# Patient Record
Sex: FEMALE | Race: WHITE | NOT HISPANIC OR LATINO | Employment: OTHER | URBAN - METROPOLITAN AREA
[De-identification: names, ages, dates, MRNs, and addresses within clinical notes are randomized per-mention and may not be internally consistent; named-entity substitution may affect disease eponyms.]

---

## 2017-01-31 ENCOUNTER — OFFICE VISIT (OUTPATIENT)
Dept: FAMILY MEDICINE | Facility: CLINIC | Age: 67
End: 2017-01-31
Payer: COMMERCIAL

## 2017-01-31 VITALS
TEMPERATURE: 97.4 F | HEART RATE: 65 BPM | SYSTOLIC BLOOD PRESSURE: 102 MMHG | BODY MASS INDEX: 24.18 KG/M2 | WEIGHT: 145.13 LBS | RESPIRATION RATE: 16 BRPM | OXYGEN SATURATION: 98 % | DIASTOLIC BLOOD PRESSURE: 70 MMHG | HEIGHT: 65 IN

## 2017-01-31 DIAGNOSIS — G25.81 RESTLESS LEGS SYNDROME (RLS): ICD-10-CM

## 2017-01-31 DIAGNOSIS — E78.2 MIXED HYPERLIPIDEMIA: ICD-10-CM

## 2017-01-31 DIAGNOSIS — M21.612 BUNION, LEFT: Primary | ICD-10-CM

## 2017-01-31 DIAGNOSIS — I10 ESSENTIAL HYPERTENSION: ICD-10-CM

## 2017-01-31 DIAGNOSIS — M19.041 OSTEOARTHRITIS OF BOTH HANDS, UNSPECIFIED OSTEOARTHRITIS TYPE: ICD-10-CM

## 2017-01-31 DIAGNOSIS — M19.042 OSTEOARTHRITIS OF BOTH HANDS, UNSPECIFIED OSTEOARTHRITIS TYPE: ICD-10-CM

## 2017-01-31 PROCEDURE — 99204 OFFICE O/P NEW MOD 45 MIN: CPT | Performed by: NURSE PRACTITIONER

## 2017-01-31 NOTE — PROGRESS NOTES
SUBJECTIVE:                                                    Nicole Stanley is a 66 year old female who presents to clinic today for the following health issues:    1.  Establishing Care, Patient has no other concerns.   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      History of bunions, does not ant to do anything about them yet as she is so active.    High cholesterol - on zocor daily for years - diet excellent, famillial  High blood pressure - on dyazide, well controlled, diet excellent also famillial  Hypothyroidism.  Low dose, many years, stable  RLS - taking klonopin 3 tablets daily was taking 1 for sleep, now has to take 2 each night.      Problem list and histories reviewed & adjusted, as indicated.  Additional history: as documented    Patient Active Problem List   Diagnosis     Essential hypertension     Mixed hyperlipidemia     Bunion, left     Osteoarthritis of both hands, unspecified osteoarthritis type     Past Surgical History   Procedure Laterality Date     Cholecystectomy       Gi surgery       rectal sphincter     Eye surgery       blepharoplasty     Vascular surgery  1988     vein stripping       Social History   Substance Use Topics     Smoking status: Never Smoker      Smokeless tobacco: Not on file     Alcohol Use: 0.0 oz/week     0 Standard drinks or equivalent per week      Comment: 1-2 glass of wine per day     Family History   Problem Relation Age of Onset     Hypertension Mother           Hyperlipidemia Mother      Family History Negative Father           Alzheimer Disease Father      Family History Negative Maternal Grandmother                No current outpatient prescriptions on file.     No Known Allergies    ROS:  Constitutional, HEENT, cardiovascular, pulmonary, GI, , musculoskeletal, neuro, skin, endocrine and psych systems are negative, except as otherwise noted.    OBJECTIVE:                                          "           /70 mmHg  Pulse 65  Temp(Src) 97.4  F (36.3  C) (Oral)  Resp 16  Ht 5' 4.5\" (1.638 m)  Wt 145 lb 2 oz (65.828 kg)  BMI 24.53 kg/m2  SpO2 98%  LMP  (LMP Unknown)  Breastfeeding? No  Body mass index is 24.53 kg/(m^2).  GENERAL: healthy, alert and no distress  EYES: Eyes grossly normal to inspection, PERRL and conjunctivae and sclerae normal  HENT: ear canals and TM's normal, nose and mouth without ulcers or lesions  RESP: lungs clear to auscultation - no rales, rhonchi or wheezes  CV: regular rate and rhythm, normal S1 S2, no S3 or S4, no murmur, click or rub, no peripheral edema and peripheral pulses strong  ABDOMEN: soft, nontender, no hepatosplenomegaly, no masses and bowel sounds normal  MS: no gross musculoskeletal defects noted, no edema  SKIN: no suspicious lesions or rashes    Diagnostic Test Results:  none      ASSESSMENT/PLAN:                                                        ICD-10-CM    1. Bunion, left M21.612    2. Essential hypertension I10    3. Mixed hyperlipidemia E78.2    4. Osteoarthritis of both hands, unspecified osteoarthritis type M19.041     M19.042    5. Restless legs syndrome (RLS) G25.81      Will monitor bunions, call when ready to take the next step with podiatry.     BP stable on meds, f/u PRN.  Labs UTD    Cholesterol stable on meds.  Due for f/u in 6 months    Monitor hands, continue exercises.      Will work with pt on weaning klonopin.  Has been increasing use  And needing more and more.    See Patient Instructions    YAAKOV Garcia [facility]  "

## 2017-02-06 ENCOUNTER — OFFICE VISIT (OUTPATIENT)
Dept: PODIATRY | Facility: CLINIC | Age: 67
End: 2017-02-06
Payer: COMMERCIAL

## 2017-02-06 ENCOUNTER — RADIANT APPOINTMENT (OUTPATIENT)
Dept: GENERAL RADIOLOGY | Facility: CLINIC | Age: 67
End: 2017-02-06
Attending: PODIATRIST
Payer: COMMERCIAL

## 2017-02-06 VITALS
SYSTOLIC BLOOD PRESSURE: 102 MMHG | HEIGHT: 65 IN | BODY MASS INDEX: 24.32 KG/M2 | WEIGHT: 146 LBS | DIASTOLIC BLOOD PRESSURE: 58 MMHG

## 2017-02-06 DIAGNOSIS — M20.5X9 HALLUX LIMITUS, UNSPECIFIED LATERALITY: ICD-10-CM

## 2017-02-06 DIAGNOSIS — M21.619 BUNION: Primary | ICD-10-CM

## 2017-02-06 PROCEDURE — 99204 OFFICE O/P NEW MOD 45 MIN: CPT | Performed by: PODIATRIST

## 2017-02-06 PROCEDURE — 73630 X-RAY EXAM OF FOOT: CPT | Mod: LT

## 2017-02-06 RX ORDER — CALCIUM CARBONATE 500(1250)
500 TABLET ORAL 2 TIMES DAILY
COMMUNITY
End: 2017-06-14

## 2017-02-06 RX ORDER — SACCHAROMYCES BOULARDII 250 MG
250 CAPSULE ORAL 2 TIMES DAILY
COMMUNITY
End: 2017-04-18

## 2017-02-06 RX ORDER — TRIAMTERENE/HYDROCHLOROTHIAZID 37.5-25 MG
1 TABLET ORAL DAILY
COMMUNITY
End: 2017-04-20

## 2017-02-06 NOTE — MR AVS SNAPSHOT
After Visit Summary   2/6/2017    Nicole Stanley    MRN: [id number]           Patient Information     Date Of Birth          1950        Visit Information        Provider Department      2/6/2017 11:20 AM Efraín Rousseau DPM [facility]        Today's Diagnoses     Bunion    -  1     Hallux limitus, unspecified laterality           Care Instructions    Over the Counter Inserts    Super Feet are the most common and easiest to find.    Locations include any [employer], [employer] in [place] on [address] and in [place] on [address], [employer] in [place] on [address], [facility] in [place] on [address], [facility] in [place] on [address], [employer] in [place] on [address] and [employer] in [place] on [address] and in [place] on [address].    Spenco can be found online and at [employer] in [place] on [address], [employer] in [facility] on [address], [employer] in [place] on [address], [employer] in [place] on [address] and [employer] in [place] on [address].    Power Step can be a little harder to find.  Locations include [employer] in [place] on [address], [employer] in [facility] [place], [employer] in [place] on [address] and online    [employer] - [employer]     [facility]    **  A good high quality over the counter insert can cost around $40-$50.     Bunion (hallux abducto valgus)   A bunion is caused by muscle imbalance. The great toe is pulled toward the smaller toes. The metatarsal head is pushed outward creating a lump on the side of your foot. Imbalance is the result of foot structure and instability.   Bunions do not improve with time. They usually enlarge, however this is a fairly slow process. Shoes do not necessarily cause bunions, however, they can hasten development and definitely cause bunions to  "hurt.   Bunions often run in families. We inherit a certain foot structure, which may be predisposed to bunion development.   Bunion pain is likely a combination of shoes rubbing on the bump, nerve irritation, compression between the toes, joint misalignment, arthritis and altered gait.   Signs & Symptoms of \"Bunions\"   Bunions are usually termed mild, moderate or severe. Just because you have a bunion does not mean you have to have pain. There are some people with very severe bunions and no pain and people with mild bunions and a lot of pain.    1.  Pain on the inside of your foot at the big toe joint (1st MTPJ)    2.  Swelling on the inside of your foot at the big toe joint    3.  Redness on the inside of your foot at the big toe joint    4.  Numbness or burning in the big toe (hallux)    5.  Decreased motion at the big toe joint    6.  Painful bursa (fluid-filled sac) on the inside of your foot at the big toe joint    7.  Pain while wearing shoes -especially shoes too narrow or with high heels     8.  Pain during activities    9.  Corn in between the big toe and second toe    10.  Callous formation on the side or bottom of the big toe or big toe joint    11.  Callous under the second toe joint (2nd MTPJ)    12.  Pain in the second toe joint   Treatment for \"Bunions\"   Conservative (non-surgical) treatment will not make the bunion go away, but it will hopefully decrease the signs and symptoms you have and help you get rid of the pain and get you back to your activities.    1.  Wider shoes    2.  Extra depth shoes    3.  Stretch shoes (where bunion is) Cut an \"x\" or cross in the shoe (where bunion is)    4.  Ice    5.  Padding, splints, toe spacers    6.  NSAIDs    7.  Arch supports    8.  Custom orthoses (orthotics)    9.  Change activities    10.  Physical therapy     11.  Surgical treatment for bunions is sometimes needed. If you are limited by pain, cannot fit in shoes comfortably and are not able to do your " daily activities then surgery may be a good option for you. There are many different surgical procedures to repair bunions. Your foot doctor (podiatrist) will review your foot exam findings, your x-rays, your age, your health, your lifestyle, your physical activity level and discuss with you which procedure he or she would recommend. Surgical procedures for bunions range from soft tissue repair to cutting and realigning the bones. It is not recommended that you have bunion surgery for cosmetic reasons (you do not like how your foot looks) or because you want to fit in a certain pair of shoes; There is the risk that even after surgery, the bunion will reoccur 9-10% of the time.   Most bunion pain can be improved simply by wearing compatible shoes. People with bunions cannot choose footwear simply because they like the style. Your bunion should determine which shoes are to be worn. Wide shoes with nonirritating seams,soft leather and a square toe box are most compatible with a bunion. Shoes should fit appropriately right out of the box but may need to be professionally stretched and modified to accommodate the bump. Heels, dress shoes and shoes with pointed toes will not be comfortable.   Shoe inserts or orthotics will often times help with bunion pain, however inserts make shoe fit more challenging. Pads placed over the bunion can also help relieve the pain. Injection may help with nerve irritation.   Bunion surgery involves cutting and repositioning the bones surrounding the bunion. Pins and screws are used to hold the bones in place during the healing process. The goal of bunion surgery is to reduce the size of the bunion bump. Realignment of the toe and joint is attempted.     Some first toes cannot be forced back into normal alignment even with surgery. Surgery is helpful in most cases but does not necessarily create a normal foot.   Healing after surgery requires about six weeks of protection. This allows the  bone to heal. Maximum recovery takes about one year. The scar tissue and joint structures require this amount of time to finish the healing process. Expect stiffness, swelling and numbness during that time frame. Bunion surgery does involve side effects. Some side effects are predictable and others are less common but do occur. A scar will be visible and could be irritated by shoes. The shoe may rub on the screw or internal pin requiring surgical removal of these fixation devices. The screw and pin would likely be left in place for a full year. The first toe may loose motion after bunion surgery. The amount of stiffness is variable. Some people never regain normal motion of the first toe. This is due to scar tissue inherent to any surgery. The first toe may drift toward the second toe or away from the second toe. Spreading of the first and second toes is a rare occurrence after bunion surgery. This can be quite bothersome and would need to be surgically repaired. Toe drift toward the second toe could result in a recurrent bunion and revision surgery. Joint fusion is one option to correct an unstable, drifting toe. This procedure straightens the toe, however, no motion remains. Fusion may be necessary to correct complications of bunion surgery or as the original procedure in severe cases.   All surgical procedures involve risk of infection, numbness, pain, delayed healing, osteotomy dislocation, blood clots, continued foot pain, etc. Bunion surgery is quite complex and should not be taken lightly.   Any skin incision can lead to infection. Deep infection might involve the bone and thus repeat surgery and six weeks of IV antibiotics. Scar tissue can cause nerve pain or numbness. This is generally temporary but can be permanent. We do not have treatments that cure nerve problems. Second toe pain could be related to altered mechanics and pressure transferred to the second toe. Most feet with bunions have pre-existing  "second toe problems. Delayed bone healing would lengthen the healing time. Some bones simply do not heal. This requires repeat surgery, electronic bone stimulation and/or extended protection. Smokers have an approximate 20% chance of poor bone healing. This is double that of a non-smoker. The bone cut may displace. This may need to be repaired with a second operation. Displacement can cause joint malalignment. Immobility after surgery can cause blood clots in the legs and lungs. This could result in death.   Foot pain is complex. Most feet hurt for more than one reason. Fixing the bunion would not necessarily create a pain free foot. Appropriate shoes, healthy body weight, avoidance of bare foot walking and moderation of activity will always be necessary to enjoy foot comfort. Your bunion may involve arthritis, which is incurable even with surgery. Long standing bunions often involve chronic irritation to the surrounding nerves. Nerve pain may not resolve even with reducing the bunion bump since permanent nerve damage may be present   Bunion surgery is nevertheless quite successful. Most surgical patients are pleased with their foot following bunion surgery. Many of the issues described above can be controlled by taking proper care of your foot during the healing process.   Cosmetic bunion surgery is discouraged for the reasons listed above. A bunion that is comfortable when wearing appropriate shoes should simply be treated with appropriate shoes.   Your surgeon would be happy to fully describe any of the above issues. You should pursue a full understanding of the operation,recovery process and any potential problems that could develop.   Prevention of \"Bunions\"    1.  Do not wear high heels if there is a family history of bunions.   2.  Wear shoes that have enough width and depth in the toe box.  Use a motion control arch support if you over-pronate (foot rolls in)         DEGENERATIVE ARTHRITIS OF THE BIG TOE " "JOINT (hallux limitus/hallux rigidus)   Arthritis of the joint at the base of the big toe (metatarsophalangeal joint) has several causes. Usually it results from repetitive trauma to the joint, secondary to abnormal foot mechanics. Often it is hereditary. However, a one-time traumatic event can lead to arthritis. The condition can worsen with time. The cartilage wears out, joint surfaces are no longer smooth, bone rubs on bone, inflammation occurs with pain, and eventually bone spurs and loose fragments might develop.   The joint is often painful with activity, worse with flimsy shoes or walking barefoot, and it slowly progresses over time. A person might notice the toe \"locking up\" with walking. There often is an obvious, and irritating, bony bump on top of the foot. Shoes might be uncomfortable. In some people the pain is so bothersome that recreational activities sometimes even normal daily activities are difficult to perform.   The pain from this arthritis is likely a combination of joint jamming, cartilage loss and inflammation, and irritation from shoes rubbing on the bump. Sometimes other parts of the foot, leg, or back hurt from altering one's walk to compensate for the painful joint.     Ways to help a person live with the discomfort include wearing a good, supportive shoe with a rigid, rocker-type bottom. An example is a hiking boot. A rigid sole minimizes bending of the joint, and therefore, joint motion and pain. Shoes with a high toe box allow for less rubbing on the bump. Avoiding barefoot walking, sandals, flip-flops and slippers usually helps.     Sometimes an insert or orthotic provides symptom relief. This might make shoe fit more difficult. Pads over the bump and occasionally injections into the joint provide relief.     Surgery for this condition is aimed towards alleviating pain. It does not cure the arthritis nor does it guarantee better joint motion. Depending on the condition of the " metatarsophalangeal joint, there are several surgical options:    1.  Cutting off the bony bump(s) and cleaning the joint    2.  Loosening the joint up by making cuts in the first metatarsal bone or the big toe bone and removing a small section of bone.    3.  Repositioning bone to minimize jamming of the joint.    4.  In severe cases, the joint is fused. By fusing the joint, it will never bend again. This resolves the pain, because it's the movement of a worn out joint that causes pain. Oftentimes the operation involves a combination of these procedures and requires the use of screws, pins, and/or a small surgical plate.     Healing after surgery requires about six weeks of protection. This allows the bone to heal. Maximum recovery takes about one year. The scar tissue and joint structures require this amount of time to finish the healing process. Expect stiffness, swelling and numbness during that time frame.   Surgery for arthritis of the metatarsophalangeal joint does involve side effects. Some side effects are predictable and others are less common but do occur. A scar will be visible and could be irritated by shoes. The shoe may rub on the screw or internal pin requiring surgical removal of these fixation devices. The screw and pin would likely be left in place for a full year. The first toe may remain stiff after surgery. The amount of stiffness is variable. Most people never regain normal motion of the first toe. This is due to scar tissue inherent to any surgery, in addition to the cumulative effects of arthritis. Sometimes the big toe drifts to one side or the other. Joint fusion is one option to correct an unstable, drifting toe.   All surgical procedures involve risk of infection, numbness, pain, delayed bone healing, osteotomy (bone cut) dislocation, blood clots, continued foot pain, etc. Arthritic joint surgery is quite complex and should not be taken lightly.    Any skin incision can lead to  infection. Deep infection might involve the bone and thus repeat surgery and six weeks of IV antibiotics. Scar tissue can cause nerve pain or numbness. This is generally temporary but can be permanent. We do not have treatments that cure nerve problems. Second toe pain could be related to altered mechanics and pressure transferred to the second toe. Delayed bone healing would lengthen the healing time. Some bones simply do not heal. This requires repeat surgery, electronic bone stimulation and/or extended protection. Smokers have an approximate 20% chance of poor bone healing. This is double that of a non-smoker. The bone cut may displace. This may need to be repaired with a second operation. Displacement can cause joint malalignment. Immobility after surgery can cause a blood clot in the legs and lungs. This could result in death.   Foot pain is complex. Most feet hurt for more than one reason. Operating on the arthritic   big toe joint will not necessarily create a pain free foot. Appropriate shoes, healthy body weight, avoidance of bare foot walking and moderation of activity will always be necessary to enjoy foot comfort. Arthritis is incurable even with surgery.     Surgery for this type of arthritis is nevertheless quite successful. Most surgical patients are pleased with their foot following surgery. Many of the issues described above can be controlled by taking proper care of your foot during the healing process.   Cosmetic bump surgery is discouraged for the reasons listed above. A bump and joint that is comfortable when wearing appropriate shoes should simply be treated with appropriate shoes.   Your surgeon would be happy to fully describe any of the above issues. You should pursue a full understanding of the operation, recovery process and any potential problems that could develop.         Surgical planning.  If you have decided to have surgery, follow these few steps to get the procedure scheduled and  "to have the proper paperwork filled out.  If you are unsure about surgery, or would like to sit down and further discuss your issue and treatment options, please make a clinic appointment with Dr Rousseau.    1.  Pick the date that you would like to have surgery.  Keep in mind that you will likely need at least 2 weeks off after the procedure for proper rest and healing.    2.  Call the surgery scheduling line at [phone] to get the procedure scheduled.    3.  Make an appointment to see Dr Rousseau within 1 week of the date of surgery for your pre-operative consult.  When making the appointment, say \"I need to make a 40 minute surgical consult with Dr Rousseau\".  It is recommended that you bring a spouse, family member or friend with you.  There will be lots of information presented.  It can be overwhelming, and it is better to have someone there to help sort out the details.    4.  Make an appointment to see your Primary Care Physician within 4 weeks of the date of surgery for your \"Pre-operative History and Physical\".  This is done to make sure you are medically healthy to undergo surgery.        If you have any post-operative questions regarding your procedure, call our triage nurses at [phone].          Follow-ups after your visit        Follow-up notes from your care team     Return if symptoms worsen or fail to improve.      Who to contact     If you have questions or need follow up information about today's clinic visit or your schedule please contact [facility] directly at [phone].  Normal or non-critical lab and imaging results will be communicated to you by MyChart, letter or phone within 4 business days after the clinic has received the results. If you do not hear from us within 7 days, please contact the clinic through [employer] or phone. If you have a critical or abnormal lab result, we will notify you by phone as soon as possible.  Submit refill requests through [employer] " "or call your pharmacy and they will forward the refill request to us. Please allow 3 business days for your refill to be completed.          Additional Information About Your Visit        MyChart Information     [employer] lets you send messages to your doctor, view your test results, renew your prescriptions, schedule appointments and more. To sign up, go to [email or web address] . Click on \"Log in\" on the left side of the screen, which will take you to the Welcome page. Then click on \"Sign up Now\" on the right side of the page.     You will be asked to enter the access code listed below, as well as some personal information. Please follow the directions to create your username and password.     Your access code is: [email or web address]  Expires: 2017  1:55 PM     Your access code will  in 90 days. If you need help or a new code, please call your [facility] clinic or [phone].        Care EveryWhere ID     This is your Care EveryWhere ID. This could be used by other organizations to access your [facility] medical records  [id number]        Your Vitals Were     Height BMI (Body Mass Index) Last Period             5' 4.5\" (1.638 m) 24.68 kg/m2 (LMP Unknown)          Blood Pressure from Last 3 Encounters:   17 102/58   17 102/70    Weight from Last 3 Encounters:   17 146 lb (66.225 kg)   17 145 lb 2 oz (65.828 kg)              We Performed the Following     XR Foot Left G/E 3 Views        Primary Care Provider    None Specified       No primary provider on file.        Thank you!     Thank you for choosing [facility]  for your care. Our goal is always to provide you with excellent care. Hearing back from our patients is one way we can continue to improve our services. Please take a few minutes to complete the written survey that you may receive in the mail after your visit with us. Thank you!             Your Updated Medication List - Protect others around you: Learn how " to safely use, store and throw away your medicines at www.disposemymeds.org.          This list is accurate as of: 2/6/17  1:55 PM.  Always use your most recent med list.                   Brand Name Dispense Instructions for use    ASPIRIN PO      Take 81 mg by mouth daily       calcium carbonate 500 MG tablet    OS-ELISE 500 mg [place]     Take 500 mg by mouth 2 times daily       CLONAZEPAM PO      Take 0.5 mg by mouth 3 times daily       LEVOTHROID PO      Take 50 mcg by mouth       NAPROSYN PO      Take 220 mg by mouth 2 times daily (with meals)       saccharomyces boulardii 250 MG capsule    FLORASTOR     Take 250 mg by mouth 2 times daily       triamterene-hydrochlorothiazide 37.5-25 MG per tablet    MAXZIDE-25     Take 1 tablet by mouth daily

## 2017-02-06 NOTE — NURSING NOTE
"No chief complaint on file.      Initial /58 mmHg  Ht 5' 4.5\" (1.638 m)  Wt 146 lb (66.225 kg)  BMI 24.68 kg/m2  LMP  (LMP Unknown) Estimated body mass index is 24.68 kg/(m^2) as calculated from the following:    Height as of this encounter: 5' 4.5\" (1.638 m).    Weight as of this encounter: 146 lb (66.225 kg).  Medication Reconciliation: complete    regular cuff    Lina Krause CMA        "

## 2017-02-06 NOTE — PATIENT INSTRUCTIONS
"Over the Counter Inserts    Super Feet are the most common and easiest to find.    Locations include any [employer], [employer] in [place] on [address] and in [place] on [address], [employer] in [facility] on [address], [employer] [facility] in [facility] on [address], [facility] in [place] on [address], [employer] in [place] on [address] and [employer] in [facility] on [address] and in [place] on [address].    Spenco can be found online and at [employer] in [facility] on [address], [employer] in [facility] on [address], [employer] in [place] on [address], [employer] in [place] on [address] and [employer] in [place] on [address].    Power Step can be a little harder to find.  Locations include [employer] in [place] on [address], [place] in [facility], [employer] in [place] on [address] and online    [employer]     [facility]    **  A good high quality over the counter insert can cost around $40-$50.     Bunion (hallux abducto valgus)   A bunion is caused by muscle imbalance. The great toe is pulled toward the smaller toes. The metatarsal head is pushed outward creating a lump on the side of your foot. Imbalance is the result of foot structure and instability.   Bunions do not improve with time. They usually enlarge, however this is a fairly slow process. Shoes do not necessarily cause bunions, however, they can hasten development and definitely cause bunions to hurt.   Bunions often run in families. We inherit a certain foot structure, which may be predisposed to bunion development.   Bunion pain is likely a combination of shoes rubbing on the bump, nerve irritation, compression between the toes, joint misalignment, arthritis and altered gait.   Signs & Symptoms of \"Bunions\"   Bunions are usually termed mild, moderate or severe. Just because you have a " "bunion does not mean you have to have pain. There are some people with very severe bunions and no pain and people with mild bunions and a lot of pain.    1.  Pain on the inside of your foot at the big toe joint (1st MTPJ)    2.  Swelling on the inside of your foot at the big toe joint    3.  Redness on the inside of your foot at the big toe joint    4.  Numbness or burning in the big toe (hallux)    5.  Decreased motion at the big toe joint    6.  Painful bursa (fluid-filled sac) on the inside of your foot at the big toe joint    7.  Pain while wearing shoes -especially shoes too narrow or with high heels     8.  Pain during activities    9.  Corn in between the big toe and second toe    10.  Callous formation on the side or bottom of the big toe or big toe joint    11.  Callous under the second toe joint (2nd MTPJ)    12.  Pain in the second toe joint   Treatment for \"Bunions\"   Conservative (non-surgical) treatment will not make the bunion go away, but it will hopefully decrease the signs and symptoms you have and help you get rid of the pain and get you back to your activities.    1.  Wider shoes    2.  Extra depth shoes    3.  Stretch shoes (where bunion is) Cut an \"x\" or cross in the shoe (where bunion is)    4.  Ice    5.  Padding, splints, toe spacers    6.  NSAIDs    7.  Arch supports    8.  Custom orthoses (orthotics)    9.  Change activities    10.  Physical therapy     11.  Surgical treatment for bunions is sometimes needed. If you are limited by pain, cannot fit in shoes comfortably and are not able to do your daily activities then surgery may be a good option for you. There are many different surgical procedures to repair bunions. Your foot doctor (podiatrist) will review your foot exam findings, your x-rays, your age, your health, your lifestyle, your physical activity level and discuss with you which procedure he or she would recommend. Surgical procedures for bunions range from soft tissue repair to " cutting and realigning the bones. It is not recommended that you have bunion surgery for cosmetic reasons (you do not like how your foot looks) or because you want to fit in a certain pair of shoes; There is the risk that even after surgery, the bunion will reoccur 9-10% of the time.   Most bunion pain can be improved simply by wearing compatible shoes. People with bunions cannot choose footwear simply because they like the style. Your bunion should determine which shoes are to be worn. Wide shoes with nonirritating seams,soft leather and a square toe box are most compatible with a bunion. Shoes should fit appropriately right out of the box but may need to be professionally stretched and modified to accommodate the bump. Heels, dress shoes and shoes with pointed toes will not be comfortable.   Shoe inserts or orthotics will often times help with bunion pain, however inserts make shoe fit more challenging. Pads placed over the bunion can also help relieve the pain. Injection may help with nerve irritation.   Bunion surgery involves cutting and repositioning the bones surrounding the bunion. Pins and screws are used to hold the bones in place during the healing process. The goal of bunion surgery is to reduce the size of the bunion bump. Realignment of the toe and joint is attempted.     Some first toes cannot be forced back into normal alignment even with surgery. Surgery is helpful in most cases but does not necessarily create a normal foot.   Healing after surgery requires about six weeks of protection. This allows the bone to heal. Maximum recovery takes about one year. The scar tissue and joint structures require this amount of time to finish the healing process. Expect stiffness, swelling and numbness during that time frame. Bunion surgery does involve side effects. Some side effects are predictable and others are less common but do occur. A scar will be visible and could be irritated by shoes. The shoe may rub  on the screw or internal pin requiring surgical removal of these fixation devices. The screw and pin would likely be left in place for a full year. The first toe may loose motion after bunion surgery. The amount of stiffness is variable. Some people never regain normal motion of the first toe. This is due to scar tissue inherent to any surgery. The first toe may drift toward the second toe or away from the second toe. Spreading of the first and second toes is a rare occurrence after bunion surgery. This can be quite bothersome and would need to be surgically repaired. Toe drift toward the second toe could result in a recurrent bunion and revision surgery. Joint fusion is one option to correct an unstable, drifting toe. This procedure straightens the toe, however, no motion remains. Fusion may be necessary to correct complications of bunion surgery or as the original procedure in severe cases.   All surgical procedures involve risk of infection, numbness, pain, delayed healing, osteotomy dislocation, blood clots, continued foot pain, etc. Bunion surgery is quite complex and should not be taken lightly.   Any skin incision can lead to infection. Deep infection might involve the bone and thus repeat surgery and six weeks of IV antibiotics. Scar tissue can cause nerve pain or numbness. This is generally temporary but can be permanent. We do not have treatments that cure nerve problems. Second toe pain could be related to altered mechanics and pressure transferred to the second toe. Most feet with bunions have pre-existing second toe problems. Delayed bone healing would lengthen the healing time. Some bones simply do not heal. This requires repeat surgery, electronic bone stimulation and/or extended protection. Smokers have an approximate 20% chance of poor bone healing. This is double that of a non-smoker. The bone cut may displace. This may need to be repaired with a second operation. Displacement can cause joint  "malalignment. Immobility after surgery can cause blood clots in the legs and lungs. This could result in death.   Foot pain is complex. Most feet hurt for more than one reason. Fixing the bunion would not necessarily create a pain free foot. Appropriate shoes, healthy body weight, avoidance of bare foot walking and moderation of activity will always be necessary to enjoy foot comfort. Your bunion may involve arthritis, which is incurable even with surgery. Long standing bunions often involve chronic irritation to the surrounding nerves. Nerve pain may not resolve even with reducing the bunion bump since permanent nerve damage may be present   Bunion surgery is nevertheless quite successful. Most surgical patients are pleased with their foot following bunion surgery. Many of the issues described above can be controlled by taking proper care of your foot during the healing process.   Cosmetic bunion surgery is discouraged for the reasons listed above. A bunion that is comfortable when wearing appropriate shoes should simply be treated with appropriate shoes.   Your surgeon would be happy to fully describe any of the above issues. You should pursue a full understanding of the operation,recovery process and any potential problems that could develop.   Prevention of \"Bunions\"    1.  Do not wear high heels if there is a family history of bunions.   2.  Wear shoes that have enough width and depth in the toe box.  Use a motion control arch support if you over-pronate (foot rolls in)         DEGENERATIVE ARTHRITIS OF THE BIG TOE JOINT (hallux limitus/hallux rigidus)   Arthritis of the joint at the base of the big toe (metatarsophalangeal joint) has several causes. Usually it results from repetitive trauma to the joint, secondary to abnormal foot mechanics. Often it is hereditary. However, a one-time traumatic event can lead to arthritis. The condition can worsen with time. The cartilage wears out, joint surfaces are no " "longer smooth, bone rubs on bone, inflammation occurs with pain, and eventually bone spurs and loose fragments might develop.   The joint is often painful with activity, worse with flimsy shoes or walking barefoot, and it slowly progresses over time. A person might notice the toe \"locking up\" with walking. There often is an obvious, and irritating, bony bump on top of the foot. Shoes might be uncomfortable. In some people the pain is so bothersome that recreational activities sometimes even normal daily activities are difficult to perform.   The pain from this arthritis is likely a combination of joint jamming, cartilage loss and inflammation, and irritation from shoes rubbing on the bump. Sometimes other parts of the foot, leg, or back hurt from altering one's walk to compensate for the painful joint.     Ways to help a person live with the discomfort include wearing a good, supportive shoe with a rigid, rocker-type bottom. An example is a hiking boot. A rigid sole minimizes bending of the joint, and therefore, joint motion and pain. Shoes with a high toe box allow for less rubbing on the bump. Avoiding barefoot walking, sandals, flip-flops and slippers usually helps.     Sometimes an insert or orthotic provides symptom relief. This might make shoe fit more difficult. Pads over the bump and occasionally injections into the joint provide relief.     Surgery for this condition is aimed towards alleviating pain. It does not cure the arthritis nor does it guarantee better joint motion. Depending on the condition of the metatarsophalangeal joint, there are several surgical options:    1.  Cutting off the bony bump(s) and cleaning the joint    2.  Loosening the joint up by making cuts in the first metatarsal bone or the big toe bone and removing a small section of bone.    3.  Repositioning bone to minimize jamming of the joint.    4.  In severe cases, the joint is fused. By fusing the joint, it will never bend again. " This resolves the pain, because it's the movement of a worn out joint that causes pain. Oftentimes the operation involves a combination of these procedures and requires the use of screws, pins, and/or a small surgical plate.     Healing after surgery requires about six weeks of protection. This allows the bone to heal. Maximum recovery takes about one year. The scar tissue and joint structures require this amount of time to finish the healing process. Expect stiffness, swelling and numbness during that time frame.   Surgery for arthritis of the metatarsophalangeal joint does involve side effects. Some side effects are predictable and others are less common but do occur. A scar will be visible and could be irritated by shoes. The shoe may rub on the screw or internal pin requiring surgical removal of these fixation devices. The screw and pin would likely be left in place for a full year. The first toe may remain stiff after surgery. The amount of stiffness is variable. Most people never regain normal motion of the first toe. This is due to scar tissue inherent to any surgery, in addition to the cumulative effects of arthritis. Sometimes the big toe drifts to one side or the other. Joint fusion is one option to correct an unstable, drifting toe.   All surgical procedures involve risk of infection, numbness, pain, delayed bone healing, osteotomy (bone cut) dislocation, blood clots, continued foot pain, etc. Arthritic joint surgery is quite complex and should not be taken lightly.    Any skin incision can lead to infection. Deep infection might involve the bone and thus repeat surgery and six weeks of IV antibiotics. Scar tissue can cause nerve pain or numbness. This is generally temporary but can be permanent. We do not have treatments that cure nerve problems. Second toe pain could be related to altered mechanics and pressure transferred to the second toe. Delayed bone healing would lengthen the healing time. Some  bones simply do not heal. This requires repeat surgery, electronic bone stimulation and/or extended protection. Smokers have an approximate 20% chance of poor bone healing. This is double that of a non-smoker. The bone cut may displace. This may need to be repaired with a second operation. Displacement can cause joint malalignment. Immobility after surgery can cause a blood clot in the legs and lungs. This could result in death.   Foot pain is complex. Most feet hurt for more than one reason. Operating on the arthritic   big toe joint will not necessarily create a pain free foot. Appropriate shoes, healthy body weight, avoidance of bare foot walking and moderation of activity will always be necessary to enjoy foot comfort. Arthritis is incurable even with surgery.     Surgery for this type of arthritis is nevertheless quite successful. Most surgical patients are pleased with their foot following surgery. Many of the issues described above can be controlled by taking proper care of your foot during the healing process.   Cosmetic bump surgery is discouraged for the reasons listed above. A bump and joint that is comfortable when wearing appropriate shoes should simply be treated with appropriate shoes.   Your surgeon would be happy to fully describe any of the above issues. You should pursue a full understanding of the operation, recovery process and any potential problems that could develop.         Surgical planning.  If you have decided to have surgery, follow these few steps to get the procedure scheduled and to have the proper paperwork filled out.  If you are unsure about surgery, or would like to sit down and further discuss your issue and treatment options, please make a clinic appointment with Dr Rousseau.    1.  Pick the date that you would like to have surgery.  Keep in mind that you will likely need at least 2 weeks off after the procedure for proper rest and healing.    2.  Call the surgery scheduling  "line at [phone] to get the procedure scheduled.    3.  Make an appointment to see Dr Rousseau within 1 week of the date of surgery for your pre-operative consult.  When making the appointment, say \"I need to make a 40 minute surgical consult with Dr Rousseau\".  It is recommended that you bring a spouse, family member or friend with you.  There will be lots of information presented.  It can be overwhelming, and it is better to have someone there to help sort out the details.    4.  Make an appointment to see your Primary Care Physician within 4 weeks of the date of surgery for your \"Pre-operative History and Physical\".  This is done to make sure you are medically healthy to undergo surgery.        If you have any post-operative questions regarding your procedure, call our triage nurses at [phone].    "

## 2017-02-06 NOTE — PROGRESS NOTES
PATIENT HISTORY:  Nicole Stanley is a 66 year old female who presents to clinic for b/l 1st MPJ pain, L>R.  Longstanding hx of bunions.  They run in her family.  Pain worse with activity.  She has tried wider shoes with mixed results.  0-10/10 pain.   She is considering surgery.  15 year duration.  No injury.      Review of Systems:  Patient denies fever, chills, rash, wound, stiffness, limping, numbness, weakness, heart burn, blood in stool, chest pain with activity, calf pain when walking, shortness of breath with activity, chronic cough, easy bleeding/bruising, swelling of ankles, excessive thirst, fatigue, depression, anxiety.      PAST MEDICAL HISTORY:   Past Medical History   Diagnosis Date     Arthritis      osteoarth        PAST SURGICAL HISTORY:   Past Surgical History   Procedure Laterality Date     Cholecystectomy  2000     Gi surgery  2006     rectal sphincter     Eye surgery       blepharoplasty     Vascular surgery  1988     vein stripping        MEDICATIONS:   Current outpatient prescriptions:      Levothyroxine Sodium (LEVOTHROID PO), Take 50 mcg by mouth, Disp: , Rfl:      triamterene-hydrochlorothiazide (MAXZIDE-25) 37.5-25 MG per tablet, Take 1 tablet by mouth daily, Disp: , Rfl:      CLONAZEPAM PO, Take 0.5 mg by mouth 3 times daily, Disp: , Rfl:      saccharomyces boulardii (FLORASTOR) 250 MG capsule, Take 250 mg by mouth 2 times daily, Disp: , Rfl:      Naproxen (NAPROSYN PO), Take 220 mg by mouth 2 times daily (with meals), Disp: , Rfl:      calcium carbonate (OS-ELISE 500 MG [place]) 500 MG tablet, Take 500 mg by mouth 2 times daily, Disp: , Rfl:      ASPIRIN PO, Take 81 mg by mouth daily, Disp: , Rfl:      ALLERGIES:  No Known Allergies     SOCIAL HISTORY:   Social History     Social History     Marital Status:      Spouse Name: N/A     Number of Children: N/A     Years of Education: N/A     Occupational History     Not on file.     Social History Main Topics     Smoking status: Never  "Smoker      Smokeless tobacco: Not on file     Alcohol Use: 0.0 oz/week     0 Standard drinks or equivalent per week      Comment: 1-2 glass of wine per day     Drug Use: No     Sexual Activity:     Partners: Male     Other Topics Concern     Not on file     Social History Narrative        FAMILY HISTORY:   Family History   Problem Relation Age of Onset     Hypertension Mother           Hyperlipidemia Mother      Family History Negative Father           Alzheimer Disease Father      Family History Negative Maternal Grandmother               EXAM:Vitals: /58 mmHg  Ht 5' 4.5\" (1.638 m)  Wt 146 lb (66.225 kg)  BMI 24.68 kg/m2  LMP  (LMP Unknown)  BMI= Body mass index is 24.68 kg/(m^2).    General appearance: Patient is alert and fully cooperative with history & exam.  No sign of distress is noted during the visit.     Psychiatric: Affect is pleasant & appropriate.  Patient appears motivated to improve health.     Respiratory: Breathing is regular & unlabored while sitting.     HEENT: Hearing is intact to spoken word.  Speech is clear.  No gross evidence of visual impairment that would impact ambulation.     Dermatologic: Skin is intact to both lower extremities without significant lesions, rash or abrasion.  No paronychia or evidence of soft tissue infection is noted.     Vascular: DP & PT pulses are intact & regular bilaterally.  No significant edema or varicosities noted.  CFT and skin temperature are normal to both lower extremities.     Neurologic: Lower extremity sensation is intact to light touch.  No evidence of weakness or contracture in the lower extremities.  No evidence of neuropathy.     Musculoskeletal:  B/l bunions noted with palpable 1st MPJ periarticular bone spurring. Mild limitation of joint ROM, L more limited than R.  Pain to medial eminence b/l.  Mild left 2nd hammertoe, not painful.  Patient is ambulatory without assistive device or brace.  No gross ankle " deformity noted.  No foot or ankle joint effusion is noted.    XRs of left foot reviewed with pt.  No acute findings.  Increased 1st IM angle.  Degenerative changes at 1st MPJ.     ASSESSMENT:   Left foot arthritic bunion     PLAN:  Reviewed patient's chart in epic.  Discussed condition and treatment options including pros and cons.    Surgical and non-surgical treatment options for hallux limitus and bunion were discussed.  This includes my philosophy about different procedures including cheilectomy, osteotomy and fusion.  I explained how fusion is for late stage treatment of advanced arthritis.  There is no certainty that the non-fusion procedures will improve joint pain that is caused by arthritis.  I explained that hallux limitus is oftentimes surgically treated in a staged manner.  This means that additional surgery may be necessary over time.  Non-operative treatments like wide shoes, stiff soles, orthotics, injection and NSAIDs were discussed.  Shoes that provide extra room for the enlarged joint should be helpful.  I would anticipate somewhat short term benefit from joint injection.  I don't think pad/splints will help.    Surgery discussed in detail including recovery requirements.  I think 1st MPJ fusion would be a good option for her.  6-10 wks NWB required.  She is considering this fall.    She will try wide shoes, otc inserts for now (superfeet advised).    F/u prn.      Efraín Rousseau DPM, FACFAS

## 2017-04-18 ENCOUNTER — OFFICE VISIT (OUTPATIENT)
Dept: FAMILY MEDICINE | Facility: CLINIC | Age: 67
End: 2017-04-18
Payer: COMMERCIAL

## 2017-04-18 VITALS
BODY MASS INDEX: 24.37 KG/M2 | RESPIRATION RATE: 16 BRPM | DIASTOLIC BLOOD PRESSURE: 83 MMHG | HEIGHT: 65 IN | OXYGEN SATURATION: 99 % | WEIGHT: 146.25 LBS | TEMPERATURE: 97 F | HEART RATE: 69 BPM | SYSTOLIC BLOOD PRESSURE: 123 MMHG

## 2017-04-18 DIAGNOSIS — G47.00 INSOMNIA, UNSPECIFIED TYPE: ICD-10-CM

## 2017-04-18 DIAGNOSIS — Z00.00 ROUTINE GENERAL MEDICAL EXAMINATION AT A HEALTH CARE FACILITY: Primary | ICD-10-CM

## 2017-04-18 PROCEDURE — 99397 PER PM REEVAL EST PAT 65+ YR: CPT | Performed by: NURSE PRACTITIONER

## 2017-04-18 RX ORDER — TRAZODONE HYDROCHLORIDE 50 MG/1
50 TABLET, FILM COATED ORAL
Qty: 90 TABLET | Refills: 1 | Status: SHIPPED | OUTPATIENT
Start: 2017-04-18 | End: 2017-05-19

## 2017-04-18 NOTE — PROGRESS NOTES
SUBJECTIVE:                                                            Nicole Stanley is a 66 year old female who presents for Preventive Visit.  Are you in the first 12 months of your Medicare coverage?  No    Physical   Annual:     Getting at least 3 servings of Calcium per day::  Yes    Bi-annual eye exam::  Yes    Dental care twice a year::  Yes    Sleep apnea or symptoms of sleep apnea::  Daytime drowsiness    Diet::  Regular (no restrictions)    Frequency of exercise::  6-7 days/week    Duration of exercise::  45-60 minutes    Taking medications regularly::  Yes    Medication side effects::  Muscle aches and Other    Additional concerns today::  YES      COGNITIVE SCREEN  1) Repeat 3 items (Banana, Sunrise, Chair)    2) Clock draw: NORMAL  3) 3 item recall: Recalls 2 objects   Results: NORMAL clock, 1-2 items recalled: COGNITIVE IMPAIRMENT LESS LIKELY    Mini-CogTM Copyright S Tr. Licensed by the author for use in [facility]; reprinted with permission (london@[facility]). All rights reserved.        Reviewed and updated as needed this visit by clinical staff  Tobacco  Allergies  Meds  Problems  Med Hx  Surg Hx  Fam Hx  Soc Hx          Reviewed and updated as needed this visit by Provider  Allergies  Meds  Problems  Surg Hx  Fam Hx        Social History   Substance Use Topics     Smoking status: Never Smoker     Smokeless tobacco: Not on file     Alcohol use 0.0 oz/week     0 Standard drinks or equivalent per week      Comment: 1-2 glass of wine per day       The patient does not drink >3 drinks per day nor >7 drinks per week.          Today's PHQ-2 Score:   PHQ-2 ( 1999 Pfizer) 4/18/2017   Little interest or pleasure in doing things Not at all   Feeling down, depressed or hopeless Not at all   PHQ-2 Score 0       Do you feel safe in your environment - Yes    Do you have a Health Care Directive?: No: Advance care planning reviewed with patient; information given to patient to  "review.    Current providers sharing in care for this patient include:   No care team member to display      Hearing impairment: Yes, Difficulty following a conversation in a noisy restaurant or crowded room.    Ability to successfully perform activities of daily living: Yes, no assistance needed     Fall risk:  Fall Risk Assessment not completed.    Home safety:  throw rugs in the hallway  click delete button to remove this line now    The following health maintenance items are reviewed in Epic and correct as of today:  Health Maintenance   Topic Date Due     ADVANCE DIRECTIVE PLANNING Q5 YRS (NO INBASKET)  05/31/1967     HEPATITIS C SCREENING  05/31/1967     FALL RISK ASSESSMENT  05/31/2014     PNEUMOCOCCAL (1 of 2 - PCV13) 05/31/2014     INFLUENZA VACCINE (SYSTEM ASSIGNED)  09/01/2017     MAMMO SCREEN Q2 YR (SYSTEM ASSIGNED)  04/13/2018     COLON CANCER SCREEN (SYSTEM ASSIGNED)  09/08/2020     LIPID SCREEN Q5 YR FEMALE (SYSTEM ASSIGNED)  04/08/2021     TETANUS IMMUNIZATION (SYSTEM ASSIGNED)  04/27/2025     DEXA SCAN SCREENING (SYSTEM ASSIGNED)  Completed         Mammogram Screening: Patient over age 50, mutual decision to screen reflected in health maintenance.     ROS:  Constitutional, HEENT, cardiovascular, pulmonary, GI, , musculoskeletal, neuro, skin, endocrine and psych systems are negative, except as otherwise noted.    Problem list, Medication list, Allergies, and Medical/Social/Surgical histories reviewed in [facility] and updated as appropriate.  OBJECTIVE:                                                            /83 (BP Location: Left arm, Patient Position: Chair, Cuff Size: Adult Regular)  Pulse 69  Temp 97  F (36.1  C) (Oral)  Resp 16  Ht 5' 4.5\" (1.638 m)  Wt 146 lb 4 oz (66.3 kg)  LMP  (LMP Unknown)  SpO2 99%  Breastfeeding? No  BMI 24.72 kg/m2 Estimated body mass index is 24.72 kg/(m^2) as calculated from the following:    Height as of this encounter: 5' 4.5\" (1.638 m).    Weight as of " this encounter: 146 lb 4 oz (66.3 kg).  EXAM:   GENERAL APPEARANCE: healthy, alert and no distress  EYES: Eyes grossly normal to inspection, PERRL and conjunctivae and sclerae normal  HENT: ear canals and TM's normal, nose and mouth without ulcers or lesions, oropharynx clear and oral mucous membranes moist  NECK: no adenopathy, no asymmetry, masses, or scars and thyroid normal to palpation  RESP: lungs clear to auscultation - no rales, rhonchi or wheezes  BREAST: normal without masses, tenderness or nipple discharge and no palpable axillary masses or adenopathy  CV: regular rate and rhythm, normal S1 S2, no S3 or S4, no murmur, click or rub, no peripheral edema and peripheral pulses strong  ABDOMEN: soft, nontender, no hepatosplenomegaly, no masses and bowel sounds normal  MS: no musculoskeletal defects are noted and gait is age appropriate without ataxia  SKIN: no suspicious lesions or rashes  NEURO: Normal strength and tone, sensory exam grossly normal, mentation intact and speech normal  PSYCH: mentation appears normal and affect normal/bright    ASSESSMENT / PLAN:                                                                ICD-10-CM    1. Routine general medical examination at a health care facility Z00.00 **Glucose FUTURE 1yr     **Lipid panel reflex to direct LDL FUTURE 1yr     **TSH with free T4 reflex FUTURE 1yr     Hepatitis C antibody   2. Insomnia, unspecified type G47.00 traZODone (DESYREL) 50 MG tablet      well female, not fasting for labs.  Encouraged to continue exercise and healthy diet choices.      Weaned off 3 klonopin a day, still not sleeping.  Will trial trazodone.  I discussed the concepts and specifics of good sleep hygiene.  she will do her best to implement these recommendations.    End of Life Planning:  Patient currently has an advanced directive: No.  I have verified the patient's ablity to prepare an advanced directive/make health care decisions.  Literature was provided to assist  "patient in preparing an advanced directive.    COUNSELING:  Reviewed preventive health counseling, as reflected in patient instructions        Estimated body mass index is 24.72 kg/(m^2) as calculated from the following:    Height as of this encounter: 5' 4.5\" (1.638 m).    Weight as of this encounter: 146 lb 4 oz (66.3 kg).     reports that she has never smoked. She does not have any smokeless tobacco history on file.      Appropriate preventive services were discussed with this patient, including applicable screening as appropriate for cardiovascular disease, diabetes, osteopenia/osteoporosis, and glaucoma.  As appropriate for age/gender, discussed screening for colorectal cancer, prostate cancer, breast cancer, and cervical cancer. Checklist reviewing preventive services available has been given to the patient.    Reviewed patients plan of care and provided an AVS. The Basic Care Plan (routine screening as documented in Health Maintenance) for Nicole meets the Care Plan requirement. This Care Plan has been established and reviewed with the Patient.    Counseling Resources:  ATP IV Guidelines  Pooled Cohorts Equation Calculator  Breast Cancer Risk Calculator  FRAX Risk Assessment  ICSI Preventive Guidelines  Dietary Guidelines for Americans, 2010  USDA's MyPlate  ASA Prophylaxis  Lung CA Screening    YAAKOV Garcia CNP  [facility]  "

## 2017-04-18 NOTE — MR AVS SNAPSHOT
After Visit Summary   4/18/2017    Nicole Stanley    MRN: [id number]           Patient Information     Date Of Birth          1950        Visit Information        Provider Department      4/18/2017 1:40 PM Adriana Stern APRN [facility]        Today's Diagnoses     Routine general medical examination at a health care facility    -  1    Insomnia, unspecified type          Care Instructions      Preventive Health Recommendations    Female Ages 65 +    Yearly exam:     See your health care provider every year in order to  o Review health changes.   o Discuss preventive care.    o Review your medicines if your doctor has prescribed any.      You no longer need a yearly Pap test unless you've had an abnormal Pap test in the past 10 years. If you have vaginal symptoms, such as bleeding or discharge, be sure to talk with your provider about a Pap test.      Every 1 to 2 years, have a mammogram.  If you are over 69, talk with your health care provider about whether or not you want to continue having screening mammograms.      Every 10 years, have a colonoscopy. Or, have a yearly FIT test (stool test). These exams will check for colon cancer.       Have a cholesterol test every 5 years, or more often if your doctor advises it.       Have a diabetes test (fasting glucose) every three years. If you are at risk for diabetes, you should have this test more often.       At age 65, have a bone density scan (DEXA) to check for osteoporosis (brittle bone disease).    Shots:    Get a flu shot each year.    Get a tetanus shot every 10 years.    Talk to your doctor about your pneumonia vaccines. There are now two you should receive - Pneumovax (PPSV 23) and Prevnar (PCV 13).    Talk to your doctor about the shingles vaccine.    Talk to your doctor about the hepatitis B vaccine.    Nutrition:     Eat at least 5 servings of fruits and vegetables each day.      Eat whole-grain bread,  whole-wheat pasta and brown rice instead of white grains and rice.      Talk to your provider about Calcium and Vitamin D.     Lifestyle    Exercise at least 150 minutes a week (30 minutes a day, 5 days a week). This will help you control your weight and prevent disease.      Limit alcohol to one drink per day.      No smoking.       Wear sunscreen to prevent skin cancer.       See your dentist twice a year for an exam and cleaning.      See your eye doctor every 1 to 2 years to screen for conditions such as glaucoma, macular degeneration and cataracts.        Follow-ups after your visit        Your next 10 appointments already scheduled     Apr 25, 2017  8:45 AM CDT   LAB with [facility]   [facility]    [address] [address] [phone]           Patient must bring picture ID.  Patient should be prepared to give a urine specimen  Please do not eat 10-12 hours before your appointment if you are coming in fasting for labs on lipids, cholesterol, or glucose (sugar).  Pregnant women should follow their Care Team instructions. Water with medications is okay. Do not drink coffee or other fluids.   If you have concerns about taking  your medications, please ask at office or if scheduling via [employer], send a message by clicking on Secure Messaging, Message Your Care Team.              Future tests that were ordered for you today     Open Future Orders        Priority Expected Expires Ordered    **Glucose FUTURE 1yr Routine 3/19/2018 4/18/2018 4/18/2017    **Lipid panel reflex to direct LDL FUTURE 1yr Routine 3/19/2018 4/18/2018 4/18/2017    **TSH with free T4 reflex FUTURE 1yr Routine 3/19/2018 4/18/2018 4/18/2017    Hepatitis C antibody Routine  4/18/2018 4/18/2017            Who to contact     If you have questions or need follow up information about today's clinic visit or your schedule please contact [facility] directly at  "[phone].  Normal or non-critical lab and imaging results will be communicated to you by MyChart, letter or phone within 4 business days after the clinic has received the results. If you do not hear from us within 7 days, please contact the clinic through [employer] or phone. If you have a critical or abnormal lab result, we will notify you by phone as soon as possible.  Submit refill requests through [employer] or call your pharmacy and they will forward the refill request to us. Please allow 3 business days for your refill to be completed.          Additional Information About Your Visit        [employer] Information     [employer] gives you secure access to your electronic health record. If you see a primary care provider, you can also send messages to your care team and make appointments. If you have questions, please call your primary care clinic.  If you do not have a primary care provider, please call [phone] and they will assist you.        Care EveryWhere ID     This is your Care EveryWhere ID. This could be used by other organizations to access your [place] medical records  [id number]        Your Vitals Were     Pulse Temperature Respirations Height Last Period Pulse Oximetry    69 97  F (36.1  C) (Oral) 16 5' 4.5\" (1.638 m) (LMP Unknown) 99%    Breastfeeding? BMI (Body Mass Index)                No 24.72 kg/m2           Blood Pressure from Last 3 Encounters:   04/18/17 123/83   02/06/17 102/58   01/31/17 102/70    Weight from Last 3 Encounters:   04/18/17 146 lb 4 oz (66.3 kg)   02/06/17 146 lb (66.2 kg)   01/31/17 145 lb 2 oz (65.8 kg)                 Today's Medication Changes          These changes are accurate as of: 4/18/17  3:45 PM.  If you have any questions, ask your nurse or doctor.               Start taking these medicines.        Dose/Directions    traZODone 50 MG tablet   Commonly known as:  DESYREL   Used for:  Insomnia, unspecified type   Started by:  Adriana Stern APRN CNP        " Dose:  50 mg   Take 1 tablet (50 mg) by mouth nightly as needed for sleep   Quantity:  90 tablet   Refills:  1         Stop taking these medicines if you haven't already. Please contact your care team if you have questions.     CLONAZEPAM PO   Stopped by:  Adriana Stern APRN CNP           saccharomyces boulardii 250 MG capsule   Commonly known as:  FLORASTOR   Stopped by:  Adriana Stern APRN CNP                Where to get your medicines      Some of these will need a paper prescription and others can be bought over the counter.  Ask your nurse if you have questions.     Bring a paper prescription for each of these medications     traZODone 50 MG tablet                Primary Care Provider    None Specified       No primary provider on file.        Thank you!     Thank you for choosing [facility]  for your care. Our goal is always to provide you with excellent care. Hearing back from our patients is one way we can continue to improve our services. Please take a few minutes to complete the written survey that you may receive in the mail after your visit with us. Thank you!             Your Updated Medication List - Protect others around you: Learn how to safely use, store and throw away your medicines at www.disposemymeds.org.          This list is accurate as of: 4/18/17  3:45 PM.  Always use your most recent med list.                   Brand Name Dispense Instructions for use    ASPIRIN PO      Take 81 mg by mouth daily       calcium carbonate 500 MG tablet    OS-ELISE 500 mg [place]. Ca     Take 500 mg by mouth 2 times daily       LEVOTHROID PO      Take 50 mcg by mouth       NAPROSYN PO      Take 220 mg by mouth 2 times daily (with meals)       traZODone 50 MG tablet    DESYREL    90 tablet    Take 1 tablet (50 mg) by mouth nightly as needed for sleep       triamterene-hydrochlorothiazide 37.5-25 MG per tablet    MAXZIDE-25     Take 1 tablet by mouth daily       * UNABLE TO FIND       MEDICATION NAME: Probiotic - 1 capsule daily       * UNABLE TO FIND      MEDICATION NAME: fish oil - 1200 mg twice daily       * Notice:  This list has 2 medication(s) that are the same as other medications prescribed for you. Read the directions carefully, and ask your doctor or other care provider to review them with you.

## 2017-04-20 DIAGNOSIS — E03.9 ACQUIRED HYPOTHYROIDISM: ICD-10-CM

## 2017-04-20 DIAGNOSIS — I10 ESSENTIAL HYPERTENSION: Primary | ICD-10-CM

## 2017-04-20 NOTE — TELEPHONE ENCOUNTER
Medication Detail      Disp Refills Start End ROBERTA   Levothyroxine Sodium (LEVOTHROID PO)     --   Sig: Take 50 mcg by mouth       [place] [facility] [place] or [facility] prescribing provider: 4-18-17       Potassium   Date Value Ref Range Status   04/08/2016 4.4 3.5 - 5.1 mmol/L Final     Creatinine   Date Value Ref Range Status   04/08/2016 0.8 0.6 - 1.4 mg/dL Final     BP Readings from Last 3 Encounters:   04/18/17 123/83   02/06/17 102/58   01/31/17 102/70         Medication Detail      Disp Refills Start End ROBERTA   triamterene-hydrochlorothiazide (MAXZIDE-25) 37.5-25 MG per tablet     --   Sig: Take 1 tablet by mouth daily   Class: Historical     Last Office Visit with [facility] or [facility] prescribing provider: 4-18-17       Potassium   Date Value Ref Range Status   04/08/2016 4.4 3.5 - 5.1 mmol/L Final     Creatinine   Date Value Ref Range Status   04/08/2016 0.8 0.6 - 1.4 mg/dL Final     BP Readings from Last 3 Encounters:   04/18/17 123/83   02/06/17 102/58   01/31/17 102/70

## 2017-04-21 NOTE — TELEPHONE ENCOUNTER
TSH   Date Value Ref Range Status   04/08/2016 3.400 0.30 - 4.70 uIU/ml Final     Routing refill request to provider for review/approval because:  Labs not current:  TSH - orders pended  Medication is reported/historical        triamterene-hydrochlorothiazide (MAXZIDE-25) 37.5-25 MG per tablet      Routing refill request to provider for review/approval because:  Medication is reported/historical      Thank you,  Manfred May RN

## 2017-04-24 RX ORDER — LEVOTHYROXINE SODIUM 75 UG/1
75 TABLET ORAL DAILY
Qty: 90 TABLET | Refills: 3 | Status: SHIPPED | OUTPATIENT
Start: 2017-04-24 | End: 2017-05-02

## 2017-04-24 RX ORDER — TRIAMTERENE/HYDROCHLOROTHIAZID 37.5-25 MG
1 TABLET ORAL DAILY
Qty: 90 TABLET | Refills: 1 | Status: SHIPPED | OUTPATIENT
Start: 2017-04-24 | End: 2018-01-14

## 2017-04-25 ENCOUNTER — TELEPHONE (OUTPATIENT)
Dept: FAMILY MEDICINE | Facility: CLINIC | Age: 67
End: 2017-04-25

## 2017-04-25 DIAGNOSIS — Z00.00 ROUTINE GENERAL MEDICAL EXAMINATION AT A HEALTH CARE FACILITY: ICD-10-CM

## 2017-04-25 DIAGNOSIS — I10 ESSENTIAL HYPERTENSION: ICD-10-CM

## 2017-04-25 DIAGNOSIS — I10 ESSENTIAL HYPERTENSION: Primary | ICD-10-CM

## 2017-04-25 LAB
CHOLEST SERPL-MCNC: 274 MG/DL
GLUCOSE SERPL-MCNC: NORMAL MG/DL (ref 70–99)
HCV AB SERPL QL IA: NORMAL
HDLC SERPL-MCNC: 112 MG/DL
LDLC SERPL CALC-MCNC: 151 MG/DL
NONHDLC SERPL-MCNC: 162 MG/DL
TRIGL SERPL-MCNC: 56 MG/DL
TSH SERPL DL<=0.005 MIU/L-ACNC: 3.14 MU/L (ref 0.4–4)

## 2017-04-25 PROCEDURE — 80061 LIPID PANEL: CPT | Performed by: NURSE PRACTITIONER

## 2017-04-25 PROCEDURE — 80048 BASIC METABOLIC PNL TOTAL CA: CPT | Performed by: NURSE PRACTITIONER

## 2017-04-25 PROCEDURE — 36415 COLL VENOUS BLD VENIPUNCTURE: CPT | Performed by: NURSE PRACTITIONER

## 2017-04-25 PROCEDURE — 82947 ASSAY GLUCOSE BLOOD QUANT: CPT | Performed by: NURSE PRACTITIONER

## 2017-04-25 PROCEDURE — 84443 ASSAY THYROID STIM HORMONE: CPT | Performed by: NURSE PRACTITIONER

## 2017-04-25 PROCEDURE — 86803 HEPATITIS C AB TEST: CPT | Performed by: NURSE PRACTITIONER

## 2017-04-26 LAB
ANION GAP SERPL CALCULATED.3IONS-SCNC: 13 MMOL/L (ref 3–14)
BUN SERPL-MCNC: 19 MG/DL (ref 7–30)
CALCIUM SERPL-MCNC: 9.2 MG/DL (ref 8.5–10.1)
CHLORIDE SERPL-SCNC: 105 MMOL/L (ref 94–109)
CO2 SERPL-SCNC: 23 MMOL/L (ref 20–32)
CREAT SERPL-MCNC: 1.04 MG/DL (ref 0.52–1.04)
GFR SERPL CREATININE-BSD FRML MDRD: 53 ML/MIN/1.7M2
GLUCOSE SERPL-MCNC: 83 MG/DL (ref 70–99)
POTASSIUM SERPL-SCNC: 3.5 MMOL/L (ref 3.4–5.3)
SODIUM SERPL-SCNC: 141 MMOL/L (ref 133–144)

## 2017-05-02 ENCOUNTER — OFFICE VISIT (OUTPATIENT)
Dept: FAMILY MEDICINE | Facility: CLINIC | Age: 67
End: 2017-05-02
Payer: COMMERCIAL

## 2017-05-02 VITALS
DIASTOLIC BLOOD PRESSURE: 87 MMHG | BODY MASS INDEX: 24.66 KG/M2 | HEIGHT: 65 IN | RESPIRATION RATE: 14 BRPM | HEART RATE: 67 BPM | OXYGEN SATURATION: 100 % | SYSTOLIC BLOOD PRESSURE: 129 MMHG | WEIGHT: 148 LBS

## 2017-05-02 DIAGNOSIS — R39.15 URINARY URGENCY: Primary | ICD-10-CM

## 2017-05-02 DIAGNOSIS — R32 URINARY INCONTINENCE, UNSPECIFIED TYPE: ICD-10-CM

## 2017-05-02 LAB
ALBUMIN UR-MCNC: NEGATIVE MG/DL
APPEARANCE UR: CLEAR
BACTERIA #/AREA URNS HPF: ABNORMAL /HPF
BILIRUB UR QL STRIP: NEGATIVE
COLOR UR AUTO: YELLOW
GLUCOSE UR STRIP-MCNC: NEGATIVE MG/DL
HGB UR QL STRIP: NEGATIVE
KETONES UR STRIP-MCNC: NEGATIVE MG/DL
LEUKOCYTE ESTERASE UR QL STRIP: ABNORMAL
NITRATE UR QL: NEGATIVE
NON-SQ EPI CELLS #/AREA URNS LPF: ABNORMAL /LPF
PH UR STRIP: 7 PH (ref 5–7)
RBC #/AREA URNS AUTO: ABNORMAL /HPF (ref 0–2)
SP GR UR STRIP: 1.01 (ref 1–1.03)
URN SPEC COLLECT METH UR: ABNORMAL
UROBILINOGEN UR STRIP-ACNC: 0.2 EU/DL (ref 0.2–1)
WBC #/AREA URNS AUTO: ABNORMAL /HPF (ref 0–2)

## 2017-05-02 PROCEDURE — 99213 OFFICE O/P EST LOW 20 MIN: CPT | Performed by: NURSE PRACTITIONER

## 2017-05-02 PROCEDURE — 81001 URINALYSIS AUTO W/SCOPE: CPT | Performed by: NURSE PRACTITIONER

## 2017-05-02 RX ORDER — SIMVASTATIN 10 MG
10 TABLET ORAL
COMMUNITY
End: 2017-05-19

## 2017-05-02 RX ORDER — LEVOTHYROXINE SODIUM 50 UG/1
50 TABLET ORAL
COMMUNITY
End: 2017-06-14

## 2017-05-02 RX ORDER — ASPIRIN 81 MG/1
81 TABLET ORAL
Status: ON HOLD | COMMUNITY
End: 2018-04-19

## 2017-05-02 NOTE — PROGRESS NOTES
SUBJECTIVE:                                                    Nicole Stanley is a 66 year old female who presents to clinic today for the following health issues:      URINARY TRACT SYMPTOMS      Duration: 2017    Description  urgency    Intensity:  moderate    Accompanying signs and symptoms:  Fever/chills: no   Flank pain no   Nausea and vomiting: no   Vaginal symptoms: none  Abdominal/Pelvic Pain: YES    History  History of frequent UTI's: no   History of kidney stones: no   Sexually Active: YES  Possibility of pregnancy: No    Precipitating or alleviating factors: None    Therapies tried and outcome: none   Outcome: none       Is certain she has a prolapse.  Feels constant pelvic pressure and frequent need to urinate with little out.  Would like to be checked for uterine or bladder prolapse.      Problem list and histories reviewed & adjusted, as indicated.  Additional history: as documented    Patient Active Problem List   Diagnosis     Essential hypertension     Mixed hyperlipidemia     Bunion, left     Osteoarthritis of both hands, unspecified osteoarthritis type     Past Surgical History:   Procedure Laterality Date     CHOLECYSTECTOMY       EYE SURGERY      blepharoplasty     GI SURGERY  2006    rectal sphincter     VASCULAR SURGERY  1988    vein stripping       Social History   Substance Use Topics     Smoking status: Never Smoker     Smokeless tobacco: Not on file     Alcohol use 0.0 oz/week     0 Standard drinks or equivalent per week      Comment: 1-2 glass of wine per day     Family History   Problem Relation Age of Onset     Hypertension Mother           Hyperlipidemia Mother      Family History Negative Father           Alzheimer Disease Father      Family History Negative Maternal Grandmother                Current Outpatient Prescriptions   Medication Sig Dispense Refill     levothyroxine (SYNTHROID/LEVOTHROID) 50 MCG tablet Take 50 mcg by mouth        "triamterene-hydrochlorothiazide (MAXZIDE-25) 37.5-25 MG per tablet Take 1 tablet by mouth daily 90 tablet 1     UNABLE TO FIND MEDICATION NAME: Probiotic - 1 capsule daily       UNABLE TO FIND MEDICATION NAME: fish oil - 1200 mg twice daily       traZODone (DESYREL) 50 MG tablet Take 1 tablet (50 mg) by mouth nightly as needed for sleep 90 tablet 1     Naproxen (NAPROSYN PO) Take 220 mg by mouth 2 times daily (with meals)       calcium carbonate (OS-ELISE 500 MG [place]. [place]) 500 MG tablet Take 500 mg by mouth 2 times daily       ASPIRIN PO Take 81 mg by mouth daily       aspirin EC 81 MG EC tablet Take 81 mg by mouth       Probiotic Product (ACIDOPHILUS/GOAT MILK) CAPS        simvastatin (ZOCOR) 10 MG tablet Take 10 mg by mouth       No Known Allergies    Reviewed and updated as needed this visit by clinical staff  Tobacco  Allergies  Meds  Problems  Med Hx  Surg Hx  Fam Hx       Reviewed and updated as needed this visit by Provider  Allergies  Meds  Problems  Med Hx  Surg Hx  Fam Hx         ROS:  Constitutional, HEENT, cardiovascular, pulmonary, gi and gu systems are negative, except as otherwise noted.    OBJECTIVE:                                                    /87 (BP Location: Right arm)  Pulse 67  Resp 14  Ht 5' 4.5\" (1.638 m)  Wt 148 lb (67.1 kg)  LMP  (LMP Unknown)  SpO2 100%  BMI 25.01 kg/m2  Body mass index is 25.01 kg/(m^2).  GENERAL: healthy, alert and no distress  ABDOMEN: soft, nontender, no hepatosplenomegaly, no masses and bowel sounds normal  MS: no gross musculoskeletal defects noted, no edema  BACK: no CVA tenderness, no paralumbar tenderness    Diagnostic Test Results:  Results for orders placed or performed in visit on 05/02/17   *UA reflex to Microscopic and Culture ([place] and [facility] (except [place] and [place])   Result Value Ref Range    Color Urine Yellow     Appearance Urine Clear     Glucose Urine Negative NEG mg/dL    Bilirubin Urine Negative NEG    " Ketones Urine Negative NEG mg/dL    Specific Gravity Urine 1.015 1.003 - 1.035    Blood Urine Negative NEG    pH Urine 7.0 5.0 - 7.0 pH    Protein Albumin Urine Negative NEG mg/dL    Urobilinogen Urine 0.2 0.2 - 1.0 EU/dL    Nitrite Urine Negative NEG    Leukocyte Esterase Urine Small (A) NEG    Source Midstream Urine    Urine Microscopic   Result Value Ref Range    WBC Urine 5-10 (A) 0 - 2 /HPF    RBC Urine O - 2 0 - 2 /HPF    Squamous Epithelial /LPF Urine Few FEW /LPF    Bacteria Urine Few (A) NEG /HPF        ASSESSMENT/PLAN:                                                        ICD-10-CM    1. Urinary urgency R39.15 *UA reflex to Microscopic and Culture ([place] and [facility] (except [place] and [place])     Urine Microscopic     UROLOGY ADULT REFERRAL     OB/GYN REFERRAL   2. Urinary incontinence, unspecified type R32 UROLOGY ADULT REFERRAL     OB/GYN REFERRAL     F/u with OBGYN or urology to eval possible prolapse.    See Patient Instructions    YAAKOV Garcia [facility]

## 2017-05-02 NOTE — NURSING NOTE
"Chief Complaint   Patient presents with     Urgency       Initial /87 (BP Location: Right arm)  Pulse 67  Resp 14  Ht 5' 4.5\" (1.638 m)  Wt 148 lb (67.1 kg)  LMP  (LMP Unknown)  SpO2 100%  BMI 25.01 kg/m2 Estimated body mass index is 25.01 kg/(m^2) as calculated from the following:    Height as of this encounter: 5' 4.5\" (1.638 m).    Weight as of this encounter: 148 lb (67.1 kg).  Medication Reconciliation: complete     Debbie Parr CMA      "

## 2017-05-02 NOTE — MR AVS SNAPSHOT
After Visit Summary   5/2/2017    Nicole Stanley    MRN: [id number]           Patient Information     Date Of Birth          1950        Visit Information        Provider Department      5/2/2017 3:00 PM Adriana Stern APRN [facility]        Today's Diagnoses     Urinary urgency    -  1    Urinary incontinence, unspecified type           Follow-ups after your visit        Additional Services     OB/GYN REFERRAL       Your provider has referred you to:  G: [facility] [phone]   [email or web address]    Please be aware that coverage of these services is subject to the terms and limitations of your health insurance plan.  Call member services at your health plan with any benefit or coverage questions.      Please bring the following with you to your appointment:    (1) Any X-Rays, CTs or MRIs which have been performed.  Contact the facility where they were done to arrange for  prior to your scheduled appointment.   (2) List of current medications   (3) This referral request   (4) Any documents/labs given to you for this referral            UROLOGY ADULT REFERRAL       Your provider has referred you to: [facility]: [facility] [phone]   [email or web address]    Please be aware that coverage of these services is subject to the terms and limitations of your health insurance plan.  Call member services at your health plan with any benefit or coverage questions.      Please bring the following with you to your appointment:    (1) Any X-Rays, CTs or MRIs which have been performed.  Contact the facility where they were done to arrange for  prior to your scheduled appointment.    (2) List of current medications  (3) This referral request   (4) Any documents/labs given to you for this referral                  Who to contact     If you have questions or need follow up  "information about today's clinic visit or your schedule please contact [facility] directly at [phone].  Normal or non-critical lab and imaging results will be communicated to you by MyChart, letter or phone within 4 business days after the clinic has received the results. If you do not hear from us within 7 days, please contact the clinic through [employer] or phone. If you have a critical or abnormal lab result, we will notify you by phone as soon as possible.  Submit refill requests through [employer] or call your pharmacy and they will forward the refill request to us. Please allow 3 business days for your refill to be completed.          Additional Information About Your Visit        [employer] Information     [employer] gives you secure access to your electronic health record. If you see a primary care provider, you can also send messages to your care team and make appointments. If you have questions, please call your primary care clinic.  If you do not have a primary care provider, please call [phone] and they will assist you.        Care EveryWhere ID     This is your Care EveryWhere ID. This could be used by other organizations to access your [place] medical records  [id number]        Your Vitals Were     Pulse Respirations Height Last Period Pulse Oximetry BMI (Body Mass Index)    67 14 5' 4.5\" (1.638 m) (LMP Unknown) 100% 25.01 kg/m2       Blood Pressure from Last 3 Encounters:   05/02/17 129/87   04/18/17 123/83   02/06/17 102/58    Weight from Last 3 Encounters:   05/02/17 148 lb (67.1 kg)   04/18/17 146 lb 4 oz (66.3 kg)   02/06/17 146 lb (66.2 kg)              We Performed the Following     *UA reflex to Microscopic and Culture ([place] [facility] (except [place] and [place])     OB/GYN REFERRAL     Urine Microscopic     UROLOGY ADULT REFERRAL          Today's Medication Changes          These changes are accurate as of: 5/2/17  3:30 PM.  If you have any questions, ask " your nurse or doctor.               These medicines have changed or have updated prescriptions.        Dose/Directions    levothyroxine 50 MCG tablet   Commonly known as:  SYNTHROID/LEVOTHROID   This may have changed:  Another medication with the same name was removed. Continue taking this medication, and follow the directions you see here.   Changed by:  Adriana Stern APRN CNP        Dose:  50 mcg   Take 50 mcg by mouth   Refills:  0                Primary Care Provider Office Phone # Fax #    YAAKOV Garcia [phone]       [address] [address]        Thank you!     Thank you for choosing [facility]  for your care. Our goal is always to provide you with excellent care. Hearing back from our patients is one way we can continue to improve our services. Please take a few minutes to complete the written survey that you may receive in the mail after your visit with us. Thank you!             Your Updated Medication List - Protect others around you: Learn how to safely use, store and throw away your medicines at www.disposemymeds.org.          This list is accurate as of: 5/2/17  3:30 PM.  Always use your most recent med list.                   Brand Name Dispense Instructions for use    Acidophilus/Goat Milk Caps          aspirin EC 81 MG EC tablet      Take 81 mg by mouth       ASPIRIN PO      Take 81 mg by mouth daily       calcium carbonate 500 MG tablet    OS-ELISE 500 mg [place]. [place]     Take 500 mg by mouth 2 times daily       levothyroxine 50 MCG tablet    SYNTHROID/LEVOTHROID     Take 50 mcg by mouth       NAPROSYN PO      Take 220 mg by mouth 2 times daily (with meals)       simvastatin 10 MG tablet    ZOCOR     Take 10 mg by mouth       traZODone 50 MG tablet    DESYREL    90 tablet    Take 1 tablet (50 mg) by mouth nightly as needed for sleep       triamterene-hydrochlorothiazide 37.5-25 MG per tablet    MAXZIDE-25     90 tablet    Take 1 tablet by mouth daily       * UNABLE TO FIND      MEDICATION NAME: Probiotic - 1 capsule daily       * UNABLE TO FIND      MEDICATION NAME: fish oil - 1200 mg twice daily       * Notice:  This list has 2 medication(s) that are the same as other medications prescribed for you. Read the directions carefully, and ask your doctor or other care provider to review them with you.

## 2017-05-04 RX ORDER — LEVOTHYROXINE SODIUM 50 UG/1
50 TABLET ORAL
Qty: 30 TABLET | Status: CANCELLED | OUTPATIENT
Start: 2017-05-04

## 2017-05-04 NOTE — TELEPHONE ENCOUNTER
Medication Detail      Disp Refills Start End ROBERTA   levothyroxine (SYNTHROID/LEVOTHROID) 50 MCG tablet     --   Sig: Take 50 mcg by mouth   Class: Historical       Last Office Visit with G, P or [facility] prescribing provider: 5-2-17   Next 5 appointments (look out 90 days)     May 19, 2017  3:00 PM CDT   (Arrive by 2:45 PM)   Office Visit with Giovanna Dominguez MD   [facility]    [address] [place] [id number] [phone]                   TSH   Date Value Ref Range Status   04/25/2017 3.14 0.40 - 4.00 mU/L Final

## 2017-05-05 PROBLEM — E03.9 HYPOTHYROIDISM: Status: ACTIVE | Noted: 2017-05-05

## 2017-05-05 RX ORDER — LEVOTHYROXINE SODIUM 50 UG/1
50 TABLET ORAL DAILY
Qty: 90 TABLET | Refills: 3 | Status: SHIPPED | OUTPATIENT
Start: 2017-05-05 | End: 2018-05-22

## 2017-05-05 NOTE — TELEPHONE ENCOUNTER
Patient called and she does not need another refill of thyroid med at this time. She has s90 days of medication.  This is the 50 mcg dose.  The [employer] sent her a 75 mcg dose but that is an error and she will not take it but set it aside.   Azalea Soto RN

## 2017-05-12 ENCOUNTER — TELEPHONE (OUTPATIENT)
Dept: GENERAL RADIOLOGY | Facility: CLINIC | Age: 67
End: 2017-05-12

## 2017-05-12 NOTE — TELEPHONE ENCOUNTER
RX FOR LEVOTHYROXINE 50 MCG TAB ACTIVE AND 75 MCG TAB (4-24-17) ADDED.  PLEASE CLARIFY IF CURRENT THERAPY IS 50 MCG OR 75 MCG.  THANKS

## 2017-05-19 ENCOUNTER — OFFICE VISIT (OUTPATIENT)
Dept: OBGYN | Facility: CLINIC | Age: 67
End: 2017-05-19
Payer: COMMERCIAL

## 2017-05-19 VITALS
WEIGHT: 148.8 LBS | BODY MASS INDEX: 24.79 KG/M2 | TEMPERATURE: 97.6 F | HEART RATE: 75 BPM | HEIGHT: 65 IN | SYSTOLIC BLOOD PRESSURE: 133 MMHG | DIASTOLIC BLOOD PRESSURE: 83 MMHG

## 2017-05-19 DIAGNOSIS — R10.2 PELVIC PRESSURE IN FEMALE: Primary | ICD-10-CM

## 2017-05-19 DIAGNOSIS — R30.0 DYSURIA: ICD-10-CM

## 2017-05-19 DIAGNOSIS — N39.41 URGENCY INCONTINENCE: ICD-10-CM

## 2017-05-19 DIAGNOSIS — N39.0 URINARY TRACT INFECTION: ICD-10-CM

## 2017-05-19 LAB
ALBUMIN UR-MCNC: NEGATIVE MG/DL
AMORPH CRY #/AREA URNS HPF: ABNORMAL /HPF
APPEARANCE UR: CLEAR
BACTERIA #/AREA URNS HPF: ABNORMAL /HPF
BILIRUB UR QL STRIP: NEGATIVE
COLOR UR AUTO: YELLOW
GLUCOSE UR STRIP-MCNC: NEGATIVE MG/DL
HGB UR QL STRIP: NEGATIVE
KETONES UR STRIP-MCNC: NEGATIVE MG/DL
LEUKOCYTE ESTERASE UR QL STRIP: ABNORMAL
MICRO REPORT STATUS: NORMAL
NITRATE UR QL: NEGATIVE
NON-SQ EPI CELLS #/AREA URNS LPF: ABNORMAL /LPF
PH UR STRIP: 7 PH (ref 5–7)
RBC #/AREA URNS AUTO: ABNORMAL /HPF (ref 0–2)
SP GR UR STRIP: 1.01 (ref 1–1.03)
SPECIMEN SOURCE: NORMAL
URN SPEC COLLECT METH UR: ABNORMAL
UROBILINOGEN UR STRIP-ACNC: 0.2 EU/DL (ref 0.2–1)
WBC #/AREA URNS AUTO: ABNORMAL /HPF (ref 0–2)
WET PREP SPEC: NORMAL

## 2017-05-19 PROCEDURE — 87088 URINE BACTERIA CULTURE: CPT | Performed by: OBSTETRICS & GYNECOLOGY

## 2017-05-19 PROCEDURE — 81001 URINALYSIS AUTO W/SCOPE: CPT | Performed by: OBSTETRICS & GYNECOLOGY

## 2017-05-19 PROCEDURE — 87186 SC STD MICRODIL/AGAR DIL: CPT | Performed by: OBSTETRICS & GYNECOLOGY

## 2017-05-19 PROCEDURE — 99203 OFFICE O/P NEW LOW 30 MIN: CPT | Performed by: OBSTETRICS & GYNECOLOGY

## 2017-05-19 PROCEDURE — 87086 URINE CULTURE/COLONY COUNT: CPT | Performed by: OBSTETRICS & GYNECOLOGY

## 2017-05-19 PROCEDURE — 87210 SMEAR WET MOUNT SALINE/INK: CPT | Performed by: OBSTETRICS & GYNECOLOGY

## 2017-05-19 NOTE — TELEPHONE ENCOUNTER
Spoke with pt and she can increase to 75mcg  She will try and let us know how it goes.Renetta Calderon RN

## 2017-05-19 NOTE — PROGRESS NOTES
"CC: urinary urgency, groin ache    HPI: Nicole Stanley is a 66 year old  who reports ?several months of feeling like she has to go to the bathroom and can't hold it if she doesn't get there quickly, especially at night.  She doesn't really know how long she has experienced it, but about a month ago she began to have more symptoms similar to a UTI with dysuria and burning.  It was very mild, not as bad as when she has had UTIs in the past.  She went in to see her PCP, Adriana Stern, and by then the symptoms were improved, but she was then noticing the urgency and urge incontinence.  Nocturia x 2-3 times.  She also noticed dull ache in her \"groin\" and pelvis that would worsen when she stood for a long time or walked a lot.  It also radiates into her hips and back. She reports she has prolapse, notices a bulge, but this is not new.  She does have JAVIER, also not new for her.  Occasionally will wear pantiliners, but not on a regular basis.    She denies vaginal bleeding, discharge or itching/irritation.  She does notice a fishy odor in the morning.  She is sexually active, does notice an increase in the \"ache\" after sex.  She had repair of rectal sphincter many years ago.  She had a forceps delivery with second child and after that had significant issues with fecal incontinence.  Better since repair.    ROS:  C: NEGATIVE for fever, chills, change in weight  I: NEGATIVE for worrisome rashes, moles or lesions  E: NEGATIVE for vision changes or irritation  E/M: NEGATIVE for ear, mouth and throat problems  R: NEGATIVE for significant cough or SOB  CV: NEGATIVE for chest pain, palpitations or peripheral edema  GI: NEGATIVE for nausea, abdominal pain, heartburn, or change in bowel habits  : as above  M: NEGATIVE for significant arthralgias or myalgia  N: NEGATIVE for weakness, dizziness or paresthesias  E: NEGATIVE for temperature intolerance, skin/hair changes  P: NEGATIVE for changes in mood or affect    Past Medical " History:   Diagnosis Date     Arthritis     osteoarth     Past Surgical History:   Procedure Laterality Date     CHOLECYSTECTOMY       EYE SURGERY      blepharoplasty     GI SURGERY  2006    rectal sphincter     VASCULAR SURGERY  1988    vein stripping     Obstetric History       T3      TAB0   SAB0   E0   M0   L3       # Outcome Date GA Lbr David/2nd Weight Sex Delivery Anes PTL Lv   3 Term      Vag-Spont   Y   2 Term      Vag-Forceps   Y   1 Term      Vag-Spont   Y         No Known Allergies      Current Outpatient Prescriptions:      levothyroxine (SYNTHROID/LEVOTHROID) 50 MCG tablet, Take 1 tablet (50 mcg) by mouth daily, Disp: 90 tablet, Rfl: 3     levothyroxine (SYNTHROID/LEVOTHROID) 50 MCG tablet, Take 50 mcg by mouth, Disp: , Rfl:      aspirin EC 81 MG EC tablet, Take 81 mg by mouth, Disp: , Rfl:      Probiotic Product (ACIDOPHILUS/GOAT MILK) CAPS, , Disp: , Rfl:      triamterene-hydrochlorothiazide (MAXZIDE-25) 37.5-25 MG per tablet, Take 1 tablet by mouth daily, Disp: 90 tablet, Rfl: 1     UNABLE TO FIND, MEDICATION NAME: Probiotic - 1 capsule daily, Disp: , Rfl:      UNABLE TO FIND, MEDICATION NAME: fish oil - 1200 mg twice daily, Disp: , Rfl:      Naproxen (NAPROSYN PO), Take 220 mg by mouth 2 times daily (with meals), Disp: , Rfl:      calcium carbonate (OS-ELISE 500 MG [place]. [place]) 500 MG tablet, Take 500 mg by mouth 2 times daily, Disp: , Rfl:      ASPIRIN PO, Take 81 mg by mouth daily, Disp: , Rfl:     Social History     Social History     Marital status:      Spouse name: N/A     Number of children: N/A     Years of education: N/A     Occupational History     Not on file.     Social History Main Topics     Smoking status: Never Smoker     Smokeless tobacco: Not on file     Alcohol use 0.0 oz/week     0 Standard drinks or equivalent per week      Comment: 1-2 glass of wine per day     Drug use: No     Sexual activity: Yes     Partners: Male     Other Topics Concern     Not on file  "    Social History Narrative     Family History   Problem Relation Age of Onset     Hypertension Mother           Hyperlipidemia Mother      Family History Negative Father           Alzheimer Disease Father      Family History Negative Maternal Grandmother            Past medical, surgical, social and family history were reviewed and updated in EPIC.    PE; /83  Pulse 75  Temp 97.6  F (36.4  C) (Oral)  Ht 5' 4.5\" (1.638 m)  Wt 148 lb 12.8 oz (67.5 kg)  LMP  (LMP Unknown)  BMI 25.15 kg/m2    Gen: NAD  Abd; SNT  Pelvic: normal vulva with atrophic changes.  Grade 3 cystocele with valsalva.  Minimal descent of uterus.  Mostly midline, but some laxity of sidewalls.   Rectal: midline rectocele  Ex; no c/c/e    UA:    Color Urine Yellow    Final 2017  2:56 PM 6550   Appearance Urine Clear    Final 2017  2:56 PM 6550   Glucose Urine Negative  NEG mg/dL Final 2017  2:56 PM 6550   Bilirubin Urine Negative  NEG  Final 2017  2:56 PM 6550   Ketones Urine Negative  NEG mg/dL Final 2017  2:56 PM 6550   Specific [place] Urine 1.015  1.003 - 1.035  Final 2017  2:56 PM 6550   Blood Urine Negative  NEG  Final 2017  2:56 PM 6550   pH Urine 7.0  5.0 - 7.0 pH Final 2017  2:56 PM 6550   Protein Albumin Urine Negative  NEG mg/dL Final 2017  2:56 PM 6550   Urobilinogen Urine 0.2  0.2 - 1.0 EU/dL Final 2017  2:56 PM 6550   Nitrite Urine Negative  NEG  Final 2017  2:56 PM 6550   Leukocyte Esterase Urine Small (A) NEG  Final 2017  2:56 PM 6550   Source Midstream Urine    Final 2017  2:56 PM 6550         Component Value Flag Ref Range Units Status Collected Lab   WBC Urine 2-5 (A) 0 - 2 /HPF Final 2017  2:56 PM 6550   RBC Urine O - 2  0 - 2 /HPF Final 2017  2:56 PM 6550   Squamous Epithelial /LPF Urine Few  FEW /LPF Final 2017  2:56 PM 6550   Bacteria Urine Few (A) NEG /HPF Final 2017  2:56 PM 6550 "   Amorphous Crystals Moderate (A) NEG /HPF Final 05/19/2017  2:56 PM 6550         A/P;   1) urinary urgency with urge incontinence   We discussed that her UA has some abnormal changes suggestive of UTI.  Since her symptoms are improved, will send for culture and treat based on that.  Discussed multiple mgmt options, but since she has appt with urology in a few weeks, would defer to them for best mgmt.    2) pelvic ache and pressure   May be multi-factorial.  Wet prep sent, treat if positive as that may be contributing.  Will obtain pelvic us to check ovaries, but my suspicion is low.  We discussed utero-vaginal prolapse can also cause symptoms.    3) cystocele, rectocele   Explained what they are and reasons why they develop.  We discussed that treatment is based on quality of life.  If not bothersome, no reason to do anything.  We discussed options of pessary, pelvic floor PT, surgery for mgmt.  Surgery would likely be done by Dr. Song in combination with hysterectomy by me if appropriate.  She will discuss more at her visit.  Questions answered.    CHRISTINA TORRES MD

## 2017-05-19 NOTE — TELEPHONE ENCOUNTER
Left message on voicemail for pt to call back and ask for renetta in triage  No documentation that pt went up on dose  But last tsh wasn't optimal as it was >3  We should find out if pt feeling ok, losing hair, gaining wt?    Renetta Caldreon RN

## 2017-05-19 NOTE — NURSING NOTE
"Chief Complaint   Patient presents with     Urinary Problem     incontinence       Initial /83  Pulse 75  Temp 97.6  F (36.4  C) (Oral)  Ht 5' 4.5\" (1.638 m)  Wt 148 lb 12.8 oz (67.5 kg)  LMP  (LMP Unknown)  BMI 25.15 kg/m2 Estimated body mass index is 25.15 kg/(m^2) as calculated from the following:    Height as of this encounter: 5' 4.5\" (1.638 m).    Weight as of this encounter: 148 lb 12.8 oz (67.5 kg).  BP completed using cuff size: regular    No obstetric history on file.    The following HM Due: NONE      The following patient reported/Care Every where data was sent to:  P ABSTRACT QUALITY INITIATIVES [[id number]]       patient has appointment for today  Elisabeth Rivera               "

## 2017-05-19 NOTE — MR AVS SNAPSHOT
After Visit Summary   5/19/2017    Nicole Stanley    MRN: [id number]           Patient Information     Date Of Birth          1950        Visit Information        Provider Department      5/19/2017 3:00 PM Giovanna Dominguez MD [facility]        Today's Diagnoses     Pelvic pressure in female    -  1    Dysuria        Urgency incontinence           Follow-ups after your visit        Your next 10 appointments already scheduled     Jun 14, 2017  4:00 PM CDT   NEW FEMALE PELVIC with Sheba See Juan A Song MD   [facility] ([facility])    [address]  [address]  [facility] [id number] [phone]              Future tests that were ordered for you today     Open Future Orders        Priority Expected Expires Ordered    US Pelvic Complete w Transvaginal Routine  5/19/2018 5/19/2017            Who to contact     If you have questions or need follow up information about today's clinic visit or your schedule please contact [facility] directly at [phone].  Normal or non-critical lab and imaging results will be communicated to you by [employer], letter or phone within 4 business days after the clinic has received the results. If you do not hear from us within 7 days, please contact the clinic through [employer] or phone. If you have a critical or abnormal lab result, we will notify you by phone as soon as possible.  Submit refill requests through [email or web address] or call your pharmacy and they will forward the refill request to us. Please allow 3 business days for your refill to be completed.          Additional Information About Your Visit        [employer] Information     [email or web address] gives you secure access to your electronic health record. If you see a primary care provider, you can also send messages to your care team and make appointments. If you have questions, please call your primary care clinic.  If you do not have a primary  "care provider, please call [phone] and they will assist you.        Care EveryWhere ID     This is your Care EveryWhere ID. This could be used by other organizations to access your [place] medical records  [id number]        Your Vitals Were     Pulse Temperature Height Last Period BMI (Body Mass Index)       75 97.6  F (36.4  C) (Oral) 5' 4.5\" (1.638 m) (LMP Unknown) 25.15 kg/m2        Blood Pressure from Last 3 Encounters:   05/19/17 133/83   05/02/17 129/87   04/18/17 123/83    Weight from Last 3 Encounters:   05/19/17 148 lb 12.8 oz (67.5 kg)   05/02/17 148 lb (67.1 kg)   04/18/17 146 lb 4 oz (66.3 kg)              We Performed the Following     UA reflex to Microscopic and Culture     Urine Culture Aerobic Bacterial     Urine Microscopic     Wet prep        Primary Care Provider Office Phone # Fax #    Adriana YAAKOV [facility] [phone]       [address] [address]        Thank you!     Thank you for choosing [facility]  for your care. Our goal is always to provide you with excellent care. Hearing back from our patients is one way we can continue to improve our services. Please take a few minutes to complete the written survey that you may receive in the mail after your visit with us. Thank you!             Your Updated Medication List - Protect others around you: Learn how to safely use, store and throw away your medicines at www.disposemymeds.org.          This list is accurate as of: 5/19/17  4:45 PM.  Always use your most recent med list.                   Brand Name Dispense Instructions for use    Acidophilus/Goat Milk Caps          aspirin EC 81 MG EC tablet      Take 81 mg by mouth       ASPIRIN PO      Take 81 mg by mouth daily       calcium carbonate 500 MG tablet    OS-ELISE 500 mg [place]. Ca     Take 500 mg by mouth 2 times daily       * levothyroxine 50 MCG tablet    SYNTHROID/LEVOTHROID     Take 50 mcg by mouth       " * levothyroxine 50 MCG tablet    SYNTHROID/LEVOTHROID    90 tablet    Take 1 tablet (50 mcg) by mouth daily       NAPROSYN PO      Take 220 mg by mouth 2 times daily (with meals)       triamterene-hydrochlorothiazide 37.5-25 MG per tablet    MAXZIDE-25    90 tablet    Take 1 tablet by mouth daily       * UNABLE TO FIND      MEDICATION NAME: Probiotic - 1 capsule daily       * UNABLE TO FIND      MEDICATION NAME: fish oil - 1200 mg twice daily       * Notice:  This list has 4 medication(s) that are the same as other medications prescribed for you. Read the directions carefully, and ask your doctor or other care provider to review them with you.

## 2017-05-21 LAB
BACTERIA SPEC CULT: ABNORMAL
MICRO REPORT STATUS: ABNORMAL
MICROORGANISM SPEC CULT: ABNORMAL
SPECIMEN SOURCE: ABNORMAL

## 2017-05-22 RX ORDER — NITROFURANTOIN 25; 75 MG/1; MG/1
100 CAPSULE ORAL 2 TIMES DAILY
Qty: 14 CAPSULE | Refills: 0 | Status: SHIPPED | OUTPATIENT
Start: 2017-05-22 | End: 2017-06-14

## 2017-06-05 ENCOUNTER — RADIANT APPOINTMENT (OUTPATIENT)
Dept: ULTRASOUND IMAGING | Facility: CLINIC | Age: 67
End: 2017-06-05
Attending: OBSTETRICS & GYNECOLOGY
Payer: COMMERCIAL

## 2017-06-05 DIAGNOSIS — R10.2 PELVIC PRESSURE IN FEMALE: ICD-10-CM

## 2017-06-05 PROCEDURE — 76830 TRANSVAGINAL US NON-OB: CPT | Performed by: OBSTETRICS & GYNECOLOGY

## 2017-06-05 ASSESSMENT — ENCOUNTER SYMPTOMS
MEMORY LOSS: 0
NECK PAIN: 0
MUSCLE WEAKNESS: 0
SEIZURES: 0
MUSCLE CRAMPS: 1
LOSS OF CONSCIOUSNESS: 0
DISTURBANCES IN COORDINATION: 0
FLANK PAIN: 0
BACK PAIN: 1
MYALGIAS: 1
TINGLING: 1
HEMATURIA: 0
DIFFICULTY URINATING: 1
ARTHRALGIAS: 0
SPEECH CHANGE: 0
NUMBNESS: 0
DYSURIA: 1
STIFFNESS: 0
WEAKNESS: 0
JOINT SWELLING: 0
DIZZINESS: 0
HEADACHES: 0
TREMORS: 0
PARALYSIS: 0

## 2017-06-06 DIAGNOSIS — N83.202 LEFT OVARIAN CYST: Primary | ICD-10-CM

## 2017-06-14 ENCOUNTER — OFFICE VISIT (OUTPATIENT)
Dept: UROLOGY | Facility: CLINIC | Age: 67
End: 2017-06-14
Payer: COMMERCIAL

## 2017-06-14 VITALS
BODY MASS INDEX: 24.66 KG/M2 | HEIGHT: 65 IN | WEIGHT: 148 LBS | SYSTOLIC BLOOD PRESSURE: 110 MMHG | DIASTOLIC BLOOD PRESSURE: 68 MMHG | HEART RATE: 62 BPM

## 2017-06-14 DIAGNOSIS — N81.2 UTEROVAGINAL PROLAPSE, INCOMPLETE: Primary | ICD-10-CM

## 2017-06-14 DIAGNOSIS — M79.18 MYALGIA OF PELVIC FLOOR: ICD-10-CM

## 2017-06-14 DIAGNOSIS — M62.89 PELVIC FLOOR DYSFUNCTION: ICD-10-CM

## 2017-06-14 DIAGNOSIS — N94.10 DYSPAREUNIA IN FEMALE: ICD-10-CM

## 2017-06-14 DIAGNOSIS — N39.46 MIXED INCONTINENCE: ICD-10-CM

## 2017-06-14 PROCEDURE — 99203 OFFICE O/P NEW LOW 30 MIN: CPT | Performed by: UROLOGY

## 2017-06-14 ASSESSMENT — ENCOUNTER SYMPTOMS
EYE WATERING: 0
TROUBLE SWALLOWING: 0
INSOMNIA: 0
EXERCISE INTOLERANCE: 0
PANIC: 0
BREAST PAIN: 0
HYPOTENSION: 0
HYPERTENSION: 0
CONSTIPATION: 0
BLOOD IN STOOL: 0
BREAST MASS: 0
DEPRESSION: 0
MYALGIAS: 1
EYE PAIN: 0
WEIGHT LOSS: 0
HEADACHES: 0
COUGH DISTURBING SLEEP: 0
POOR WOUND HEALING: 0
POLYDIPSIA: 0
ALTERED TEMPERATURE REGULATION: 0
DYSPNEA ON EXERTION: 0
PALPITATIONS: 0
BRUISES/BLEEDS EASILY: 0
SEIZURES: 0
LEG PAIN: 0
HEARTBURN: 0
BOWEL INCONTINENCE: 0
HEMATURIA: 0
TACHYCARDIA: 0
TREMORS: 0
SLEEP DISTURBANCES DUE TO BREATHING: 0
EYE IRRITATION: 0
LOSS OF CONSCIOUSNESS: 0
RESPIRATORY PAIN: 0
SWOLLEN GLANDS: 0
SNORES LOUDLY: 0
STIFFNESS: 0
MUSCLE CRAMPS: 1
BLOATING: 0
ABDOMINAL PAIN: 0
COUGH: 0
POLYPHAGIA: 0
DOUBLE VISION: 0
SMELL DISTURBANCE: 0
DECREASED CONCENTRATION: 0
SPUTUM PRODUCTION: 0
NUMBNESS: 0
NAIL CHANGES: 0
WEAKNESS: 0
JAUNDICE: 0
DIFFICULTY URINATING: 1
SINUS CONGESTION: 0
JOINT SWELLING: 0
SYNCOPE: 0
EYE REDNESS: 0
NAUSEA: 0
MUSCLE WEAKNESS: 0
FEVER: 0
BACK PAIN: 1
POSTURAL DYSPNEA: 0
INCREASED ENERGY: 0
PARALYSIS: 0
NIGHT SWEATS: 0
HOARSE VOICE: 0
SHORTNESS OF BREATH: 0
ORTHOPNEA: 0
SPEECH CHANGE: 0
DISTURBANCES IN COORDINATION: 0
MEMORY LOSS: 0
WHEEZING: 0
SKIN CHANGES: 0
HALLUCINATIONS: 0
TINGLING: 1
HOT FLASHES: 0
RECTAL PAIN: 0
VOMITING: 0
NECK MASS: 0
ARTHRALGIAS: 0
SINUS PAIN: 0
DIARRHEA: 0
HEMOPTYSIS: 0
NERVOUS/ANXIOUS: 0
DIZZINESS: 0
TASTE DISTURBANCE: 0
WEIGHT GAIN: 0
LIGHT-HEADEDNESS: 0
FATIGUE: 0
RECTAL BLEEDING: 0
DYSURIA: 1
CHILLS: 0
SORE THROAT: 0
DECREASED LIBIDO: 0
FLANK PAIN: 0
DECREASED APPETITE: 0
NECK PAIN: 0
CLAUDICATION: 0
LEG SWELLING: 0

## 2017-06-14 ASSESSMENT — PAIN SCALES - GENERAL: PAINLEVEL: NO PAIN (0)

## 2017-06-14 NOTE — MR AVS SNAPSHOT
After Visit Summary   6/14/2017    Nicole Stanley    MRN: [id number]           Patient Information     Date Of Birth          1950        Visit Information        Provider Department      6/14/2017 4:00 PM Sheba Song MD [facility] [place]        Today's Diagnoses     Uterovaginal prolapse, incomplete    -  1    Mixed incontinence        Myalgia of pelvic floor        Pelvic floor dysfunction        Dyspareunia in female          Care Instructions    Websites with free information:    American Urogynecologic Society patient website: www.voicesforpfd.org    Total Control Program: www.totalcontrolprogram.com    Please see one of the dedicated pelvic floor physical therapist ([facility] [phone])    Please return to see me in 4 months, sooner if needed    It was a pleasure meeting with you today.  Thank you for allowing me and my team the privilege of caring for you today.  YOU are the reason we are here, and I truly hope we provided you with the excellent service you deserve.  Please let us know if there is anything else we can do for you so that we can be sure you are leaving completely satisfied with your care experience.              Follow-ups after your visit        Additional Services     [facility] Physical Therapy Referral       **This order will print in the [facility] Scheduling Office**    Physical Therapy, Hand Therapy and Chiropractic Care are available through:    *[facility]  *[facility]  *[facility]    Call one number to schedule at any of the above locations: [phone].    Your provider has referred you to: Physical Therapy at [facility] or [facility]    Indication/Reason for Referral: Women's Health (Please Complete Special Programs SmartList)  Onset of Illness: years  Therapy Orders: Evaluate and Treat  Special Programs: None and Women's Health: Pelvic Dysfunction:  dyspareunia, myofascial tenderness of the pelvic floor, right groin/leg pain  Pelvic Floor Weakness: mixed urinary incontinence and  Biofeedback, E-Stim/TENS/TENS Rental if deemed appropriate by therapist, Exercise/HEP and Myofascial Release/Massage (internal)  Special Request: Exercise: Home Exercise Program  Manual Therapy: Myofascial Release/Massage (internal)  Modalities: As Indicated E-Stim/TENS    Bro Crabtree      Additional Comments for the Therapist or Chiropractor: No dima please.  Core strengthening    Please be aware that coverage of these services is subject to the terms and limitations of your health insurance plan.  Call member services at your health plan with any benefit or coverage questions.      Please bring the following to your appointment:    *Your personal calendar for scheduling future appointments  *Comfortable clothing                  Your next 10 appointments already scheduled     Sep 06, 2017 10:20 AM CDT   US PELVIC COMPLETE W TRANSVAGINAL with RDUS1   [facility]    [address] [place] [id number] [phone]           Please bring a list of your medicines (including vitamins, minerals and over-the-counter drugs). Also, tell your doctor about any allergies you may have. Wear comfortable clothes and leave your valuables at home.  Adults: Drink six 8-ounce glasses of fluid one hour before your exam. Do NOT empty your bladder.  If you need to empty your bladder before your exam, try to release only a little bit of urine. Then, drink another 8oz glass of fluid.  Children: Children who are potty trained should drink at least 4 cups (32 oz) of liquid 45 minutes to one hour prior to the exam. The child s bladder must be full in order to achieve a diagnostic exam. If your child is very uncomfortable or has an urgent need to pee, please notify a technologist; they will try to find out how much longer the wait may be and  "provide instructions to help relieve the pressure. Occasionally it is medically necessary to insert a urinary catheter to fill the bladder.  Please call the Imaging Department at your exam site with any questions.            Oct 18, 2017 11:30 AM CDT   Return Visit with Sheba Song MD   [facility] [place] ([facility])    [address]  [address]  [facility] [id number] [phone]              Who to contact     If you have questions or need follow up information about today's clinic visit or your schedule please contact [facility] [place] directly at [phone].  Normal or non-critical lab and imaging results will be communicated to you by MyChart, letter or phone within 4 business days after the clinic has received the results. If you do not hear from us within 7 days, please contact the clinic through [employer] or phone. If you have a critical or abnormal lab result, we will notify you by phone as soon as possible.  Submit refill requests through [employer] or call your pharmacy and they will forward the refill request to us. Please allow 3 business days for your refill to be completed.          Additional Information About Your Visit        MyChart Information     [employer] gives you secure access to your electronic health record. If you see a primary care provider, you can also send messages to your care team and make appointments. If you have questions, please call your primary care clinic.  If you do not have a primary care provider, please call [phone] and they will assist you.        Care EveryWhere ID     This is your Care EveryWhere ID. This could be used by other organizations to access your [facility] medical records  [id number]        Your Vitals Were     Pulse Height Last Period BMI (Body Mass Index)          62 1.638 m (5' 4.5\") (LMP Unknown) 25.01 kg/m2         Blood Pressure from Last 3 Encounters: "   06/14/17 110/68   05/19/17 133/83   05/02/17 129/87    Weight from Last 3 Encounters:   06/14/17 67.1 kg (148 lb)   05/19/17 67.5 kg (148 lb 12.8 oz)   05/02/17 67.1 kg (148 lb)              We Performed the Following     [facility] Physical Therapy Referral        Primary Care Provider Office Phone # Fax #    YAAKOV Garcia [facility] [phone]       [facility] [address] [facility] [address]        Thank you!     Thank you for choosing [facility] [place]  for your care. Our goal is always to provide you with excellent care. Hearing back from our patients is one way we can continue to improve our services. Please take a few minutes to complete the written survey that you may receive in the mail after your visit with us. Thank you!             Your Updated Medication List - Protect others around you: Learn how to safely use, store and throw away your medicines at www.disposemymeds.org.          This list is accurate as of: 6/14/17  4:29 PM.  Always use your most recent med list.                   Brand Name Dispense Instructions for use    Acidophilus/Goat Milk Caps          aspirin EC 81 MG EC tablet      Take 81 mg by mouth       levothyroxine 50 MCG tablet    SYNTHROID/LEVOTHROID    90 tablet    Take 1 tablet (50 mcg) by mouth daily       NAPROSYN PO      Take 220 mg by mouth 2 times daily (with meals)       triamterene-hydrochlorothiazide 37.5-25 MG per tablet    MAXZIDE-25    90 tablet    Take 1 tablet by mouth daily       UNABLE TO FIND      MEDICATION NAME: fish oil - 1200 mg twice daily

## 2017-06-14 NOTE — LETTER
2017       RE: Nicole Stanley  [address] [place] [address]     Dear Colleague,    Thank you for referring your patient, Nicole Stanley, to the [facility] [place] at [facility]. Please see a copy of my visit note below.    2017    Referring Provider: Giovanna Dominguez MD  [facility]  [address]  [place] [address]    Primary Care Provider: Adriana Stern    CC: Prolapse    HPI:  Nicole Stanley is a 67 year old female w  who presents for evaluation of her pelvic floor symptoms.  She has has some urinary urgency, urge incontinence, nocturia x2-3, stress incontinence and vaginal bulge. States that the urgency and urge incontinence are betters since being treated for a UTI last month by Dr Dominguez but still has some persistent symptoms.    Right groin upper thigh pain with walking-intermittent, not every day.  Is sexually active and notes pain the day after.      Denies hematuria, Adam, vaginal bleeding.     Health maintenance screening:  Pap smear denies history of abnormal  Colonoscopy reports up to date and denies any abnormal  Past Medical History:   Diagnosis Date     Arthritis     osteoarth     Spider veins        Past Surgical History:   Procedure Laterality Date     CHOLECYSTECTOMY       CYSTOSCOPY       EYE SURGERY      blepharoplasty     GI SURGERY  2006    rectal sphincter     VASCULAR SURGERY  1988    vein stripping     Social History     Social History     Marital status:      Spouse name: N/A     Number of children: N/A     Years of education: N/A     Occupational History     Not on file.     Social History Main Topics     Smoking status: Never Smoker     Smokeless tobacco: Not on file     Alcohol use 0.0 oz/week     0 Standard drinks or equivalent per week      Comment: 1-2 glass of wine per day     Drug use: No     Sexual activity: Yes     Partners: Male     Other Topics Concern      Not on file     Social History Narrative       Family History   Problem Relation Age of Onset     Hypertension Mother           Hyperlipidemia Mother      Family History Negative Father           Alzheimer Disease Father      Family History Negative Maternal Grandmother            Review of Systems   Constitutional:  Negative for fever, chills, weight loss, weight gain, fatigue, decreased appetite, night sweats, recent stressors, height gain, height loss, post-operative complications, incisional pain, hallucinations, increased energy, hyperactivity and confused.   HENT:  Negative for ear pain, hearing loss, tinnitus, nosebleeds, trouble swallowing, hoarse voice, mouth sores, sore throat, ear discharge, tooth pain, gum tenderness, taste disturbance, smell disturbance, hearing aid, bleeding gums, dry mouth, sinus pain, sinus congestion and neck mass.    Eyes:  Negative for double vision, pain, redness, eye pain, decreased vision, eye watering, eye bulging, eye dryness, flashing lights, spots, floaters, strabismus, tunnel vision, jaundice and eye irritation.   Respiratory:   Negative for cough, hemoptysis, sputum production, shortness of breath, wheezing, sleep disturbances due to breathing, snores loudly, respiratory pain, dyspnea on exertion, cough disturbing sleep and postural dyspnea.    Cardiovascular:  Negative for chest pain, dyspnea on exertion, palpitations, orthopnea, claudication, leg swelling, fingers/toes turn blue, hypertension, hypotension, syncope, history of heart murmur, chest pain on exertion, chest pain at rest, pacemaker, few scattered varicosities, leg pain, sleep disturbances due to breathing, tachycardia, light-headedness, exercise intolerance and edema.   Gastrointestinal:  Negative for heartburn, nausea, vomiting, abdominal pain, diarrhea, constipation, blood in stool, melena, rectal pain, bloating, hemorrhoids, bowel incontinence, jaundice, rectal bleeding, coffee  ground emesis and change in stool.   Genitourinary:  Positive for bladder incontinence, dysuria, urgency, difficulty urinating and nocturia. Negative for hematuria, flank pain, vaginal discharge, genital sores, dyspareunia, decreased libido, voiding less frequently, arousal difficulty, abnormal vaginal bleeding, excessive menstruation, menstrual changes, hot flashes, vaginal dryness and postmenopausal bleeding.   Musculoskeletal:  Positive for myalgias, back pain and muscle cramps. Negative for joint swelling, arthralgias, stiffness, neck pain, bone pain, muscle weakness and fracture.   Skin:  Negative for nail changes, itching, poor wound healing, rash, hair changes, skin changes, acne, warts, poor wound healing, scarring, flaky skin, Raynaud's phenomenon, sensitivity to sunlight and skin thickening.   Neurological:  Positive for tingling. Negative for dizziness, tremors, speech change, seizures, loss of consciousness, weakness, light-headedness, numbness, headaches, disturbances in coordination, memory loss, difficulty walking and paralysis.   Endo/Heme:  Negative for anemia, swollen glands and bruises/bleeds easily.   Psychiatric/Behavioral:  Negative for depression, hallucinations, memory loss, decreased concentration, mood swings and panic attacks.    Breast:  Negative for breast discharge, breast mass, breast pain and nipple retraction.   Endocrine:  Negative for altered temperature regulation, polyphagia, polydipsia, unwanted hair growth and change in facial hair.    No Known Allergies    Current Outpatient Prescriptions   Medication     levothyroxine (SYNTHROID/LEVOTHROID) 50 MCG tablet     aspirin EC 81 MG EC tablet     Probiotic Product (ACIDOPHILUS/GOAT MILK) CAPS     triamterene-hydrochlorothiazide (MAXZIDE-25) 37.5-25 MG per tablet     UNABLE TO FIND     Naproxen (NAPROSYN PO)     [DISCONTINUED] levothyroxine (SYNTHROID/LEVOTHROID) 50 MCG tablet     No current facility-administered medications for this  "visit.      /68 (BP Location: Left arm, Patient Position: Chair, Cuff Size: Adult Regular)  Pulse 62  Ht 1.638 m (5' 4.5\")  Wt 67.1 kg (148 lb)  LMP  (LMP Unknown)  BMI 25.01 kg/m2 No LMP recorded (lmp unknown). Patient is postmenopausal. Body mass index is 25.01 kg/(m^2).  She is alert, comfortable in no acute distress, non-labored breathing. Abdomen is soft, non-tender, non-distended, no CVAT.  Normal external female genitalia.  Negative ESST.  Speculum and bimanual exam are remarkable for myofascial tenderness of the pelvic floor.  She has stage II support on supine strain (anterior to 0, apex at -7).  3/5 kegels.      Urine dip insufficient sample, had a UTI in May    PVR 11 mL by bladder scan    A/P: Nicole Stanley is a 67 year old F with mixed urinary incontinence, dyspareunia, myofascial tenderness of the pelvic floor, pelvic floor dysfunction    We discussed how her pelvic floor symptoms are related to the physical exam findings and her pelvic floor myofascial dysfunction.  We discussed how the recommended treatment is dedicated pelvic floor therapy.  We discussed how the pelvic floor physical therapy works and patient is agreeable.  Referral was placed.      Discussed that although she has prolapse I would not recommend any intervention at this time aside from pelvic floor therapy.  Patient is not really interested in discussing surgery and I do not feel that she could tolerate a pessary at this time    RTC 3-4 months, sooner if needed    30 minutes were spent with the patient today, > 50% in counseling and coordination of care    Sheba Song MD MPH    Urology    CC  Patient Care Team:  Adriana Stern, YAAKOV CNP as PCP - General (Nurse Practitioner)      "

## 2017-06-14 NOTE — PATIENT INSTRUCTIONS
Websites with free information:    American Urogynecologic Society patient website: www.voicesforpfd.org    Total Control Program: www.totalcontrolprogram.com    Please see one of the dedicated pelvic floor physical therapist ([facility] [phone])    Please return to see me in 4 months, sooner if needed    It was a pleasure meeting with you today.  Thank you for allowing me and my team the privilege of caring for you today.  YOU are the reason we are here, and I truly hope we provided you with the excellent service you deserve.  Please let us know if there is anything else we can do for you so that we can be sure you are leaving completely satisfied with your care experience.

## 2017-06-14 NOTE — PROGRESS NOTES
2017    Referring Provider: Giovanna Dominguez MD  [facility]  [address]  [place] [address]    Primary Care Provider: Adriana Stern    CC: Prolapse    HPI:  Nicole Stanley is a 67 year old female w  who presents for evaluation of her pelvic floor symptoms.  She has has some urinary urgency, urge incontinence, nocturia x2-3, stress incontinence and vaginal bulge. States that the urgency and urge incontinence are betters since being treated for a UTI last month by Dr Dominguez but still has some persistent symptoms.    Right groin upper thigh pain with walking-intermittent, not every day.  Is sexually active and notes pain the day after.      Denies hematuria, Adam, vaginal bleeding.     Health maintenance screening:  Pap smear denies history of abnormal  Colonoscopy reports up to date and denies any abnormal    Past Medical History:   Diagnosis Date     Arthritis     osteoarth     Spider veins        Past Surgical History:   Procedure Laterality Date     CHOLECYSTECTOMY       CYSTOSCOPY       EYE SURGERY      blepharoplasty     GI SURGERY      rectal sphincter     VASCULAR SURGERY  1988    vein stripping       Social History     Social History     Marital status:      Spouse name: N/A     Number of children: N/A     Years of education: N/A     Occupational History     Not on file.     Social History Main Topics     Smoking status: Never Smoker     Smokeless tobacco: Not on file     Alcohol use 0.0 oz/week     0 Standard drinks or equivalent per week      Comment: 1-2 glass of wine per day     Drug use: No     Sexual activity: Yes     Partners: Male     Other Topics Concern     Not on file     Social History Narrative       Family History   Problem Relation Age of Onset     Hypertension Mother           Hyperlipidemia Mother      Family History Negative Father           Alzheimer Disease Father      Family History Negative Maternal Grandmother               Review of Systems     Constitutional:  Negative for fever, chills, weight loss, weight gain, fatigue, decreased appetite, night sweats, recent stressors, height gain, height loss, post-operative complications, incisional pain, hallucinations, increased energy, hyperactivity and confused.   HENT:  Negative for ear pain, hearing loss, tinnitus, nosebleeds, trouble swallowing, hoarse voice, mouth sores, sore throat, ear discharge, tooth pain, gum tenderness, taste disturbance, smell disturbance, hearing aid, bleeding gums, dry mouth, sinus pain, sinus congestion and neck mass.    Eyes:  Negative for double vision, pain, redness, eye pain, decreased vision, eye watering, eye bulging, eye dryness, flashing lights, spots, floaters, strabismus, tunnel vision, jaundice and eye irritation.   Respiratory:   Negative for cough, hemoptysis, sputum production, shortness of breath, wheezing, sleep disturbances due to breathing, snores loudly, respiratory pain, dyspnea on exertion, cough disturbing sleep and postural dyspnea.    Cardiovascular:  Negative for chest pain, dyspnea on exertion, palpitations, orthopnea, claudication, leg swelling, fingers/toes turn blue, hypertension, hypotension, syncope, history of heart murmur, chest pain on exertion, chest pain at rest, pacemaker, few scattered varicosities, leg pain, sleep disturbances due to breathing, tachycardia, light-headedness, exercise intolerance and edema.   Gastrointestinal:  Negative for heartburn, nausea, vomiting, abdominal pain, diarrhea, constipation, blood in stool, melena, rectal pain, bloating, hemorrhoids, bowel incontinence, jaundice, rectal bleeding, coffee ground emesis and change in stool.   Genitourinary:  Positive for bladder incontinence, dysuria, urgency, difficulty urinating and nocturia. Negative for hematuria, flank pain, vaginal discharge, genital sores, dyspareunia, decreased libido, voiding less frequently, arousal difficulty,  "abnormal vaginal bleeding, excessive menstruation, menstrual changes, hot flashes, vaginal dryness and postmenopausal bleeding.   Musculoskeletal:  Positive for myalgias, back pain and muscle cramps. Negative for joint swelling, arthralgias, stiffness, neck pain, bone pain, muscle weakness and fracture.   Skin:  Negative for nail changes, itching, poor wound healing, rash, hair changes, skin changes, acne, warts, poor wound healing, scarring, flaky skin, Raynaud's phenomenon, sensitivity to sunlight and skin thickening.   Neurological:  Positive for tingling. Negative for dizziness, tremors, speech change, seizures, loss of consciousness, weakness, light-headedness, numbness, headaches, disturbances in coordination, memory loss, difficulty walking and paralysis.   Endo/Heme:  Negative for anemia, swollen glands and bruises/bleeds easily.   Psychiatric/Behavioral:  Negative for depression, hallucinations, memory loss, decreased concentration, mood swings and panic attacks.    Breast:  Negative for breast discharge, breast mass, breast pain and nipple retraction.   Endocrine:  Negative for altered temperature regulation, polyphagia, polydipsia, unwanted hair growth and change in facial hair.    No Known Allergies    Current Outpatient Prescriptions   Medication     levothyroxine (SYNTHROID/LEVOTHROID) 50 MCG tablet     aspirin EC 81 MG EC tablet     Probiotic Product (ACIDOPHILUS/GOAT MILK) CAPS     triamterene-hydrochlorothiazide (MAXZIDE-25) 37.5-25 MG per tablet     UNABLE TO FIND     Naproxen (NAPROSYN PO)     [DISCONTINUED] levothyroxine (SYNTHROID/LEVOTHROID) 50 MCG tablet     No current facility-administered medications for this visit.      /68 (BP Location: Left arm, Patient Position: Chair, Cuff Size: Adult Regular)  Pulse 62  Ht 1.638 m (5' 4.5\")  Wt 67.1 kg (148 lb)  LMP  (LMP Unknown)  BMI 25.01 kg/m2 No LMP recorded (lmp unknown). Patient is postmenopausal. Body mass index is 25.01 " kg/(m^2).  She is alert, comfortable in no acute distress, non-labored breathing. Abdomen is soft, non-tender, non-distended, no CVAT.  Normal external female genitalia.  Negative ESST.  Speculum and bimanual exam are remarkable for myofascial tenderness of the pelvic floor.  She has stage II support on supine strain (anterior to 0, apex at -7).  3/5 kegels.      Urine dip insufficient sample, had a UTI in May    PVR 11 mL by bladder scan    A/P: Nicole Stanley is a 67 year old F with mixed urinary incontinence, dyspareunia, myofascial tenderness of the pelvic floor, pelvic floor dysfunction    We discussed how her pelvic floor symptoms are related to the physical exam findings and her pelvic floor myofascial dysfunction.  We discussed how the recommended treatment is dedicated pelvic floor therapy.  We discussed how the pelvic floor physical therapy works and patient is agreeable.  Referral was placed.      Discussed that although she has prolapse I would not recommend any intervention at this time aside from pelvic floor therapy.  Patient is not really interested in discussing surgery and I do not feel that she could tolerate a pessary at this time    RTC 3-4 months, sooner if needed    30 minutes were spent with the patient today, > 50% in counseling and coordination of care    Sheba Song MD MPH    Urology    CC  Patient Care Team:  Adriana Stern APRN CNP as PCP - General (Nurse Practitioner)

## 2017-07-17 ENCOUNTER — THERAPY VISIT (OUTPATIENT)
Dept: PHYSICAL THERAPY | Facility: CLINIC | Age: 67
End: 2017-07-17
Payer: MEDICARE

## 2017-07-17 DIAGNOSIS — M99.05 SOMATIC DYSFUNCTION OF PELVIC REGION: Primary | ICD-10-CM

## 2017-07-17 PROCEDURE — G8991 OTHER PT/OT GOAL STATUS: HCPCS | Mod: GP | Performed by: PHYSICAL THERAPIST

## 2017-07-17 PROCEDURE — G8990 OTHER PT/OT CURRENT STATUS: HCPCS | Mod: GP | Performed by: PHYSICAL THERAPIST

## 2017-07-17 PROCEDURE — 97112 NEUROMUSCULAR REEDUCATION: CPT | Mod: GP | Performed by: PHYSICAL THERAPIST

## 2017-07-17 PROCEDURE — 97530 THERAPEUTIC ACTIVITIES: CPT | Mod: GP | Performed by: PHYSICAL THERAPIST

## 2017-07-17 PROCEDURE — 97162 PT EVAL MOD COMPLEX 30 MIN: CPT | Mod: GP | Performed by: PHYSICAL THERAPIST

## 2017-07-17 PROCEDURE — 97110 THERAPEUTIC EXERCISES: CPT | Mod: GP | Performed by: PHYSICAL THERAPIST

## 2017-07-17 NOTE — LETTER
DEPARTMENT OF HEALTH AND HUMAN SERVICES  CENTERS FOR MEDICARE & MEDICAID SERVICES    PLAN/UPDATED PLAN OF PROGRESS FOR OUTPATIENT REHABILITATION    PATIENTS NAME:  Nicole Stanley   : 1950  [facility] NUMBER:    [id number]  [id number]   PROVIDER NAME: [facility]  MEDICAL RECORD NUMBER: [id number]   START OF CARE DATE:  17   TYPE:  PT  PRIMARY/TREATMENT DIAGNOSIS:Somatic dysfunction of pelvic region  VISITS FROM START OF CARE:  Rxs Used: 1     Subjective:  Patient is a 67 year old female presenting with rehab pelvic hpi. The history is provided by the patient. No  was used.   Nicole Stanley is a 67 year old female with a pelvic dysfunction condition.  Condition occurred with:  Insidious onset.  Condition occurred: for unknown reasons.  This is a chronic condition  Had UTI 2016. Treated with antibiotics with good results.  Referred to PT 2016. The past 2 weeks has noticed incomplete emptying, mild pain in vaginal area and R side groin pain, some LBP. Voids every hour during day, some urgency but usually able to delay, however occasional leakage if waits to long, occasional post-void dribbling. Nighttime voiding 1-2 x night. Also some stress incontinence with cough/sneeze. Occasional achiness after intercourse but not during. Goal is to have less pain and improve incontinence. Hx of anal sphincter muscle repaired ~ due to diarrhea control. , forcep delivery, all vaginal deliveries and 1 10# baby. .    Patient reports pain:  Vaginal (occasional L>R hip pain, LBP).    Pain is described as aching  and reported as 3/10.        Since onset symptoms are gradually worsening.        General health as reported by patient is good.  Pertinent medical history includes:  High blood pressure, osteoarthritis and menopausal.  Medical allergies: none.  Other surgeries include:  None.  Current medications:  High blood pressure medication, thyroid medication and  anti-inflammatory.  Current occupation is retired.      Objective:  Lumbar/SI Evaluation  ROM:  AROM Lumbar: normal  Neural Tension/Mobility:  Lumbar:  Normal    Pelvic Dysfunction Evaluation:    Bladder/Pelvic Problems:    Storage Problem:  Mixed incontinence  Emptying Problem:  Incomplete emptying, postvoid dribbling and strain to void    Diagnostic Tests:    Pelvic Exam:  Painful per patient report 2017  Flexibility:    Tightness present at:Adductors; Iliopsoas; Piriformis and Gluteals  Abdominal Wall:  normal  Pelvic Clock Exam:  Pelvic clock exam: L>R tightness.  Ischiocavernosis pain:  -  Bulbocavernosis pain:  -  Transverse Perineal:  +/-  Levator ANI:  ++  Perineal Body:  -  PATIENTS NAME:  Nicole Stanley   : 1950      Reflex Testing:  normal  External Assessment:  External assessment pelvic: ~Gr II prolapse with gentle bear down in supine.  Tissue Symmetry:  Normal  Introitus:  Normal  Muscle Contraction/Perineal Mobility:  Slight lift, no urogential triangle descent  Internal Assessment:    Contraction/Grade:  Fair squeeze, definite lift (3)  SEMG Biofeedback:  NA  Additional History:  Delivery History:  Vaginal delivery  Number of Pregnancies: 3  Number of Live Births: 3         Hip Evaluation    Hip Special Testing:    Left hip positive for the following special tests:  Jordan  Left hip negative for the following special tests:  Fadir/Labrum   Right hip positive for the following special tests:  FabreRight hip negative for the following special tests:  Fadir/Labrum      Assessment/Plan:    Patient is a 67 year old female with pelvic complaints.    Patient has the following significant findings with corresponding treatment plan.                Diagnosis 1:  Pelvic dysfunction  Pain -  hot/cold therapy, manual therapy, self management, education and home program  Decreased ROM/flexibility - manual therapy and therapeutic exercise  Decreased joint mobility - manual therapy and therapeutic  exercise  Decreased strength - therapeutic exercise and therapeutic activities  Decreased proprioception - neuro re-education and therapeutic activities  Inflammation - cold therapy and self management/home program  Impaired gait - gait training  Impaired muscle performance - neuro re-education  Decreased function - therapeutic activities  Impaired posture - neuro re-education    Therapy Evaluation Codes:   1) History comprised of:   Personal factors that impact the plan of care:      None.    Comorbidity factors that impact the plan of care are:      forceps delivery and 10#+ baby.     Medications impacting care: None.  2) Examination of Body Systems comprised of:   Body structures and functions that impact the plan of care:      Pelvis.   Activity limitations that impact the plan of care are:      Standing, Walking, Fecal incontinence, Frequency and Urinary incontinence.  PATIENTS NAME:  Nicole Stanley   : 1950      3) Clinical presentation characteristics are:   Evolving/Changing.  4) Decision-Making    Moderate complexity using standardized patient assessment instrument and/or measureable assessment of functional outcome.  Cumulative Therapy Evaluation is: Moderate complexity.    Previous and current functional limitations:  (See Goal Flow Sheet for this information)    Short term and Long term goals: (See Goal Flow Sheet for this information)     Communication ability:  Patient appears to be able to clearly communicate and understand verbal and written communication and follow directions correctly.  Treatment Explanation - The following has been discussed with the patient:   RX ordered/plan of care  Anticipated outcomes  Possible risks and side effects  This patient would benefit from PT intervention to resume normal activities.   Rehab potential is good.    Frequency:  1 X week, once daily  Duration:  for 8 weeks  Discharge Plan:  Achieve all LTG.  Independent in home treatment program.  Reach maximal  "therapeutic benefit.      Caregiver Signature/Credentials _____________________________ Date ________          Treating Provider: Renetta Batista, PT, OCS     I have reviewed and certified the need for these services and plan of treatment while under my care.        PHYSICIAN'S SIGNATURE:   __________________________________ Date___________                Sheba Yuri Song    Certification period:  Beginning of Cert date period: 07/17/17 to  End of Cert period date: 10/14/17     Functional Level Progress Report: Please see attached \"Goal Flow sheet for Functional level.\"    ____X____ Continue Services or       ________ DC Services                Service dates: From  SOC Date: 07/17/17 date to present                         "

## 2017-07-17 NOTE — PROGRESS NOTES
Subjective:    Patient is a 67 year old female presenting with rehab pelvic hpi. The history is provided by the patient. No  was used.   Nicole Stanley is a 67 year old female with a pelvic dysfunction condition.  Condition occurred with:  Insidious onset.  Condition occurred: for unknown reasons.  This is a chronic condition  Had UTI 2016. Treated with antibiotics with good results.  Referred to PT 2016. The past 2 weeks has noticed incomplete emptying, mild pain in vaginal area and R side groin pain, some LBP. Voids every hour during day, some urgency but usually able to delay, however occasional leakage if waits to long, occasional post-void dribbling. Nighttime voiding 1-2 x night. Also some stress incontinence with cough/sneeze. Occasional achiness after intercourse but not during. Goal is to have less pain and improve incontinence. Hx of anal sphincter muscle repaired ~ due to diarrhea control. , forcep delivery, all vaginal deliveries and 1 10# baby. .    Patient reports pain:  Vaginal (occasional L>R hip pain, LBP).    Pain is described as aching  and reported as 3/10.        Since onset symptoms are gradually worsening.        General health as reported by patient is good.  Pertinent medical history includes:  High blood pressure, osteoarthritis and menopausal.  Medical allergies: none.  Other surgeries include:  None.  Current medications:  High blood pressure medication, thyroid medication and anti-inflammatory.  Current occupation is retired.                                    Objective:    System         Lumbar/SI Evaluation  ROM:  AROM Lumbar: normal              Neural Tension/Mobility:  Lumbar:  Normal                                        Pelvic Dysfunction Evaluation:    Bladder/Pelvic Problems:    Storage Problem:  Mixed incontinence  Emptying Problem:  Incomplete emptying, postvoid dribbling and strain to void      Diagnostic Tests:    Pelvic Exam:  Painful per  patient report June 2017                      Flexibility:    Tightness present at:Adductors; Iliopsoas; Piriformis and Gluteals    Abdominal Wall:  normal        Pelvic Clock Exam:  Pelvic clock exam: L>R tightness.  Ischiocavernosis pain:  -  Bulbocavernosis pain:  -  Transverse Perineal:  +/-  Levator ANI:  ++  Perineal Body:  -    Reflex Testing:  normal    External Assessment:  External assessment pelvic: ~Gr II prolapse with gentle bear down in supine.        Tissue Symmetry:  Normal  Introitus:  Normal  Muscle Contraction/Perineal Mobility:  Slight lift, no urogential triangle descent  Internal Assessment:      Contraction/Grade:  Fair squeeze, definite lift (3)          SEMG Biofeedback:  NA                  Additional History:  Delivery History:  Vaginal delivery  Number of Pregnancies: 3  Number of Live Births: 3            Hip Evaluation      Hip Special Testing:    Left hip positive for the following special tests:  Jordan  Left hip negative for the following special tests:  Fadir/Labrum   Right hip positive for the following special tests:  FabreRight hip negative for the following special tests:  Fadir/Labrum                 General     ROS    Assessment/Plan:      Patient is a 67 year old female with pelvic complaints.    Patient has the following significant findings with corresponding treatment plan.                Diagnosis 1:  Pelvic dysfunction  Pain -  hot/cold therapy, manual therapy, self management, education and home program  Decreased ROM/flexibility - manual therapy and therapeutic exercise  Decreased joint mobility - manual therapy and therapeutic exercise  Decreased strength - therapeutic exercise and therapeutic activities  Decreased proprioception - neuro re-education and therapeutic activities  Inflammation - cold therapy and self management/home program  Impaired gait - gait training  Impaired muscle performance - neuro re-education  Decreased function - therapeutic  activities  Impaired posture - neuro re-education    Therapy Evaluation Codes:   1) History comprised of:   Personal factors that impact the plan of care:      None.    Comorbidity factors that impact the plan of care are:      forceps delivery and 10#+ baby.     Medications impacting care: None.  2) Examination of Body Systems comprised of:   Body structures and functions that impact the plan of care:      Pelvis.   Activity limitations that impact the plan of care are:      Standing, Walking, Fecal incontinence, Frequency and Urinary incontinence.  3) Clinical presentation characteristics are:   Evolving/Changing.  4) Decision-Making    Moderate complexity using standardized patient assessment instrument and/or measureable assessment of functional outcome.  Cumulative Therapy Evaluation is: Moderate complexity.    Previous and current functional limitations:  (See Goal Flow Sheet for this information)    Short term and Long term goals: (See Goal Flow Sheet for this information)     Communication ability:  Patient appears to be able to clearly communicate and understand verbal and written communication and follow directions correctly.  Treatment Explanation - The following has been discussed with the patient:   RX ordered/plan of care  Anticipated outcomes  Possible risks and side effects  This patient would benefit from PT intervention to resume normal activities.   Rehab potential is good.    Frequency:  1 X week, once daily  Duration:  for 8 weeks  Discharge Plan:  Achieve all LTG.  Independent in home treatment program.  Reach maximal therapeutic benefit.    Please refer to the daily flowsheet for treatment today, total treatment time and time spent performing 1:1 timed codes.

## 2017-07-17 NOTE — MR AVS SNAPSHOT
After Visit Summary   7/17/2017    Nicole Stanley    MRN: [id number]           Patient Information     Date Of Birth          1950        Visit Information        Provider Department      7/17/2017 2:15 PM Renetta Batista PT [facility]        Today's Diagnoses     Somatic dysfunction of pelvic region    -  1       Follow-ups after your visit        Your next 10 appointments already scheduled     Sep 14, 2017 10:20 AM CDT   US PELVIC COMPLETE W TRANSVAGINAL with RDUS1   [facility] ([facility])    [address] [place] [address]  [address]  [facility] [address] [phone]           Please bring a list of your medicines (including vitamins, minerals and over-the-counter drugs). Also, tell your doctor about any allergies you may have. Wear comfortable clothes and leave your valuables at home.  Adults: Drink six 8-ounce glasses of fluid one hour before your exam. Do NOT empty your bladder.  If you need to empty your bladder before your exam, try to release only a little bit of urine. Then, drink another 8oz glass of fluid.  Children: Children who are potty trained should drink at least 4 cups (32 oz) of liquid 45 minutes to one hour prior to the exam. The child s bladder must be full in order to achieve a diagnostic exam. If your child is very uncomfortable or has an urgent need to pee, please notify a technologist; they will try to find out how much longer the wait may be and provide instructions to help relieve the pressure. Occasionally it is medically necessary to insert a urinary catheter to fill the bladder.  Please call the Imaging Department at your exam site with any questions.            Oct 18, 2017 11:30 AM CDT   Return Visit with Sheba Song MD   [facility] ([facility])    [address]  [address]  [facility] [address] [phone]              Who to contact     If you have  questions or need follow up information about today's clinic visit or your schedule please contact [facility] directly at [phone].  Normal or non-critical lab and imaging results will be communicated to you by MyChart, letter or phone within 4 business days after the clinic has received the results. If you do not hear from us within 7 days, please contact the clinic through [employer] or phone. If you have a critical or abnormal lab result, we will notify you by phone as soon as possible.  Submit refill requests through My eStore App or call your pharmacy and they will forward the refill request to us. Please allow 3 business days for your refill to be completed.          Additional Information About Your Visit        [email or web address] Information     My eStore App gives you secure access to your electronic health record. If you see a primary care provider, you can also send messages to your care team and make appointments. If you have questions, please call your primary care clinic.  If you do not have a primary care provider, please call [phone] and they will assist you.        Care EveryWhere ID     This is your Care EveryWhere ID. This could be used by other organizations to access your [place] medical records  [id number]        Your Vitals Were     Last Period                   (LMP Unknown)            Blood Pressure from Last 3 Encounters:   06/14/17 110/68   05/19/17 133/83   05/02/17 129/87    Weight from Last 3 Encounters:   06/14/17 67.1 kg (148 lb)   05/19/17 67.5 kg (148 lb 12.8 oz)   05/02/17 67.1 kg (148 lb)              We Performed the Following     [facility] PT EVAL, MODERATE COMPLEXITY     [facility] CERT REPORT     [facility] INITIAL EVAL REPORT     NEUROMUSCULAR RE-EDUCATION     THERAPEUTIC ACTIVITIES     THERAPEUTIC EXERCISES        Primary Care Provider Office Phone # Fax #    Adriana YAAKOV Huntley [phone]       [address] [address]        Equal  Access to Services     [facility]: Hadii aad ku hadamericasandar Emilianatyrel, wajosyda luqadaha, qaybta katejaldanny pihllips. So [facility] [phone].    ATENCIÓN: Si habla español, tiene a michaels disposición servicios gratuitos de asistencia lingüística. Llame al [phone].    We comply with applicable federal civil rights laws and [place] laws. We do not discriminate on the basis of race, color, national origin, age, disability sex, sexual orientation or gender identity.            Thank you!     Thank you for choosing [facility]  for your care. Our goal is always to provide you with excellent care. Hearing back from our patients is one way we can continue to improve our services. Please take a few minutes to complete the written survey that you may receive in the mail after your visit with us. Thank you!             Your Updated Medication List - Protect others around you: Learn how to safely use, store and throw away your medicines at www.disposemymeds.org.          This list is accurate as of: 7/17/17  4:38 PM.  Always use your most recent med list.                   Brand Name Dispense Instructions for use Diagnosis    Acidophilus/Goat Milk Caps           aspirin EC 81 MG EC tablet      Take 81 mg by mouth        levothyroxine 50 MCG tablet    SYNTHROID/LEVOTHROID    90 tablet    Take 1 tablet (50 mcg) by mouth daily    Acquired hypothyroidism       NAPROSYN PO      Take 220 mg by mouth 2 times daily (with meals)        triamterene-hydrochlorothiazide 37.5-25 MG per tablet    MAXZIDE-25    90 tablet    Take 1 tablet by mouth daily    Essential hypertension       UNABLE TO FIND      MEDICATION NAME: fish oil - 1200 mg twice daily

## 2017-08-09 ENCOUNTER — THERAPY VISIT (OUTPATIENT)
Dept: PHYSICAL THERAPY | Facility: CLINIC | Age: 67
End: 2017-08-09
Payer: MEDICARE

## 2017-08-09 DIAGNOSIS — M99.05 SOMATIC DYSFUNCTION OF PELVIC REGION: ICD-10-CM

## 2017-08-09 PROCEDURE — 97112 NEUROMUSCULAR REEDUCATION: CPT | Mod: GP | Performed by: PHYSICAL THERAPIST

## 2017-08-09 PROCEDURE — 97110 THERAPEUTIC EXERCISES: CPT | Mod: GP | Performed by: PHYSICAL THERAPIST

## 2017-08-09 PROCEDURE — 97140 MANUAL THERAPY 1/> REGIONS: CPT | Mod: GP | Performed by: PHYSICAL THERAPIST

## 2017-08-16 ENCOUNTER — THERAPY VISIT (OUTPATIENT)
Dept: PHYSICAL THERAPY | Facility: CLINIC | Age: 67
End: 2017-08-16
Payer: MEDICARE

## 2017-08-16 DIAGNOSIS — M99.05 SOMATIC DYSFUNCTION OF PELVIC REGION: ICD-10-CM

## 2017-08-16 PROCEDURE — 97140 MANUAL THERAPY 1/> REGIONS: CPT | Mod: GP | Performed by: PHYSICAL THERAPIST

## 2017-08-16 PROCEDURE — 97110 THERAPEUTIC EXERCISES: CPT | Mod: GP | Performed by: PHYSICAL THERAPIST

## 2017-08-30 ENCOUNTER — THERAPY VISIT (OUTPATIENT)
Dept: PHYSICAL THERAPY | Facility: CLINIC | Age: 67
End: 2017-08-30
Payer: MEDICARE

## 2017-08-30 DIAGNOSIS — M99.05 SOMATIC DYSFUNCTION OF PELVIC REGION: ICD-10-CM

## 2017-08-30 PROCEDURE — 97112 NEUROMUSCULAR REEDUCATION: CPT | Mod: GP | Performed by: PHYSICAL THERAPIST

## 2017-08-30 PROCEDURE — 97110 THERAPEUTIC EXERCISES: CPT | Mod: GP | Performed by: PHYSICAL THERAPIST

## 2017-08-30 PROCEDURE — 97140 MANUAL THERAPY 1/> REGIONS: CPT | Mod: GP | Performed by: PHYSICAL THERAPIST

## 2017-09-14 ENCOUNTER — THERAPY VISIT (OUTPATIENT)
Dept: PHYSICAL THERAPY | Facility: CLINIC | Age: 67
End: 2017-09-14
Payer: MEDICARE

## 2017-09-14 ENCOUNTER — RADIANT APPOINTMENT (OUTPATIENT)
Dept: ULTRASOUND IMAGING | Facility: CLINIC | Age: 67
End: 2017-09-14
Attending: OBSTETRICS & GYNECOLOGY
Payer: COMMERCIAL

## 2017-09-14 DIAGNOSIS — M99.05 SOMATIC DYSFUNCTION OF PELVIC REGION: ICD-10-CM

## 2017-09-14 DIAGNOSIS — N83.202 LEFT OVARIAN CYST: ICD-10-CM

## 2017-09-14 PROCEDURE — 76830 TRANSVAGINAL US NON-OB: CPT | Performed by: OBSTETRICS & GYNECOLOGY

## 2017-09-14 PROCEDURE — 97140 MANUAL THERAPY 1/> REGIONS: CPT | Mod: GP | Performed by: PHYSICAL THERAPIST

## 2017-09-14 PROCEDURE — 97110 THERAPEUTIC EXERCISES: CPT | Mod: GP | Performed by: PHYSICAL THERAPIST

## 2017-09-15 DIAGNOSIS — N83.202 LEFT OVARIAN CYST: Primary | ICD-10-CM

## 2017-09-18 ENCOUNTER — TELEPHONE (OUTPATIENT)
Dept: FAMILY MEDICINE | Facility: CLINIC | Age: 67
End: 2017-09-18

## 2017-09-18 NOTE — TELEPHONE ENCOUNTER
Triage called patient. She does have medication left. She is going to call in October for her refill.  Azalea Soto RN

## 2017-09-18 NOTE — TELEPHONE ENCOUNTER
Reason for Call:  Medication or medication refill:    Do you use a [facility]?  Name of the pharmacy and phone number for the current request:  [facility]    Name of the medication requested: triamterene-hydrochlorothiazide (MAXZIDE-25) 37.5-25 MG per tablet    Other request: Pt received a letter stating this med was denied due to the provider request. She would like to discuss this with someone since this is her BP med. She has been taking this for four to five years. Please contact pt regarding this. Thanks!    Can we leave a detailed message on this number? YES    Phone number patient can be reached at: Cell number on file:    Telephone Information:   Mobile [phone]       Best Time: Anytime    Call taken on 9/18/2017 at 12:21 PM by Dulce Maria Kingsley

## 2017-09-19 ENCOUNTER — THERAPY VISIT (OUTPATIENT)
Dept: PHYSICAL THERAPY | Facility: CLINIC | Age: 67
End: 2017-09-19
Payer: MEDICARE

## 2017-09-19 DIAGNOSIS — M99.05 SOMATIC DYSFUNCTION OF PELVIC REGION: ICD-10-CM

## 2017-09-19 PROCEDURE — 97140 MANUAL THERAPY 1/> REGIONS: CPT | Mod: GP | Performed by: PHYSICAL THERAPIST

## 2017-09-19 PROCEDURE — 97110 THERAPEUTIC EXERCISES: CPT | Mod: GP | Performed by: PHYSICAL THERAPIST

## 2017-09-25 ENCOUNTER — THERAPY VISIT (OUTPATIENT)
Dept: PHYSICAL THERAPY | Facility: CLINIC | Age: 67
End: 2017-09-25
Payer: MEDICARE

## 2017-09-25 DIAGNOSIS — M99.05 SOMATIC DYSFUNCTION OF PELVIC REGION: ICD-10-CM

## 2017-09-25 PROCEDURE — 97140 MANUAL THERAPY 1/> REGIONS: CPT | Mod: GP | Performed by: PHYSICAL THERAPIST

## 2017-09-25 PROCEDURE — G8991 OTHER PT/OT GOAL STATUS: HCPCS | Mod: GP | Performed by: PHYSICAL THERAPIST

## 2017-09-25 PROCEDURE — G8990 OTHER PT/OT CURRENT STATUS: HCPCS | Mod: GP | Performed by: PHYSICAL THERAPIST

## 2017-09-25 PROCEDURE — 97110 THERAPEUTIC EXERCISES: CPT | Mod: GP | Performed by: PHYSICAL THERAPIST

## 2017-09-25 NOTE — MR AVS SNAPSHOT
After Visit Summary   9/25/2017    Nicole Stanley    MRN: [id number]           Patient Information     Date Of Birth          1950        Visit Information        Provider Department      9/25/2017 10:25 AM Renetta Batista, SHAZIA LUU PT        Today's Diagnoses     Somatic dysfunction of pelvic region           Follow-ups after your visit        Your next 10 appointments already scheduled     Oct 18, 2017 11:30 AM CDT   Return Visit with Sheba Song MD   [facility] [place] ([facility] [place])    [address]  [address]  [facility] [address] [phone]            Oct 30, 2017 11:05 AM CDT   [facility] with SHAZIA Hammond PT ([facility]  )    [address]  [address]  [facility] [address]   [phone]            Mar 12, 2018 10:20 AM CDT   US PELVIC COMPLETE W TRANSVAGINAL with RDUS1   [facility] ([facility])    [address] [place] [address] S  [address]  [facility] [id number] [phone]           Please bring a list of your medicines (including vitamins, minerals and over-the-counter drugs). Also, tell your doctor about any allergies you may have. Wear comfortable clothes and leave your valuables at home.  Adults: Drink six 8-ounce glasses of fluid one hour before your exam. Do NOT empty your bladder.  If you need to empty your bladder before your exam, try to release only a little bit of urine. Then, drink another 8oz glass of fluid.  Children: Children who are potty trained should drink at least 4 cups (32 oz) of liquid 45 minutes to one hour prior to the exam. The child s bladder must be full in order to achieve a diagnostic exam. If your child is very uncomfortable or has an urgent need to pee, please notify a technologist; they will try to find out how much longer the wait may be and provide instructions to help relieve the pressure. Occasionally  it is medically necessary to insert a urinary catheter to fill the bladder.  Please call the Imaging Department at your exam site with any questions.              Who to contact     If you have questions or need follow up information about today's clinic visit or your schedule please contact [facility] directly at [phone].  Normal or non-critical lab and imaging results will be communicated to you by MyChart, letter or phone within 4 business days after the clinic has received the results. If you do not hear from us within 7 days, please contact the clinic through youcalchart or phone. If you have a critical or abnormal lab result, we will notify you by phone as soon as possible.  Submit refill requests through TalentBin or call your pharmacy and they will forward the refill request to us. Please allow 3 business days for your refill to be completed.          Additional Information About Your Visit        MyChart Information     TalentBin gives you secure access to your electronic health record. If you see a primary care provider, you can also send messages to your care team and make appointments. If you have questions, please call your primary care clinic.  If you do not have a primary care provider, please call [phone] and they will assist you.        Care EveryWhere ID     This is your Care EveryWhere ID. This could be used by other organizations to access your [place] medical records  [id number]        Your Vitals Were     Last Period                   (LMP Unknown)            Blood Pressure from Last 3 Encounters:   06/14/17 110/68   05/19/17 133/83   05/02/17 129/87    Weight from Last 3 Encounters:   06/14/17 67.1 kg (148 lb)   05/19/17 67.5 kg (148 lb 12.8 oz)   05/02/17 67.1 kg (148 lb)              We Performed the Following     [facility] PROGRESS NOTES REPORT     [facility] RE-CERT REPORT     MANUAL THER TECH,1+REGIONS,EA 15 MIN     THERAPEUTIC EXERCISES        Primary Care Provider Office Phone # Fax #     Adriana Stern, APRN [phone]       [address] [facility] [address]        Equal Access to Services     JUNIE GONSALES : Hadii aad ku hadamericasandra Sotyrel, wajosyda luqadaha, qaybta kaalmada drew, danny baudilioin hayaaleroy solomonemmanuelle shantel dunbar. So [facility] [phone].    ATENCIÓN: Si habla español, tiene a michaels disposición servicios gratuitos de asistencia lingüística. Llame al [phone].    We comply with applicable federal civil rights laws and [place] laws. We do not discriminate on the basis of race, color, national origin, age, disability sex, sexual orientation or gender identity.            Thank you!     Thank you for choosing [facility]  for your care. Our goal is always to provide you with excellent care. Hearing back from our patients is one way we can continue to improve our services. Please take a few minutes to complete the written survey that you may receive in the mail after your visit with us. Thank you!             Your Updated Medication List - Protect others around you: Learn how to safely use, store and throw away your medicines at www.disposemymeds.org.          This list is accurate as of: 9/25/17 11:02 AM.  Always use your most recent med list.                   Brand Name Dispense Instructions for use Diagnosis    Acidophilus/Goat Milk Caps           aspirin EC 81 MG EC tablet      Take 81 mg by mouth        levothyroxine 50 MCG tablet    SYNTHROID/LEVOTHROID    90 tablet    Take 1 tablet (50 mcg) by mouth daily    Acquired hypothyroidism       NAPROSYN PO      Take 220 mg by mouth 2 times daily (with meals)        triamterene-hydrochlorothiazide 37.5-25 MG per tablet    MAXZIDE-25    90 tablet    Take 1 tablet by mouth daily    Essential hypertension       UNABLE TO FIND      MEDICATION NAME: fish oil - 1200 mg twice daily

## 2017-09-25 NOTE — PROGRESS NOTES
Subjective:    HPI                    Objective:    System    Physical Exam    General     ROS    Assessment/Plan:      PROGRESS  REPORT    Progress reporting period is from 07/17/17 to 09/25/17.       SUBJECTIVE  Subjective changes noted by patient:   Doing much better with pain. Also doing well with urge suppression, able to delay voiding for 2-3 hours. Denies stress incontinence much but leakage small amounts primarily with urge. Using 1-2 pads/day max.     Current pain level is NA  .     Previous pain level was  : 3/10.   Changes in function:  Yes (See Goal flowsheet attached for changes in current functional level)  Adverse reaction to treatment or activity: None    OBJECTIVE  Changes noted in objective findings:  Yes,   Objective: Prolapse gr 1-2, PF strength 3+/5. No myofascial tenderness to R side, L side mild tenderness to LA/OI but resolves withing 2-3 minutes of treatment.      ASSESSMENT/PLAN  Updated problem list and treatment plan: Diagnosis 1:  Pelvis pain and urge incontinence  Pain -  hot/cold therapy, manual therapy, self management, education and home program  Decreased ROM/flexibility - manual therapy and therapeutic exercise  Decreased strength - therapeutic exercise and therapeutic activities  Decreased proprioception - neuro re-education and therapeutic activities  Inflammation - cold therapy and self management/home program  Impaired muscle performance - biofeedback and neuro re-education  Decreased function - therapeutic activities  STG/LTGs have been met or progress has been made towards goals:  Yes (See Goal flow sheet completed today.)  Assessment of Progress: The patient's condition is improving.  Self Management Plans:  Patient has been instructed in a home treatment program.  Patient  has been instructed in self management of symptoms.  I have re-evaluated this patient and find that the nature, scope, duration and intensity of the therapy is appropriate for the medical condition of the  patient.  Nicole continues to require the following intervention to meet STG and LTG's:  PT    Recommendations:  This patient would benefit from continued therapy.     Frequency:  1 X a month, once daily  Duration:  for 2 months    Almost ready for DC to HEP. Consider therawand use for home.         Please refer to the daily flowsheet for treatment today, total treatment time and time spent performing 1:1 timed codes.

## 2017-09-25 NOTE — LETTER
DEPARTMENT OF HEALTH AND HUMAN SERVICES  CENTERS FOR MEDICARE & MEDICAID SERVICES    PLAN/UPDATED PLAN OF PROGRESS FOR OUTPATIENT REHABILITATION    PATIENTS NAME:  Nicole Stanley   : 1950  PROVIDER NUMBER:    [id number]  [id number]   PROVIDER NAME: [facility]  MEDICAL RECORD NUMBER: [id number]   START OF CARE DATE:  17   TYPE:  PT  PRIMARY/TREATMENT DIAGNOSIS:Somatic dysfunction of pelvic region  VISITS FROM START OF CARE:  Rxs Used: 7     PROGRESS  REPORT  Progress reporting period is from 17 to 17.       SUBJECTIVE  Subjective changes noted by patient:   Doing much better with pain. Also doing well with urge suppression, able to delay voiding for 2-3 hours. Denies stress incontinence much but leakage small amounts primarily with urge. Using 1-2 pads/day max.     Current pain level is NA  .     Previous pain level was  : 3/10.   Changes in function:  Yes (See Goal flowsheet attached for changes in current functional level)  Adverse reaction to treatment or activity: None    OBJECTIVE  Changes noted in objective findings:  Yes,   Objective: Prolapse gr 1-2, PF strength 3+/5. No myofascial tenderness to R side, L side mild tenderness to LA/OI but resolves withing 2-3 minutes of treatment.      ASSESSMENT/PLAN  Updated problem list and treatment plan: Diagnosis 1:  Pelvis pain and urge incontinence  Pain -  hot/cold therapy, manual therapy, self management, education and home program  Decreased ROM/flexibility - manual therapy and therapeutic exercise  Decreased strength - therapeutic exercise and therapeutic activities  Decreased proprioception - neuro re-education and therapeutic activities  Inflammation - cold therapy and self management/home program  Impaired muscle performance - biofeedback and neuro re-education  Decreased function - therapeutic activities  STG/LTGs have been met or progress has been made towards goals:  Yes (See Goal flow sheet completed  "today.)  Assessment of Progress: The patient's condition is improving.  Self Management Plans:  Patient has been instructed in a home treatment program.  Patient  has been instructed in self management of symptoms.  I have re-evaluated this patient and find that the nature, scope, duration and intensity of the therapy is appropriate for the medical condition of the patient.  Nicole continues to require the following intervention to meet STG and LTG's:  PT        PATIENTS NAME:  Nicole Stanley   : 1950        Recommendations:  This patient would benefit from continued therapy.     Frequency:  1 X a month, once daily  Duration:  for 2 months    Almost ready for DC to HEP. Consider therawand use for home.           Caregiver Signature/Credentials _____________________________ Date ________         Treating Provider: Renetta Batista, PT, OCS       I have reviewed and certified the need for these services and plan of treatment while under my care.        PHYSICIAN'S SIGNATURE:   ________________________________Date___________          Sheba See Juan A Song    Certification period:  Beginning of Cert date period: 10/15/17 to  End of Cert period date: 18     Functional Level Progress Report: Please see attached \"Goal Flow sheet for Functional level.\"    ____X____ Continue Services or       ________ DC Services                Service dates: From  SOC Date: 17 date to present                         "

## 2017-10-18 ENCOUNTER — OFFICE VISIT (OUTPATIENT)
Dept: UROLOGY | Facility: CLINIC | Age: 67
End: 2017-10-18
Payer: COMMERCIAL

## 2017-10-18 VITALS
HEART RATE: 71 BPM | OXYGEN SATURATION: 98 % | HEIGHT: 65 IN | BODY MASS INDEX: 23.32 KG/M2 | WEIGHT: 140 LBS | SYSTOLIC BLOOD PRESSURE: 118 MMHG | DIASTOLIC BLOOD PRESSURE: 60 MMHG

## 2017-10-18 DIAGNOSIS — M62.89 PELVIC FLOOR DYSFUNCTION: ICD-10-CM

## 2017-10-18 DIAGNOSIS — N81.2 UTEROVAGINAL PROLAPSE, INCOMPLETE: ICD-10-CM

## 2017-10-18 DIAGNOSIS — N39.46 MIXED INCONTINENCE: Primary | ICD-10-CM

## 2017-10-18 PROCEDURE — 99213 OFFICE O/P EST LOW 20 MIN: CPT | Performed by: UROLOGY

## 2017-10-18 RX ORDER — OXYBUTYNIN CHLORIDE 5 MG/1
5 TABLET, EXTENDED RELEASE ORAL DAILY
Qty: 30 TABLET | Refills: 3 | Status: SHIPPED | OUTPATIENT
Start: 2017-10-18 | End: 2018-04-18

## 2017-10-18 ASSESSMENT — PAIN SCALES - GENERAL: PAINLEVEL: NO PAIN (0)

## 2017-10-18 NOTE — PATIENT INSTRUCTIONS
Discuss Milana with Renetta    Trial of oxybutynin daily.  Stop the medication if you cannot urinate or are having troubles urinating.  Seek medical attention if unable to urinate    Return 6-8 weeks to assess the medication and check how well you empty your bladder

## 2017-10-18 NOTE — MR AVS SNAPSHOT
After Visit Summary   10/18/2017    Nicole Stanley    MRN: [id number]           Patient Information     Date Of Birth          1950        Visit Information        Provider Department      10/18/2017 11:30 AM Sheba Song See MD Juan A [facility] [place]        Today's Diagnoses     Mixed incontinence    -  1    Pelvic floor dysfunction        Uterovaginal prolapse, incomplete          Care Instructions    Discuss Therawand with Renetta    Trial of oxybutynin daily.  Stop the medication if you cannot urinate or are having troubles urinating.  Seek medical attention if unable to urinate    Return 6-8 weeks to assess the medication and check how well you empty your bladder          Follow-ups after your visit        Your next 10 appointments already scheduled     Oct 30, 2017 11:05 AM CDT   [facility] with Renetta Batista PT   [facility] ([facility]  )    [address]  [address]  [facility] [address]   [phone]            Nov 15, 2017 12:15 PM CST   Return Visit with Sheba See Juan A Song MD   [facility] ([facility])    [address]  [address]  [facility] [address] [phone]            Mar 12, 2018 10:20 AM CDT   US PELVIC COMPLETE W TRANSVAGINAL with RDUS1   [facility] ([facility])    [address] [place] [address] [address]  [address]  [facility] [id number] [phone]           Please bring a list of your medicines (including vitamins, minerals and over-the-counter drugs). Also, tell your doctor about any allergies you may have. Wear comfortable clothes and leave your valuables at home.  Adults: Drink six 8-ounce glasses of fluid one hour before your exam. Do NOT empty your bladder.  If you need to empty your bladder before your exam, try to release only a little bit of urine. Then, drink another 8oz glass of fluid.  Children: Children who are potty  trained should drink at least 4 cups (32 oz) of liquid 45 minutes to one hour prior to the exam. The child s bladder must be full in order to achieve a diagnostic exam. If your child is very uncomfortable or has an urgent need to pee, please notify a technologist; they will try to find out how much longer the wait may be and provide instructions to help relieve the pressure. Occasionally it is medically necessary to insert a urinary catheter to fill the bladder.  Please call the Imaging Department at your exam site with any questions.              Who to contact     If you have questions or need follow up information about today's clinic visit or your schedule please contact [facility] directly at [phone].  Normal or non-critical lab and imaging results will be communicated to you by Interactivohart, letter or phone within 4 business days after the clinic has received the results. If you do not hear from us within 7 days, please contact the clinic through Keoghst or phone. If you have a critical or abnormal lab result, we will notify you by phone as soon as possible.  Submit refill requests through HiperScan or call your pharmacy and they will forward the refill request to us. Please allow 3 business days for your refill to be completed.          Additional Information About Your Visit        HiperScan Information     HiperScan gives you secure access to your electronic health record. If you see a primary care provider, you can also send messages to your care team and make appointments. If you have questions, please call your primary care clinic.  If you do not have a primary care provider, please call [phone] and they will assist you.        Care EveryWhere ID     This is your Care EveryWhere ID. This could be used by other organizations to access your [facility] medical records  [id number]        Your Vitals Were     Pulse Height Last Period Pulse Oximetry BMI (Body Mass Index)  "      71 1.651 m (5' 5\") (LMP Unknown) 98% 23.3 kg/m2        Blood Pressure from Last 3 Encounters:   10/18/17 118/60   06/14/17 110/68   05/19/17 133/83    Weight from Last 3 Encounters:   10/18/17 63.5 kg (140 lb)   06/14/17 67.1 kg (148 lb)   05/19/17 67.5 kg (148 lb 12.8 oz)              Today, you had the following     No orders found for display         Today's Medication Changes          These changes are accurate as of: 10/18/17 11:57 AM.  If you have any questions, ask your nurse or doctor.               Start taking these medicines.        Dose/Directions    oxybutynin 5 MG 24 hr tablet   Commonly known as:  DITROPAN-XL   Used for:  Mixed incontinence   Started by:  Sheba Song MD        Dose:  5 mg   Take 1 tablet (5 mg) by mouth daily   Quantity:  30 tablet   Refills:  3            Where to get your medicines      These medications were sent to [address]) [place] [address]     Phone:  [phone]     oxybutynin 5 MG 24 hr tablet                Primary Care Provider Office Phone # Fax #    Adriana YAAKOV [facility] [phone] [address] [address]        Equal Access to Services     JUNIE GONSALES AH: Hadii aad ku hadasho Soomaali, waaxda luqadaha, qaybta kaalmada adeegyada, waxay idiin haypreethi dunbar. So [facility] [phone].    ATENCIÓN: Si habla español, tiene a michaels disposición servicios gratuitos de asistencia lingüística. Llame al [phone].    We comply with applicable federal civil rights laws and [place] laws. We do not discriminate on the basis of race, color, national origin, age, disability, sex, sexual orientation, or gender identity.            Thank you!     Thank you for choosing [facility]  for your care. Our goal is always to provide you with excellent care. Hearing back from " our patients is one way we can continue to improve our services. Please take a few minutes to complete the written survey that you may receive in the mail after your visit with us. Thank you!             Your Updated Medication List - Protect others around you: Learn how to safely use, store and throw away your medicines at www.disposemymeds.org.          This list is accurate as of: 10/18/17 11:57 AM.  Always use your most recent med list.                   Brand Name Dispense Instructions for use Diagnosis    Acidophilus/Goat Milk Caps           aspirin EC 81 MG EC tablet      Take 81 mg by mouth        levothyroxine 50 MCG tablet    SYNTHROID/LEVOTHROID    90 tablet    Take 1 tablet (50 mcg) by mouth daily    Acquired hypothyroidism       NAPROSYN PO      Take 220 mg by mouth 2 times daily (with meals)        oxybutynin 5 MG 24 hr tablet    DITROPAN-XL    30 tablet    Take 1 tablet (5 mg) by mouth daily    Mixed incontinence       triamterene-hydrochlorothiazide 37.5-25 MG per tablet    MAXZIDE-25    90 tablet    Take 1 tablet by mouth daily    Essential hypertension       UNABLE TO FIND      MEDICATION NAME: fish oil - 1200 mg twice daily

## 2017-10-18 NOTE — PROGRESS NOTES
"October 18, 2017    Return visit    Patient returns today for follow up for mixed urinary incontinence, dyspareunia, myofascial tenderness of the pelvic floor, pelvic floor dysfunction now s/p PFPT.  States that the pain is at least 85% improved.  She gets a little sore with the therapy and exercises, discussed therawand with Renetta but did not . She denies any changes in her health since last visit.  Denies glaucoma or constipation    /60 (BP Location: Left arm, Patient Position: Sitting, Cuff Size: Adult Regular)  Pulse 71  Ht 1.651 m (5' 5\")  Wt 63.5 kg (140 lb)  LMP  (LMP Unknown)  SpO2 98%  BMI 23.3 kg/m2  She is comfortable, in no distress, non-labored breathing.     A/P: 67 year old F with mixed incontinence, dyspareunia, myofascial tenderness of the pelvic floor and pelvic floor dysfunction improved with PT    Patient wishes to see if there are any other options for the more bothersome urge incontinence. Options discussed and she has elected medication.  Common side effects discussed and based on her insurance will try oxybutynin    Also encouraged her to get a therawand and have Renetta teach her how to do home myofascial release as it sounds that this would give patient a longer duration in symptom improvement    RTC 6-8 weeks for PVR and symptoms check    15 minutes were spent with the patient today, > 50% in counseling and coordination of care    Sheba Song MD MPH   of Urology    CC  Patient Care Team:  Adriana Stern, YAAKOV BARILLAS as PCP - General (Nurse Practitioner)                "

## 2017-10-18 NOTE — LETTER
"10/18/2017       RE: Nicole Stanley  [address]  [place] [address]     Dear Colleague,    Thank you for referring your patient, Nicole Stanley, to the [facility] [place] at [facility]. Please see a copy of my visit note below.    October 18, 2017    Return visit    Patient returns today for follow up for mixed urinary incontinence, dyspareunia, myofascial tenderness of the pelvic floor, pelvic floor dysfunction now s/p PFPT.  States that the pain is at least 85% improved.  She gets a little sore with the therapy and exercises, discussed therawand with Renetta but did not . She denies any changes in her health since last visit.  Denies glaucoma or constipation    /60 (BP Location: Left arm, Patient Position: Sitting, Cuff Size: Adult Regular)  Pulse 71  Ht 1.651 m (5' 5\")  Wt 63.5 kg (140 lb)  LMP  (LMP Unknown)  SpO2 98%  BMI 23.3 kg/m2  She is comfortable, in no distress, non-labored breathing.     A/P: 67 year old F with mixed incontinence, dyspareunia, myofascial tenderness of the pelvic floor and pelvic floor dysfunction improved with PT    Patient wishes to see if there are any other options for the more bothersome urge incontinence. Options discussed and she has elected medication.  Common side effects discussed and based on her insurance will try oxybutynin    Also encouraged her to get a therawand and have Renetta teach her how to do home myofascial release as it sounds that this would give patient a longer duration in symptom improvement    RTC 6-8 weeks for PVR and symptoms check    15 minutes were spent with the patient today, > 50% in counseling and coordination of care    Sheba Song MD MPH   of Urology    CC  Patient Care Team:  Adriana Stern, YAAKOV CNP as PCP - General (Nurse Practitioner)                "

## 2017-10-30 ENCOUNTER — THERAPY VISIT (OUTPATIENT)
Dept: PHYSICAL THERAPY | Facility: CLINIC | Age: 67
End: 2017-10-30
Payer: MEDICARE

## 2017-10-30 DIAGNOSIS — M99.05 SOMATIC DYSFUNCTION OF PELVIC REGION: ICD-10-CM

## 2017-10-30 PROCEDURE — 97530 THERAPEUTIC ACTIVITIES: CPT | Mod: GP | Performed by: PHYSICAL THERAPIST

## 2017-10-30 PROCEDURE — 97110 THERAPEUTIC EXERCISES: CPT | Mod: GP | Performed by: PHYSICAL THERAPIST

## 2017-10-30 PROCEDURE — G8992 OTHER PT/OT  D/C STATUS: HCPCS | Mod: GP | Performed by: PHYSICAL THERAPIST

## 2017-10-30 PROCEDURE — G8991 OTHER PT/OT GOAL STATUS: HCPCS | Mod: GP | Performed by: PHYSICAL THERAPIST

## 2017-10-30 NOTE — MR AVS SNAPSHOT
After Visit Summary   10/30/2017    Nicole Stanley    MRN: [id number]           Patient Information     Date Of Birth          1950        Visit Information        Provider Department      10/30/2017 11:05 AM Renetta Batista, PT [facility]        Today's Diagnoses     Somatic dysfunction of pelvic region           Follow-ups after your visit        Your next 10 appointments already scheduled     Nov 15, 2017 12:15 PM CST   Return Visit with Sheba Song MD   [facility] [place] ([facility] [place])    [address]  [address]  [facility] [id number] [phone]            Mar 12, 2018 10:20 AM CDT   US PELVIC COMPLETE W TRANSVAGINAL with RDUS1   [facility] ([facility])    [address] [place] [address] S  [address]  [facility] [address] [phone]           Please bring a list of your medicines (including vitamins, minerals and over-the-counter drugs). Also, tell your doctor about any allergies you may have. Wear comfortable clothes and leave your valuables at home.  Adults: Drink six 8-ounce glasses of fluid one hour before your exam. Do NOT empty your bladder.  If you need to empty your bladder before your exam, try to release only a little bit of urine. Then, drink another 8oz glass of fluid.  Children: Children who are potty trained should drink at least 4 cups (32 oz) of liquid 45 minutes to one hour prior to the exam. The child s bladder must be full in order to achieve a diagnostic exam. If your child is very uncomfortable or has an urgent need to pee, please notify a technologist; they will try to find out how much longer the wait may be and provide instructions to help relieve the pressure. Occasionally it is medically necessary to insert a urinary catheter to fill the bladder.  Please call the Imaging Department at your exam site with any questions.              Who to contact     If you have  questions or need follow up information about today's clinic visit or your schedule please contact [facility] directly at [phone].  Normal or non-critical lab and imaging results will be communicated to you by MyChart, letter or phone within 4 business days after the clinic has received the results. If you do not hear from us within 7 days, please contact the clinic through MyChart or phone. If you have a critical or abnormal lab result, we will notify you by phone as soon as possible.  Submit refill requests through [employer]ai or call your pharmacy and they will forward the refill request to us. Please allow 3 business days for your refill to be completed.          Additional Information About Your Visit        ScriptedharTappnGo Information     [employer] gives you secure access to your electronic health record. If you see a primary care provider, you can also send messages to your care team and make appointments. If you have questions, please call your primary care clinic.  If you do not have a primary care provider, please call [phone] and they will assist you.        Care EveryWhere ID     This is your Care EveryWhere ID. This could be used by other organizations to access your [place] medical records  [id number]        Your Vitals Were     Last Period                   (LMP Unknown)            Blood Pressure from Last 3 Encounters:   10/18/17 118/60   06/14/17 110/68   05/19/17 133/83    Weight from Last 3 Encounters:   10/18/17 63.5 kg (140 lb)   06/14/17 67.1 kg (148 lb)   05/19/17 67.5 kg (148 lb 12.8 oz)              We Performed the Following     [facility] PROGRESS NOTES REPORT     THERAPEUTIC ACTIVITIES     THERAPEUTIC EXERCISES        Primary Care Provider Office Phone # Fax #    Adriana YAAKOV Huntley [phone] [address] [facility] [address]        Equal Access to Services     JUNIE GONSALES : Sonja Alonso, yusuf barrios, krista thakkar  danny russellross juanitoemmanuelle caroaan ah. So [facility] [phone].    ATENCIÓN: Si habla dixon, tiene a michaels disposición servicios gratuitos de asistencia lingüística. Jud al [phone].    We comply with applicable federal civil rights laws and [place] laws. We do not discriminate on the basis of race, color, national origin, age, disability, sex, sexual orientation, or gender identity.            Thank you!     Thank you for choosing [facility]  for your care. Our goal is always to provide you with excellent care. Hearing back from our patients is one way we can continue to improve our services. Please take a few minutes to complete the written survey that you may receive in the mail after your visit with us. Thank you!             Your Updated Medication List - Protect others around you: Learn how to safely use, store and throw away your medicines at www.disposemymeds.org.          This list is accurate as of: 10/30/17  1:36 PM.  Always use your most recent med list.                   Brand Name Dispense Instructions for use Diagnosis    Acidophilus/Goat Milk Caps           aspirin EC 81 MG EC tablet      Take 81 mg by mouth        levothyroxine 50 MCG tablet    SYNTHROID/LEVOTHROID    90 tablet    Take 1 tablet (50 mcg) by mouth daily    Acquired hypothyroidism       NAPROSYN PO      Take 220 mg by mouth 2 times daily (with meals)        oxybutynin 5 MG 24 hr tablet    DITROPAN-XL    30 tablet    Take 1 tablet (5 mg) by mouth daily    Mixed incontinence       triamterene-hydrochlorothiazide 37.5-25 MG per tablet    MAXZIDE-25    90 tablet    Take 1 tablet by mouth daily    Essential hypertension       UNABLE TO FIND      MEDICATION NAME: fish oil - 1200 mg twice daily

## 2017-10-30 NOTE — PROGRESS NOTES
Subjective:    HPI                    Objective:    System    Physical Exam    General     ROS    Assessment/Plan:      PROGRESS  REPORT    Progress reporting period is from 10/30/17.       SUBJECTIVE  Subjective changes noted by patient:  .  Subjective: Saw MD a few weeks ago and started on Ditropan b/c had had a tough week before seeing MD. Voiding every ~ 2 hours during day. URge incontinence primarily vs stress incontinence. MD again mid-November. Reports she is not crazy about using Ditropan although seems to be helping a bit.Has been trying to drink a lot of fluids (8 oz x 8 x day).     Current pain level is 1/10  .     Previous pain level was   Initial Pain level: 3/10.   Changes in function:  Yes (See Goal flowsheet attached for changes in current functional level)  Adverse reaction to treatment or activity: None    OBJECTIVE  Changes noted in objective findings:    Objective: Discussed watching color of urine vs oz of fluid. Goal is pale yellow urine vs clear. Also spent most of session discussing urge suppression and timed voiding goals. Defer therawand use for home at this time due to no palpable tenderness on last visit 09/25/17. Instead focus on urge suppression/timed voiding/fluid intake.      ASSESSMENT/PLAN  Updated problem list and treatment plan:   STG/LTGs have been met or progress has been made towards goals:  Yes (See Goal flow sheet completed today.)  Assessment of Progress: The patient's condition is improving.  Self Management Plans:  Patient is independent in a home treatment program.  Patient is independent in self management of symptoms.    Nicole continues to require the following intervention to meet STG and LTG's:  PT intervention is no longer required to meet STG/LTG.    Recommendations:  This patient is ready to be discharged from therapy and continue their home treatment program.    Please refer to the daily flowsheet for treatment today, total treatment time and time spent  performing 1:1 timed codes.

## 2017-11-15 ENCOUNTER — OFFICE VISIT (OUTPATIENT)
Dept: UROLOGY | Facility: CLINIC | Age: 67
End: 2017-11-15
Payer: COMMERCIAL

## 2017-11-15 VITALS
SYSTOLIC BLOOD PRESSURE: 132 MMHG | DIASTOLIC BLOOD PRESSURE: 70 MMHG | BODY MASS INDEX: 23.32 KG/M2 | HEART RATE: 80 BPM | HEIGHT: 65 IN | WEIGHT: 140 LBS

## 2017-11-15 DIAGNOSIS — N39.46 MIXED INCONTINENCE: Primary | ICD-10-CM

## 2017-11-15 LAB
ALBUMIN UR-MCNC: NEGATIVE MG/DL
APPEARANCE UR: CLEAR
BILIRUB UR QL STRIP: NEGATIVE
COLOR UR AUTO: YELLOW
GLUCOSE UR STRIP-MCNC: NEGATIVE MG/DL
HGB UR QL STRIP: NEGATIVE
KETONES UR STRIP-MCNC: NEGATIVE MG/DL
LEUKOCYTE ESTERASE UR QL STRIP: NEGATIVE
NITRATE UR QL: NEGATIVE
PH UR STRIP: 7 PH (ref 5–7)
PR INTERVAL - MUSE: 14
SOURCE: NORMAL
SP GR UR STRIP: 1.01 (ref 1–1.03)
UROBILINOGEN UR STRIP-ACNC: 0.2 EU/DL (ref 0.2–1)

## 2017-11-15 PROCEDURE — 51798 US URINE CAPACITY MEASURE: CPT | Performed by: UROLOGY

## 2017-11-15 PROCEDURE — 99213 OFFICE O/P EST LOW 20 MIN: CPT | Mod: 25 | Performed by: UROLOGY

## 2017-11-15 PROCEDURE — 81003 URINALYSIS AUTO W/O SCOPE: CPT | Performed by: UROLOGY

## 2017-11-15 RX ORDER — OXYBUTYNIN CHLORIDE 5 MG/1
5 TABLET, EXTENDED RELEASE ORAL DAILY
Qty: 30 TABLET | Refills: 0 | Status: SHIPPED | OUTPATIENT
Start: 2017-11-15 | End: 2018-04-18

## 2017-11-15 RX ORDER — OXYBUTYNIN CHLORIDE 5 MG/1
TABLET, EXTENDED RELEASE ORAL
Qty: 30 TABLET | Refills: 0 | Status: SHIPPED | OUTPATIENT
Start: 2017-11-15 | End: 2017-11-15

## 2017-11-15 RX ORDER — OXYBUTYNIN CHLORIDE 5 MG/1
5 TABLET, EXTENDED RELEASE ORAL DAILY
Qty: 90 TABLET | Refills: 3 | Status: SHIPPED | OUTPATIENT
Start: 2017-11-15 | End: 2018-04-18

## 2017-11-15 ASSESSMENT — PAIN SCALES - GENERAL: PAINLEVEL: NO PAIN (0)

## 2017-11-15 NOTE — MR AVS SNAPSHOT
After Visit Summary   11/15/2017    Nicole Stanley    MRN: [id number]           Patient Information     Date Of Birth          1950        Visit Information        Provider Department      11/15/2017 12:15 PM Sheba Song See MD Juan A [facility] [place]        Today's Diagnoses     Mixed incontinence    -  1      Care Instructions    Websites with free information:    American Urogynecologic Society patient website: www.voicesforpfd.org    Total Control Program: www.totalcontrolprogram.com    Continue the medication as prescribed    Continue the pelvic floor exercises    Do NOT push to urinate.      After you urinate if you are concerned with emptying your bladder stand up, count to 10 and then sit back down to see if more comes out.  DO NOT PUSH    Return in one year, sooner if needed          Follow-ups after your visit        Follow-up notes from your care team     Return in about 1 year (around 11/15/2018).      Your next 10 appointments already scheduled     Nov 15, 2017 12:15 PM CST   Return Visit with Sheba See Juan A Song MD   [facility] ([facility])    [address]  [address]  [facility] [id number] [phone]            Mar 12, 2018 10:20 AM CDT   US PELVIC COMPLETE W TRANSVAGINAL with RDUS1   [facility] ([facility])    [address] [place] [address] S  [address]  [facility] [address] [phone]           Please bring a list of your medicines (including vitamins, minerals and over-the-counter drugs). Also, tell your doctor about any allergies you may have. Wear comfortable clothes and leave your valuables at home.  Adults: Drink six 8-ounce glasses of fluid one hour before your exam. Do NOT empty your bladder.  If you need to empty your bladder before your exam, try to release only a little bit of urine. Then, drink another 8oz glass of fluid.  Children: Children who are  potty trained should drink at least 4 cups (32 oz) of liquid 45 minutes to one hour prior to the exam. The child s bladder must be full in order to achieve a diagnostic exam. If your child is very uncomfortable or has an urgent need to pee, please notify a technologist; they will try to find out how much longer the wait may be and provide instructions to help relieve the pressure. Occasionally it is medically necessary to insert a urinary catheter to fill the bladder.  Please call the Imaging Department at your exam site with any questions.              Who to contact     If you have questions or need follow up information about today's clinic visit or your schedule please contact [facility] directly at [phone].  Normal or non-critical lab and imaging results will be communicated to you by "[employer]"hart, letter or phone within 4 business days after the clinic has received the results. If you do not hear from us within 7 days, please contact the clinic through Pixowlt or phone. If you have a critical or abnormal lab result, we will notify you by phone as soon as possible.  Submit refill requests through 1stdibs or call your pharmacy and they will forward the refill request to us. Please allow 3 business days for your refill to be completed.          Additional Information About Your Visit        1stdibs Information     1[employer]s gives you secure access to your electronic health record. If you see a primary care provider, you can also send messages to your care team and make appointments. If you have questions, please call your primary care clinic.  If you do not have a primary care provider, please call [phone] and they will assist you.        Care EveryWhere ID     This is your Care EveryWhere ID. This could be used by other organizations to access your [place] medical records  [id number]        Your Vitals Were     Pulse Height Last Period BMI (Body Mass Index)           "80 1.651 m (5' 5\") (LMP Unknown) 23.3 kg/m2         Blood Pressure from Last 3 Encounters:   11/15/17 132/70   10/18/17 118/60   06/14/17 110/68    Weight from Last 3 Encounters:   11/15/17 63.5 kg (140 lb)   10/18/17 63.5 kg (140 lb)   06/14/17 67.1 kg (148 lb)              We Performed the Following     MEASURE POST-VOID RESIDUAL URINE/BLADDER CAPACITY, US NON-IMAGING (03432)     UA without Microscopic          Today's Medication Changes          These changes are accurate as of: 11/15/17 11:25 AM.  If you have any questions, ask your nurse or doctor.               These medicines have changed or have updated prescriptions.        Dose/Directions    * oxybutynin 5 MG 24 hr tablet   Commonly known as:  DITROPAN-XL   This may have changed:  Another medication with the same name was added. Make sure you understand how and when to take each.   Used for:  Mixed incontinence        Dose:  5 mg   Take 1 tablet (5 mg) by mouth daily   Quantity:  30 tablet   Refills:  3       * oxybutynin 5 MG 24 hr tablet   Commonly known as:  DITROPAN XL   This may have changed:  You were already taking a medication with the same name, and this prescription was added. Make sure you understand how and when to take each.   Used for:  Mixed incontinence        Dose:  5 mg   Take 1 tablet (5 mg) by mouth daily   Quantity:  30 tablet   Refills:  0       * oxybutynin 5 MG 24 hr tablet   Commonly known as:  DITROPAN XL   This may have changed:  You were already taking a medication with the same name, and this prescription was added. Make sure you understand how and when to take each.   Used for:  Mixed incontinence        Dose:  5 mg   Take 1 tablet (5 mg) by mouth daily   Quantity:  90 tablet   Refills:  3       * Notice:  This list has 3 medication(s) that are the same as other medications prescribed for you. Read the directions carefully, and ask your doctor or other care provider to review them with you.         Where to get your medicines "      These medications were sent to [facility] - [place], [place] - [address]  [address], [facility] [address]     Phone:  [phone]     oxybutynin 5 MG 24 hr tablet         These medications were sent to [id number] - [place], [place] - [address] AT [facility] [address]  [address], [place] [address]     Phone:  [phone]     oxybutynin 5 MG 24 hr tablet                Primary Care Provider Office Phone # Fax #    Adriana Stern, APRN [phone]       [address] [facility] [address]        Equal Access to Services     JUNIE GONSALES : Hadii judith bear hadasho Soomaali, waaxda luqadaha, qaybta kaalmada adeegyada, waxay baudilioin cedric funes . So [facility] [phone].    ATENCIÓN: Si habla español, tiene a michaels disposición servicios gratuitos de asistencia lingüística. [facility] [phone].    We comply with applicable federal civil rights laws and [place] laws. We do not discriminate on the basis of race, color, national origin, age, disability, sex, sexual orientation, or gender identity.            Thank you!     Thank you for choosing [facility] [place]  for your care. Our goal is always to provide you with excellent care. Hearing back from our patients is one way we can continue to improve our services. Please take a few minutes to complete the written survey that you may receive in the mail after your visit with us. Thank you!             Your Updated Medication List - Protect others around you: Learn how to safely use, store and throw away your medicines at www.disposemymeds.org.          This list is accurate as of: 11/15/17 11:25 AM.  Always use your most recent med list.                   Brand Name Dispense Instructions for use Diagnosis    Acidophilus/Goat Milk Caps           aspirin EC 81 MG EC tablet      Take 81 mg by mouth        levothyroxine 50 MCG tablet     SYNTHROID/LEVOTHROID    90 tablet    Take 1 tablet (50 mcg) by mouth daily    Acquired hypothyroidism       NAPROSYN PO      Take 220 mg by mouth 2 times daily (with meals)        NITROFURANTOIN PO           * oxybutynin 5 MG 24 hr tablet    DITROPAN-XL    30 tablet    Take 1 tablet (5 mg) by mouth daily    Mixed incontinence       * oxybutynin 5 MG 24 hr tablet    DITROPAN XL    30 tablet    Take 1 tablet (5 mg) by mouth daily    Mixed incontinence       * oxybutynin 5 MG 24 hr tablet    DITROPAN XL    90 tablet    Take 1 tablet (5 mg) by mouth daily    Mixed incontinence       triamterene-hydrochlorothiazide 37.5-25 MG per tablet    MAXZIDE-25    90 tablet    Take 1 tablet by mouth daily    Essential hypertension       UNABLE TO FIND      MEDICATION NAME: fish oil - 1200 mg twice daily        * Notice:  This list has 3 medication(s) that are the same as other medications prescribed for you. Read the directions carefully, and ask your doctor or other care provider to review them with you.

## 2017-11-15 NOTE — PROGRESS NOTES
"November 15, 2017    Return visit    Patient returns today for follow up after starting oxybutynin.  At least 80% better, especially as she is able to get to the bathroom in time without leakage now when she gets the urge. She notes continues to have the desire to push to complete her urination which she was told by her PT not to do.  She denies any changes in her health since last visit.    /70 (BP Location: Left arm)  Pulse 80  Ht 1.651 m (5' 5\")  Wt 63.5 kg (140 lb)  LMP  (LMP Unknown)  BMI 23.3 kg/m2  She is comfortable, in no distress, non-labored breathing.     PVR 14 mL by bladder scan    A/P: 67 year old F with mixed incontinence, dyspareunia, myofascial tenderness of the pelvic floor and pelvic floor dysfunction improved with PT and oxybutynin    Continue oxybutynin-refills given for one year    Continue pelvic floor exercises    Explained to patient that she is emptying well and not to push.  If concerns she can double void    RTC one year sooner if needed    15 minutes were spent with the patient today, > 50% in counseling and coordination of care    Sheba Song MD MPH   of Urology    CC  Patient Care Team:  Adriana Stern, YAAKOV BARILLAS as PCP - General (Nurse Practitioner)                "

## 2017-11-15 NOTE — PATIENT INSTRUCTIONS
Websites with free information:    American Urogynecologic Society patient website: www.voicesforpfd.org    Total Control Program: www.totalcontrolprogram.com    Continue the medication as prescribed    Continue the pelvic floor exercises    Do NOT push to urinate.      After you urinate if you are concerned with emptying your bladder stand up, count to 10 and then sit back down to see if more comes out.  DO NOT PUSH    Return in one year, sooner if needed

## 2017-11-15 NOTE — NURSING NOTE
Chief Complaint   Patient presents with     Clinic Care Coordination - Follow-up     Mixed incontinence      Lawanda Leahy LPN

## 2017-11-15 NOTE — LETTER
"11/15/2017       RE: Nicole Stanley  [address]  [place] [address]     Dear Colleague,    Thank you for referring your patient, Nicole Stanley, to the [facility] [place] at [facility]. Please see a copy of my visit note below.    November 15, 2017    Return visit    Patient returns today for follow up after starting oxybutynin.  At least 80% better, especially as she is able to get to the bathroom in time without leakage now when she gets the urge. She notes continues to have the desire to push to complete her urination which she was told by her PT not to do.  She denies any changes in her health since last visit.    /70 (BP Location: Left arm)  Pulse 80  Ht 1.651 m (5' 5\")  Wt 63.5 kg (140 lb)  LMP  (LMP Unknown)  BMI 23.3 kg/m2  She is comfortable, in no distress, non-labored breathing.     PVR 14 mL by bladder scan    A/P: 67 year old F with mixed incontinence, dyspareunia, myofascial tenderness of the pelvic floor and pelvic floor dysfunction improved with PT and oxybutynin    Continue oxybutynin-refills given for one year    Continue pelvic floor exercises    Explained to patient that she is emptying well and not to push.  If concerns she can double void    RTC one year sooner if needed    15 minutes were spent with the patient today, > 50% in counseling and coordination of care    Sheba Song MD MPH   of Urology    CC  Patient Care Team:  Adriana Stern, YAAKOV BARILLAS as PCP - General (Nurse Practitioner)              "

## 2018-01-14 DIAGNOSIS — I10 ESSENTIAL HYPERTENSION: ICD-10-CM

## 2018-01-15 NOTE — TELEPHONE ENCOUNTER
"Requested Prescriptions   Pending Prescriptions Disp Refills     triamterene-hydrochlorothiazide (MAXZIDE-25) 37.5-25 MG per tablet [Pharmacy Med Name: TRIAMTERENE/HYDROCHLOROTHIAZIDE 37.5-25   MG Tablet]  Last Written Prescription Date:  4/24/2017  Last Fill Quantity: 90 tablet    ,  # refills: 1   Last Office Visit with [facility] or [facility] prescribing provider:  5/2/2017   Future Office Visit:      90 tablet 1     Sig: TAKE 1 TABLET EVERY DAY    Diuretics (Including Combos) Protocol Passed    1/14/2018  3:43 PM       Passed - Blood pressure under 140/90    BP Readings from Last 3 Encounters:   11/15/17 132/70   10/18/17 118/60   06/14/17 110/68          Passed - Recent or future visit with authorizing provider's specialty    Patient had office visit in the last year or has a visit in the next 30 days with authorizing provider.  See \"Patient Info\" tab in inbasket, or \"Choose Columns\" in Meds & Orders section of the refill encounter.          Passed - Patient is age 18 or older       Passed - No active pregancy on record       Passed - Normal serum creatinine on file in past 12 months    Recent Labs   Lab Test  04/25/17   0842   CR  1.04           Passed - Normal serum potassium on file in past 12 months    Recent Labs   Lab Test  04/25/17   0842   POTASSIUM  3.5           Passed - Normal serum sodium on file in past 12 months    Recent Labs   Lab Test  04/25/17   0842   NA  141           Passed - No positive pregnancy test in past 12 months          "

## 2018-01-16 RX ORDER — TRIAMTERENE/HYDROCHLOROTHIAZID 37.5-25 MG
TABLET ORAL
Qty: 90 TABLET | Refills: 0 | Status: ON HOLD | OUTPATIENT
Start: 2018-01-16 | End: 2018-04-19

## 2018-01-16 NOTE — TELEPHONE ENCOUNTER
Prescription approved per [facility] Refill Protocol.  JUDY Guzman, BSN, RN

## 2018-03-12 ENCOUNTER — RADIANT APPOINTMENT (OUTPATIENT)
Dept: ULTRASOUND IMAGING | Facility: CLINIC | Age: 68
End: 2018-03-12
Attending: OBSTETRICS & GYNECOLOGY
Payer: COMMERCIAL

## 2018-03-12 DIAGNOSIS — N83.202 LEFT OVARIAN CYST: ICD-10-CM

## 2018-03-12 PROCEDURE — 76830 TRANSVAGINAL US NON-OB: CPT | Performed by: OBSTETRICS & GYNECOLOGY

## 2018-04-18 ENCOUNTER — OFFICE VISIT (OUTPATIENT)
Dept: URGENT CARE | Facility: URGENT CARE | Age: 68
End: 2018-04-18
Payer: COMMERCIAL

## 2018-04-18 ENCOUNTER — HOSPITAL ENCOUNTER (INPATIENT)
Facility: CLINIC | Age: 68
LOS: 1 days | Discharge: HOME | DRG: 195 | End: 2018-04-19
Attending: EMERGENCY MEDICINE | Admitting: INTERNAL MEDICINE
Payer: MEDICARE

## 2018-04-18 ENCOUNTER — APPOINTMENT (OUTPATIENT)
Dept: GENERAL RADIOLOGY | Facility: CLINIC | Age: 68
DRG: 195 | End: 2018-04-18
Attending: EMERGENCY MEDICINE
Payer: MEDICARE

## 2018-04-18 VITALS
TEMPERATURE: 99.8 F | OXYGEN SATURATION: 96 % | HEIGHT: 65 IN | DIASTOLIC BLOOD PRESSURE: 66 MMHG | BODY MASS INDEX: 23.53 KG/M2 | WEIGHT: 141.2 LBS | SYSTOLIC BLOOD PRESSURE: 104 MMHG | HEART RATE: 84 BPM

## 2018-04-18 DIAGNOSIS — I48.91 ATRIAL FIBRILLATION, UNSPECIFIED TYPE (H): ICD-10-CM

## 2018-04-18 DIAGNOSIS — I49.9 IRREGULAR HEARTBEAT: ICD-10-CM

## 2018-04-18 DIAGNOSIS — I48.91 ATRIAL FIBRILLATION WITH RVR (H): Primary | ICD-10-CM

## 2018-04-18 DIAGNOSIS — J10.1 INFLUENZA B: ICD-10-CM

## 2018-04-18 DIAGNOSIS — J10.1 INFLUENZA B: Primary | ICD-10-CM

## 2018-04-18 DIAGNOSIS — M79.10 MYALGIA: ICD-10-CM

## 2018-04-18 LAB
ALBUMIN SERPL-MCNC: 3.4 G/DL (ref 3.4–5)
ALBUMIN UR-MCNC: NEGATIVE MG/DL
ALP SERPL-CCNC: 54 U/L (ref 40–150)
ALT SERPL W P-5'-P-CCNC: 25 U/L (ref 0–50)
ANION GAP SERPL CALCULATED.3IONS-SCNC: 11 MMOL/L (ref 3–14)
APPEARANCE UR: CLEAR
AST SERPL W P-5'-P-CCNC: 16 U/L (ref 0–45)
BASOPHILS # BLD AUTO: 0 10E9/L (ref 0–0.2)
BASOPHILS NFR BLD AUTO: 0.1 %
BILIRUB SERPL-MCNC: 0.5 MG/DL (ref 0.2–1.3)
BILIRUB UR QL STRIP: NEGATIVE
BUN SERPL-MCNC: 10 MG/DL (ref 7–30)
CALCIUM SERPL-MCNC: 8.5 MG/DL (ref 8.5–10.1)
CHLORIDE SERPL-SCNC: 98 MMOL/L (ref 94–109)
CO2 SERPL-SCNC: 26 MMOL/L (ref 20–32)
COLOR UR AUTO: NORMAL
CREAT SERPL-MCNC: 0.73 MG/DL (ref 0.52–1.04)
DIFFERENTIAL METHOD BLD: NORMAL
EOSINOPHIL # BLD AUTO: 0 10E9/L (ref 0–0.7)
EOSINOPHIL NFR BLD AUTO: 0 %
ERYTHROCYTE [DISTWIDTH] IN BLOOD BY AUTOMATED COUNT: 13.8 % (ref 10–15)
FLUAV+FLUBV AG SPEC QL: NEGATIVE
FLUAV+FLUBV AG SPEC QL: POSITIVE
GFR SERPL CREATININE-BSD FRML MDRD: 79 ML/MIN/1.7M2
GLUCOSE SERPL-MCNC: 100 MG/DL (ref 70–99)
GLUCOSE UR STRIP-MCNC: NEGATIVE MG/DL
HCT VFR BLD AUTO: 39.7 % (ref 35–47)
HGB BLD-MCNC: 13.6 G/DL (ref 11.7–15.7)
HGB UR QL STRIP: NEGATIVE
IMM GRANULOCYTES # BLD: 0 10E9/L (ref 0–0.4)
IMM GRANULOCYTES NFR BLD: 0.2 %
INR PPP: 0.94 (ref 0.86–1.14)
KETONES UR STRIP-MCNC: NEGATIVE MG/DL
LACTATE BLD-SCNC: 0.8 MMOL/L (ref 0.7–2)
LEUKOCYTE ESTERASE UR QL STRIP: NEGATIVE
LYMPHOCYTES # BLD AUTO: 1.3 10E9/L (ref 0.8–5.3)
LYMPHOCYTES NFR BLD AUTO: 15.3 %
MAGNESIUM SERPL-MCNC: 1.9 MG/DL (ref 1.6–2.3)
MCH RBC QN AUTO: 32.2 PG (ref 26.5–33)
MCHC RBC AUTO-ENTMCNC: 34.3 G/DL (ref 31.5–36.5)
MCV RBC AUTO: 94 FL (ref 78–100)
MONOCYTES # BLD AUTO: 0.8 10E9/L (ref 0–1.3)
MONOCYTES NFR BLD AUTO: 9.4 %
NEUTROPHILS # BLD AUTO: 6.2 10E9/L (ref 1.6–8.3)
NEUTROPHILS NFR BLD AUTO: 75 %
NITRATE UR QL: NEGATIVE
NRBC # BLD AUTO: 0 10*3/UL
NRBC BLD AUTO-RTO: 0 /100
PH UR STRIP: 7 PH (ref 5–7)
PLATELET # BLD AUTO: 155 10E9/L (ref 150–450)
POTASSIUM SERPL-SCNC: 3.4 MMOL/L (ref 3.4–5.3)
PROT SERPL-MCNC: 7.3 G/DL (ref 6.8–8.8)
RBC # BLD AUTO: 4.23 10E12/L (ref 3.8–5.2)
SODIUM SERPL-SCNC: 135 MMOL/L (ref 133–144)
SOURCE: NORMAL
SP GR UR STRIP: 1 (ref 1–1.03)
SPECIMEN SOURCE: ABNORMAL
TROPONIN I SERPL-MCNC: <0.015 UG/L (ref 0–0.04)
TSH SERPL DL<=0.005 MIU/L-ACNC: 2.62 MU/L (ref 0.4–4)
UROBILINOGEN UR STRIP-MCNC: NORMAL MG/DL (ref 0–2)
WBC # BLD AUTO: 8.3 10E9/L (ref 4–11)

## 2018-04-18 PROCEDURE — 80053 COMPREHEN METABOLIC PANEL: CPT | Performed by: EMERGENCY MEDICINE

## 2018-04-18 PROCEDURE — 71046 X-RAY EXAM CHEST 2 VIEWS: CPT

## 2018-04-18 PROCEDURE — 87804 INFLUENZA ASSAY W/OPTIC: CPT | Performed by: INTERNAL MEDICINE

## 2018-04-18 PROCEDURE — 25000132 ZZH RX MED GY IP 250 OP 250 PS 637: Mod: GY | Performed by: EMERGENCY MEDICINE

## 2018-04-18 PROCEDURE — 25000125 ZZHC RX 250: Performed by: EMERGENCY MEDICINE

## 2018-04-18 PROCEDURE — 96366 THER/PROPH/DIAG IV INF ADDON: CPT

## 2018-04-18 PROCEDURE — 84484 ASSAY OF TROPONIN QUANT: CPT | Performed by: EMERGENCY MEDICINE

## 2018-04-18 PROCEDURE — 85025 COMPLETE CBC W/AUTO DIFF WBC: CPT | Performed by: EMERGENCY MEDICINE

## 2018-04-18 PROCEDURE — 93005 ELECTROCARDIOGRAM TRACING: CPT

## 2018-04-18 PROCEDURE — 99285 EMERGENCY DEPT VISIT HI MDM: CPT | Mod: 25

## 2018-04-18 PROCEDURE — 83735 ASSAY OF MAGNESIUM: CPT | Performed by: EMERGENCY MEDICINE

## 2018-04-18 PROCEDURE — 84443 ASSAY THYROID STIM HORMONE: CPT | Performed by: EMERGENCY MEDICINE

## 2018-04-18 PROCEDURE — 93000 ELECTROCARDIOGRAM COMPLETE: CPT | Performed by: PHYSICIAN ASSISTANT

## 2018-04-18 PROCEDURE — 83605 ASSAY OF LACTIC ACID: CPT | Performed by: EMERGENCY MEDICINE

## 2018-04-18 PROCEDURE — 81003 URINALYSIS AUTO W/O SCOPE: CPT | Performed by: EMERGENCY MEDICINE

## 2018-04-18 PROCEDURE — 99207 ZZC OFFICE-HOSPITAL ADMIT: CPT | Performed by: PHYSICIAN ASSISTANT

## 2018-04-18 PROCEDURE — 96365 THER/PROPH/DIAG IV INF INIT: CPT

## 2018-04-18 PROCEDURE — 25000128 H RX IP 250 OP 636: Performed by: EMERGENCY MEDICINE

## 2018-04-18 PROCEDURE — 85610 PROTHROMBIN TIME: CPT | Performed by: EMERGENCY MEDICINE

## 2018-04-18 PROCEDURE — A9270 NON-COVERED ITEM OR SERVICE: HCPCS | Mod: GY | Performed by: EMERGENCY MEDICINE

## 2018-04-18 RX ORDER — METOPROLOL TARTRATE 25 MG/1
25 TABLET, FILM COATED ORAL ONCE
Status: DISCONTINUED | OUTPATIENT
Start: 2018-04-18 | End: 2018-04-19 | Stop reason: CLARIF

## 2018-04-18 RX ORDER — ACETAMINOPHEN 325 MG/1
975 TABLET ORAL ONCE
Status: COMPLETED | OUTPATIENT
Start: 2018-04-18 | End: 2018-04-18

## 2018-04-18 RX ORDER — HEPARIN SODIUM 10000 [USP'U]/100ML
0-3500 INJECTION, SOLUTION INTRAVENOUS CONTINUOUS
Status: DISCONTINUED | OUTPATIENT
Start: 2018-04-18 | End: 2018-04-19

## 2018-04-18 RX ORDER — MAGNESIUM SULFATE HEPTAHYDRATE 40 MG/ML
2 INJECTION, SOLUTION INTRAVENOUS ONCE
Status: DISCONTINUED | OUTPATIENT
Start: 2018-04-18 | End: 2018-04-18

## 2018-04-18 RX ORDER — DILTIAZEM HYDROCHLORIDE 5 MG/ML
15 INJECTION INTRAVENOUS ONCE
Status: DISCONTINUED | OUTPATIENT
Start: 2018-04-18 | End: 2018-04-18

## 2018-04-18 RX ORDER — MAGNESIUM SULFATE HEPTAHYDRATE 500 MG/ML
2 INJECTION, SOLUTION INTRAMUSCULAR; INTRAVENOUS ONCE
Status: DISCONTINUED | OUTPATIENT
Start: 2018-04-18 | End: 2018-04-18

## 2018-04-18 RX ORDER — ZINC SULFATE 50(220)MG
220 CAPSULE ORAL DAILY
COMMUNITY

## 2018-04-18 RX ORDER — IBUPROFEN 600 MG/1
600 TABLET, FILM COATED ORAL ONCE
Status: COMPLETED | OUTPATIENT
Start: 2018-04-18 | End: 2018-04-18

## 2018-04-18 RX ORDER — CALCIUM CARBONATE 500(1250)
1 TABLET ORAL 2 TIMES DAILY
COMMUNITY
End: 2018-11-28

## 2018-04-18 RX ADMIN — ACETAMINOPHEN 975 MG: 325 TABLET, FILM COATED ORAL at 19:47

## 2018-04-18 RX ADMIN — SODIUM CHLORIDE, POTASSIUM CHLORIDE, SODIUM LACTATE AND CALCIUM CHLORIDE 1000 ML: 600; 310; 30; 20 INJECTION, SOLUTION INTRAVENOUS at 21:38

## 2018-04-18 RX ADMIN — SODIUM CHLORIDE, POTASSIUM CHLORIDE, SODIUM LACTATE AND CALCIUM CHLORIDE 1000 ML: 600; 310; 30; 20 INJECTION, SOLUTION INTRAVENOUS at 19:47

## 2018-04-18 RX ADMIN — IBUPROFEN 600 MG: 600 TABLET ORAL at 20:05

## 2018-04-18 RX ADMIN — Medication 2 G: at 20:05

## 2018-04-18 ASSESSMENT — ENCOUNTER SYMPTOMS
EYE REDNESS: 0
HEADACHES: 0
ABDOMINAL PAIN: 0
NAUSEA: 1
FEVER: 1
DIFFICULTY URINATING: 0
NECK STIFFNESS: 0
SHORTNESS OF BREATH: 0
CONFUSION: 0
DYSURIA: 0
APPETITE CHANGE: 1
VOMITING: 0
HEMATURIA: 0
CHILLS: 1
ARTHRALGIAS: 0
FATIGUE: 1
COLOR CHANGE: 0
DIARRHEA: 1

## 2018-04-18 NOTE — MR AVS SNAPSHOT
"              After Visit Summary   4/18/2018    Nicole Stanley    MRN: [id number]           Patient Information     Date Of Birth          1950        Visit Information        Provider Department      4/18/2018 5:10 PM Elsie Keenan PA-C [facility]        Today's Diagnoses     Atrial fibrillation with RVR (H)    -  1    Influenza B        Myalgia        Irregular heartbeat           Follow-ups after your visit        Who to contact     If you have questions or need follow up information about today's clinic visit or your schedule please contact [facility] directly at [phone].  Normal or non-critical lab and imaging results will be communicated to you by [employer], letter or phone within 4 business days after the clinic has received the results. If you do not hear from us within 7 days, please contact the clinic through [employer] or phone. If you have a critical or abnormal lab result, we will notify you by phone as soon as possible.  Submit refill requests through [employer] or call your pharmacy and they will forward the refill request to us. Please allow 3 business days for your refill to be completed.          Additional Information About Your Visit        MyChart Information     [employer] gives you secure access to your electronic health record. If you see a primary care provider, you can also send messages to your care team and make appointments. If you have questions, please call your primary care clinic.  If you do not have a primary care provider, please call [phone] and they will assist you.        Care EveryWhere ID     This is your Care EveryWhere ID. This could be used by other organizations to access your [place] medical records  [id number]        Your Vitals Were     Pulse Temperature Height Last Period Pulse Oximetry BMI (Body Mass Index)    84 99.8  F (37.7  C) (Oral) 5' 5\" (1.651 m) (LMP Unknown) 96% 23.5 kg/m2       Blood Pressure " from Last 3 Encounters:   04/18/18 104/66   11/15/17 132/70   10/18/17 118/60    Weight from Last 3 Encounters:   04/18/18 141 lb 3.2 oz (64 kg)   11/15/17 140 lb (63.5 kg)   10/18/17 140 lb (63.5 kg)              We Performed the Following     EKG 12-LEAD CLINIC READ     Influenza A/B antigen        Primary Care Provider Office Phone # Fax #    Adriana Stern, YAAKOV [facility] [phone] [address] [facility] [address]        Equal Access to Services     YANDY GONSALES : Hadii aad ku hadasho Soomaali, waaxda luqadaha, qaybta kaalmada adeegyada, danny funes . So [facility] [phone].    ATENCIÓN: Si habla español, tiene a michaels disposición servicios gratuitos de asistencia lingüística. Llame al [phone].    We comply with applicable federal civil rights laws and [place] laws. We do not discriminate on the basis of race, color, national origin, age, disability, sex, sexual orientation, or gender identity.            Thank you!     Thank you for choosing [facility]  for your care. Our goal is always to provide you with excellent care. Hearing back from our patients is one way we can continue to improve our services. Please take a few minutes to complete the written survey that you may receive in the mail after your visit with us. Thank you!             Your Updated Medication List - Protect others around you: Learn how to safely use, store and throw away your medicines at www.disposemymeds.org.          This list is accurate as of 4/18/18  6:31 PM.  Always use your most recent med list.                   Brand Name Dispense Instructions for use Diagnosis    Acidophilus/Goat Milk Caps           aspirin EC 81 MG EC tablet      Take 81 mg by mouth        calcium carbonate 1250 MG tablet    OS-ELISE 500 mg [place]     Take 1 tablet by mouth 2 times daily        levothyroxine 50 MCG tablet    SYNTHROID/LEVOTHROID    90 tablet    Take 1 tablet (50  mcg) by mouth daily    Acquired hypothyroidism       MAGNESIUM OXIDE PO           NAPROSYN PO      Take 220 mg by mouth 2 times daily (with meals)        * oxybutynin 5 MG 24 hr tablet    DITROPAN-XL    30 tablet    Take 1 tablet (5 mg) by mouth daily    Mixed incontinence       * oxybutynin 5 MG 24 hr tablet    DITROPAN XL    30 tablet    Take 1 tablet (5 mg) by mouth daily    Mixed incontinence       * oxybutynin 5 MG 24 hr tablet    DITROPAN XL    90 tablet    Take 1 tablet (5 mg) by mouth daily    Mixed incontinence       triamterene-hydrochlorothiazide 37.5-25 MG per tablet    MAXZIDE-25    90 tablet    TAKE 1 TABLET EVERY DAY    Essential hypertension       UNABLE TO FIND      MEDICATION NAME: fish oil - 1200 mg twice daily        VITAMIN D (CHOLECALCIFEROL) PO      Take by mouth daily        zinc sulfate 220 (50 Zn) MG capsule    ZINCATE     Take 220 mg by mouth daily        * Notice:  This list has 3 medication(s) that are the same as other medications prescribed for you. Read the directions carefully, and ask your doctor or other care provider to review them with you.

## 2018-04-18 NOTE — IP AVS SNAPSHOT
[address] [address]    Phone:  [phone]                                       After Visit Summary   4/18/2018    Nicole Stanley    MRN: [id number]           After Visit Summary Signature Page     I have received my discharge instructions, and my questions have been answered. I have discussed any challenges I see with this plan with the nurse or doctor.    ..........................................................................................................................................  Patient/Patient Representative Signature      ..........................................................................................................................................  Patient Representative Print Name and Relationship to Patient    ..................................................               ................................................  Date                                            Time    ..........................................................................................................................................  Reviewed by Signature/Title    ...................................................              ..............................................  Date                                                            Time

## 2018-04-18 NOTE — PROGRESS NOTES
"SUBJECTIVE:   Nicole Stanley is a 67 year old female presenting with a chief complaint of   Chief Complaint   Patient presents with     Urgent Care     Cough     Bad cold started Saturday; last three days has had body aches, chills, fatigue, productive cough with slight shortness of breath.     Cough     Pt's heart rate is iregularly irregular, registering 124 - 137 on oximeter but only 84 and irregular by radial palpation.  Pt notes her mother and brother both have or had atrial fibrillation; pt has never been diagnosed with it.   Symptoms started with cough, runny nose -\"like a bad cold.\"  She also admits to body aches, chills, fatigue x 3 days. She has not measured a fever. Cough is productive of \"tan thick phlegm.\" She feels a little more short of breath. No wheezing. No hemoptysis. She admits to nausea and bad appetite. She has not had any vomiting. No abdominal pain. She is drinking plenty of water. She had 3 loose stools this AM. No dysuria, hematuria. She sometimes has had a headache. She says \"when she coughs she feels a strain in her right low back.\"    When roomed, it was noted that she had an irregular pulse. She denies palpitations, chest pain, leg swelling, lightheadedness. She denies a PMH atrial fibrillation.  Her son had a cold a few weeks ago. She did get an influenza vaccine this year. She has been taking Tylenol and Benadryl for symptoms.    ROS  See HPI    Past Medical History:   Diagnosis Date     Arthritis     osteoarth     Spider veins      Current Outpatient Prescriptions   Medication Sig Dispense Refill     aspirin EC 81 MG EC tablet Take 81 mg by mouth       calcium carbonate (OS-ELISE 500 MG [place]. [place]) 1250 MG tablet Take 1 tablet by mouth 2 times daily       levothyroxine (SYNTHROID/LEVOTHROID) 50 MCG tablet Take 1 tablet (50 mcg) by mouth daily 90 tablet 3     MAGNESIUM OXIDE PO        Naproxen (NAPROSYN PO) Take 220 mg by mouth 2 times daily (with meals)       oxybutynin (DITROPAN XL) 5 MG " "24 hr tablet Take 1 tablet (5 mg) by mouth daily (Patient not taking: Reported on 2018) 30 tablet 0     oxybutynin (DITROPAN XL) 5 MG 24 hr tablet Take 1 tablet (5 mg) by mouth daily (Patient not taking: Reported on 2018) 90 tablet 3     oxybutynin (DITROPAN-XL) 5 MG 24 hr tablet Take 1 tablet (5 mg) by mouth daily (Patient not taking: Reported on 2018) 30 tablet 3     Probiotic Product (ACIDOPHILUS/GOAT MILK) CAPS        triamterene-hydrochlorothiazide (MAXZIDE-25) 37.5-25 MG per tablet TAKE 1 TABLET EVERY DAY 90 tablet 0     UNABLE TO FIND MEDICATION NAME: fish oil - 1200 mg twice daily       VITAMIN D, CHOLECALCIFEROL, PO Take by mouth daily       zinc sulfate (ZINCATE) 220 (50 Zn) MG capsule Take 220 mg by mouth daily         Family History   Problem Relation Age of Onset     Hypertension Mother           Hyperlipidemia Mother      Family History Negative Father           Alzheimer Disease Father      Family History Negative Maternal Grandmother              Social History   Substance Use Topics     Smoking status: Never Smoker     Smokeless tobacco: Never Used     Alcohol use 0.0 oz/week     0 Standard drinks or equivalent per week      Comment: 1-2 glass of wine per day            OBJECTIVE  /66  Pulse 84  Temp 99.8  F (37.7  C) (Oral)  Ht 5' 5\" (1.651 m)  Wt 141 lb 3.2 oz (64 kg)  LMP  (LMP Unknown)  SpO2 96%  BMI 23.5 kg/m2    General: alert, appears nontoxic but tired, NAD. Low grade febrile.  Skin: no diaphoresis or pallor.  HEENT: Normocephalic.   Eyes: conjunctiva clear.   Ears: TMs pearly, translucent bilaterally.   Nose: mild erythema of nasal mucosa. No rhinorrhea.  Oropharynx: MMM. No posterior pharyngeal erythema, petechiae, or exudate. No tonsillar hypertrophy. Uvula midline.    Neck: supple, no lymphadenopathy.  Respiratory: No distress. Equal inspiration to bilateral bases. Crackles in right base. No wheeze, rhonchi, rales.   Cardiovascular: " irregularly irregular, tachycardic. No peripheral edema. Peripheral pulses 2+ bilaterally.  Gastrointestinal: Abdomen soft, nontender, BS present. No masses, organomegaly.  MSK: no calf tenderness    Labs:  Results for orders placed or performed in visit on 04/18/18 (from the past 24 hour(s))   Influenza A/B antigen   Result Value Ref Range    Influenza A/B Agn Specimen Nasal     Influenza A Negative NEG^Negative    Influenza B Positive (A) NEG^Negative     EKG per my read:  Rate: 132  Rhythm: atrial fibrillation  Axis: normal  Ischemic changes/ST segments: no ST elevation  No prior EKG  Overall impression: new Afib, onset unknown        ASSESSMENT/PLAN:    ICD-10-CM    1. Atrial fibrillation with RVR (H) I48.91    2. Influenza B J10.1    3. Myalgia M79.1 Influenza A/B antigen   4. Irregular heartbeat I49.9 EKG 12-LEAD CLINIC READ           Medical Decision Making:    Patient was found to be tachycardic with an irregularly irregular rhythm upon initial exam. HR as measured by pulse ox was 84, however, upon auscultation of her chest, was in the 120s. Other vital signs normal, BP notably normal at 104/86.  She has NO history of Afib but does report a FH of such. My concern is that her illness has caused her to flip into Afib. She has not had symptoms, thus onset is unknown.  EKG today showed atrial fibrillation with a rate 132. Influenza swab was indeed positive for influenza B.  Patient will need to be seen in the ED for new onset Afib. I discussed this with her and she understood and agreed.  As she is hemodynamically stable, asymptomatic with respect to Afib, I think it is reasonable for her to go by car. Her  is here today and will drive her.  I called report to [facility] ED - spoke with Dr Polo, who accepted patient information.         Elsie Keenan PA-C

## 2018-04-18 NOTE — NURSING NOTE
"Chief Complaint   Patient presents with     Urgent Care     Cough     Bad cold started Saturday; last three days has had body aches, chills, fatigue, productive cough with slight shortness of breath.     Cough     Pt's heart rate is iregularly irregular, registering 124 - 137 on oximeter but only 84 and irregular by radial palpation.  Pt notes her mother and brother both have or had atrial fibrillation; pt has never been diagnosed with it.     Initial /66  Pulse 84  Temp 99.8  F (37.7  C) (Oral)  Ht 5' 5\" (1.651 m)  Wt 141 lb 3.2 oz (64 kg)  LMP  (LMP Unknown)  SpO2 96%  BMI 23.5 kg/m2 Estimated body mass index is 23.5 kg/(m^2) as calculated from the following:    Height as of this encounter: 5' 5\" (1.651 m).    Weight as of this encounter: 141 lb 3.2 oz (64 kg)..  BP completed using cuff size: cathy Willams RN  "

## 2018-04-18 NOTE — NURSING NOTE
"Chief Complaint   Patient presents with     Urgent Care     Cough     Bad cold started Saturday; last three days has had body aches, chills, fatigue, productive cough with slight shortness of breath.     Cough     Pt's heart rate is iregularly irregular, regiustering 124 - 137 on [id number] but only 84 and irregular by radial palpation.  Pt notes her mother and brother both have or had atrial fibrillation; pt has never been diagnosed with it.     Initial /66  Pulse 84  Temp 99.8  F (37.7  C) (Oral)  Ht 5' 5\" (1.651 m)  Wt 141 lb 3.2 oz (64 kg)  LMP  (LMP Unknown)  SpO2 96%  BMI 23.5 kg/m2 Estimated body mass index is 23.5 kg/(m^2) as calculated from the following:    Height as of this encounter: 5' 5\" (1.651 m).    Weight as of this encounter: 141 lb 3.2 oz (64 kg)..  BP completed using cuff size: cathy Willams RN  "

## 2018-04-18 NOTE — IP AVS SNAPSHOT
MRN:[id number]                      After Visit Summary   4/18/2018    Nicole Stanley    MRN: [id number]           Thank you!     Thank you for choosing [facility] for your care. Our goal is always to provide you with excellent care. Hearing back from our patients is one way we can continue to improve our services. Please take a few minutes to complete the written survey that you may receive in the mail after you visit with us. Thank you!        Patient Information     Date Of Birth          1950        Designated Caregiver       Most Recent Value    Caregiver    Will someone help with your care after discharge? yes    Name of designated caregiver Salas Stnaley    Phone number of caregiver [phone]    Caregiver address [address] [place] [address]      About your hospital stay     You were admitted on:  April 19, 2018 You last received care in the:  [address] [facility]    You were discharged on:  April 19, 2018        Reason for your hospital stay       You were hospitalized for atrial fibrillation with fast heart rates and influenza. It improved with IV fluids and medications to control your heart rate. You will need a blood thinner going forward for anticoagulation to decrease your stroke risk with this abnormal heart rhythm.                  Who to Call     For medical emergencies, please call 911.  For non-urgent questions about your medical care, please call your primary care provider or clinic, [phone]          Attending Provider     Provider Specialty    Sonido Ramsey MD Emergency Medicine    José Deng MD Clinical Cardiac Electrophysiology    Maile Leonardo MD Cardiology       Primary Care Provider Office Phone # Fax #    Adriana GrahamYAAKOV [facility]or [facility] [phone]      After Care Instructions     Activity       Your activity upon discharge: activity as tolerated            Diet       Follow this diet upon discharge: Orders Placed This Encounter       "Regular Diet Adult                  Follow-up Appointments     Adult [facility] Follow-up and recommended labs and tests       Follow up with primary care provider, Adriana Stern, within 7 days for hospital follow- up.  No follow up labs or test are needed.      Appointments on [place] and/or [facility] (with [facility] or [facility] provider or service). Call [phone] if you haven't heard regarding these appointments within 7 days of discharge.                  Pending Results     Date and Time Order Name Status Description    4/18/2018 1856 EKG 12-lead, tracing only Preliminary     4/18/2018 1802 EKG 12-LEAD CLINIC READ In process             Statement of Approval     Ordered          04/19/18 1143  I have reviewed and agree with all the recommendations and orders detailed in this document.  EFFECTIVE NOW     Approved and electronically signed by:  Supa Alvares MD             Admission Information     Date & Time Provider Department Dept. Phone    4/18/2018 Maile Leonardo MD [address] [facility] [phone]      Your Vitals Were     Blood Pressure Pulse Temperature Respirations Height Weight    93/74 (BP Location: Right arm) 99 97.9  F (36.6  C) (Oral) 15 1.651 m (5' 5\") 67.4 kg (148 lb 8 oz)    Last Period Pulse Oximetry BMI (Body Mass Index)             (LMP Unknown) 97% 24.71 kg/m2         MyChart Information     [facility] gives you secure access to your electronic health record. If you see a primary care provider, you can also send messages to your care team and make appointments. If you have questions, please call your primary care clinic.  If you do not have a primary care provider, please call [phone] and they will assist you.        Care EveryWhere ID     This is your Care EveryWhere ID. This could be used by other organizations to access your [facility] medical records  [id number]        Equal Access to Services     JUNIE GONSALES : yusuf Johnson qaybta " danny paigeross caroaan ah. So [facility] [phone].    ATENCIÓN: Si yanick metcalf, tiene a michaels disposición servicios gratuitos de asistencia lingüística. Llthomas al [phone].    We comply with applicable federal civil rights laws and [place] laws. We do not discriminate on the basis of race, color, national origin, age, disability, sex, sexual orientation, or gender identity.               Review of your medicines      START taking        Dose / Directions    apixaban ANTICOAGULANT 5 MG tablet   Commonly known as:  ELIQUIS   Used for:  Atrial fibrillation, unspecified type (H)        Dose:  5 mg   Take 1 tablet (5 mg) by mouth 2 times daily   Quantity:  60 tablet   Refills:  0       metoprolol succinate 25 MG 24 hr tablet   Commonly known as:  TOPROL-XL   Used for:  Atrial fibrillation, unspecified type (H)        Dose:  25 mg   Start taking on:  4/20/2018   Take 1 tablet (25 mg) by mouth daily   Quantity:  30 tablet   Refills:  0       oseltamivir 75 MG capsule   Commonly known as:  TAMIFLU   Indication:  Flu        Dose:  75 mg   Take 1 capsule (75 mg) by mouth 2 times daily   Quantity:  8 capsule   Refills:  0         CONTINUE these medicines which have NOT CHANGED        Dose / Directions    Acidophilus/Goat Milk Caps        Refills:  0       calcium carbonate 1250 MG tablet   Commonly known as:  OS-ELISE 500 mg [place]. Ca        Dose:  1 tablet   Take 1 tablet by mouth 2 times daily   Refills:  0       levothyroxine 50 MCG tablet   Commonly known as:  SYNTHROID/LEVOTHROID   Used for:  Acquired hypothyroidism        Dose:  50 mcg   Take 1 tablet (50 mcg) by mouth daily   Quantity:  90 tablet   Refills:  3       MAGNESIUM OXIDE PO        Refills:  0       UNABLE TO FIND        MEDICATION NAME: fish oil - 1200 mg twice daily   Refills:  0       VITAMIN D (CHOLECALCIFEROL) PO        Take by mouth daily   Refills:  0       zinc sulfate 220 (50 Zn) MG capsule   Commonly known as:   ZINCATE        Dose:  220 mg   Take 220 mg by mouth daily   Refills:  0         STOP taking     aspirin EC 81 MG EC tablet           NAPROSYN PO           triamterene-hydrochlorothiazide 37.5-25 MG per tablet   Commonly known as:  MAXZIDE-25                Where to get your medicines      These medications were sent to [facility] - [place], [place] - [address], [facility] [address]     Phone:  [phone]     apixaban ANTICOAGULANT 5 MG tablet    metoprolol succinate 25 MG 24 hr tablet    oseltamivir 75 MG capsule                Protect others around you: Learn how to safely use, store and throw away your medicines at www.disposemymeds.org.        ANTIBIOTIC INSTRUCTION     You've Been Prescribed an Antibiotic - Now What?  Your healthcare team thinks that you or your loved one might have an infection. Some infections can be treated with antibiotics, which are powerful, life-saving drugs. Like all medications, antibiotics have side effects and should only be used when necessary. There are some important things you should know about your antibiotic treatment.      Your healthcare team may run tests before you start taking an antibiotic.    Your team may take samples (e.g., from your blood, urine or other areas) to run tests to look for bacteria. These test can be important to determine if you need an antibiotic at all and, if you do, which antibiotic will work best.      Within a few days, your healthcare team might change or even stop your antibiotic.    Your team may start you on an antibiotic while they are working to find out what is making you sick.    Your team might change your antibiotic because test results show that a different antibiotic would be better to treat your infection.    In some cases, once your team has more information, they learn that you do not need an antibiotic at all. They may find out that you don't have an infection, or that the antibiotic  you're taking won't work against your infection. For example, an infection caused by a virus can't be treated with antibiotics. Staying on an antibiotic when you don't need it is more likely to be harmful than helpful.      You may experience side effects from your antibiotic.    Like all medications, antibiotics have side effects. Some of these can be serious.    Let you healthcare team know if you have any known allergies when you are admitted to the hospital.    One significant side effect of nearly all antibiotics is the risk of severe and sometimes deadly diarrhea caused by Clostridium difficile (C. Difficile). This occurs when a person takes antibiotics because some good germs are destroyed. Antibiotic use allows C. diificile to take over, putting patients at high risk for this serious infection.    As a patient or caregiver, it is important to understand your or your loved one's antibiotic treatment. It is especially important for caregivers to speak up when patients can't speak for themselves. Here are some important questions to ask your healthcare team.    What infection is this antibiotic treating and how do you know I have that infection?    What side effects might occur from this antibiotic?    How long will I need to take this antibiotic?    Is it safe to take this antibiotic with other medications or supplements (e.g., vitamins) that I am taking?     Are there any special directions I need to know about taking this antibiotic? For example, should I take it with food?    How will I be monitored to know whether my infection is responding to the antibiotic?    What tests may help to make sure the right antibiotic is prescribed for me?      Information provided by:  www.cdc.gov/getsmart  U.S. Department of Health and Human Services  Centers for disease Control and Prevention  National Center for Emerging and Zoonotic Infectious Diseases  Division of Healthcare Quality Promotion             Medication  List: This is a list of all your medications and when to take them. Check marks below indicate your daily home schedule. Keep this list as a reference.      Medications           Morning Afternoon Evening Bedtime As Needed    Acidophilus/Goat Milk Caps                                apixaban ANTICOAGULANT 5 MG tablet   Commonly known as:  ELIQUIS   Take 1 tablet (5 mg) by mouth 2 times daily   Last time this was given:  5 mg on 4/19/2018  9:45 AM                                calcium carbonate 1250 MG tablet   Commonly known as:  OS-ELISE 500 mg [place]. Ca   Take 1 tablet by mouth 2 times daily                                levothyroxine 50 MCG tablet   Commonly known as:  SYNTHROID/LEVOTHROID   Take 1 tablet (50 mcg) by mouth daily   Last time this was given:  50 mcg on 4/19/2018  9:50 AM                                MAGNESIUM OXIDE PO                                metoprolol succinate 25 MG 24 hr tablet   Commonly known as:  TOPROL-XL   Take 1 tablet (25 mg) by mouth daily   Start taking on:  4/20/2018   Last time this was given:  25 mg on 4/19/2018  5:28 AM                                oseltamivir 75 MG capsule   Commonly known as:  TAMIFLU   Take 1 capsule (75 mg) by mouth 2 times daily   Last time this was given:  75 mg on 4/19/2018  9:44 AM                                UNABLE TO FIND   MEDICATION NAME: fish oil - 1200 mg twice daily                                VITAMIN D (CHOLECALCIFEROL) PO   Take by mouth daily                                zinc sulfate 220 (50 Zn) MG capsule   Commonly known as:  ZINCATE   Take 220 mg by mouth daily                                          More Information        Influenza (Adult)    Influenza is also called the flu. It is a viral illness that affects the air passages of your lungs. It is different from the common cold. The flu can easily be passed from one to person to another. It may be spread through the air by coughing and sneezing. Or it can be spread by  touching the sick person and then touching your own eyes, nose, or mouth.  The flu starts 1 to 3 days after you are exposed to the flu virus. It may last for 1 to 2 weeks but many people feel tired or fatigued for many weeks afterward. You usually don t need to take antibiotics unless you have a complication. This might be an ear or sinus infection or pneumonia.  Symptoms of the flu may be mild or severe. They can include extreme tiredness (wanting to stay in bed all day), chills, fevers, muscle aches, soreness with eye movement, headache, and a dry, hacking cough.  Home care  Follow these guidelines when caring for yourself at home:    Avoid being around cigarette smoke, whether yours or other people s.    Acetaminophen or ibuprofen will help ease your fever, muscle aches, and headache. Don t give aspirin to anyone younger than 18 who has the flu. Aspirin can harm the liver.    Nausea and loss of appetite are common with the flu. Eat light meals. Drink 6 to 8 glasses of liquids every day. Good choices are water, sport drinks, soft drinks without caffeine, juices, tea, and soup. Extra fluids will also help loosen secretions in your nose and lungs.    Over-the-counter cold medicines will not make the flu go away faster. But the medicines may help with coughing, sore throat, and congestion in your nose and sinuses. Don t use a decongestant if you have high blood pressure.    Stay home until your fever has been gone for at least 24 hours without using medicine to reduce fever.  Follow-up care  Follow up with your healthcare provider, or as advised, if you are not getting better over the next week.  If you are age 65 or older, talk with your provider about getting a pneumococcal vaccine every 5 years. You should also get this vaccine if you have chronic asthma or COPD. All adults should get a flu vaccine every fall. Ask your provider about this.  When to seek medical advice  Call your healthcare provider right away if  any of these occur:    Cough with lots of colored mucus (sputum) or blood in your mucus    Chest pain, shortness of breath, wheezing, or trouble breathing    Severe headache, or face, neck, or ear pain    New rash with fever    Fever of 100.4 F (38 C) or higher, or as directed by your healthcare provider    Confusion, behavior change, or seizure    Severe weakness or dizziness    You get a new fever or cough after getting better for a few days  Date Last Reviewed: 1/1/2017 2000-2017 [employer]. [address] [place] [id number]. All rights reserved. This information is not intended as a substitute for professional medical care. Always follow your healthcare professional's instructions.                Discharge Instructions for Atrial Fibrillation  You have been diagnosed with an abnormal heart rhythm called atrial fibrillation. With this condition, your heart s 2 upper chambers quiver rather than squeeze the blood out in a normal pattern. This leads to an irregular and sometimes rapid heartbeat. Some people will develop associated symptoms such as a flip-flopping heartbeat, chest pain, lightheadedness, or shortness of breath. Other people may have no symptoms at all. Atrial fibrillation is serious because it affects the heart s ability to fill with blood as it should. Blood clots may form. This increases the risk for stroke. Untreated atrial fibrillation can also lead to heart failure. Atrial fibrillation can be controlled. With treatment, most people with atrial fibrillation lead normal lives.  Treatment options  Recommended treatment for atrial fibrillation depends on your age, symptoms, how long you have had atrial fibrillation, and other factors. You will have a complete evaluation to find out if you have any abnormalities that caused your heart to go into atrial fibrillation. This might be blocked heart arteries or a thyroid problem. Your doctor will assess your particular case and  discuss choices with you.  Treatment choices may include:    Treating an underlying disorder that puts you at risk for atrial fibrillation. For example, correcting an abnormal thyroid or electrolyte problem, or treating a blocked heart artery.    Restoring a normal heart rhythm with an electrical shock (cardioversion) or with an antiarrhythmic medicine (chemical cardioversion)    Using medicine to control your heart rate in atrial fibrillation.    Preventing the risk for blood clot and stroke using blood-thinning medicines. Your doctor will tell you what he or she recommends. Choices may include aspirin, clopidogrel, warfarin, dabigatran, rivaroxaban, apixaban, and edoxaban.    Doing catheter ablation or a surgical maze procedure. These use different methods to destroy certain areas of heart tissue. This interrupts the electrical signals causing atrial fibrillation. One of these procedures may be a choice when medicines do not work, or as an alternative to long-term medicine.    Other treatment choices may be recommended for you by your doctor.  Managing risk factors for stroke and preventing heart failure are important parts of any treatment plan for atrial fibrillation.  Home care    Take your medicines exactly as directed. Don t skip doses.    Work with your doctor to find the right medicines and doses for you.    Learn to take your own pulse. Keep a record of your results. Ask your doctor which pulse rates mean that you need medical attention. Slowing your pulse is often the goal of treatment. Ask your doctor if it s OK for you to use an automatic machine to check your pulse at home. Sometimes these machines don t count the pulse correctly when you have atrial fibrillation.    Limit your intake of coffee, tea, cola, and other beverages with caffeine. Talk with your doctor about whether you should eliminate caffeine.    Avoid over-the-counter medicines that have caffeine in them.    Let your doctor know what  medicines you take, including prescription and over-the-counter medicines, as well as any supplements. They interfere with some medicines given for atrial fibrillation.    Ask your doctor about whether you can drink alcohol. Some people need to avoid alcohol to better treat atrial fibrillation. If you are taking blood-thinner medicines, alcohol may interfere with them by increasing their effect.    Never take stimulants such as amphetamines or cocaine. These drugs can speed up your heart rate and trigger atrial fibrillation.  Follow-up care  Follow up with your doctor, or as advised.     When should I call my healthcare provider  Call your healthcare provider right away if you have any of the following:    Weakness    Dizziness    Fainting    Fatigue    Shortness of breath    Chest pain with increased activity    A change in the usual regularity of your heartbeat, or an unusually fast heartbeat   Date Last Reviewed: 4/23/2016 2000-2017 [employer]. [address] [place] [address]. All rights reserved. This information is not intended as a substitute for professional medical care. Always follow your healthcare professional's instructions.                Apixaban Oral tablet  What is this medicine?  APIXABAN (a PIX a ban) is an anticoagulant (blood thinner). It is used to lower the chance of stroke in people with a medical condition called atrial fibrillation. It is also used to treat or prevent blood clots in the lungs or in the veins.  This medicine may be used for other purposes; ask your health care provider or pharmacist if you have questions.  What should I tell my health care provider before I take this medicine?  They need to know if you have any of these conditions:    bleeding disorders    bleeding in the brain    blood in your stools (black or tarry stools) or if you have blood in your vomit    history of stomach bleeding    kidney disease    liver disease    mechanical heart  valve    an unusual or allergic reaction to apixaban, other medicines, foods, dyes, or preservatives    pregnant or trying to get pregnant    breast-feeding  How should I use this medicine?  Take this medicine by mouth with a glass of water. Follow the directions on the prescription label. You can take it with or without food. If it upsets your stomach, take it with food. Take your medicine at regular intervals. Do not take it more often than directed. Do not stop taking except on your doctor's advice. Stopping this medicine may increase your risk of a blot clot. Be sure to refill your prescription before you run out of medicine.  Talk to your pediatrician regarding the use of this medicine in children. Special care may be needed.  Overdosage: If you think you have taken too much of this medicine contact a poison control center or emergency room at once.  NOTE: This medicine is only for you. Do not share this medicine with others.  What if I miss a dose?  If you miss a dose, take it as soon as you can. If it is almost time for your next dose, take only that dose. Do not take double or extra doses.  What may interact with this medicine?  This medicine may interact with the following:    aspirin and aspirin-like medicines    certain medicines for fungal infections like ketoconazole and itraconazole    certain medicines for seizures like carbamazepine and phenytoin    certain medicines that treat or prevent blood clots like warfarin, enoxaparin, and dalteparin    clarithromycin    NSAIDs, medicines for pain and inflammation, like ibuprofen or naproxen    rifampin    ritonavir    Schoeneck's wort  This list may not describe all possible interactions. Give your health care provider a list of all the medicines, herbs, non-prescription drugs, or dietary supplements you use. Also tell them if you smoke, drink alcohol, or use illegal drugs. Some items may interact with your medicine.  What should I watch for while using this  medicine?  Notify your doctor or health care professional and seek emergency treatment if you develop breathing problems; changes in vision; chest pain; severe, sudden headache; pain, swelling, warmth in the leg; trouble speaking; sudden numbness or weakness of the face, arm, or leg. These can be signs that your condition has gotten worse.  If you are going to have surgery, tell your doctor or health care professional that you are taking this medicine.  Tell your health care professional that you use this medicine before you have a spinal or epidural procedure. Sometimes people who take this medicine have bleeding problems around the spine when they have a spinal or epidural procedure. This bleeding is very rare. If you have a spinal or epidural procedure while on this medicine, call your health care professional immediately if you have back pain, numbness or tingling (especially in your legs and feet), muscle weakness, paralysis, or loss of bladder or bowel control.  Avoid sports and activities that might cause injury while you are using this medicine. Severe falls or injuries can cause unseen bleeding. Be careful when using sharp tools or knives. Consider using an electric razor. Take special care brushing or flossing your teeth. Report any injuries, bruising, or red spots on the skin to your doctor or health care professional.  What side effects may I notice from receiving this medicine?  Side effects that you should report to your doctor or health care professional as soon as possible:    allergic reactions like skin rash, itching or hives, swelling of the face, lips, or tongue    signs and symptoms of bleeding such as bloody or black, tarry stools; red or dark-brown urine; spitting up blood or brown material that looks like coffee grounds; red spots on the skin; unusual bruising or bleeding from the eye, gums, or nose  This list may not describe all possible side effects. Call your doctor for medical advice  about side effects. You may report side effects to FDA at 7-921-FDA-0615.  Where should I keep my medicine?  Keep out of the reach of children.  Store at room temperature between 20 and 25 degrees C (68 and 77 degrees F). Throw away any unused medicine after the expiration date.  NOTE: This sheet is a summary. It may not cover all possible information. If you have questions about this medicine, talk to your doctor, pharmacist, or health care provider.  NOTE:This sheet is a summary. It may not cover all possible information. If you have questions about this medicine, talk to your doctor, pharmacist, or health care provider. Copyright  2016 Gold Standard

## 2018-04-18 NOTE — ED TRIAGE NOTES
Pt comes from [facility] and was dx'd with influenza B and new onset A Fib with HR up to 130s.

## 2018-04-18 NOTE — ED NOTES
Bed: ED20  Expected date: 4/18/18  Expected time: 6:40 PM  Means of arrival: Car  Comments:  Nicole Stanley  Medic Rig:  CC: influenza B and new onset rapid a-fib  Age: 67  Sex: F  Name/MR: Nicole Stanley  Call taken by: Christ  Other notes: positive for influenza B at  today, new a-fib rate 132

## 2018-04-19 ENCOUNTER — APPOINTMENT (OUTPATIENT)
Dept: CARDIOLOGY | Facility: CLINIC | Age: 68
DRG: 195 | End: 2018-04-19
Payer: MEDICARE

## 2018-04-19 VITALS
TEMPERATURE: 97.9 F | OXYGEN SATURATION: 99 % | BODY MASS INDEX: 24.74 KG/M2 | WEIGHT: 148.5 LBS | DIASTOLIC BLOOD PRESSURE: 74 MMHG | HEIGHT: 65 IN | RESPIRATION RATE: 15 BRPM | SYSTOLIC BLOOD PRESSURE: 93 MMHG | HEART RATE: 99 BPM

## 2018-04-19 PROBLEM — J10.1 INFLUENZA B: Status: ACTIVE | Noted: 2018-04-19

## 2018-04-19 LAB
ANION GAP SERPL CALCULATED.3IONS-SCNC: 7 MMOL/L (ref 3–14)
BUN SERPL-MCNC: 9 MG/DL (ref 7–30)
CALCIUM SERPL-MCNC: 8.2 MG/DL (ref 8.5–10.1)
CHLORIDE SERPL-SCNC: 104 MMOL/L (ref 94–109)
CO2 SERPL-SCNC: 30 MMOL/L (ref 20–32)
CREAT SERPL-MCNC: 0.8 MG/DL (ref 0.52–1.04)
GFR SERPL CREATININE-BSD FRML MDRD: 71 ML/MIN/1.7M2
GLUCOSE SERPL-MCNC: 98 MG/DL (ref 70–99)
INTERPRETATION ECG - MUSE: NORMAL
LMWH PPP CHRO-ACNC: 0.27 IU/ML
MAGNESIUM SERPL-MCNC: 2.1 MG/DL (ref 1.6–2.3)
POTASSIUM SERPL-SCNC: 4.1 MMOL/L (ref 3.4–5.3)
SODIUM SERPL-SCNC: 141 MMOL/L (ref 133–144)

## 2018-04-19 PROCEDURE — 36415 COLL VENOUS BLD VENIPUNCTURE: CPT | Performed by: EMERGENCY MEDICINE

## 2018-04-19 PROCEDURE — 96376 TX/PRO/DX INJ SAME DRUG ADON: CPT

## 2018-04-19 PROCEDURE — 80048 BASIC METABOLIC PNL TOTAL CA: CPT | Performed by: STUDENT IN AN ORGANIZED HEALTH CARE EDUCATION/TRAINING PROGRAM

## 2018-04-19 PROCEDURE — 93306 TTE W/DOPPLER COMPLETE: CPT | Mod: 26 | Performed by: INTERNAL MEDICINE

## 2018-04-19 PROCEDURE — 83735 ASSAY OF MAGNESIUM: CPT | Performed by: STUDENT IN AN ORGANIZED HEALTH CARE EDUCATION/TRAINING PROGRAM

## 2018-04-19 PROCEDURE — 25000132 ZZH RX MED GY IP 250 OP 250 PS 637: Mod: GY | Performed by: EMERGENCY MEDICINE

## 2018-04-19 PROCEDURE — A9270 NON-COVERED ITEM OR SERVICE: HCPCS | Mod: GY | Performed by: STUDENT IN AN ORGANIZED HEALTH CARE EDUCATION/TRAINING PROGRAM

## 2018-04-19 PROCEDURE — 25000132 ZZH RX MED GY IP 250 OP 250 PS 637: Mod: GY | Performed by: INTERNAL MEDICINE

## 2018-04-19 PROCEDURE — 25000128 H RX IP 250 OP 636: Performed by: EMERGENCY MEDICINE

## 2018-04-19 PROCEDURE — A9270 NON-COVERED ITEM OR SERVICE: HCPCS | Mod: GY | Performed by: EMERGENCY MEDICINE

## 2018-04-19 PROCEDURE — 96367 TX/PROPH/DG ADDL SEQ IV INF: CPT

## 2018-04-19 PROCEDURE — 25000132 ZZH RX MED GY IP 250 OP 250 PS 637: Mod: GY | Performed by: STUDENT IN AN ORGANIZED HEALTH CARE EDUCATION/TRAINING PROGRAM

## 2018-04-19 PROCEDURE — 93306 TTE W/DOPPLER COMPLETE: CPT

## 2018-04-19 PROCEDURE — A9270 NON-COVERED ITEM OR SERVICE: HCPCS | Mod: GY | Performed by: INTERNAL MEDICINE

## 2018-04-19 PROCEDURE — 12000006 ZZH R&B IMCU INTERMEDIATE UMMC

## 2018-04-19 PROCEDURE — 85520 HEPARIN ASSAY: CPT | Performed by: EMERGENCY MEDICINE

## 2018-04-19 PROCEDURE — 25000128 H RX IP 250 OP 636: Performed by: STUDENT IN AN ORGANIZED HEALTH CARE EDUCATION/TRAINING PROGRAM

## 2018-04-19 RX ORDER — ASPIRIN 81 MG/1
81 TABLET ORAL DAILY
Status: DISCONTINUED | OUTPATIENT
Start: 2018-04-19 | End: 2018-04-19 | Stop reason: HOSPADM

## 2018-04-19 RX ORDER — METOPROLOL SUCCINATE 25 MG/1
25 TABLET, EXTENDED RELEASE ORAL DAILY
Status: DISCONTINUED | OUTPATIENT
Start: 2018-04-19 | End: 2018-04-19 | Stop reason: HOSPADM

## 2018-04-19 RX ORDER — OSELTAMIVIR PHOSPHATE 75 MG/1
75 CAPSULE ORAL 2 TIMES DAILY
Qty: 8 CAPSULE | Refills: 0 | Status: SHIPPED | OUTPATIENT
Start: 2018-04-19 | End: 2018-04-25

## 2018-04-19 RX ORDER — POTASSIUM CHLORIDE 750 MG/1
40 TABLET, EXTENDED RELEASE ORAL ONCE
Status: COMPLETED | OUTPATIENT
Start: 2018-04-19 | End: 2018-04-19

## 2018-04-19 RX ORDER — LEVOTHYROXINE SODIUM 50 UG/1
50 TABLET ORAL
Status: DISCONTINUED | OUTPATIENT
Start: 2018-04-19 | End: 2018-04-19 | Stop reason: HOSPADM

## 2018-04-19 RX ORDER — ACETAMINOPHEN 650 MG/1
650 SUPPOSITORY RECTAL EVERY 4 HOURS PRN
Status: DISCONTINUED | OUTPATIENT
Start: 2018-04-19 | End: 2018-04-19 | Stop reason: HOSPADM

## 2018-04-19 RX ORDER — METOPROLOL TARTRATE 25 MG/1
25 TABLET, FILM COATED ORAL EVERY 6 HOURS
Status: DISCONTINUED | OUTPATIENT
Start: 2018-04-19 | End: 2018-04-19

## 2018-04-19 RX ORDER — POTASSIUM CHLORIDE 750 MG/1
40 TABLET, EXTENDED RELEASE ORAL ONCE
Status: DISCONTINUED | OUTPATIENT
Start: 2018-04-19 | End: 2018-04-19

## 2018-04-19 RX ORDER — ACETAMINOPHEN 325 MG/1
650 TABLET ORAL EVERY 4 HOURS PRN
Status: DISCONTINUED | OUTPATIENT
Start: 2018-04-19 | End: 2018-04-19 | Stop reason: HOSPADM

## 2018-04-19 RX ORDER — METOPROLOL SUCCINATE 25 MG/1
25 TABLET, EXTENDED RELEASE ORAL DAILY
Qty: 30 TABLET | Refills: 0 | Status: SHIPPED | OUTPATIENT
Start: 2018-04-20 | End: 2018-05-03

## 2018-04-19 RX ORDER — SODIUM CHLORIDE, SODIUM LACTATE, POTASSIUM CHLORIDE, CALCIUM CHLORIDE 600; 310; 30; 20 MG/100ML; MG/100ML; MG/100ML; MG/100ML
INJECTION, SOLUTION INTRAVENOUS CONTINUOUS
Status: DISCONTINUED | OUTPATIENT
Start: 2018-04-19 | End: 2018-04-19 | Stop reason: HOSPADM

## 2018-04-19 RX ORDER — OSELTAMIVIR PHOSPHATE 75 MG/1
75 CAPSULE ORAL 2 TIMES DAILY
Status: DISCONTINUED | OUTPATIENT
Start: 2018-04-19 | End: 2018-04-19 | Stop reason: HOSPADM

## 2018-04-19 RX ORDER — LIDOCAINE 40 MG/G
CREAM TOPICAL
Status: DISCONTINUED | OUTPATIENT
Start: 2018-04-19 | End: 2018-04-19 | Stop reason: HOSPADM

## 2018-04-19 RX ADMIN — OSELTAMIVIR PHOSPHATE 75 MG: 75 CAPSULE ORAL at 03:35

## 2018-04-19 RX ADMIN — METOPROLOL SUCCINATE 25 MG: 25 TABLET, EXTENDED RELEASE ORAL at 05:28

## 2018-04-19 RX ADMIN — POTASSIUM CHLORIDE 40 MEQ: 750 TABLET, EXTENDED RELEASE ORAL at 03:34

## 2018-04-19 RX ADMIN — POTASSIUM CHLORIDE 40 MEQ: 750 TABLET, EXTENDED RELEASE ORAL at 05:32

## 2018-04-19 RX ADMIN — OSELTAMIVIR PHOSPHATE 75 MG: 75 CAPSULE ORAL at 09:44

## 2018-04-19 RX ADMIN — ASPIRIN 81 MG: 81 TABLET, COATED ORAL at 09:50

## 2018-04-19 RX ADMIN — HEPARIN SODIUM 800 UNITS/HR: 10000 INJECTION, SOLUTION INTRAVENOUS at 00:08

## 2018-04-19 RX ADMIN — APIXABAN 5 MG: 5 TABLET, FILM COATED ORAL at 09:45

## 2018-04-19 RX ADMIN — SODIUM CHLORIDE, POTASSIUM CHLORIDE, SODIUM LACTATE AND CALCIUM CHLORIDE: 600; 310; 30; 20 INJECTION, SOLUTION INTRAVENOUS at 03:04

## 2018-04-19 RX ADMIN — LEVOTHYROXINE SODIUM 50 MCG: 50 TABLET ORAL at 09:50

## 2018-04-19 ASSESSMENT — ACTIVITIES OF DAILY LIVING (ADL)
COGNITION: 0 - NO COGNITION ISSUES REPORTED
ADLS_ACUITY_SCORE: 9
SWALLOWING: 0-->SWALLOWS FOODS/LIQUIDS WITHOUT DIFFICULTY
TOILETING: 0-->INDEPENDENT
FALL_HISTORY_WITHIN_LAST_SIX_MONTHS: NO
DRESS: 0-->INDEPENDENT
TRANSFERRING: 0-->INDEPENDENT
ADLS_ACUITY_SCORE: 9
ADLS_ACUITY_SCORE: 9
BATHING: 0-->INDEPENDENT
AMBULATION: 0-->INDEPENDENT
RETIRED_COMMUNICATION: 0-->UNDERSTANDS/COMMUNICATES WITHOUT DIFFICULTY
RETIRED_EATING: 0-->INDEPENDENT

## 2018-04-19 NOTE — DISCHARGE SUMMARY
[facility], [place]    Discharge Summary  Cardiology    Date of Admission:  4/18/2018  Date of Discharge:  4/19/2018  Discharging Provider: Supa Alvares    Discharge Diagnoses   Influenza B  Atrial fibrillation with rapid ventricular response     History of Present Illness   Nicole Stanley is an 67 year old female with history of hypertension and hypothyroidism who presents with respiratory infection symptoms, found to have influenza B and atrial fibrillation with RVR.     Hospital Course   She was given IV fluids in the ED and started on oral metoprolol in addition to tamiflu. She improved with adequate rate control overnight. She was feeling better on the following morning and was comfortable with discharge. Due to her [id number] being 3, anticoagulation was discussed and she was agreeable. Her baby aspirin was stopped and she was started on apixaban. She was discharged on Toprol XL for rate control and BP control, and due to softer BP's her home Maxzide for hypertension was discontinued. If she is hypertensive in clinic follow up, one could consider titrating up the beta blocker (if her HR tolerates) vs adding back her PTA Maxzide for BP control.     Seen and discussed with Dr. Leonardo at discharge.     Supa Alvares MD  PGY-3 Internal Medicine     Code Status   Full Code    Primary Care Physician   Adriana Stern    Physical Exam   Temp: 97.9  F (36.6  C) Temp src: Oral BP: 93/74 Pulse: 99 Heart Rate: 102 Resp: 15 SpO2: 97 % O2 Device: None (Room air)    Vitals:    04/18/18 1852 04/19/18 0205   Weight: 66.1 kg (145 lb 11.6 oz) 67.4 kg (148 lb 8 oz)     Vital Signs with Ranges  Temp:  [97.9  F (36.6  C)-99.8  F (37.7  C)] 97.9  F (36.6  C)  Pulse:  [] 99  Heart Rate:  [] 102  Resp:  [11-26] 15  BP: ()/() 93/74  SpO2:  [93 %-100 %] 97 %  I/O last 3 completed shifts:  In: 30.93 [I.V.:30.93]  Out: -     Constitutional: No apparent  distress  Respiratory: CTAB  Cardiovascular: Irregular, normal rate, normal S1 S2, no murmurs, normal JVP  GI: NABS, soft, non tender  Skin: No rashes  Musculoskeletal: No joint abnormalities, warm extremities, no edema   Neurologic: Alert and oriented, non focal   Neuropsychiatric: Pleasant affect     Discharge Disposition   Discharged to home  Condition at discharge: Stable    Consultations This Hospital Stay   SMOKING CESSATION PROGRAM IP CONSULT    Discharge Orders     Reason for your hospital stay   You were hospitalized for atrial fibrillation with fast heart rates and influenza. It improved with IV fluids and medications to control your heart rate. You will need a blood thinner going forward for anticoagulation to decrease your stroke risk with this abnormal heart rhythm.     Adult [facility] Follow-up and recommended labs and tests   Follow up with primary care provider, Adriana Stern, within 7 days for hospital follow- up.  No follow up labs or test are needed.      Appointments on [place] and/or [facility] (with [facility] or [facility] provider or service). Call [phone] if you haven't heard regarding these appointments within 7 days of discharge.     Activity   Your activity upon discharge: activity as tolerated     Full Code     Diet   Follow this diet upon discharge: Orders Placed This Encounter     Regular Diet Adult       Discharge Medications   Current Discharge Medication List      START taking these medications    Details   apixaban ANTICOAGULANT (ELIQUIS) 5 MG tablet Take 1 tablet (5 mg) by mouth 2 times daily  Qty: 60 tablet, Refills: 0    Associated Diagnoses: Atrial fibrillation, unspecified type (H)      metoprolol succinate (TOPROL-XL) 25 MG 24 hr tablet Take 1 tablet (25 mg) by mouth daily  Qty: 30 tablet, Refills: 0    Associated Diagnoses: Atrial fibrillation, unspecified type (H)      oseltamivir (TAMIFLU) 75 MG capsule Take 1 capsule (75 mg) by mouth 2 times daily  Qty: 8 capsule,  Refills: 0    Associated Diagnoses: Influenza B         CONTINUE these medications which have NOT CHANGED    Details   calcium carbonate (OS-ELISE 500 MG [place]. [place]) 1250 MG tablet Take 1 tablet by mouth 2 times daily      levothyroxine (SYNTHROID/LEVOTHROID) 50 MCG tablet Take 1 tablet (50 mcg) by mouth daily  Qty: 90 tablet, Refills: 3    Associated Diagnoses: Acquired hypothyroidism      Probiotic Product (ACIDOPHILUS/GOAT MILK) CAPS       UNABLE TO FIND MEDICATION NAME: fish oil - 1200 mg twice daily      VITAMIN D, CHOLECALCIFEROL, PO Take by mouth daily      MAGNESIUM OXIDE PO       zinc sulfate (ZINCATE) 220 (50 Zn) MG capsule Take 220 mg by mouth daily         STOP taking these medications       aspirin EC 81 MG EC tablet Comments:   Reason for Stopping:         Naproxen (NAPROSYN PO) Comments:   Reason for Stopping:         triamterene-hydrochlorothiazide (MAXZIDE-25) 37.5-25 MG per tablet Comments:   Reason for Stopping:             Allergies   No Known Allergies     Data   Most Recent 3 CBC's:  Recent Labs   Lab Test  04/18/18 1937   WBC  8.3   HGB  13.6   MCV  94   PLT  155      Most Recent 3 BMP's:  Recent Labs   Lab Test  04/19/18   0633  04/18/18 1937 04/25/17   0842   NA  141  135  141   POTASSIUM  4.1  3.4  3.5   CHLORIDE  104  98  105   CO2  30  26  23   BUN  9  10  19   CR  0.80  0.73  1.04   ANIONGAP  7  11  13   ELISE  8.2*  8.5  9.2   GLC  98  100*  83  Canceled, Test credited   Duplicate request  CORRECTED ON 04/25 AT 1438: PREVIOUSLY REPORTED AS 84 Fasting specimen       Most Recent 2 LFT's:  Recent Labs   Lab Test  04/18/18 1937 04/08/16   AST  16  16   ALT  25  12   ALKPHOS  54   --    BILITOTAL  0.5   --      Most Recent 3 Troponin's:  Recent Labs   Lab Test  04/18/18 1937   TROPI  <0.015     Results for orders placed or performed during the hospital encounter of 04/18/18   XR Chest 2 Views    Narrative    XR CHEST 2 VW  4/18/2018 7:55 PM      HISTORY: cough, eval for PNA;      COMPARISON: None    TECHNIQUE: PA and lateral upright radiographs of the chest.    FINDINGS: No focal airspace opacity, pneumothorax, or pleural  effusion. Biapical pleural scarring. Tortuous descending thoracic  aorta. Cardiac silhouette is within normal limits. The trachea is  midline. Cholecystectomy clips right upper quadrant. No suspicious  osseous lesion.      Impression    IMPRESSION: No focal airspace opacity.    These images were discussed with senior radiology resident Ry Stevenson MD.     I have personally reviewed the examination and initial interpretation  and I agree with the findings.    ISABEL GRAHAM MD   POC US ECHO LIMITED    Impression    [facility] Procedure Note     Limited Bedside ED Cardiac Ultrasound:    PROCEDURE: PERFORMED BY: Dr. Sonido Ramsey  INDICATIONS/SYMPTOM:  Abnormal EKG  PROBE: Cardiac phased array probe  BODY LOCATION: Chest  FINDINGS:   The ultrasound was performed utilizing the subcostal, parasternal long axis, parasternal short axis and apical 4 chamber views.  Cardiac contractility:  Present  Gross estimation of cardiac kinesis: normal, irregularly irregular  Pericardial Effusion:  None  RV:LV ratio: LV > RV  IVC:                                                    Collapsibility:  IVC collapses < 50% with inspiration  INTERPRETATION:    Chamber size and motion were grossly normal with LV > RV, normal cardiac kinesis with an irregularly irregular rhythm, making estimation of gross LVEF limited.  No pericardial effusion was found.  IVC visualized and findings indicate normovolemia.  IMAGE DOCUMENTATION: Images were archived to PACs system.      Sonido Rasmey DO  Emergency Medicine  Pager: [phone]

## 2018-04-19 NOTE — PLAN OF CARE
Problem: Patient Care Overview  Goal: Plan of Care/Patient Progress Review  Outcome: Adequate for Discharge Date Met: 04/19/18  D/C teaching reviewed by Pt and . Questions encouraged and answered.  obtained medications from D/C pharmacy. Pt D/C'd with  at roughly 1315.

## 2018-04-19 NOTE — PLAN OF CARE
Problem: Patient Care Overview  Goal: Plan of Care/Patient Progress Review  Neuro: A&Ox4.   Cardiac: A fib RVR with rates ranging between 103 at rest and up to 120 with activity. Pt denies CP, dizziness, palpitations. Pt was given metoprolol this AM; no new changes in HR or rhythm has been noted since. Pt is schedule for an ECHO this AM.   Respiratory: Sating WDL on RA.  GI/: Adequate urine output. No BM this shift.   Diet/appetite: Tolerating regular diet.  Activity:  Independent, up to chair and in halls.  Pain: Denies  Skin: Intact, no new deficits noted. 2 RN assessment done with WILTON Parada; no existing skin issues noted.   LDA's: N/A    Plan: Continue with POC. Notify primary team with changes.

## 2018-04-19 NOTE — ED PROVIDER NOTES
History     Chief Complaint   Patient presents with     Atrial Fib     Generalized Body Aches     Fever     HPI  Nicole Stanley is a 67 year old female who presents for evaluation of flu-like symptoms and atrial fibrillation. The patient reports that Saturday, 4 days ago, she developed cough and runny nose which worsened.  She states that soon thereafter she developed generalized body aches, subjective fevers, chills, as well as fatigue.  With symptoms persisting, she went to the urgent care center prior to arrival where she underwent testing that revealed influenza B positive status.  She incidentally was found in atrial fibrillation at that time as well, and was also advised to come to the emergency department today for further evaluation.  The patient denies a history of atrial fibrillation; however, she states that both her mother and her brother suffer from this.  She denies any chest pain, shortness of breath, or any palpitations.  In addition to the above, she does endorse some decreased appetite and nausea, though no vomiting.  She also does report an episode of diarrhea this morning.  She denies abdominal pain, she further denies any urinary symptoms.  No unexpected weight loss or heat/cold intolerance.    Current Facility-Administered Medications   Medication     lactated ringers BOLUS 1,000 mL    Followed by     lactated ringers BOLUS 1,000 mL     magnesium sulfate 2 g in NS intermittent infusion ([facility] or [facility])     Current Outpatient Prescriptions   Medication     calcium carbonate (OS-ELISE 500 MG [place]. [place]) 1250 MG tablet     levothyroxine (SYNTHROID/LEVOTHROID) 50 MCG tablet     Probiotic Product (ACIDOPHILUS/GOAT MILK) CAPS     triamterene-hydrochlorothiazide (MAXZIDE-25) 37.5-25 MG per tablet     UNABLE TO FIND     VITAMIN D, CHOLECALCIFEROL, PO     aspirin EC 81 MG EC tablet     MAGNESIUM OXIDE PO     Naproxen (NAPROSYN PO)     zinc sulfate (ZINCATE) 220 (50 Zn) MG capsule     Past Medical  "History:   Diagnosis Date     Arthritis     osteoarth     Spider veins        Past Surgical History:   Procedure Laterality Date     CHOLECYSTECTOMY  2000     CYSTOSCOPY       EYE SURGERY      blepharoplasty     GI SURGERY  2006    rectal sphincter     VASCULAR SURGERY  1988    vein stripping       Family History   Problem Relation Age of Onset     Hypertension Mother           Hyperlipidemia Mother      Family History Negative Father           Alzheimer Disease Father      Family History Negative Maternal Grandmother              Social History   Substance Use Topics     Smoking status: Never Smoker     Smokeless tobacco: Never Used     Alcohol use 0.0 oz/week     0 Standard drinks or equivalent per week      Comment: 1-2 glass of wine per day     No Known Allergies    I have reviewed the Medications, Allergies, Past Medical and Surgical History, and Social History in the Epic system.    Review of Systems   Constitutional: Positive for appetite change, chills, fatigue and fever.   HENT: Negative for congestion.    Eyes: Negative for redness.   Respiratory: Negative for shortness of breath.    Cardiovascular: Negative for chest pain.   Gastrointestinal: Positive for diarrhea and nausea. Negative for abdominal pain and vomiting.   Endocrine: Negative for cold intolerance and heat intolerance.   Genitourinary: Negative for difficulty urinating, dysuria and hematuria.   Musculoskeletal: Negative for arthralgias and neck stiffness.   Skin: Negative for color change.   Neurological: Negative for headaches.   Psychiatric/Behavioral: Negative for confusion.   All other systems reviewed and are negative.      Physical Exam   BP: 123/82  Pulse: 127  Temp: 99.6  F (37.6  C)  Resp: 20  Height: 165.1 cm (5' 5\")  Weight: 66.1 kg (145 lb 11.6 oz)  SpO2: 96 %      Physical Exam    ED Course     ED Course     Procedures        {EKG done?:972542::\" \"}    Critical Care time:  {none or " "minutes:042457::\"none\"}  {trauma activation or Fall?:133154::\" \"}  {Sepsis/Septic Shock/Stemi/Stroke:279475::\" \"}       Labs Ordered and Resulted from Time of ED Arrival Up to the Time of Departure from the ED - No data to display         Assessments & Plan (with Medical Decision Making)   ***    I have reviewed the nursing notes.    I have reviewed the findings, diagnosis, plan and need for follow up with the patient.    New Prescriptions    No medications on file       Final diagnoses:   None     I, Agustin Kirkpatrick, am serving as a trained medical scribe to document services personally performed by Sonido Ramsey MD, based on the provider's statements to me.       ISonido MD, was physically present and have reviewed and verified the accuracy of this note documented by Agustin Kirkpatrick.    4/18/2018   [facility], EMERGENCY DEPARTMENT  "

## 2018-04-19 NOTE — H&P
History & Physical  Service: Cards 1     Nicole Stanley MRN# [id number]   YOB: 1950 Age: 67 year old      Date of Admission:  4/18/2018    Chief Complaint: Flu like sx      History of Present Illness:  68 y/o F with PMH of HTN, hypothyroidism who has been feeling unwell for the last 4-5 days with cough, runny nose, overall malaise.  Today she also had 3 liquid stools.  She went to urgent care where flu test came back positive for influenza B.  She was found to be tachycardic to 150s and rhythm was A fib with RVR.  She was sent to the ED.  There her HR was better controlled with 2L LR bolus and 25mg of PO metoprolol.      She says she feels better now.  HR in 110s-120s.  No chest pain, dyspnea, PND, orthopnea, LE edema.     ROS: 10 point ROS is negative except per HPI    Past Medical History:  - reviewed    Past Surgical History:  - reviewed    Medications:  - reviewed  Allergies:  -  No Known Allergies    Social History:  - Denies tobacco, drinks 2-3 drinks per night, no illicits, lives with , quite active.    Family History:  - FH of A fib in mother, sister.    Exam:  Temp:  [98.1  F (36.7  C)-99.8  F (37.7  C)] 98.1  F (36.7  C)  Pulse:  [] 127  Heart Rate:  [102-168] 118  Resp:  [11-26] 17  BP: ()/() 101/68  SpO2:  [93 %-100 %] 100 %  No intake or output data in the 24 hours ending 04/19/18 0250    General: Alert, interactive, NAD  HEENT: Atraumatic, normocephalic, sclera anicteric, EOMI, OP clear with MMM  Neck: Supple, no JVD  Resp: some bibasilar crackles, no wheezes -- on RA  Cardiac: tachycardic, irregular  Abdomen: Soft, nontender, nondistended. Active BS.    Extremities: No LE edema B/L, warm  Skin: Warm and dry, no jaundice or rash  Neuro and Psych: Alert & grossly oriented, CNS 3-12 grossly intact, moves all extremities equally  Bedside US: tachycardic, irregular, looks to have good EF, IVC collapsible, LA doesn't look enlarged.      Labs/Imaging reviewed in the EMR.  Labs: K 3.4, Mg 1.9    Imaging: CXR without infiltrate or pulm edema    EKG: A fib    Assessment/ Plan:  68 y/o F with PMH of HTN, hypothyroidism presents with A fib with RVR in the setting of influenza B.    # Influenza B  - Even though it's been > 48 hours, because she is in A fib with RVR, will treat with oseltamivir 75mg BID for 5 days.     # A fib with RVR  - Likely driven by above.  Replace K and Mg.    - Formal TTE pending.   - 2L LR in the ED.  1 more LR over 12 hours here.  IVC looked collapsible.  - Start metop succ 25mg PO   - Heparin drip -- CHADSVASC 3 for female, age, and HTN.  Check with insurance for which DOAC will be covered.   - Continue home asa.     --- CHRONIC ISSUES ---  # HTN: Hold BP meds while in RVR  # Hypothyroid: continue LT4.  TSH normal.     FEN: regular diet  Prophy: heparin   Code Status: full code    Disposition Plan   Expected discharge: 2 - 3 days, recommended to prior living arrangement once A fib with RVR is resolved.    To be staffed in the AM.     Mindi Brown DO, MS  PGY-2, Internal Medicine Resident  Pager

## 2018-04-19 NOTE — ED NOTES
[facility], [place]   ED Nurse to Floor Handoff     Nicole Stanley is a 67 year old female who speaks English and lives with a spouse,  in a home  They arrived in the ED by car from clinic    ED Chief Complaint: Atrial Fib; Generalized Body Aches; and Fever    ED Dx;   Final diagnoses:   Atrial fibrillation, unspecified type (H)         Needed?: No    Allergies: No Known Allergies.  Past Medical Hx:   Past Medical History:   Diagnosis Date     Arthritis     osteoarth     Spider veins       Baseline Mental status: WDL  Current Mental Status changes: at basesline    Infection present or suspected this encounter: yes respiratory Flu positive  Sepsis suspected: No  Isolation type: No active isolations     Activity level - Baseline/Home:  Independent  Activity Level - Current:   Independent    Bariatric equipment needed?: No    In the ED these meds were given:   Medications   heparin  drip 25,000 units in 0.45% NaCl 250 mL (see additional administration details for dose) (800 Units/hr Intravenous New Bag 4/19/18 0008)   heparin bolus from infusion pump (not administered)   metoprolol tartrate (LOPRESSOR) tablet 25 mg (0 mg Oral Hold 4/19/18 0003)   lactated ringers BOLUS 1,000 mL (1,000 mLs Intravenous New Bag 4/18/18 1947)     Followed by   lactated ringers BOLUS 1,000 mL (1,000 mLs Intravenous New Bag 4/18/18 2138)   acetaminophen (TYLENOL) tablet 975 mg (975 mg Oral Given 4/18/18 1947)   ibuprofen (ADVIL/MOTRIN) tablet 600 mg (600 mg Oral Given 4/18/18 2005)   magnesium sulfate 2 g in NS intermittent infusion ([facility]ar[employer] or [facility]) (2 g Intravenous New Bag 4/18/18 2005)   heparin Loading Dose from infusion pump *Give when STARTING heparin infusion 3,950 Units (3,950 Units Intravenous Given 4/19/18 0008)       Drips running?  Yes    Home pump  No    Current LDAs       Labs results:   Labs Ordered and Resulted from Time of ED Arrival Up to the Time of Departure from the ED    COMPREHENSIVE METABOLIC PANEL - Abnormal; Notable for the following:        Result Value    Glucose 100 (*)     All other components within normal limits   CBC WITH PLATELETS DIFFERENTIAL   LACTIC ACID WHOLE BLOOD   TROPONIN I   TSH WITH FREE T4 REFLEX   MAGNESIUM   UA MACROSCOPIC WITH REFLEX TO MICRO AND CULTURE   INR   PLATELETS MONITORED PER HEPARIN TREATMENT PROTOCOL (FOR MEANINGFUL USE       Imaging Studies:   Recent Results (from the past 24 hour(s))   POC US ECHO LIMITED    Impression    [facility] Procedure Note     Limited Bedside ED Cardiac Ultrasound:    PROCEDURE: PERFORMED BY: Dr. Sonido Ramsey  INDICATIONS/SYMPTOM:  Abnormal EKG  PROBE: Cardiac phased array probe  BODY LOCATION: Chest  FINDINGS:   The ultrasound was performed utilizing the subcostal, parasternal long axis, parasternal short axis and apical 4 chamber views.  Cardiac contractility:  Present  Gross estimation of cardiac kinesis: normal, irregularly irregular  Pericardial Effusion:  None  RV:LV ratio: LV > RV  IVC:                                                    Collapsibility:  IVC collapses < 50% with inspiration  INTERPRETATION:    Chamber size and motion were grossly normal with LV > RV, normal cardiac kinesis with an irregularly irregular rhythm, making estimation of gross LVEF limited.  No pericardial effusion was found.  IVC visualized and findings indicate normovolemia.  IMAGE DOCUMENTATION: Images were archived to PACs system.      Sonido Ramsey DO  Emergency Medicine  Pager: [phone]   XR Chest 2 Views    Narrative    XR CHEST 2 VW  4/18/2018 7:55 PM      HISTORY: cough, eval for PNA;     COMPARISON: None    TECHNIQUE: PA and lateral upright radiographs of the chest.    FINDINGS: No focal airspace opacity, pneumothorax, or pleural  effusion. Biapical pleural scarring. Tortuous descending thoracic  aorta. Cardiac silhouette is within normal limits. The trachea is  midline. Cholecystectomy clips  "right upper quadrant. No suspicious  osseous lesion.      Impression    IMPRESSION: No focal airspace opacity.    These images were discussed with senior radiology resident Ry Stevenson MD.     I have personally reviewed the examination and initial interpretation  and I agree with the findings.    ISABEL GRAHAM MD       Recent vital signs:   BP 99/77  Pulse 127  Temp 99.6  F (37.6  C) (Oral)  Resp 20  Ht 1.651 m (5' 5\")  Wt 66.1 kg (145 lb 11.6 oz)  LMP  (LMP Unknown)  SpO2 94%  BMI 24.25 kg/m2    Cardiac Rhythm: A fib  Pt needs tele? Yes  Skin/wound Issues: None    Code Status: Full Code    Pain control: pt had none    Nausea control: pt had none    Abnormal labs/tests/findings requiring intervention: Flu positive, Held metoprolol due to 90s systolic, pt still in afib.    Family present during ED course? Yes   Family Comments/Social Situation comments:     Tasks needing completion: None    No Kumar RN  [facility]--   [phone] [place] ED  [phone] [facility] ED      "

## 2018-04-19 NOTE — PLAN OF CARE
Discharge paperwork reviewed including medications and followup. All questions answered.  to transport

## 2018-04-20 ENCOUNTER — CARE COORDINATION (OUTPATIENT)
Dept: CARE COORDINATION | Facility: CLINIC | Age: 68
End: 2018-04-20

## 2018-04-20 NOTE — PROGRESS NOTES
Patient was called three times and no answer so post 24 hr DC follow up calls will be closed out, message was left with contact number for department seen by or following up with     Follow up with primary care provider, Adriana Stern, within 7 days for hospital follow- up.  No follow up labs or test are needed.

## 2018-04-25 ENCOUNTER — OFFICE VISIT (OUTPATIENT)
Dept: FAMILY MEDICINE | Facility: CLINIC | Age: 68
End: 2018-04-25
Payer: COMMERCIAL

## 2018-04-25 ENCOUNTER — TELEPHONE (OUTPATIENT)
Dept: FAMILY MEDICINE | Facility: CLINIC | Age: 68
End: 2018-04-25

## 2018-04-25 VITALS
TEMPERATURE: 98.1 F | OXYGEN SATURATION: 99 % | DIASTOLIC BLOOD PRESSURE: 82 MMHG | HEIGHT: 65 IN | RESPIRATION RATE: 12 BRPM | HEART RATE: 65 BPM | WEIGHT: 147.5 LBS | SYSTOLIC BLOOD PRESSURE: 130 MMHG | BODY MASS INDEX: 24.57 KG/M2

## 2018-04-25 DIAGNOSIS — Z12.11 SPECIAL SCREENING FOR MALIGNANT NEOPLASMS, COLON: ICD-10-CM

## 2018-04-25 DIAGNOSIS — R20.2 PARESTHESIA: ICD-10-CM

## 2018-04-25 DIAGNOSIS — I48.91 ATRIAL FIBRILLATION, UNSPECIFIED TYPE (H): ICD-10-CM

## 2018-04-25 DIAGNOSIS — E78.2 MIXED HYPERLIPIDEMIA: ICD-10-CM

## 2018-04-25 DIAGNOSIS — Z12.31 ENCOUNTER FOR SCREENING MAMMOGRAM FOR BREAST CANCER: ICD-10-CM

## 2018-04-25 DIAGNOSIS — E03.9 HYPOTHYROIDISM, UNSPECIFIED TYPE: ICD-10-CM

## 2018-04-25 DIAGNOSIS — J10.1 INFLUENZA B: Primary | ICD-10-CM

## 2018-04-25 DIAGNOSIS — I10 ESSENTIAL HYPERTENSION: ICD-10-CM

## 2018-04-25 PROCEDURE — 99495 TRANSJ CARE MGMT MOD F2F 14D: CPT | Performed by: FAMILY MEDICINE

## 2018-04-25 NOTE — PATIENT INSTRUCTIONS
1. Schedule with Dr. Srivastava for your foot and leg numbness  2. Remember to schedule your mammogram and colonoscopy.  3. Schedule fasting lab visit.    Preventive Health Recommendations    Female Ages 65 +    Yearly exam:     See your health care provider every year in order to  o Review health changes.   o Discuss preventive care.    o Review your medicines if your doctor has prescribed any.      You no longer need a yearly Pap test unless you've had an abnormal Pap test in the past 10 years. If you have vaginal symptoms, such as bleeding or discharge, be sure to talk with your provider about a Pap test.      Every 1 to 2 years, have a mammogram.  If you are over 69, talk with your health care provider about whether or not you want to continue having screening mammograms.      Every 10 years, have a colonoscopy. Or, have a yearly FIT test (stool test). These exams will check for colon cancer.       Have a cholesterol test every 5 years, or more often if your doctor advises it.       Have a diabetes test (fasting glucose) every three years. If you are at risk for diabetes, you should have this test more often.       At age 65, have a bone density scan (DEXA) to check for osteoporosis (brittle bone disease).    Shots:    Get a flu shot each year.    Get a tetanus shot every 10 years.    Talk to your doctor about your pneumonia vaccines. There are now two you should receive - Pneumovax (PPSV 23) and Prevnar (PCV 13).    Talk to your doctor about the shingles vaccine.    Talk to your doctor about the hepatitis B vaccine.    Nutrition:     Eat at least 5 servings of fruits and vegetables each day.      Eat whole-grain bread, whole-wheat pasta and brown rice instead of white grains and rice.      Talk to your provider about Calcium and Vitamin D.     Lifestyle    Exercise at least 150 minutes a week (30 minutes a day, 5 days a week). This will help you control your weight and prevent disease.      Limit alcohol  to one drink per day.      No smoking.       Wear sunscreen to prevent skin cancer.       See your dentist twice a year for an exam and cleaning.      See your eye doctor every 1 to 2 years to screen for conditions such as glaucoma, macular degeneration and cataracts.

## 2018-04-25 NOTE — PROGRESS NOTES
SUBJECTIVE:   Nicole Stanley is a 67 year old female who presents to clinic today for the following health issues:          Hospital Follow-up Visit:    Hospital/Nursing Home/IP Rehab Facility: [facility]  Date of Admission: 4/18/18  Date of Discharge: 4/19/18  Reason(s) for Admission: Flu B             Problems taking medications regularly:  None       Medication changes since discharge: UTD in Epic        Problems adhering to non-medication therapy:  None    Summary of hospitalization:  [facility] discharge summary reviewed  Diagnostic Tests/Treatments reviewed.  Follow up needed: cardiology--scheduled  Other Healthcare Providers Involved in Patient s Care:         Specialist appointment - cardiology  Update since discharge: improved.      Post Discharge Medication Reconciliation: discharge medications reconciled, continue medications without change.  Plan of care communicated with patient     Coding guidelines for this visit:  Type of Medical   Decision Making Face-to-Face Visit       within 7 Days of discharge Face-to-Face Visit        within 14 days of discharge   Moderate Complexity [id number]   High Complexity [id number]              Patient reports that she is feeling better after her hospital visit for Influenza B, but still a bit congested and some coughing. She states that she is walking again ( about 3 miles a day) and is not getting winded.     She states that she is following up with Dr. Leonardo in the coming week. Future labs placed per patients request so she can have the results for visit with Dr. Leonardo. She is going to schedule a physical. Her mammogram and colonoscopy are over due; will schedule after visit.      She states that she has been on simvastatin in the past, but is not currently.     Arthritis: has history of arthrits in her hands. Saw rheumatologist and diagnosed with OA.     Also notes, while walking she gets numbness in the bottom of her feet- actually fell  once when carrying a heavy bag. She also states that the other day her butt was also numb and so she thinks that this numbness is coming from her spine. She is concerned as she walker not want to give up her walking. She had previously seen a rheumatologist, not seen a neurologist.            Problem list and histories reviewed & adjusted, as indicated.  Additional history: as documented    Patient Active Problem List   Diagnosis     Essential hypertension     Mixed hyperlipidemia     Bunion, left     Osteoarthritis of both hands, unspecified osteoarthritis type     Hypothyroidism     Uterovaginal prolapse, incomplete     Mixed incontinence     Myalgia of pelvic floor     Pelvic floor dysfunction     Dyspareunia in female     Left ovarian cyst     Influenza B     Atrial fibrillation, unspecified type (H)     Past Surgical History:   Procedure Laterality Date     CHOLECYSTECTOMY       CYSTOSCOPY       EYE SURGERY      blepharoplasty     GI SURGERY  2006    rectal sphincter     VASCULAR SURGERY  1988    vein stripping       Social History   Substance Use Topics     Smoking status: Never Smoker     Smokeless tobacco: Never Used     Alcohol use 0.0 oz/week     0 Standard drinks or equivalent per week      Comment: 1-2 glass of wine per day     Family History   Problem Relation Age of Onset     Hypertension Mother           Hyperlipidemia Mother      Family History Negative Father           Alzheimer Disease Father      Family History Negative Maternal Grandmother                Current Outpatient Prescriptions   Medication Sig Dispense Refill     apixaban ANTICOAGULANT (ELIQUIS) 5 MG tablet Take 1 tablet (5 mg) by mouth 2 times daily 60 tablet 0     calcium carbonate (OS-ELISE 500 MG [place]. [place]) 1250 MG tablet Take 1 tablet by mouth 2 times daily       levothyroxine (SYNTHROID/LEVOTHROID) 50 MCG tablet Take 1 tablet (50 mcg) by mouth daily 90 tablet 3     MAGNESIUM OXIDE PO        metoprolol  "succinate (TOPROL-XL) 25 MG 24 hr tablet Take 1 tablet (25 mg) by mouth daily 30 tablet 0     Probiotic Product (ACIDOPHILUS/GOAT MILK) CAPS        UNABLE TO FIND MEDICATION NAME: fish oil - 1200 mg twice daily       VITAMIN D, CHOLECALCIFEROL, PO Take by mouth daily       zinc sulfate (ZINCATE) 220 (50 Zn) MG capsule Take 220 mg by mouth daily       No Known Allergies  Recent Labs   Lab Test  04/19/18   0633  04/18/18   1937  04/25/17   0842 04/08/16 03/16/15   LDL   --    --   151*  171*  121   HDL   --    --   112  133  108   TRIG   --    --   56  76  66   ALT   --   25   --   12  19   CR  0.80  0.73  1.04  0.8  0.9   GFRESTIMATED  71  79  53*  72  63   GFRESTBLACK  86  >90  64  87  76   POTASSIUM  4.1  3.4  3.5  4.4  4.8   TSH   --   2.62  3.14  3.400  4.130      BP Readings from Last 3 Encounters:   04/25/18 130/82   04/19/18 93/74   04/18/18 104/66    Wt Readings from Last 3 Encounters:   04/25/18 147 lb 8 oz (66.9 kg)   04/19/18 148 lb 8 oz (67.4 kg)   04/18/18 141 lb 3.2 oz (64 kg)                    Reviewed and updated as needed this visit by clinical staff  Tobacco  Allergies  Meds  Med Hx  Surg Hx  Fam Hx  Soc Hx      Reviewed and updated as needed this visit by Provider         ROS:  Denies chest pain or shortness of breath     OBJECTIVE:     /82  Pulse 65  Temp 98.1  F (36.7  C) (Tympanic)  Resp 12  Ht 5' 5\" (1.651 m)  Wt 147 lb 8 oz (66.9 kg)  LMP  (LMP Unknown)  SpO2 99%  BMI 24.55 kg/m2  Body mass index is 24.55 kg/(m^2).  GENERAL: healthy, alert and no distress  RESP: lungs clear to auscultation - no rales, rhonchi or wheezes  CV: regular rate and rhythm today, normal S1 S2, no S3 or S4, no murmur, click or rub     Diagnostic Test Results:  none     ASSESSMENT/PLAN:          1. Influenza B  Symptoms much improved, mild cough still. Normal exam. She completed the tamiflu.     2. Atrial fibrillation, unspecified type (H)  Sounds regular today  Continues on metoprolol for rate " control and apixiban for stroke prophylaxis. Has appt scheduled with Dr. Leonardo.     3. Essential hypertension  Controlled today. Continue metoprolol    4. Mixed hyperlipidemia  She would like to return fasting   - Lipid panel reflex to direct LDL Fasting; Future    5. Hypothyroidism, unspecified type  TSH WNL in hospital  Continues levothyroxine 50mcg     6. Encounter for screening mammogram for breast cancer     - *MA Screening Digital Bilateral; Future    7.  Special screening for malignant neoplasms, colon     - GASTROENTEROLOGY ADULT REF PROCEDURE ONLY [facility] [phone]    9. Paresthesia  Numbness and tingling in feet and sometimes bilateral buttocks, no back pain. Referral to neurology given.   - NEUROLOGY ADULT REFERRAL    Patient Instructions         1. Schedule with Dr. Srivastava for your foot and leg numbness  2. Remember to schedule your mammogram and colonoscopy.  3. Schedule fasting lab visit.    Preventive Health Recommendations    Female Ages 65 +    Yearly exam:     See your health care provider every year in order to  o Review health changes.   o Discuss preventive care.    o Review your medicines if your doctor has prescribed any.      You no longer need a yearly Pap test unless you've had an abnormal Pap test in the past 10 years. If you have vaginal symptoms, such as bleeding or discharge, be sure to talk with your provider about a Pap test.      Every 1 to 2 years, have a mammogram.  If you are over 69, talk with your health care provider about whether or not you want to continue having screening mammograms.      Every 10 years, have a colonoscopy. Or, have a yearly FIT test (stool test). These exams will check for colon cancer.       Have a cholesterol test every 5 years, or more often if your doctor advises it.       Have a diabetes test (fasting glucose) every three years. If you are at risk for diabetes, you should have this test more often.       At age 65, have a bone density  scan (DEXA) to check for osteoporosis (brittle bone disease).    Shots:    Get a flu shot each year.    Get a tetanus shot every 10 years.    Talk to your doctor about your pneumonia vaccines. There are now two you should receive - Pneumovax (PPSV 23) and Prevnar (PCV 13).    Talk to your doctor about the shingles vaccine.    Talk to your doctor about the hepatitis B vaccine.    Nutrition:     Eat at least 5 servings of fruits and vegetables each day.      Eat whole-grain bread, whole-wheat pasta and brown rice instead of white grains and rice.      Talk to your provider about Calcium and Vitamin D.     Lifestyle    Exercise at least 150 minutes a week (30 minutes a day, 5 days a week). This will help you control your weight and prevent disease.      Limit alcohol to one drink per day.      No smoking.       Wear sunscreen to prevent skin cancer.       See your dentist twice a year for an exam and cleaning.      See your eye doctor every 1 to 2 years to screen for conditions such as glaucoma, macular degeneration and cataracts.      Lauryn Allison MD, MD  [facility]

## 2018-04-25 NOTE — MR AVS SNAPSHOT
After Visit Summary   4/25/2018    Nicole Stanley    MRN: [id number]           Patient Information     Date Of Birth          1950        Visit Information        Provider Department      4/25/2018 8:20 AM Lauryn Allison MD [facility]        Today's Diagnoses     Influenza B    -  1    Atrial fibrillation, unspecified type (H)        Essential hypertension        Mixed hyperlipidemia        Hypothyroidism, unspecified type        Encounter for screening mammogram for breast cancer        Special screening for malignant neoplasms, colon        Paresthesia          Care Instructions          1. Schedule with Dr. Srivastava for your foot and leg numbness  2. Remember to schedule your mammogram and colonoscopy.  3. Schedule fasting lab visit.    Preventive Health Recommendations    Female Ages 65 +    Yearly exam:     See your health care provider every year in order to  o Review health changes.   o Discuss preventive care.    o Review your medicines if your doctor has prescribed any.      You no longer need a yearly Pap test unless you've had an abnormal Pap test in the past 10 years. If you have vaginal symptoms, such as bleeding or discharge, be sure to talk with your provider about a Pap test.      Every 1 to 2 years, have a mammogram.  If you are over 69, talk with your health care provider about whether or not you want to continue having screening mammograms.      Every 10 years, have a colonoscopy. Or, have a yearly FIT test (stool test). These exams will check for colon cancer.       Have a cholesterol test every 5 years, or more often if your doctor advises it.       Have a diabetes test (fasting glucose) every three years. If you are at risk for diabetes, you should have this test more often.       At age 65, have a bone density scan (DEXA) to check for osteoporosis (brittle bone disease).    Shots:    Get a flu shot each year.    Get a tetanus shot every 10 years.    Talk to  your doctor about your pneumonia vaccines. There are now two you should receive - Pneumovax (PPSV 23) and Prevnar (PCV 13).    Talk to your doctor about the shingles vaccine.    Talk to your doctor about the hepatitis B vaccine.    Nutrition:     Eat at least 5 servings of fruits and vegetables each day.      Eat whole-grain bread, whole-wheat pasta and brown rice instead of white grains and rice.      Talk to your provider about Calcium and Vitamin D.     Lifestyle    Exercise at least 150 minutes a week (30 minutes a day, 5 days a week). This will help you control your weight and prevent disease.      Limit alcohol to one drink per day.      No smoking.       Wear sunscreen to prevent skin cancer.       See your dentist twice a year for an exam and cleaning.      See your eye doctor every 1 to 2 years to screen for conditions such as glaucoma, macular degeneration and cataracts.          Follow-ups after your visit        Additional Services     GASTROENTEROLOGY ADULT REF PROCEDURE ONLY [facility] [phone]       Last Lab Result: Creatinine (mg/dL)       Date                     Value                 04/19/2018               0.80             ----------  Body mass index is 24.55 kg/(m^2).     Needed:  No  Language:  English    Patient will be contacted to schedule procedure.     Please be aware that coverage of these services is subject to the terms and limitations of your health insurance plan.  Call member services at your health plan with any benefit or coverage questions.  Any procedures must be performed at a [facility] facility OR coordinated by your clinic's referral office.    Please bring the following with you to your appointment:    (1) Any X-Rays, CTs or MRIs which have been performed.  Contact the facility where they were done to arrange for  prior to your scheduled appointment.    (2) List of current medications   (3) This referral request   (4) Any documents/labs given to  you for this referral            NEUROLOGY ADULT REFERRAL       Your provider has referred you for the following:   Consult at [facility]: [facility] [phone]   [email or web address]    Please be aware that coverage of these services is subject to the terms and limitations of your health insurance plan.  Call member services at your health plan with any benefit or coverage questions.      Please bring the following with you to your appointment:    (1) Any X-Rays, CTs or MRIs which have been performed.  Contact the facility where they were done to arrange for  prior to your scheduled appointment.    (2) List of current medications  (3) This referral request   (4) Any documents/labs given to you for this referral                  Your next 10 appointments already scheduled     Apr 26, 2018  9:00 AM CDT   LAB with  LAB   [facility]    [address] [place] [id number] [phone]           Please do not eat 10-12 hours before your appointment if you are coming in fasting for labs on lipids, cholesterol, or glucose (sugar). This does not apply to pregnant women. Water, hot tea and black coffee (with nothing added) are okay. Do not drink other fluids, diet soda or chew gum.            May 03, 2018  1:00 PM CDT   New Visit with Maile Leonardo MD   [facility] ([facility])    [address] [place] [id number] [phone]            May 08, 2018 10:00 AM CDT   New Visit with Manish Srivastava MD   [facility] ([facility]    [address] [place] [address] [phone]            May 09, 2018 11:15 AM CDT   (Arrive by 11:00 AM)   MA SCREENING DIGITAL BILATERAL with EAMA1   [facility] ([facility] [place]    [address] [facility] [address]  [address]  [place] [address] [phone]           Do not use any powder, lotion or deodorant under your arms or on your breast. If you do, we will ask you to remove it before your exam.  Wear comfortable, two-piece clothing.  If you have any allergies, tell your care team.  Bring any previous mammograms from other facilities or have them mailed to the breast center. Three-dimensional (3D) mammograms are available at [facility] locations in [facility], [facility], and [place]. Health locations include [facility] in [place]. Benefits of 3D mammograms include: - Improved rate of cancer detection - Decreases your chance of having to go back for more tests, which means fewer: - \"False-positive\" results (This means that there is an abnormal area but it isn't cancer.) - Invasive testing procedures, such as a biopsy or surgery - Can provide clearer images of the breast if you have dense breast tissue. 3D mammography is an optional exam that anyone can have with a 2D mammogram. It doesn't replace or take the place of a 2D mammogram. 2D mammograms remain an effective screening test for all women.  Not all insurance companies cover the cost of a 3D mammogram. Check with your insurance.              Future tests that were ordered for you today     Open Future Orders        Priority Expected Expires Ordered    *MA Screening Digital Bilateral Routine  4/23/2019 4/25/2018    Lipid panel reflex to direct LDL Fasting Routine  4/25/2019 4/25/2018            Who to contact     If you have questions or need follow up information about today's clinic visit or your schedule please contact [facility] directly at [phone].  Normal or non-critical lab and imaging results will be communicated to you by MyChart, letter or phone within 4 business days after the clinic has received the results. If you do not hear from us within 7 days, please " "contact the clinic through [employer] or phone. If you have a critical or abnormal lab result, we will notify you by phone as soon as possible.  Submit refill requests through [employer] or call your pharmacy and they will forward the refill request to us. Please allow 3 business days for your refill to be completed.          Additional Information About Your Visit        [employer] Information     [employer] gives you secure access to your electronic health record. If you see a primary care provider, you can also send messages to your care team and make appointments. If you have questions, please call your primary care clinic.  If you do not have a primary care provider, please call [phone] and they will assist you.        Care EveryWhere ID     This is your Care EveryWhere ID. This could be used by other organizations to access your [facility] medical records  [id number]        Your Vitals Were     Pulse Temperature Respirations Height Last Period Pulse Oximetry    65 98.1  F (36.7  C) (Tympanic) 12 5' 5\" (1.651 m) (LMP Unknown) 99%    BMI (Body Mass Index)                   24.55 kg/m2            Blood Pressure from Last 3 Encounters:   04/25/18 130/82   04/19/18 93/74   04/18/18 104/66    Weight from Last 3 Encounters:   04/25/18 147 lb 8 oz (66.9 kg)   04/19/18 148 lb 8 oz (67.4 kg)   04/18/18 141 lb 3.2 oz (64 kg)              We Performed the Following     GASTROENTEROLOGY ADULT REF PROCEDURE ONLY [facility] [phone]     NEUROLOGY ADULT REFERRAL        Primary Care Provider Office Phone # Fax #    Adriana Novapreston Stern, YAAKOV [facility] [phone] [address] [facility] [address]        Equal Access to Services     [facility] DRU AH: Hadii judith denton Sotyrel, waaxda luqadaha, qaybta kaalmada adeegyada, danny dunbar. So [facility] [phone].    ATENCIÓN: Si habla español, tiene a michaels disposición servicios gratuitos de asistencia lingüística. Llame al " [phone].    We comply with applicable federal civil rights laws and [place] laws. We do not discriminate on the basis of race, color, national origin, age, disability, sex, sexual orientation, or gender identity.            Thank you!     Thank you for choosing [facility]  for your care. Our goal is always to provide you with excellent care. Hearing back from our patients is one way we can continue to improve our services. Please take a few minutes to complete the written survey that you may receive in the mail after your visit with us. Thank you!             Your Updated Medication List - Protect others around you: Learn how to safely use, store and throw away your medicines at www.disposemymeds.org.          This list is accurate as of 4/25/18  9:15 AM.  Always use your most recent med list.                   Brand Name Dispense Instructions for use Diagnosis    Acidophilus/Goat Milk Caps           apixaban ANTICOAGULANT 5 MG tablet    ELIQUIS    60 tablet    Take 1 tablet (5 mg) by mouth 2 times daily    Atrial fibrillation, unspecified type (H)       calcium carbonate 1250 MG tablet    OS-ELISE 500 mg [place]. Ca     Take 1 tablet by mouth 2 times daily        levothyroxine 50 MCG tablet    SYNTHROID/LEVOTHROID    90 tablet    Take 1 tablet (50 mcg) by mouth daily    Acquired hypothyroidism       MAGNESIUM OXIDE PO           metoprolol succinate 25 MG 24 hr tablet    TOPROL-XL    30 tablet    Take 1 tablet (25 mg) by mouth daily    Atrial fibrillation, unspecified type (H)       UNABLE TO FIND      MEDICATION NAME: fish oil - 1200 mg twice daily        VITAMIN D (CHOLECALCIFEROL) PO      Take by mouth daily        zinc sulfate 220 (50 Zn) MG capsule    ZINCATE     Take 220 mg by mouth daily

## 2018-04-25 NOTE — TELEPHONE ENCOUNTER
Please call for In Patient follow up  Chief Complaint: Atrial Fibrillation, Unspecified Type (H), Influenza B

## 2018-04-26 DIAGNOSIS — E78.2 MIXED HYPERLIPIDEMIA: ICD-10-CM

## 2018-04-26 LAB
CHOLEST SERPL-MCNC: 221 MG/DL
HDLC SERPL-MCNC: 54 MG/DL
LDLC SERPL CALC-MCNC: 151 MG/DL
NONHDLC SERPL-MCNC: 167 MG/DL
TRIGL SERPL-MCNC: 81 MG/DL

## 2018-04-26 PROCEDURE — 80061 LIPID PANEL: CPT | Performed by: FAMILY MEDICINE

## 2018-04-26 PROCEDURE — 36415 COLL VENOUS BLD VENIPUNCTURE: CPT | Performed by: FAMILY MEDICINE

## 2018-04-29 ENCOUNTER — HEALTH MAINTENANCE LETTER (OUTPATIENT)
Age: 68
End: 2018-04-29

## 2018-04-30 NOTE — PROGRESS NOTES
The results of your recent lipid (cholesterol) profile were abnormal, similar to a year ago.      Here are the results:  Lab Results       Component                Value               Date                       CHOL                     221                 04/26/2018            Lab Results       Component                Value               Date                       HDL                      54                  04/26/2018            Lab Results       Component                Value               Date                       LDL                      151                 04/26/2018            Lab Results       Component                Value               Date                       TRIG                     81                  04/26/2018            No results found for: CHOLHDLRATIO    Desired or goal levels are:  CHOLESTEROL: Desirable is less than 200.   HDL (Good Cholesterol): Desirable is greater than 40 (for men) greater than 50 (for women).  LDL (Bad Cholesterol): Desirable is less than 130 (or less than 100 if you have heart disease or diabetes). Borderline 130-160.  TRIGLYCERIDES: Desirable is less than 150.  Borderline is 150-200.      **Your 10-year heart disease risk score, which is calculated using the factors shown below, is  11%. When the risk score is 7% or higher, it is recommended that we consider starting a statin (cholesterol-lowering) medication to reduce your risk of heart attack and stroke. Please schedule a visit with me if you are open to considering this medication.     The 10-year ASCVD risk score (Charlotte Hall TAIWO Jr, et al., 2013) is: 11%    Values used to calculate the score:      Age: 67 years      Sex: Female      Is Non- : No      Diabetic: No      Tobacco smoker: No      Systolic Blood Pressure: 130 mmHg      Is BP treated: Yes      HDL Cholesterol: 54 mg/dL      Total Cholesterol: 221 mg/dL     As you may know, an elevated cholesterol is one factor that increases your risk for  heart disease and stroke. You can improve your cholesterol by controlling the amount and type of fat you eat and by increasing your daily activity level.    Here are some ways to improve your nutrition:  Eat less fat (especially butter, Crisco and other saturated fats)  Buy lean cuts of meat, reduce your portions of red meat or substitute poultry or fish  Use skim milk and low-fat dairy products  Eat no more than 4 egg yolks per week  Avoid fried or fast foods that are high in fat  Eat more fruits and vegetables      Also consider starting or increasing your aerobic activity. Aerobic activity is the best way to improve HDL (good) cholesterol. If this would be new to you, please talk with me first about what activities are safe for you.        Please feel free to contact us with any questions or if you would like more information.          Lauryn Allison M.D.

## 2018-05-03 ENCOUNTER — OFFICE VISIT (OUTPATIENT)
Dept: CARDIOLOGY | Facility: CLINIC | Age: 68
End: 2018-05-03
Payer: COMMERCIAL

## 2018-05-03 VITALS
BODY MASS INDEX: 24.3 KG/M2 | HEART RATE: 63 BPM | SYSTOLIC BLOOD PRESSURE: 123 MMHG | DIASTOLIC BLOOD PRESSURE: 79 MMHG | OXYGEN SATURATION: 98 % | WEIGHT: 146 LBS

## 2018-05-03 DIAGNOSIS — E78.49 OTHER HYPERLIPIDEMIA: ICD-10-CM

## 2018-05-03 DIAGNOSIS — I48.0 PAROXYSMAL ATRIAL FIBRILLATION (H): Primary | ICD-10-CM

## 2018-05-03 PROCEDURE — 99204 OFFICE O/P NEW MOD 45 MIN: CPT | Performed by: INTERNAL MEDICINE

## 2018-05-03 PROCEDURE — 93000 ELECTROCARDIOGRAM COMPLETE: CPT | Performed by: INTERNAL MEDICINE

## 2018-05-03 RX ORDER — ATORVASTATIN CALCIUM 10 MG/1
10 TABLET, FILM COATED ORAL DAILY
Qty: 30 TABLET | Refills: 2 | Status: SHIPPED | OUTPATIENT
Start: 2018-05-03 | End: 2018-07-18

## 2018-05-03 RX ORDER — METOPROLOL SUCCINATE 25 MG/1
25 TABLET, EXTENDED RELEASE ORAL DAILY
Qty: 90 TABLET | Refills: 3 | Status: SHIPPED | OUTPATIENT
Start: 2018-05-03 | End: 2019-02-16

## 2018-05-03 NOTE — PATIENT INSTRUCTIONS
You were seen today in the [facility] at [place].     Cardiology Providers you saw during your visit: Dr. Maile Leonardo    Diagnosis: Atrial fibrillation    Results: discussed with patient    Orders: none    Medication Changes: none    Recommendations:   Blood tests in  6 months - BMP, fasting cholesterol profile    Follow-up:   6 months           Please feel free to call me with any questions or concerns.         If you need a medication refill please contact your pharmacy.  Please allow 3 business days for your refill to be completed.

## 2018-05-03 NOTE — PROGRESS NOTES
I am delighted to see Nicole Stanley in consultation for atrial fibrillation.    History of Present Illness:  As you know, the patient is a 67 year old  Female whom I met in the hospital 18 when she presented with URI symptoms, found to be positive for influenza B, and was in AF with RVR, hemodynamically stable. She was on HCTZ at home for HTN, which was switched to metoprolol. KOD1WF0-WSAl score is 3 for hypertension, age > 65, female. Apixaban started. She was sent home, still in AF but rates were better controlled.     Since discharge, she has been gradually improving. Initially she had ongoing dyspnea with stair climbing likely due to URI but that has since resolved. She still has cough occasionally productive of clear sputum, no fevers, no dyspnea. She denies palpitations, dizziness, syncope.    The following portions of the patient's history were reviewed and updated as appropriate: allergies, current medications, past family history, past medical history, past social history, past surgical history, and the problem list.    Past Medical History:  1. Atrial fibrillation. Initial diagnosis 2018 in the setting of Influenza B. Spontaneously converted to sinus rhythm.  2. Hypertension.  3. Hyperlipidemia. She had previously been on simvastatin but had some muscle aches. She is reluctant to add more medications.  4. Hypothyroidism.  5. Cholecystectomy  6. Vein stripping 1988    Medications:   Apixaban 5 mg bid  Metoprolol XL 25 mg qd  Synthroid 50 mcg qd    Allergies:  No Known Allergies    Family History:   Family History   Problem Relation Age of Onset     Hypertension Mother           Hyperlipidemia Mother      Family History Negative Father           Alzheimer Disease Father      Family History Negative Maternal Grandmother              Psychosocial history:  reports that she has never smoked. She has never used smokeless tobacco. She reports that she drinks alcohol. She reports that  she does not use illicit drugs. She lives with her , she is retired. Very active, remodelling house, sees kids/grandkids, walks regularly, no physical limitations.    Review of systems:   Cardiovascular: No palpitations, chest pain, shortness of breath at rest, dyspnea with exertion, orthopnea, paroxysmal nocturia dyspnea, nocturia, dizziness, syncope.    In addition,   Constitutional: No change in weight, sleep or appetite.  Normal energy.  No fever or chills  Eyes: Negative for vision changes or eye problems  ENT: No problems with ears, nose or throat.  No difficulty swallowing.  Resp: No coughing, wheezing or shortness of breath  GI: No nausea, vomiting,  heartburn, abdominal pain, diarrhea, constipation or change in bowel habits  : No urinary frequency or dysuria, bladder or kidney problems  Musculoskeletal: No significant muscle or joint pains  Neurologic: No headaches, numbness, tingling, weakness, problems with balance or coordination  Psychiatric: No problems with anxiety, depression or mental health  Heme/immune/allergy: No history of bleeding or clotting problems or anemia.  No allergies or immune system problems  Integumentary: No rashes,worrisome lesions or skin problems      Physical examination  Vitals: /79  Pulse 63  Wt 66.2 kg (146 lb)  LMP  (LMP Unknown)  SpO2 98%  BMI 24.3 kg/m2  BMI= Body mass index is 24.3 kg/(m^2).    Constitutional: In general, the patient is a pleasant female in no apparent distress.    Eyes: PERRLA.  EOMI.  Sclerae white, not injected.  ENT/mouth: Normiocephalic and atraumatic.  Nares clear.  Pharynx without erythema or exudate.  Dentition intact.  No adenopathy.  No thyromegaly. Carotids +2/2 bilaterally without bruits.  No jugular venous distension.   Card/Vasc: The PMI is in the 5th ICS in the midclavicular line. There is no heave. Regular rate and rhythm. Normal S1, S2. No murmur, rub, click, or gallop. Pulses are normal bilaterally throughout. No  peripheral edema.  Respiratory: Clear to asculation.  No ronchi, wheezes, rales.  No dullness to percussion.   GI: Abdomen is soft, nontender, nondistended. No organomegaly. No AAA.  No bruits.   Integument: No significant bruises or rashes  Neurological: The neurological examination reveal a patient who was oriented to person, place, and time.    Psych: Normal  Heme/Lymph/Immun: no significant adenopathy      I have reviewed the following labs/imaging:  Echo 4/19/18 (in AF): normal LV/RV, no valvular abnormalities  Labs: 4/26/18: fasting lipid profile: cholesterol 221, HDL 54, , TG 81  4/19/18: cr 0.8, K 4.1, hgb 13, plt 155K, TSH 2.62, ALT 25, AST 16    I have personally and independently reviewed the following:  EKG today 5/3/18: normal sinus, 63 bpm, normal intervals  EKG 4/13/18:  bpm    Assessment :  1, Atrial fibrillation. Paroxysmal. Asymptomatic. [id number]-QTVt score is 3. Recommend apixaban 5 mg bid unless contraindications. She is tolerating well. BP/HR stable on low dose metoprolol. Continue at 25 mg qd.  2. Hypertension. Controlled.  3. Hyperlipidemia. Her 10-year atherosclerotic cardiovascular disease risk profile is 9.9%. Current recommendation is to treat with moderate intensity statin. I discussed this with her in detail. She would like to retry statin. Would start low dose given previous h/o myalgia.     Plan:  1. Continue apixaban 5 mg bid and metoprolol XL 25 mg qd (can this at night if she prefers). 3-month scripts with 3 refills sent electronically to [facility] order pharmacy.  2. BMP every 6 months to reassess creatinine while on apixaban.  3. Begin atorvastatin 10 mg qd - script sent  4. Fasting lipid profile 6 months      The patient is to return 6 months. The patient understood the treatment plan as outlined above.  There were no barriers to learning.      Maile Leonardo MD

## 2018-05-03 NOTE — MR AVS SNAPSHOT
After Visit Summary   5/3/2018    Nicole Stanley    MRN: [id number]           Patient Information     Date Of Birth          1950        Visit Information        Provider Department      5/3/2018 1:00 PM Maile Leonardo MD [facility]        Today's Diagnoses     Atrial fibrillation, unspecified type (H)    -  1    Atrial fibrillation (H)        Other hyperlipidemia          Care Instructions    You were seen today in the [facility] at [place].     Cardiology Providers you saw during your visit: Dr. Maile Leonardo    Diagnosis: Atrial fibrillation    Results: discussed with patient    Orders: none    Medication Changes: none    Recommendations:   Blood tests in  6 months - BMP, fasting cholesterol profile    Follow-up:   6 months           Please feel free to call me with any questions or concerns.         If you need a medication refill please contact your pharmacy.  Please allow 3 business days for your refill to be completed.            Follow-ups after your visit        Your next 10 appointments already scheduled     May 08, 2018 10:00 AM CDT   New Visit with Manish Srivastava MD   [facility] ([facility])    [address] [place] [id number] [phone]            May 16, 2018 11:15 AM CDT   MA SCREENING DIGITAL BILATERAL with EAMA1   [facility] ([facility])    [address] [place] [address] ,[address]  [facility] [address] [phone]           Do not use any powder, lotion or deodorant under your arms or on your breast. If you do, we will ask you to remove it before your exam.  Wear comfortable, two-piece clothing.  If you have any allergies, tell your care team.  Bring any previous mammograms from other facilities or have them mailed to the breast center. Three-dimensional (3D) mammograms are available at [facility] locations in [facility], [place],  "[facility], [place], [place], and [place]. [facility] locations include [place] [facility] in [place]. Benefits of 3D mammograms include: - Improved rate of cancer detection - Decreases your chance of having to go back for more tests, which means fewer: - \"False-positive\" results (This means that there is an abnormal area but it isn't cancer.) - Invasive testing procedures, such as a biopsy or surgery - Can provide clearer images of the breast if you have dense breast tissue. 3D mammography is an optional exam that anyone can have with a 2D mammogram. It doesn't replace or take the place of a 2D mammogram. 2D mammograms remain an effective screening test for all women.  Not all insurance companies cover the cost of a 3D mammogram. Check with your insurance.            Jun 13, 2018   Procedure with Simón Avila MD   [facility] [place], [facility] ([facility], [facility])    [address] [address] [phone]           The [facility] is located on the corner of [address] and [address] on the [facility]. It is easily accessible from virtually any point in the [facility] area, via I-iPointer and I-InkiveW.            Nov 08, 2018  8:00 AM CST   LAB with  LAB   [facility] ([facility])    [address] [place] [address] [phone]           Please do not eat 10-12 hours before your appointment if you are coming in fasting for labs on lipids, cholesterol, or glucose (sugar). This does not apply to pregnant women. Water, hot tea and black coffee (with nothing added) are okay. Do not drink other fluids, diet soda or chew gum.            Nov 08, 2018  1:00 PM CST   Return Visit with Maile Leonardo MD   [facility] ([facility])    [address] [place] [address] [phone]       "        Who to contact     If you have questions or need follow up information about today's clinic visit or your schedule please contact [facility]   [facility] directly at [phone].  Normal or non-critical lab and imaging results will be communicated to you by MyChart, letter or phone within 4 business days after the clinic has received the results. If you do not hear from us within 7 days, please contact the clinic through MyChart or phone. If you have a critical or abnormal lab result, we will notify you by phone as soon as possible.  Submit refill requests through [employer] or call your pharmacy and they will forward the refill request to us. Please allow 3 business days for your refill to be completed.          Additional Information About Your Visit        [employer] Information     [employer] gives you secure access to your electronic health record. If you see a primary care provider, you can also send messages to your care team and make appointments. If you have questions, please call your primary care clinic.  If you do not have a primary care provider, please call [phone] and they will assist you.        Care EveryWhere ID     This is your Care EveryWhere ID. This could be used by other organizations to access your [facility] medical records  [id number]        Your Vitals Were     Pulse Last Period Pulse Oximetry BMI (Body Mass Index)          63 (LMP Unknown) 98% 24.3 kg/m2         Blood Pressure from Last 3 Encounters:   05/03/18 123/79   04/25/18 130/82   04/19/18 93/74    Weight from Last 3 Encounters:   05/03/18 146 lb (66.2 kg)   04/25/18 147 lb 8 oz (66.9 kg)   04/19/18 148 lb 8 oz (67.4 kg)              We Performed the Following     EKG 12-lead complete w/read - Clinics          Today's Medication Changes          These changes are accurate as of 5/3/18  1:56 PM.  If you have any questions, ask your nurse or doctor.               Start taking these medicines.         Dose/Directions    atorvastatin 10 MG tablet   Commonly known as:  LIPITOR   Used for:  Other hyperlipidemia   Started by:  Maile Leonardo MD        Dose:  10 mg   Take 1 tablet (10 mg) by mouth daily   Quantity:  30 tablet   Refills:  2            Where to get your medicines      These medications were sent to [facility] - [place], [place] - [address]  [address], [facility] [address]     Phone:  [phone]     apixaban ANTICOAGULANT 5 MG tablet    atorvastatin 10 MG tablet    metoprolol succinate 25 MG 24 hr tablet                Primary Care Provider Office Phone # Fax #    Adriana Stern, APRN [phone] [address] [facility] [address]        Equal Access to Services     JUNIE GONSALES : Sonja cummingso Sotyrel, waaxda luqadaha, qaybta kaalmada adeegyada, danny funes . So [facility] [phone].    ATENCIÓN: Si habla español, tiene a michaels disposición servicios gratuitos de asistencia lingüística. [facility] [phone].    We comply with applicable federal civil rights laws and [place] laws. We do not discriminate on the basis of race, color, national origin, age, disability, sex, sexual orientation, or gender identity.            Thank you!     Thank you for choosing [facility]  for your care. Our goal is always to provide you with excellent care. Hearing back from our patients is one way we can continue to improve our services. Please take a few minutes to complete the written survey that you may receive in the mail after your visit with us. Thank you!             Your Updated Medication List - Protect others around you: Learn how to safely use, store and throw away your medicines at www.disposemymeds.org.          This list is accurate as of 5/3/18  1:56 PM.  Always use your most recent med list.                   Brand Name Dispense Instructions for use Diagnosis     Acidophilus/Goat Milk Caps           apixaban ANTICOAGULANT 5 MG tablet    ELIQUIS    180 tablet    Take 1 tablet (5 mg) by mouth 2 times daily    Atrial fibrillation, unspecified type (H)       atorvastatin 10 MG tablet    LIPITOR    30 tablet    Take 1 tablet (10 mg) by mouth daily    Other hyperlipidemia       calcium carbonate 500 tablet    OS-ELISE 500 mg [place]. Ca     Take 1 tablet by mouth 2 times daily        levothyroxine 50 MCG tablet    SYNTHROID/LEVOTHROID    90 tablet    Take 1 tablet (50 mcg) by mouth daily    Acquired hypothyroidism       MAGNESIUM OXIDE PO           metoprolol succinate 25 MG 24 hr tablet    TOPROL-XL    90 tablet    Take 1 tablet (25 mg) by mouth daily    Atrial fibrillation, unspecified type (H)       UNABLE TO FIND      MEDICATION NAME: fish oil - 1200 mg twice daily        VITAMIN D (CHOLECALCIFEROL) PO      Take by mouth daily        zinc sulfate 220 (50 Zn) MG capsule    ZINCATE     Take 220 mg by mouth daily

## 2018-05-03 NOTE — LETTER
5/3/2018      RE: Nicole Stanley  [address]  [place] [address]       Dear Colleague,    Thank you for the opportunity to participate in the care of your patient, Nicole Stanley, at the [facility]. Please see a copy of my visit note below.    I am delighted to see Nicole Stanley in consultation for atrial fibrillation.    History of Present Illness:  As you know, the patient is a 67 year old  Female whom I met in the hospital 4/18/18 when she presented with URI symptoms, found to be positive for influenza B, and was in AF with RVR, hemodynamically stable. She was on HCTZ at home for HTN, which was switched to metoprolol. [id number] score is 3 for hypertension, age > 65, female. Apixaban started. She was sent home, still in AF but rates were better controlled.     Since discharge, she has been gradually improving. Initially she had ongoing dyspnea with stair climbing likely due to URI but that has since resolved. She still has cough occasionally productive of clear sputum, no fevers, no dyspnea. She denies palpitations, dizziness, syncope.    The following portions of the patient's history were reviewed and updated as appropriate: allergies, current medications, past family history, past medical history, past social history, past surgical history, and the problem list.    Past Medical History:  1. Atrial fibrillation. Initial diagnosis 4/2018 in the setting of Influenza B. Spontaneously converted to sinus rhythm.  2. Hypertension.  3. Hyperlipidemia. She had previously been on simvastatin but had some muscle aches. She is reluctant to add more medications.  4. Hypothyroidism.  5. Cholecystectomy  6. Vein stripping 1988    Medications:   Apixaban 5 mg bid  Metoprolol XL 25 mg qd  Synthroid 50 mcg qd    Allergies:  No Known Allergies    Family History:   Family History   Problem Relation Age of Onset     Hypertension Mother            Hyperlipidemia Mother      Family History Negative Father           Alzheimer Disease Father      Family History Negative Maternal Grandmother              Psychosocial history:  reports that she has never smoked. She has never used smokeless tobacco. She reports that she drinks alcohol. She reports that she does not use illicit drugs. She lives with her , she is retired. Very active, remodelling house, sees kids/grandkids, walks regularly, no physical limitations.    Review of systems:   Cardiovascular: No palpitations, chest pain, shortness of breath at rest, dyspnea with exertion, orthopnea, paroxysmal nocturia dyspnea, nocturia, dizziness, syncope.    In addition,   Constitutional: No change in weight, sleep or appetite.  Normal energy.  No fever or chills  Eyes: Negative for vision changes or eye problems  ENT: No problems with ears, nose or throat.  No difficulty swallowing.  Resp: No coughing, wheezing or shortness of breath  GI: No nausea, vomiting,  heartburn, abdominal pain, diarrhea, constipation or change in bowel habits  : No urinary frequency or dysuria, bladder or kidney problems  Musculoskeletal: No significant muscle or joint pains  Neurologic: No headaches, numbness, tingling, weakness, problems with balance or coordination  Psychiatric: No problems with anxiety, depression or mental health  Heme/immune/allergy: No history of bleeding or clotting problems or anemia.  No allergies or immune system problems  Integumentary: No rashes,worrisome lesions or skin problems      Physical examination  Vitals: /79  Pulse 63  Wt 66.2 kg (146 lb)  LMP  (LMP Unknown)  SpO2 98%  BMI 24.3 kg/m2  BMI= Body mass index is 24.3 kg/(m^2).    Constitutional: In general, the patient is a pleasant female in no apparent distress.    Eyes: PERRLA.  EOMI.  Sclerae white, not injected.  ENT/mouth: Normiocephalic and atraumatic.  Nares clear.  Pharynx without erythema or  exudate.  Dentition intact.  No adenopathy.  No thyromegaly. Carotids +2/2 bilaterally without bruits.  No jugular venous distension.   Card/Vasc: The PMI is in the 5th ICS in the midclavicular line. There is no heave. Regular rate and rhythm. Normal S1, S2. No murmur, rub, click, or gallop. Pulses are normal bilaterally throughout. No peripheral edema.  Respiratory: Clear to asculation.  No ronchi, wheezes, rales.  No dullness to percussion.   GI: Abdomen is soft, nontender, nondistended. No organomegaly. No AAA.  No bruits.   Integument: No significant bruises or rashes  Neurological: The neurological examination reveal a patient who was oriented to person, place, and time.    Psych: Normal  Heme/Lymph/Immun: no significant adenopathy      I have reviewed the following labs/imaging:  Echo 4/19/18 (in AF): normal LV/RV, no valvular abnormalities  Labs: 4/26/18: fasting lipid profile: cholesterol 221, HDL 54, , TG 81  4/19/18: cr 0.8, K 4.1, hgb 13, plt 155K, TSH 2.62, ALT 25, AST 16    I have personally and independently reviewed the following:  EKG today 5/3/18: normal sinus, 63 bpm, normal intervals  EKG 4/13/18:  bpm    Assessment :  1, Atrial fibrillation. Paroxysmal. Asymptomatic. [id number] score is 3. Recommend apixaban 5 mg bid unless contraindications. She is tolerating well. BP/HR stable on low dose metoprolol. Continue at 25 mg qd.  2. Hypertension. Controlled.  3. Hyperlipidemia. Her 10-year atherosclerotic cardiovascular disease risk profile is 9.9%. Current recommendation is to treat with moderate intensity statin. I discussed this with her in detail. She would like to retry statin. Would start low dose given previous h/o myalgia.     Plan:  1. Continue apixaban 5 mg bid and metoprolol XL 25 mg qd (can this at night if she prefers). 3-month scripts with 3 refills sent electronically to [facility] pharmacy.  2. BMP every 6 months to reassess creatinine while on apixaban.  3. Begin  atorvastatin 10 mg qd - script sent  4. Fasting lipid profile 6 months      The patient is to return 6 months. The patient understood the treatment plan as outlined above.  There were no barriers to learning.      Maile Leonardo MD

## 2018-05-08 ENCOUNTER — OFFICE VISIT (OUTPATIENT)
Dept: NEUROLOGY | Facility: CLINIC | Age: 68
End: 2018-05-08
Payer: COMMERCIAL

## 2018-05-08 VITALS
RESPIRATION RATE: 14 BRPM | HEART RATE: 65 BPM | BODY MASS INDEX: 24.46 KG/M2 | WEIGHT: 147 LBS | TEMPERATURE: 97.9 F | DIASTOLIC BLOOD PRESSURE: 80 MMHG | SYSTOLIC BLOOD PRESSURE: 124 MMHG | OXYGEN SATURATION: 99 %

## 2018-05-08 DIAGNOSIS — R20.2 PARESTHESIA OF BOTH FEET: Primary | ICD-10-CM

## 2018-05-08 PROCEDURE — 99205 OFFICE O/P NEW HI 60 MIN: CPT | Performed by: PSYCHIATRY & NEUROLOGY

## 2018-05-08 NOTE — PROGRESS NOTES
"INITIAL NEUROLOGY CONSULTATION    DATE OF VISIT: 5/8/2018  CLINIC LOCATION: [facility]– [place] 2[place]  MRN: [id number]  PATIENT NAME: Nicole Stanley  YOB: 1950    PRIMARY CARE PROVIDER: Dr. Allison.     REASON FOR VISIT:   Chief Complaint   Patient presents with     Consult     bilateral feet numbness      HISTORY OF PRESENT ILLNESS:                                                    Ms. Nicole Stanley is 67 year old right handed female patient with past medical history of hypertension, hyperlipidemia, atrial fibrillation, and arthritis, who was seen in consultation today requested by Dr. Allison, for intermittent bilateral feet numbness.    Per patient's report, she was in her usual state of health until approximately 1.5 years ago, when she noticed intermittent numbness of the plantar surfaces of her both feet that occurs 2-3 times per week lasting 20-30 minutes.  She is not sure but does not think that it affects the dorsal surfaces of her feet.  Standing or walking triggers her symptoms.  She did not notice any additional aggravating or alleviating factors.  She denies any associated feet weakness or tripping over her feet, though mentions that she is not \"as steady\" on her feet when she experiences her numbness.  Reports one fall in December 2017 when her feet got very numb, and she did not feel the floor.       The additional symptoms include occasional joint pain, including her both lower extremities and left arm.  Reports 1 episode of bilateral buttock numbness in April 2018 in context of recent influenza B illness.  Denies significant low back pain currently, but several years ago, she had an episode of significant low back pain resulted in imaging, which is not available for review.  She was told that she has \"a cyst on the spine\", but nothing could be done about it.  I do not have records of this evaluation.    The patient denies any additional focal neurological symptoms.  No treatments " tried.    Recent laboratory evaluation (April 2018) includes normal BMP, magnesium, TSH (2.62), negative UA, and elevated LDL of 151.  No additional useful information is available in Care Everywhere, which was reviewed.    The patient denies a history of recent head injury. Prior neurological history: negative for migraine, stroke, brain neoplasms, seizure disorders, multiple sclerosis, meningitis, encephalitis, and major head injuries.    Neurologic Review of Systems - no amaurosis, diplopia, abnormal speech, unilateral numbness or weakness. She endorses recent fever, insomnia, fatigue (related to influenza B illness), irregular heartbeat (recently found to have atrial fibrillation and was placed on the apixaban), bilateral hearing loss, thyroid problems, arthritis, muscle tenderness, and urinary tract infection.  These problems have been already discussed with other medical providers.  Otherwise, she denies any other complaints on 14-point comprehensive review of systems.    PAST MEDICAL/SURGICAL HISTORY:                                                    I personally reviewed patient's past medical and surgical history with the patient at today's visit.  Past Medical History:   Diagnosis Date     Arthritis     osteoarth     Spider veins      Past Surgical History:   Procedure Laterality Date     CHOLECYSTECTOMY  2000     CYSTOSCOPY       EYE SURGERY      blepharoplasty     GI SURGERY  2006    rectal sphincter     VASCULAR SURGERY  1988    vein stripping     MEDICATIONS:                                                    I personally reviewed patient's medications and allergies with the patient at today's visit.  Current Outpatient Prescriptions on File Prior to Visit:  apixaban ANTICOAGULANT (ELIQUIS) 5 MG tablet Take 1 tablet (5 mg) by mouth 2 times daily   atorvastatin (LIPITOR) 10 MG tablet Take 1 tablet (10 mg) by mouth daily   calcium carbonate (OS-ELISE 500 MG [place]. [place]) 1250 MG tablet Take 1 tablet by mouth  2 times daily   levothyroxine (SYNTHROID/LEVOTHROID) 50 MCG tablet Take 1 tablet (50 mcg) by mouth daily   MAGNESIUM OXIDE PO    metoprolol succinate (TOPROL-XL) 25 MG 24 hr tablet Take 1 tablet (25 mg) by mouth daily   Probiotic Product (ACIDOPHILUS/GOAT MILK) CAPS    UNABLE TO FIND MEDICATION NAME: fish oil - 1200 mg twice daily   VITAMIN D, CHOLECALCIFEROL, PO Take by mouth daily   zinc sulfate (ZINCATE) 220 (50 Zn) MG capsule Take 220 mg by mouth daily     ALLERGIES:                                                    No Known Allergies  FAMILY/SOCIAL HISTORY:                                                    Family and social history was reviewed with the patient at today's visit.  Family history is positive for stroke (mother) and dementia/Alzheimer's disease (father).   Problem (# of Occurrences) Relation (Name,Age of Onset)    Alzheimer Disease (1) Father    Family History Negative (2) Father: , Maternal Grandmother:      Hyperlipidemia (1) Mother    Hypertension (1) Mother:         , lives with her .  Never smoker.  Consumes 1 alcoholic drink per day.  Denies use of recreational drugs.  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  Social History   Substance Use Topics     Smoking status: Never Smoker     Smokeless tobacco: Never Used     Alcohol use 0.0 oz/week     0 Standard drinks or equivalent per week      Comment: 1-2 glass of wine per day     REVIEW OF SYSTEMS:                                                    Patient has completed a [facility] Patient Health History, including a 14-system review, which was personally reviewed, and pertinent positives are listed in HPI. She denies any additional problems on the further questioning.    EXAM:                                                    VITAL SIGNS:   /80 (BP Location: Left arm, Patient Position: Sitting, Cuff Size: Adult Regular)  Pulse 65  Temp 97.9  F (36.6  C) (Oral)  Resp 14  Wt 66.7 kg (147 lb)  LMP   (LMP Unknown)  SpO2 99%  BMI 24.46 kg/m2  Mini-Cog Assessment:  Mini Cog Assessment  Clock Draw Score: 2 Normal  3 Item Recall: 3 objects recalled  Mini Cog Total Score: 5  Administered by: : Williams Walsh MA  Mini-Cog Assessment Score: Mini Cog Total Score: 5/5.    General: pt is in NAD, cooperative.  Skin: normal turgor, moist mucous membranes, no lesions/rashes noticed.  HEENT: ATNC, EOMI, PERRL, white sclera, normal conjunctiva, no nystagmus or ptosis. No carotid bruits bilaterally.  Respiratory: lung sounds clear to auscultation bilaterally, no crackles, wheezes, rhonchi. Symmetric lung excursion, no accessory respiratory muscle use.  Cardiovascular: normal S1/S2, no murmurs/rubs/gallops.   Abdomen: Not distended.  : deferred.    Neurological:  Mental: alert, follows commands, mini-cog is 5/5 with 3/3 on memory recall, no aphasia or dysarthria. Fund of knowledge is appropriate for age.  Cranial Nerves:  CN II: visual acuity - able to accurately count fingers with each eye. Visual fields intact, fundi: discs sharp, no papilledema and normal vessels bilaterally.  CN III, IV, VI: EOM intact, pupils equal and reactive  CN V: facial sensation nl  CN VII: face symmetric, no facial droop  CN VIII: hearing bilaterally reduced  CN IX: palate elevation symmetric, uvula at midline  CN XI SCM normal, shoulder shrug nl  CN XII: tongue midline  Motor: Strength: 5/5 in all major groups of all extremities. Normal tone. No abnormal movements. No pronator drift b/l.  Reflexes: Triceps, biceps, brachioradialis, patellar, and achilles reflexes normal and symmetric. No clonus noted. Toes are down-going b/l.   Sensory: temperature, light touch, pinprick, and vibration intact, including the areas of concern. Proprioception is intact bilaterally.  Romberg: negative.  Coordination: FNF and heel-shin tests intact b/l. No dysdiadochokinesia with rapid alternating movements.  Gait:  Normal, able to tandem, toe, and heel walk.    DATA:    LABS//IMAGING/OTHER STUDIES: I reviewed pertinent medical records, including Care Everywhere, as detailed in the history of present illness.  ASSESSMENT and PLAN:      ASSESSMENT: Nicole Stanley is a 67 year old female patient with past medical history of hypertension, hyperlipidemia, atrial fibrillation, and arthritis, who presents with intermittent bilateral symmetric feet numbness for the last 1.5 years.      We had a detailed discussion with the patient regarding her symptoms.  Her neurological exam today is non-focal, including the areas of concern.  Her clinical presentation would not be consistent with peripheral polyneuropathy, focal neuropathy, or lumbar radiculopathy/lumbar stenosis given the absence of typical symptoms and signs.  Tarsal tunnel syndrome is also unlikely, though cannot be fully excluded.  Otherwise, I do not see any neurological explanation for the patient's symptoms.  At this point, I do not feel that any additional neurological investigations will be fruitful.  Podiatry and/or rheumatology evaluations might be helpful.  If symptoms worsen in the future, EMG or lumbar spine MRI could be considered for further workup.    DIAGNOSES:    ICD-10-CM    1. Paresthesia of both feet R20.2      PLAN: At today's visit we thoroughly discussed various diagnostic possibilities for patient's symptoms and the reasons for possible future work-up (MRI/EMG).  However, at this point I do not feel that any additional neurological investigations are needed.  I recommended the patient to return to her primary care provider to continue her evaluation.    No new medications were ordered.    Next follow-up appointment is on as needed basis.    I advised the patient to call me with any questions or concerns.    Total Time:  60 minutes with > 50% spent counseling the patient on stated above assessment and recommendations, including nature of the differential diagnosis, possible future w/u, and proposed plan.   Additional time was used to answer numerous patient's questions regarding her symptoms and the plan.    Manish Srivastava MD  / Neurology  [place]  (Chart documentation was completed in part with Dragon voice-recognition software. Even though reviewed, some grammatical, spelling, and word errors may remain.)

## 2018-05-08 NOTE — PATIENT INSTRUCTIONS
AFTER VISIT SUMMARY (AVS):    At today's visit we discussed various diagnostic possibilities for your symptoms and the reasons for possible future work-up (MRI/EMG).  However, at this point I do not feel that any additional neurological investigations are needed.  Please return to your primary care provider to continue your evaluation.    No new medications were ordered.    Next follow-up appointment is on as needed basis.    Please do not hesitate to call me with any questions or concerns.    Thanks.

## 2018-05-08 NOTE — MR AVS SNAPSHOT
After Visit Summary   5/8/2018    Nicole Stanley    MRN: [id number]           Patient Information     Date Of Birth          1950        Visit Information        Provider Department      5/8/2018 10:00 AM Manish Srivastava MD [facility]        Today's Diagnoses     Paresthesia of both feet    -  1      Care Instructions    AFTER VISIT SUMMARY (AVS):    At today's visit we discussed various diagnostic possibilities for your symptoms and the reasons for possible future work-up (MRI/EMG).  However, at this point I do not feel that any additional neurological investigations are needed.  Please return to your primary care provider to continue your evaluation.    No new medications were ordered.    Next follow-up appointment is on as needed basis.    Please do not hesitate to call me with any questions or concerns.    Thanks.            Follow-ups after your visit        Follow-up notes from your care team     Return if symptoms worsen or fail to improve.      Your next 10 appointments already scheduled     May 16, 2018 11:15 AM CDT   MA SCREENING DIGITAL BILATERAL with EAMA1   [facility]    [address] ,[address]  [facility] [place] [id number] [phone]           Do not use any powder, lotion or deodorant under your arms or on your breast. If you do, we will ask you to remove it before your exam.  Wear comfortable, two-piece clothing.  If you have any allergies, tell your care team.  Bring any previous mammograms from other facilities or have them mailed to the breast center. Three-dimensional (3D) mammograms are available at [facility] in [facility], [facility], and [place]. [facility] locations include [place] [facility] in [place]. Benefits of 3D mammograms include: - Improved rate of cancer detection - Decreases your chance of having to go  "back for more tests, which means fewer: - \"False-positive\" results (This means that there is an abnormal area but it isn't cancer.) - Invasive testing procedures, such as a biopsy or surgery - Can provide clearer images of the breast if you have dense breast tissue. 3D mammography is an optional exam that anyone can have with a 2D mammogram. It doesn't replace or take the place of a 2D mammogram. 2D mammograms remain an effective screening test for all women.  Not all insurance companies cover the cost of a 3D mammogram. Check with your insurance.            Jun 13, 2018   Procedure with Simón Avila MD   [facility] [place] ([facility])    [address] [place] [address] [phone]           The [facility] is located on the corner of [address] on the [facility]. It is easily accessible from virtually any point in the [facility] area, via [address] and [address].            Nov 08, 2018  8:00 AM CST   LAB with  LAB   [facility] ([facility])    [address] [place] [address] [phone]           Please do not eat 10-12 hours before your appointment if you are coming in fasting for labs on lipids, cholesterol, or glucose (sugar). This does not apply to pregnant women. Water, hot tea and black coffee (with nothing added) are okay. Do not drink other fluids, diet soda or chew gum.            Nov 08, 2018  1:00 PM CST   Return Visit with Maile Leonardo MD   [facility] ([facility])    [address] [place] [address] [phone]              Who to contact     If you have questions or need follow up information about today's clinic visit or your schedule please contact [facility] directly at [phone].  Normal or non-critical lab and imaging results will be " communicated to you by Identivhart, letter or phone within 4 business days after the clinic has received the results. If you do not hear from us within 7 days, please contact the clinic through Specle or phone. If you have a critical or abnormal lab result, we will notify you by phone as soon as possible.  Submit refill requests through [employer] or call your pharmacy and they will forward the refill request to us. Please allow 3 business days for your refill to be completed.          Additional Information About Your Visit        Specle Information     Specle gives you secure access to your electronic health record. If you see a primary care provider, you can also send messages to your care team and make appointments. If you have questions, please call your primary care clinic.  If you do not have a primary care provider, please call [phone] and they will assist you.        Care EveryWhere ID     This is your Care EveryWhere ID. This could be used by other organizations to access your [place] medical records  [id number]        Your Vitals Were     Pulse Temperature Respirations Last Period Pulse Oximetry BMI (Body Mass Index)    65 97.9  F (36.6  C) (Oral) 14 (LMP Unknown) 99% 24.46 kg/m2       Blood Pressure from Last 3 Encounters:   05/08/18 124/80   05/03/18 123/79   04/25/18 130/82    Weight from Last 3 Encounters:   05/08/18 66.7 kg (147 lb)   05/03/18 66.2 kg (146 lb)   04/25/18 66.9 kg (147 lb 8 oz)              Today, you had the following     No orders found for display       Primary Care Provider Office Phone # Fax #    Adriana Melissa Stern, YAAKOV [facility] [phone] [address] [address]        Equal Access to Services     [facility] AH: Hadii judith Alonso, wajosyda luqadaha, qaybta kaalmada meganda, danny dunbar. So [facility] [phone].    ATENCIÓN: Si habla español, tiene a michaels disposición servicios gratuitos de asistencia  lingüísticaRuss Renner al [phone].    We comply with applicable federal civil rights laws and [place] laws. We do not discriminate on the basis of race, color, national origin, age, disability, sex, sexual orientation, or gender identity.            Thank you!     Thank you for choosing [facility]  for your care. Our goal is always to provide you with excellent care. Hearing back from our patients is one way we can continue to improve our services. Please take a few minutes to complete the written survey that you may receive in the mail after your visit with us. Thank you!             Your Updated Medication List - Protect others around you: Learn how to safely use, store and throw away your medicines at www.disposemymeds.org.          This list is accurate as of 5/8/18 10:46 AM.  Always use your most recent med list.                   Brand Name Dispense Instructions for use Diagnosis    Acidophilus/Goat Milk Caps           apixaban ANTICOAGULANT 5 MG tablet    ELIQUIS    180 tablet    Take 1 tablet (5 mg) by mouth 2 times daily        atorvastatin 10 MG tablet    LIPITOR    30 tablet    Take 1 tablet (10 mg) by mouth daily    Other hyperlipidemia       calcium carbonate 500 tablet    OS-ELISE 500 mg [place]. Ca     Take 1 tablet by mouth 2 times daily        levothyroxine 50 MCG tablet    SYNTHROID/LEVOTHROID    90 tablet    Take 1 tablet (50 mcg) by mouth daily    Acquired hypothyroidism       MAGNESIUM OXIDE PO           metoprolol succinate 25 MG 24 hr tablet    TOPROL-XL    90 tablet    Take 1 tablet (25 mg) by mouth daily        UNABLE TO FIND      MEDICATION NAME: fish oil - 1200 mg twice daily        VITAMIN D (CHOLECALCIFEROL) PO      Take by mouth daily        zinc sulfate 220 (50 Zn) MG capsule    ZINCATE     Take 220 mg by mouth daily

## 2018-05-16 ENCOUNTER — RADIANT APPOINTMENT (OUTPATIENT)
Dept: MAMMOGRAPHY | Facility: CLINIC | Age: 68
End: 2018-05-16
Attending: FAMILY MEDICINE
Payer: COMMERCIAL

## 2018-05-16 DIAGNOSIS — Z12.31 ENCOUNTER FOR SCREENING MAMMOGRAM FOR BREAST CANCER: ICD-10-CM

## 2018-05-16 DIAGNOSIS — Z12.31 VISIT FOR SCREENING MAMMOGRAM: ICD-10-CM

## 2018-05-16 PROCEDURE — 77067 SCR MAMMO BI INCL CAD: CPT | Mod: TC

## 2018-05-17 ENCOUNTER — TELEPHONE (OUTPATIENT)
Dept: FAMILY MEDICINE | Facility: CLINIC | Age: 68
End: 2018-05-17

## 2018-05-17 DIAGNOSIS — I48.91 ATRIAL FIBRILLATION, UNSPECIFIED TYPE (H): Primary | ICD-10-CM

## 2018-05-17 NOTE — TELEPHONE ENCOUNTER
The patients prescription for apixaban ANTICOAGULANT (ELIQUIS) 5 MG tablet was delayed as [employer] had to receive co-payment from the patient before filling.  [facility] is filling the prescription today however the patient will be out of her medication before she receives it in the mail.  [facility] is requesting a quantity of 20 be sent to the [facility].  Pharmacy is loaded.

## 2018-05-21 ENCOUNTER — DOCUMENTATION ONLY (OUTPATIENT)
Dept: PHARMACY | Facility: CLINIC | Age: 68
End: 2018-05-21

## 2018-05-21 ENCOUNTER — OFFICE VISIT (OUTPATIENT)
Dept: FAMILY MEDICINE | Facility: CLINIC | Age: 68
End: 2018-05-21
Payer: COMMERCIAL

## 2018-05-21 VITALS
SYSTOLIC BLOOD PRESSURE: 138 MMHG | HEART RATE: 69 BPM | RESPIRATION RATE: 15 BRPM | OXYGEN SATURATION: 96 % | WEIGHT: 147.25 LBS | DIASTOLIC BLOOD PRESSURE: 83 MMHG | TEMPERATURE: 97.7 F | BODY MASS INDEX: 24.5 KG/M2

## 2018-05-21 DIAGNOSIS — R49.0 RASPY VOICE: ICD-10-CM

## 2018-05-21 DIAGNOSIS — I48.91 ATRIAL FIBRILLATION, UNSPECIFIED TYPE (H): ICD-10-CM

## 2018-05-21 DIAGNOSIS — Z23 ENCOUNTER FOR IMMUNIZATION: ICD-10-CM

## 2018-05-21 DIAGNOSIS — E78.2 MIXED HYPERLIPIDEMIA: ICD-10-CM

## 2018-05-21 DIAGNOSIS — I10 ESSENTIAL HYPERTENSION: Primary | ICD-10-CM

## 2018-05-21 DIAGNOSIS — E03.9 HYPOTHYROIDISM, UNSPECIFIED TYPE: ICD-10-CM

## 2018-05-21 DIAGNOSIS — R09.89 CHRONIC THROAT CLEARING: ICD-10-CM

## 2018-05-21 DIAGNOSIS — R05.9 COUGH: ICD-10-CM

## 2018-05-21 DIAGNOSIS — M70.21 OLECRANON BURSITIS OF RIGHT ELBOW: ICD-10-CM

## 2018-05-21 DIAGNOSIS — R20.2 PARESTHESIA OF BOTH FEET: ICD-10-CM

## 2018-05-21 PROCEDURE — 90715 TDAP VACCINE 7 YRS/> IM: CPT | Performed by: FAMILY MEDICINE

## 2018-05-21 PROCEDURE — 90471 IMMUNIZATION ADMIN: CPT | Performed by: FAMILY MEDICINE

## 2018-05-21 PROCEDURE — 99214 OFFICE O/P EST MOD 30 MIN: CPT | Mod: 25 | Performed by: FAMILY MEDICINE

## 2018-05-21 NOTE — MR AVS SNAPSHOT
After Visit Summary   5/21/2018    Nicole Stanley    MRN: [id number]           Patient Information     Date Of Birth          1950        Visit Information        Provider Department      5/21/2018 2:20 PM Lauryn Allison MD [facility]        Today's Diagnoses     Essential hypertension    -  1    Mixed hyperlipidemia        Atrial fibrillation, unspecified type (H)        Hypothyroidism, unspecified type        Olecranon bursitis of right elbow        Paresthesia of both feet        Raspy voice        Chronic throat clearing        Cough          Care Instructions      Bursitis of the Elbow (Olecranon)  Your elbow joint contains a small fluid-filled sac called a bursa. The bursa helps the muscles and tendons move smoothly over the bone. It also cushions and protects your elbow. Bursitis is when the bursa is inflamed or swollen. This is most often due to overuse of or injury to the elbow. Symptoms include swelling and pain. If the elbow is red and feels warm to the touch, the bursa itself may be infected.  In most cases, elbow bursitis resolves with medicine and self-care at home. It may take several weeks for the bursa to heal and the swelling to go away. In some cases, your healthcare provider may drain excess fluid from the bursa. Or, he or she may inject medicine directly into the bursa to help relieve symptoms. In severe cases, you may need surgery to remove the bursa may. If there is concern that the bursa is infected, your healthcare provider may prescribe antibiotics to treat the infection.    Home care  Your healthcare provider may prescribe medicine to help relieve pain and swelling. This may be an over-the-counter pain reliever or prescription pain medicine. Take all medicines as directed. To help treat or prevent infection, your provider may prescribe antibiotics. If these are prescribed, take them as directed until they are gone.  The following are general care  guidelines:    Apply an ice pack or bag of frozen peas wrapped in a thin towel to your elbow for 15 to 20 minutes at a time. Do this 3 to 4 times a day until pain and swelling improve.    Keep your elbow raised above the level of your heart whenever possible. This helps reduce swelling. When sitting or lying down, place your arm on a pillow that rests on your chest or on a pillow at your side.    Use an elastic wrap around the elbow joint to compress the area while it is healing. Make the wrap snug but not tight to the point of causing pain.    Rest your elbow to give it time to heal. You may need to wear an elbow pad to help protect and limit the movement of your elbow. During and after healing, avoid leaning on your elbows.  Follow-up care  Follow up with your healthcare provider, or as advised. If you have been referred to a specialist, make that appointment promptly.  When to seek medical advice  Call your healthcare provider right away if any of these occur:    Fever of 100.4 F (38 C) or higher, or as advised    Chills    Increased pain, swelling, warmth, redness, or drainage from the joint    Trouble moving the elbow joint    Numbness or tingling in the hand    Severe pain or swelling in forearm or hand    Loss of pink color and slow return of color after squeezing fingertip or hand  Date Last Reviewed: 6/1/2016 2000-2017 [employer]. [address] [place], [place] [address]. All rights reserved. This information is not intended as a substitute for professional medical care. Always follow your healthcare professional's instructions.                Follow-ups after your visit        Additional Services     ORTHO  REFERRAL       [facility] is referring you to the Orthopedic  Services at [facility].       The  Representative will assist you in the coordination of your Orthopedic and Musculoskeletal Care as prescribed by your  physician.    The [facility] Representative will call you within 1 business day to help schedule your appointment, or you may contact the [facility] Representative at:    All areas ~ [phone]     Type of Referral : Non Surgical       Timeframe requested: Routine    Coverage of these services is subject to the terms and limitations of your health insurance plan.  Please call member services at your health plan with any benefit or coverage questions.      If X-rays, CT or MRI's have been performed, please contact the facility where they were done to arrange for , prior to your scheduled appointment.  Please bring this referral request to your appointment and present it to your specialist.            OTOLARYNGOLOGY REFERRAL       Your provider has referred you to: [facility]: Adult [facility] (Otolaryngology) - [place] [phone]  [email or web address]  [facility]: [facility] [phone]   [email or web address]    Please be aware that coverage of these services is subject to the terms and limitations of your health insurance plan.  Call member services at your health plan with any benefit or coverage questions.      Please bring the following with you to your appointment:    (1) Any X-Rays, CTs or MRIs which have been performed.  Contact the facility where they were done to arrange for  prior to your scheduled appointment.   (2) List of current medications  (3) This referral request   (4) Any documents/labs given to you for this referral                  Your next 10 appointments already scheduled     Jun 13, 2018   Procedure with Simón Avila MD   [facility] ([facility])    [address] [place] [address] [phone]           The [facility] is located on the corner of [address] on the [facility]. It is easily accessible from virtually any point in the [facility] area, via [address] and [address].            Nov 08, 2018  8:00 AM CST   LAB with  LAB   [facility])    [address] [place] [address] [phone]           Please do not eat 10-12 hours before your appointment if you are coming in fasting for labs on lipids, cholesterol, or glucose (sugar). This does not apply to pregnant women. Water, hot tea and black coffee (with nothing added) are okay. Do not drink other fluids, diet soda or chew gum.            Nov 08, 2018  1:00 PM CST   Return Visit with Maile Leonardo MD   [facility] ([facility])    [address] [place] [address] [phone]              Who to contact     If you have questions or need follow up information about today's clinic visit or your schedule please contact [facility] directly at [phone].  Normal or non-critical lab and imaging results will be communicated to you by MyChart, letter or phone within 4 business days after the clinic has received the results. If you do not hear from us within 7 days, please contact the clinic through MyChart or phone. If you have a critical or abnormal lab result, we will notify you by phone as soon as possible.  Submit refill requests through [employer] or call your pharmacy and they will forward the refill request to us. Please allow 3 business days for your refill to be completed.          Additional Information About Your Visit        MyChart Information     [employer] gives you secure access to your electronic health record. If you see a primary care provider, you can also send messages to your care team and make appointments. If you have questions, please call your primary care clinic.  If you do not have a primary care  provider, please call [phone] and they will assist you.        Care EveryWhere ID     This is your Care EveryWhere ID. This could be used by other organizations to access your [place] medical records  [id number]        Your Vitals Were     Pulse Temperature Respirations Last Period Pulse Oximetry BMI (Body Mass Index)    69 97.7  F (36.5  C) (Oral) 15 (LMP Unknown) 96% 24.5 kg/m2       Blood Pressure from Last 3 Encounters:   05/21/18 138/83   05/08/18 124/80   05/03/18 123/79    Weight from Last 3 Encounters:   05/21/18 147 lb 4 oz (66.8 kg)   05/08/18 147 lb (66.7 kg)   05/03/18 146 lb (66.2 kg)              We Performed the Following     ORTHO  REFERRAL     OTOLARYNGOLOGY REFERRAL        Primary Care Provider Office Phone # Fax #    Adriana Stern, APRN [phone] [address] [facility] [address]        Equal Access to Services     Y[facility]Y [facility] : Hadii aad ku hadasho Soomaali, waaxda luqadaha, qaybta kaalmada adeegyada, waxay idiin haypreethi funes . So [facility] [phone].    ATENCIÓN: Si habla español, tiene a michaels disposición servicios gratuitos de asistencia lingüística. Llame al [phone].    We comply with applicable federal civil rights laws and [place] laws. We do not discriminate on the basis of race, color, national origin, age, disability, sex, sexual orientation, or gender identity.            Thank you!     Thank you for choosing [facility]  for your care. Our goal is always to provide you with excellent care. Hearing back from our patients is one way we can continue to improve our services. Please take a few minutes to complete the written survey that you may receive in the mail after your visit with us. Thank you!             Your Updated Medication List - Protect others around you: Learn how to safely use, store and throw away your medicines at www.disposemymeds.org.          This list is accurate as of 5/21/18   2:46 PM.  Always use your most recent med list.                   Brand Name Dispense Instructions for use Diagnosis    Acidophilus/Goat Milk Caps           apixaban ANTICOAGULANT 5 MG tablet    ELIQUIS    20 tablet    Take 1 tablet (5 mg) by mouth 2 times daily    Atrial fibrillation, unspecified type (H)       atorvastatin 10 MG tablet    LIPITOR    30 tablet    Take 1 tablet (10 mg) by mouth daily    Other hyperlipidemia       calcium carbonate 500 MG tablet    OS-ELISE 500 mg [place]. Ca     Take 1 tablet by mouth 2 times daily        levothyroxine 50 MCG tablet    SYNTHROID/LEVOTHROID    90 tablet    Take 1 tablet (50 mcg) by mouth daily    Acquired hypothyroidism       MAGNESIUM OXIDE PO           metoprolol succinate 25 MG 24 hr tablet    TOPROL-XL    90 tablet    Take 1 tablet (25 mg) by mouth daily        UNABLE TO FIND      MEDICATION NAME: fish oil - 1200 mg twice daily        VITAMIN D (CHOLECALCIFEROL) PO      Take by mouth daily        zinc sulfate 220 (50 Zn) MG capsule    ZINCATE     Take 220 mg by mouth daily

## 2018-05-21 NOTE — PROGRESS NOTES
"  SUBJECTIVE:   Nicole Stanley is a 67 year old female who presents to clinic today for the following health issues:    Hyperlipidemia Follow-Up    Rate your low fat/cholesterol diet?: good    Taking statin?  No    Other lipid medications/supplements?:  none    Hypertension Follow-up    Outpatient blood pressures are not being checked.    Low Salt Diet: no added salt    Amount of exercise or physical activity: 4-5 days/week for an average of 45-60 minutes    Problems taking medications regularly: No    Medication side effects: none    Diet: regular (no restrictions)    Joint Pain    Onset: 2 weeks, hit elbow in [place]    Description:   Location: right elbow  Character: leaning or put pressure is when it hurts most    Intensity: 0/10    Progression of Symptoms: same    Accompanying Signs & Symptoms:  Other symptoms:pocket of fluids    History:   Previous similar pain: no       Precipitating factors:   Trauma or overuse: YES    Alleviating factors:  Improved by: nothing    Therapies Tried and outcome:none    Patient states that on her right elbow she hit and bruised [place] and that resolved. However she now has a fluid sac that is only painful when she leans on it. She denies limited ROM of hr elbow, numbness or tingling down her arm, or that it is increasing in size.    She has started her Lipitor two days ago and denies any side effects currently. She met with neurology and there was no conclusive findings. She saw her podiatrist as well. She notes that her foot pain/ cramp/spasam has resolved .       Problem list and histories reviewed & adjusted, as indicated.  Additional history: as documented    4/25/18 office visit HPI:  \"Patient reports that she is feeling better after her hospital visit for Influenza B, but still a bit congested and some coughing. She states that she is walking again (about 3 miles a day) and is not getting winded.      She states that she is following up with Dr. Leonardo in the " "coming week. Future labs placed per patients request so she can have the results for visit with Dr. Leonardo. She is going to schedule a physical. Her mammogram and colonoscopy are over due; will schedule after visit.       She states that she has been on simvastatin in the past, but is not currently.       Arthritis: has history of arthrits in her hands. Saw rheumatologist and diagnosed with OA.   Also notes, while walking she gets numbness in the bottom of her feet- actually fell once when carrying a heavy bag. She also states that the other day her butt was also numb and so she thinks that this numbness is coming from her spine. She is concerned as she walker not want to give up her walking. She had previously seen a rheumatologist, not seen a neurologist.\"    Patient Active Problem List   Diagnosis     Essential hypertension     Mixed hyperlipidemia     Bunion, left     Osteoarthritis of both hands, unspecified osteoarthritis type     Hypothyroidism     Uterovaginal prolapse, incomplete     Mixed incontinence     Myalgia of pelvic floor     Pelvic floor dysfunction     Dyspareunia in female     Left ovarian cyst     Influenza B     Atrial fibrillation, unspecified type (H)     Past Surgical History:   Procedure Laterality Date     CHOLECYSTECTOMY       CYSTOSCOPY       EYE SURGERY      blepharoplasty     GI SURGERY  2006    rectal sphincter     VASCULAR SURGERY  1988    vein stripping       Social History   Substance Use Topics     Smoking status: Never Smoker     Smokeless tobacco: Never Used     Alcohol use 0.0 oz/week     0 Standard drinks or equivalent per week      Comment: 1-2 glass of wine per day     Family History   Problem Relation Age of Onset     Hypertension Mother           Hyperlipidemia Mother      Family History Negative Father           Alzheimer Disease Father      Family History Negative Maternal Grandmother                Current Outpatient Prescriptions   Medication Sig " Dispense Refill     apixaban ANTICOAGULANT (ELIQUIS) 5 MG tablet Take 1 tablet (5 mg) by mouth 2 times daily 20 tablet 0     atorvastatin (LIPITOR) 10 MG tablet Take 1 tablet (10 mg) by mouth daily 30 tablet 2     calcium carbonate (OS-ELISE 500 MG [place]. [place]) 1250 MG tablet Take 1 tablet by mouth 2 times daily       levothyroxine (SYNTHROID/LEVOTHROID) 50 MCG tablet Take 1 tablet (50 mcg) by mouth daily 90 tablet 3     MAGNESIUM OXIDE PO        metoprolol succinate (TOPROL-XL) 25 MG 24 hr tablet Take 1 tablet (25 mg) by mouth daily 90 tablet 3     Probiotic Product (ACIDOPHILUS/GOAT MILK) CAPS        UNABLE TO FIND MEDICATION NAME: fish oil - 1200 mg twice daily       VITAMIN D, CHOLECALCIFEROL, PO Take by mouth daily       zinc sulfate (ZINCATE) 220 (50 Zn) MG capsule Take 220 mg by mouth daily       No Known Allergies  Recent Labs   Lab Test  04/26/18   0852  04/19/18   0633  04/18/18   1937  04/25/17   0842 04/08/16 03/16/15   LDL  151*   --    --   151*  171*  121   HDL  54   --    --   112  133  108   TRIG  81   --    --   56  76  66   ALT   --    --   25   --   12  19   CR   --   0.80  0.73  1.04  0.8  0.9   GFRESTIMATED   --   71  79  53*  72  63   GFRESTBLACK   --   86  >90  64  87  76   POTASSIUM   --   4.1  3.4  3.5  4.4  4.8   TSH   --    --   2.62  3.14  3.400  4.130      BP Readings from Last 3 Encounters:   05/21/18 138/83   05/08/18 124/80   05/03/18 123/79    Wt Readings from Last 3 Encounters:   05/21/18 66.8 kg (147 lb 4 oz)   05/08/18 66.7 kg (147 lb)   05/03/18 66.2 kg (146 lb)        Reviewed and updated as needed this visit by clinical staff  Tobacco  Allergies  Meds  Med Hx  Surg Hx  Fam Hx  Soc Hx      Reviewed and updated as needed this visit by Provider       ROS:  See above    This document serves as a record of the services and decisions personally performed and made by Lauryn Allison MD. It was created on his/her behalf by Otilia Taylor, trained medical scribe. The creation of this  document is based the provider's statements to the medical scribes.    Elisabet Taylor, May 21, 2018  OBJECTIVE:     /83 (BP Location: Left arm, Patient Position: Chair, Cuff Size: Adult Regular)  Pulse 69  Temp 97.7  F (36.5  C) (Oral)  Resp 15  Wt 66.8 kg (147 lb 4 oz)  LMP  (LMP Unknown)  SpO2 96%  BMI 24.5 kg/m2  Body mass index is 24.5 kg/(m^2).     GENERAL: healthy, alert and no distress  HENT: ear canals and TM's normal, nose and mouth without ulcers or lesions  MS: right elbow with bulbous swelling at the olecranon, mildly tender without overlying erythema. She has normal elbow ROM and sensation and vascularity are intact distally.   PSYCH: mentation appears normal, affect normal/bright    Diagnostic Test Results:  No results found for this or any previous visit (from the past 24 hour(s)).    ASSESSMENT/PLAN:     1. Atrial fibrillation, unspecified type (H)  Saw Dr. Leonardo and continues on metoprolol for rate control and apixiban for stroke prophylaxis.She will need bmp every 6 months while on the apixiban.      2. Essential hypertension  Controlled today. Continue metoprolol 25mg daily     3. Mixed hyperlipidemia  She has started atorvastatin 10mg daily (low dose due to history of myalgia with statin)      4. Hypothyroidism, unspecified type  TSH WNL in hospital  Continues levothyroxine 50mcg      5. Bilateral foot paresthesia  She saw neurology. No clear cause of her paresthesias was found. It was recommended that she consider follow up with rheumatology or podiatry if ongoing symptoms.     6. Olecranon bursitis of right elbow  See pt instructions   - ORTHO  REFERRAL     7. Raspy voice  8. Chronic throat clearing  9. Cough  She will schedule with ENT      Patient Instructions     Bursitis of the Elbow (Olecranon)  Your elbow joint contains a small fluid-filled sac called a bursa. The bursa helps the muscles and tendons move smoothly over the bone. It also cushions and protects  your elbow. Bursitis is when the bursa is inflamed or swollen. This is most often due to overuse of or injury to the elbow. Symptoms include swelling and pain. If the elbow is red and feels warm to the touch, the bursa itself may be infected.  In most cases, elbow bursitis resolves with medicine and self-care at home. It may take several weeks for the bursa to heal and the swelling to go away. In some cases, your healthcare provider may drain excess fluid from the bursa. Or, he or she may inject medicine directly into the bursa to help relieve symptoms. In severe cases, you may need surgery to remove the bursa may. If there is concern that the bursa is infected, your healthcare provider may prescribe antibiotics to treat the infection.    Home care  Your healthcare provider may prescribe medicine to help relieve pain and swelling. This may be an over-the-counter pain reliever or prescription pain medicine. Take all medicines as directed. To help treat or prevent infection, your provider may prescribe antibiotics. If these are prescribed, take them as directed until they are gone.  The following are general care guidelines:    Apply an ice pack or bag of frozen peas wrapped in a thin towel to your elbow for 15 to 20 minutes at a time. Do this 3 to 4 times a day until pain and swelling improve.    Keep your elbow raised above the level of your heart whenever possible. This helps reduce swelling. When sitting or lying down, place your arm on a pillow that rests on your chest or on a pillow at your side.    Use an elastic wrap around the elbow joint to compress the area while it is healing. Make the wrap snug but not tight to the point of causing pain.    Rest your elbow to give it time to heal. You may need to wear an elbow pad to help protect and limit the movement of your elbow. During and after healing, avoid leaning on your elbows.  Follow-up care  Follow up with your healthcare provider, or as advised. If you have  been referred to a specialist, make that appointment promptly.  When to seek medical advice  Call your healthcare provider right away if any of these occur:    Fever of 100.4 F (38 C) or higher, or as advised    Chills    Increased pain, swelling, warmth, redness, or drainage from the joint    Trouble moving the elbow joint    Numbness or tingling in the hand    Severe pain or swelling in forearm or hand    Loss of pink color and slow return of color after squeezing fingertip or hand  Date Last Reviewed: 6/1/2016 2000-2017 [employer] [address] [place] [address]. All rights reserved. This information is not intended as a substitute for professional medical care. Always follow your healthcare professional's instructions.          The information in this document, created by the medical scribe for me, accurately reflects the services I personally performed and the decisions made by me. I have reviewed and approved this document for accuracy. 05/21/18    Lauryn Allison MD  [facility]

## 2018-05-21 NOTE — NURSING NOTE
Screening Questionnaire for Adult Immunization    Are you sick today?   No   Do you have allergies to medications, food, a vaccine component or latex?   No   Have you ever had a serious reaction after receiving a vaccination?   No   Do you have a long-term health problem with heart disease, lung disease, asthma, kidney disease, metabolic disease (e.g. diabetes), anemia, or other blood disorder?   No   Do you have cancer, leukemia, HIV/AIDS, or any other immune system problem?   No   In the past 3 months, have you taken medications that affect  your immune system, such as prednisone, other steroids, or anticancer drugs; drugs for the treatment of rheumatoid arthritis, Crohn s disease, or psoriasis; or have you had radiation treatments?   No   Have you had a seizure, or a brain or other nervous system problem?   No   During the past year, have you received a transfusion of blood or blood     products, or been given immune (gamma) globulin or antiviral drug?   No   For women: Are you pregnant or is there a chance you could become        pregnant during the next month?   No   Have you received any vaccinations in the past 4 weeks?   No     Immunization questionnaire answers were all negative.        Per orders of Dr. CARDOZO, injection of TDAP given by Shira Stoner. Patient instructed to remain in clinic for 15 minutes afterwards, and to report any adverse reaction to me immediately.    Due to injection administration, patient instructed to remain in clinic for 15 minutes  afterwards, and to report any adverse reaction to me immediately.         Screening performed by Shira Stoner on 5/21/2018 at 3:03 PM.

## 2018-05-21 NOTE — PATIENT INSTRUCTIONS
Bursitis of the Elbow (Olecranon)  Your elbow joint contains a small fluid-filled sac called a bursa. The bursa helps the muscles and tendons move smoothly over the bone. It also cushions and protects your elbow. Bursitis is when the bursa is inflamed or swollen. This is most often due to overuse of or injury to the elbow. Symptoms include swelling and pain. If the elbow is red and feels warm to the touch, the bursa itself may be infected.  In most cases, elbow bursitis resolves with medicine and self-care at home. It may take several weeks for the bursa to heal and the swelling to go away. In some cases, your healthcare provider may drain excess fluid from the bursa. Or, he or she may inject medicine directly into the bursa to help relieve symptoms. In severe cases, you may need surgery to remove the bursa may. If there is concern that the bursa is infected, your healthcare provider may prescribe antibiotics to treat the infection.    Home care  Your healthcare provider may prescribe medicine to help relieve pain and swelling. This may be an over-the-counter pain reliever or prescription pain medicine. Take all medicines as directed. To help treat or prevent infection, your provider may prescribe antibiotics. If these are prescribed, take them as directed until they are gone.  The following are general care guidelines:    Apply an ice pack or bag of frozen peas wrapped in a thin towel to your elbow for 15 to 20 minutes at a time. Do this 3 to 4 times a day until pain and swelling improve.    Keep your elbow raised above the level of your heart whenever possible. This helps reduce swelling. When sitting or lying down, place your arm on a pillow that rests on your chest or on a pillow at your side.    Use an elastic wrap around the elbow joint to compress the area while it is healing. Make the wrap snug but not tight to the point of causing pain.    Rest your elbow to give it time to heal. You may need to wear an  elbow pad to help protect and limit the movement of your elbow. During and after healing, avoid leaning on your elbows.  Follow-up care  Follow up with your healthcare provider, or as advised. If you have been referred to a specialist, make that appointment promptly.  When to seek medical advice  Call your healthcare provider right away if any of these occur:    Fever of 100.4 F (38 C) or higher, or as advised    Chills    Increased pain, swelling, warmth, redness, or drainage from the joint    Trouble moving the elbow joint    Numbness or tingling in the hand    Severe pain or swelling in forearm or hand    Loss of pink color and slow return of color after squeezing fingertip or hand  Date Last Reviewed: 6/1/2016 2000-2017 [employer]. [address] [place] [address], [place] [address]. All rights reserved. This information is not intended as a substitute for professional medical care. Always follow your healthcare professional's instructions.

## 2018-05-22 DIAGNOSIS — E03.9 ACQUIRED HYPOTHYROIDISM: ICD-10-CM

## 2018-05-22 RX ORDER — LEVOTHYROXINE SODIUM 50 UG/1
50 TABLET ORAL DAILY
Qty: 90 TABLET | Refills: 3 | Status: SHIPPED | OUTPATIENT
Start: 2018-05-22 | End: 2019-05-28

## 2018-05-22 NOTE — PROGRESS NOTES
Prior Authorization Approval    eliquis 5 mg  Date Initiated: 05/21/2018  Date Completed: 05/21/2018  Prior Auth Type: Other: tier exception     Status: Approved    Effective Date: 05/21/2018 - 12/31/2018  Copay: 35.74  [place] Filled: No    Insurance: [employer]  Ph: [phone]  ID: [id number]  Case Number: n/[id number]  Submitted Via: [facility]  Pharmacy Liaison  Ph: [phone] Page: [phone]

## 2018-05-22 NOTE — TELEPHONE ENCOUNTER
"Requested Prescriptions   Pending Prescriptions Disp Refills     levothyroxine (SYNTHROID/LEVOTHROID) 50 MCG tablet  Last Written Prescription Date:  5/5/2017  Last Fill Quantity: 90 tablet,  # refills: 3   Last Office Visit: 5/21/2018  Future Office Visit:      90 tablet 3     Sig: Take 1 tablet (50 mcg) by mouth daily    Thyroid Protocol Passed    5/22/2018  8:52 AM       Passed - Patient is 12 years or older       Passed - Recent (12 mo) or future (30 days) visit within the authorizing provider's specialty    Patient had office visit in the last 12 months or has a visit in the next 30 days with authorizing provider or within the authorizing provider's specialty.  See \"Patient Info\" tab in inbasket, or \"Choose Columns\" in Meds & Orders section of the refill encounter.           Passed - Normal TSH on file in past 12 months    Recent Labs   Lab Test  04/18/18   1937   TSH  2.62           Passed - No active pregnancy on record    If patient is pregnant or has had a positive pregnancy test, please check TSH.       Passed - No positive pregnancy test in past 12 months    If patient is pregnant or has had a positive pregnancy test, please check TSH.            "

## 2018-05-22 NOTE — TELEPHONE ENCOUNTER
Prescription approved per [facility] Refill Protocol.    Signed Prescriptions:                        Disp   Refills    levothyroxine (SYNTHROID/LEVOTHROID) 50 MC*90 tab*3        Sig: Take 1 tablet (50 mcg) by mouth daily  Authorizing Provider: LINDSEY ONEAL  Ordering User: ELVA MAY      Closing encounter - no further actions needed at this time    Elva May RN

## 2018-05-24 NOTE — TELEPHONE ENCOUNTER
FUTURE VISIT INFORMATION      FUTURE VISIT INFORMATION:    Date: 06/04/2018    Time: 11:00    [place] [facility] [place]  REFERRAL INFORMATION:    Referring provider:  Lauryn Allison     Referring providers clinic:   F[facility]ILY PRACTICE/[facility]    Reason for visit/diagnosis  Chronic throat that causes raspy voice    RECORDS REQUESTED FROM:       Clinic name Comments Records Status Imaging Status    F[facility]ILY [facility]ACTICE/[facility] OFFICE VISIT: 05/21/2018 INTERNAL N/A                                   RECORDS STATUS

## 2018-06-04 ENCOUNTER — OFFICE VISIT (OUTPATIENT)
Dept: ORTHOPEDICS | Facility: CLINIC | Age: 68
End: 2018-06-04
Payer: COMMERCIAL

## 2018-06-04 ENCOUNTER — PRE VISIT (OUTPATIENT)
Dept: OTOLARYNGOLOGY | Facility: CLINIC | Age: 68
End: 2018-06-04

## 2018-06-04 ENCOUNTER — OFFICE VISIT (OUTPATIENT)
Dept: OTOLARYNGOLOGY | Facility: CLINIC | Age: 68
End: 2018-06-04
Payer: COMMERCIAL

## 2018-06-04 ENCOUNTER — PRE VISIT (OUTPATIENT)
Dept: ORTHOPEDICS | Facility: CLINIC | Age: 68
End: 2018-06-04

## 2018-06-04 VITALS
HEIGHT: 65 IN | WEIGHT: 147.7 LBS | HEART RATE: 92 BPM | BODY MASS INDEX: 24.61 KG/M2 | SYSTOLIC BLOOD PRESSURE: 147 MMHG | DIASTOLIC BLOOD PRESSURE: 95 MMHG

## 2018-06-04 VITALS — BODY MASS INDEX: 24.49 KG/M2 | WEIGHT: 147 LBS | HEIGHT: 65 IN | RESPIRATION RATE: 16 BRPM

## 2018-06-04 DIAGNOSIS — R05.3 CHRONIC COUGH: Primary | ICD-10-CM

## 2018-06-04 DIAGNOSIS — R49.0 DYSPHONIA: ICD-10-CM

## 2018-06-04 DIAGNOSIS — M70.21 OLECRANON BURSITIS OF RIGHT ELBOW: ICD-10-CM

## 2018-06-04 DIAGNOSIS — R49.0 MUSCLE TENSION DYSPHONIA: ICD-10-CM

## 2018-06-04 DIAGNOSIS — J38.7 IRRITABLE LARYNX SYNDROME: ICD-10-CM

## 2018-06-04 DIAGNOSIS — J38.7 IRRITABLE LARYNX SYNDROME: Primary | ICD-10-CM

## 2018-06-04 DIAGNOSIS — M70.21 OLECRANON BURSITIS OF RIGHT ELBOW: Primary | ICD-10-CM

## 2018-06-04 LAB
CRP SERPL-MCNC: <2.9 MG/L (ref 0–8)
ERYTHROCYTE [SEDIMENTATION RATE] IN BLOOD BY WESTERGREN METHOD: 9 MM/H (ref 0–30)
URATE SERPL-MCNC: 5 MG/DL (ref 2.6–6)

## 2018-06-04 RX ORDER — IBUPROFEN 200 MG
1 CAPSULE ORAL 2 TIMES DAILY
COMMUNITY

## 2018-06-04 ASSESSMENT — PAIN SCALES - GENERAL: PAINLEVEL: NO PAIN (0)

## 2018-06-04 NOTE — LETTER
6/4/2018      RE: Nicole Stanley  [address]  [place] [address]       Dear Dr. Allison:    I had the pleasure of meeting Nicole Stanley in consultation at the [facility] of the [facility] at your request, for evaluation of dysphonia. The note from our visit follows.  I appreciate the opportunity to participate in the care of this pleasant patient.    Please feel free to contact me with any questions.    Sincerely yours,    Jacinda Saleem M.D., M.P.H.  , Laryngology  Director, [facility]  Otolaryngology- Head & Neck Surgery  [phone]          =====    History of Present Illness:   Nicole Stanley is a pleasant 68-year-old female with hypothyroidism and atrial fibrillation with rapid ventricular response who presents with a history of dysphonia. Symptoms include throat irritation, mucus in throat, frequent throat-clearing, frequent cough, poor voice quality, gravelly voice, raspy voice and scratchy voice.      Voice  The patient notes a 12 year history of rough voice and concurrent throat clearing. When it first started, she had more voice use. She stopped volunteering because of the voice quality.    She was seen by an ENT locally and was told her voice problems were associated with her age, and increased her hydration.  She is now retired but feels like her voice quality is frustrating and is concerned about it being annoying to others.  She notes that her voice is sometimes normal.  Singing vocal effort is extremely increased      Swallowing  She does not feel like things get down the wrong way, but sometimes sour or very sweet foods irritate her throat. This can lead to a coughing fit.  She does not experience increased swallowing effort with any texture(s).      Cough/Throat-clearing  She reports chronic throat clearing that is bothersome and has a perpetual globus sensation.  She sometimes has a wet voice quality.   She also has chronic rhinitis, which her father also had..  She tries to clear her throat but the sensation never clears.  About once a month, she gets throat irritation from eating something, but this is inconsistent and unpredictable.  Triggers for throat clearing include cold air, talking, laughing, eating, drinking.      Reflux-type symptoms  She never experiences heartburn/indigestion. She is not taking reflux medications.        MEDICATIONS:     Current Outpatient Prescriptions   Medication Sig Dispense Refill     Acetaminophen (TYLENOL PO) Take 500 mg by mouth as needed for mild pain or fever       apixaban ANTICOAGULANT (ELIQUIS) 5 MG tablet Take 1 tablet (5 mg) by mouth 2 times daily 20 tablet 0     atorvastatin (LIPITOR) 10 MG tablet Take 1 tablet (10 mg) by mouth daily 30 tablet 2     calcium carbonate (OS-ELISE 500 MG [place]. [place]) 1250 MG tablet Take 1 tablet by mouth 2 times daily       levothyroxine (SYNTHROID/LEVOTHROID) 50 MCG tablet Take 1 tablet (50 mcg) by mouth daily 90 tablet 3     MAGNESIUM OXIDE PO        metoprolol succinate (TOPROL-XL) 25 MG 24 hr tablet Take 1 tablet (25 mg) by mouth daily 90 tablet 3     Probiotic Product (PROBIOTIC COLON SUPPORT PO) Take 1 tablet by mouth daily       UNABLE TO FIND MEDICATION NAME: fish oil - 1200 mg twice daily       VITAMIN D, CHOLECALCIFEROL, PO Take by mouth daily       zinc sulfate (ZINCATE) 220 (50 Zn) MG capsule Take 220 mg by mouth daily       calcium carbonate (OSCAL 500) 1250 (500 Ca) MG TABS tablet Take 1 tablet by mouth 2 times daily       Probiotic Product (ACIDOPHILUS/GOAT MILK) CAPS          ALLERGIES:  No Known Allergies    PAST MEDICAL HISTORY:   Past Medical History:   Diagnosis Date     Arthritis     osteoarth     Spider veins         PAST SURGICAL HISTORY:   Past Surgical History:   Procedure Laterality Date     CHOLECYSTECTOMY  2000     CYSTOSCOPY       EYE SURGERY      blepharoplasty     GI SURGERY  2006    rectal sphincter     VASCULAR  SURGERY  1988    vein stripping       HABITS/SOCIAL HISTORY:    Social History   Substance Use Topics     Smoking status: Never Smoker     Smokeless tobacco: Never Used     Alcohol use 0.0 oz/week     0 Standard drinks or equivalent per week      Comment: 1-2 glass of wine per day         FAMILY HISTORY:    Family History   Problem Relation Age of Onset     Hypertension Mother           Hyperlipidemia Mother      Family History Negative Father           Alzheimer Disease Father      Family History Negative Maternal Grandmother              REVIEW OF SYSTEMS:  The patient completed a comprehensive 11 point review of systems (below), which was reviewed. Positives are as noted below; pertinent findings are as noted in the HPI.     Patient Supplied Answers to Review of Systems   ENT ROS 2018   Constitutional Problems with sleep   Neurology Numbness   Ears, Nose, Throat Hearing loss, Hoarseness   Musculoskeletal Sore or stiff joints       PHYSICAL EXAMINATION:  General: The patient was alert and conversant, and in no acute distress.    Eyes: PERRL, conjunctiva and lids normal, sclera nonicteric.  Nose: Anterior rhinoscopy: no gross abnormalities. no  bleeding; no  mucopurulence; septum grossly normal, mild mucoid drainage and/or crusting.  Oral cavity/oropharynx: No masses or lesions. Dentition in good condition. Floor of mouth and oral tongue soft to palpation. Tongue mobility and palate elevation intact and symmetric.  Ears: Normal auricles, external auditory canals bilaterally. Visualized portions of tympanic membranes normal bilaterally.   Neck: No palpable cervical lymphadenopathy. There was moderate  tenderness to palpation of the thyrohyoid space, which was narrow. No obvious thyroid abnormality. Landmarks palpable.  Resp: Breathing comfortably, no stridor or stertor.  Neuro: Symmetric facial function. Other cranial nerves as documented above.  Psych: Normal affect, pleasant and  cooperative.  Voice/speech: Mild to moderate dysphonia characterized by roughness, strain, glottal guzman and poor respiratory recruitment.  Extremities: No cyanosis, clubbing, or edema of the upper extremities.    Intake scores  Total Score for Last Patient-Answered VHI Questionnaire  VHI Total Score 5/30/2018   VHI Total Score 11     Total Score for Last Patient-Answered EAT Questionnaire  EAT Total Score 5/30/2018   EAT Total Score 1     Total Score for Last Patient-Answered CSI Questionnaire  CSI Total Score 5/30/2018   CSI Total Score 8       PROCEDURE:   Flexible fiberoptic laryngoscopy and laryngovideostroboscopy  Indications: This procedure was warranted to evaluate the patient's laryngeal anatomy and function. Risks, benefits, and alternatives were discussed.  Description: After written informed consent was obtained, a time-out was performed to confirm patient identity, procedure, and procedure site. Topical 3% lidocaine with 0.25% phenylephrine was applied to the nasal cavities. I performed the endoscopy and no complications were apparent. Continuous and stroboscopic light were utilized to assess routine phonation and variable frequency phonation.  Performed by: Jacinda Saleem MD MPH  SLP: Ben Corey MM, MA, CCC-SLP   Findings: Normal nasopharynx. Normal base of tongue, valleculae, and epiglottis. Vocal fold mobility: right: normal; left: normal. Medial edges of the vocal folds: smooth and straight. No focal mucosal lesions were observed on the true vocal folds. Glissade produced appropriate elongation. There was mild to moderate supraglottic recruitment with connected speech. Mucosa of false vocal folds, aryepiglottic folds, piriform sinuses, and posterior glottis unremarkable. Airway was patent. Response to the therapy probes was fair.      The addition of stroboscopy allowed evaluation of the mucosal wave.   Amplitude: right: normal; left: normal. Symmetry: intermittent symmetry. Closure pattern:  complete. Closure plane: at glottic level. Phase distribution: open-phase predominant.      IMPRESSION AND PLAN:   Nicole Stanley presents with irritable larynx and muscle tension dysphonia.  She does not perceive a significant contribution from reflux, allergies, or asthma.     We discussed that chronic throat clearing can often be multifactorial but frequently has a component of laryngeal hyperreactivity that is perpetuated by the ongoing vocal fold trauma. I recommended speech therapy as initial primary management, with goals including reducing laryngeal irritability, improving laryngeal efficiency and improving respiratory/phonatory coordination.      She is feeling a little overwhelmed right now with all of her new medical issues, and may hold off on starting therapy until later in the summer.  We discussed that this is just fine, given that her laryngeal exam is unremarkable.  I did ask her to return to see me if she has any worsening of her throat symptoms. If her neck tightness persists we could also consider physical therapy, but right now I think it would be wiser to focus on speech therapy when she is ready to pursue it.     She will return as needed. I appreciate the opportunity to participate in the care of this pleasant patient.     Jacinda Saleem MD

## 2018-06-04 NOTE — PROGRESS NOTES
" Subjective:   Nicole Stanley is a 68 year old female who presents with right elbow pain. She was in [place] in March and \"bumped her elbow while on a trip, initial bruising was noted. She is right handed.    Background:   Date of injury: 3/2018   Duration of symptoms: 6 weeks  Mechanism of Injury: Acute; Activity Related hit to right elbow.  Aggravating factors: TTP  Relieving Factors: ice and compression  Prior Evaluation: Prior Physician Evalutation:     PAST MEDICAL, SOCIAL, SURGICAL AND FAMILY HISTORY:   Patient Active Problem List   Diagnosis     Essential hypertension     Mixed hyperlipidemia     Bunion, left     Osteoarthritis of both hands, unspecified osteoarthritis type     Hypothyroidism     Uterovaginal prolapse, incomplete     Mixed incontinence     Myalgia of pelvic floor     Pelvic floor dysfunction     Dyspareunia in female     Left ovarian cyst     Influenza B     Atrial fibrillation, unspecified type (H)     Irritable larynx syndrome     Muscle tension dysphonia       ALLERGIES: She has No Known Allergies.    CURRENT MEDICATIONS: She has a current medication list which includes the following prescription(s): acetaminophen, apixaban anticoagulant, atorvastatin, calcium carbonate, calcium carbonate, levothyroxine, magnesium oxide, metoprolol succinate, acidophilus/goat milk, probiotic product, UNABLE TO FIND, and zinc sulfate.     REVIEW OF SYSTEMS: 3 point review of systems is negative except as noted above.     Exam:   Resp 16  Ht 5' 5\" (1.651 m)  Wt 147 lb (66.7 kg)  LMP  (LMP Unknown)  BMI 24.46 kg/m2     CONSTITUTIONAL: healthy, alert and no distress  HEAD: Normocephalic. No masses, lesions, tenderness or abnormalities  SKIN: no suspicious lesions or rashes  GAIT: normal  NEUROLOGIC: Non-focal  PSYCHIATRIC: affect normal/bright and mentation appears normal.    MUSCULOSKELETAL:   R elbow with swelling at the olecranon bursa, there is no redness or warmth. Mild tenderness. AROM elbow " including flexion, ext pronation and supination are full without pain.  Some degnerative changes of both hands suggestive of OA are noted.       Assessment/Plan:   R olecranon traumatic Bursitis  --we discussed that this appears traumatic and should resolve with protection but it may take longer due to some retraumatization. We discussed I would not recommend aspiration as I believe the risk of secondary infection or creation of a sinus tract outweights the benefit of resolving this benign condition. I did agree to look for secondary causes including gout or inflammatory d/o though given the appearance I think this is unlikely.  If not resolving in 3-6 months despite protection consider revisiting aspiration or injection.    Dominic Hi MD CAQ

## 2018-06-04 NOTE — MR AVS SNAPSHOT
After Visit Summary   6/4/2018    Nicole Stanley    MRN: [id number]           Patient Information     Date Of Birth          1950        Visit Information        Provider Department      6/4/2018 11:00 AM Juan Daniel Corey SLP [facility]        Today's Diagnoses     Chronic cough    -  1    Dysphonia        Irritable larynx syndrome           Follow-ups after your visit        Your next 10 appointments already scheduled     Sep 26, 2018   Procedure with Simón Avila MD   [facility] ([facility], [facility])    [address] [id number] [phone]           The [facility] is located on the corner of [address] and [address] on the [facility]. It is easily accessible from virtually any point in the [facility] area, via [address] and [address].            Nov 08, 2018  8:00 AM CST   LAB with HW LAB   [facility] ([facility])    [address] [place] [address] [phone]           Please do not eat 10-12 hours before your appointment if you are coming in fasting for labs on lipids, cholesterol, or glucose (sugar). This does not apply to pregnant women. Water, hot tea and black coffee (with nothing added) are okay. Do not drink other fluids, diet soda or chew gum.            Nov 08, 2018  1:00 PM CST   Return Visit with Maile Leonardo MD   [facility] ([facility])    [address] [place] [address] [phone]              Who to contact     Please call your clinic at [phone] to:    Ask questions about your health    Make or cancel appointments    Discuss your medicines    Learn about your test results    Speak to your doctor            Additional Information About Your Visit        MyChart Information     [employer] gives you secure access to your electronic health  record. If you see a primary care provider, you can also send messages to your care team and make appointments. If you have questions, please call your primary care clinic.  If you do not have a primary care provider, please call [phone] and they will assist you.      [employer] is an electronic gateway that provides easy, online access to your medical records. With [employer], you can request a clinic appointment, read your test results, renew a prescription or communicate with your care team.     To access your existing account, please contact your [facility] or call [phone] for assistance.        Care EveryWhere ID     This is your Care EveryWhere ID. This could be used by other organizations to access your [place] medical records  [id number]        Your Vitals Were     Last Period                   (LMP Unknown)            Blood Pressure from Last 3 Encounters:   06/04/18 (!) 147/95   05/21/18 138/83   05/08/18 124/80    Weight from Last 3 Encounters:   06/04/18 66.7 kg (147 lb)   06/04/18 67 kg (147 lb 11.2 oz)   05/21/18 66.8 kg (147 lb 4 oz)              We Performed the Following     C BEHAVIORAL & QUALITATIVE ANALYSIS VOICE AND RESONANCE     HC  SLP VOICE CURRENT STATUS     HC SLP VOICE GOAL STATUS     LARYNGEAL FUNCTION STUDIES        Primary Care Provider Office Phone # Fax #    Adriana NovaYAAKOV Zaldivar [facility] [phone]        Equal Access to Services     JUNIE GONSALES : Hadii aad ku hadasho Soomaali, waaxda luqadaha, qaybta kaalmada adeegyada, danny dunbar. So [facility] [phone].    ATENCIÓN: Si habla español, tiene a michaels disposición servicios gratuitos de asistencia lingüística. Jud al [phone].    We comply with applicable federal civil rights laws and [place] laws. We do not discriminate on the basis of race, color, national origin, age, disability, sex, sexual  orientation, or gender identity.            Thank you!     Thank you for choosing [facility]  for your care. Our goal is always to provide you with excellent care. Hearing back from our patients is one way we can continue to improve our services. Please take a few minutes to complete the written survey that you may receive in the mail after your visit with us. Thank you!             Your Updated Medication List - Protect others around you: Learn how to safely use, store and throw away your medicines at www.disposemymeds.org.          This list is accurate as of 6/4/18  2:25 PM.  Always use your most recent med list.                   Brand Name Dispense Instructions for use Diagnosis    * Acidophilus/Goat Milk Caps           * PROBIOTIC COLON SUPPORT PO      Take 1 tablet by mouth daily        apixaban ANTICOAGULANT 5 MG tablet    ELIQUIS    20 tablet    Take 1 tablet (5 mg) by mouth 2 times daily    Atrial fibrillation, unspecified type (H)       atorvastatin 10 MG tablet    LIPITOR    30 tablet    Take 1 tablet (10 mg) by mouth daily    Other hyperlipidemia       calcium carbonate 1250 (500 Ca) MG Tabs tablet    OSCAL 500     Take 1 tablet by mouth 2 times daily        calcium carbonate 500 MG tablet    OS-ELISE 500 mg [place]. Ca     Take 1 tablet by mouth 2 times daily        levothyroxine 50 MCG tablet    SYNTHROID/LEVOTHROID    90 tablet    Take 1 tablet (50 mcg) by mouth daily    Acquired hypothyroidism       MAGNESIUM OXIDE PO           metoprolol succinate 25 MG 24 hr tablet    TOPROL-XL    90 tablet    Take 1 tablet (25 mg) by mouth daily        TYLENOL PO      Take 500 mg by mouth as needed for mild pain or fever        UNABLE TO FIND      MEDICATION NAME: fish oil - 1200 mg twice daily        zinc sulfate 220 (50 Zn) MG capsule    ZINCATE     Take 220 mg by mouth daily        * Notice:  This list has 2 medication(s) that are the same as other medications prescribed for you. Read the directions carefully,  and ask your doctor or other care provider to review them with you.

## 2018-06-04 NOTE — MR AVS SNAPSHOT
After Visit Summary   6/4/2018    Nicole Stanley    MRN: [id number]           Patient Information     Date Of Birth          1950        Visit Information        Provider Department      6/4/2018 1:30 PM Dominic Hi MD [facility]        Today's Diagnoses     Olecranon bursitis of right elbow    -  1       Follow-ups after your visit        Your next 10 appointments already scheduled     Sep 26, 2018   Procedure with Simón Avila MD   [facility] ([facility], [facility])    [address] [facility] [address] [phone]           The [facility] is located on the corner of [facility] and [facility] on the [facility]. It is easily accessible from virtually any point in the [facility] area, via [address] and [address].            Nov 08, 2018  8:00 AM CST   LAB with  LAB   [facility] ([facility])    [address] [place] [address] [phone]           Please do not eat 10-12 hours before your appointment if you are coming in fasting for labs on lipids, cholesterol, or glucose (sugar). This does not apply to pregnant women. Water, hot tea and black coffee (with nothing added) are okay. Do not drink other fluids, diet soda or chew gum.            Nov 08, 2018  1:00 PM CST   Return Visit with Maile Leonardo MD   [facility] ([facility])    [address] [place] [address] [phone]              Who to contact     Please call your clinic at [phone] to:    Ask questions about your health    Make or cancel appointments    Discuss your medicines    Learn about your test results    Speak to your doctor            Additional Information About Your Visit        MyChart Information     [employer] gives you secure access to your electronic health record. If you see  "a primary care provider, you can also send messages to your care team and make appointments. If you have questions, please call your primary care clinic.  If you do not have a primary care provider, please call [phone] and they will assist you.      [employer] is an electronic gateway that provides easy, online access to your medical records. With [employer], you can request a clinic appointment, read your test results, renew a prescription or communicate with your care team.     To access your existing account, please contact your [facility] or call [phone] for assistance.        Care EveryWhere ID     This is your Care EveryWhere ID. This could be used by other organizations to access your [place] medical records  [id number]        Your Vitals Were     Respirations Height Last Period BMI (Body Mass Index)          16 5' 5\" (1.651 m) (LMP Unknown) 24.46 kg/m2         Blood Pressure from Last 3 Encounters:   06/04/18 (!) 147/95   05/21/18 138/83   05/08/18 124/80    Weight from Last 3 Encounters:   06/04/18 147 lb (66.7 kg)   06/04/18 147 lb 11.2 oz (67 kg)   05/21/18 147 lb 4 oz (66.8 kg)               Primary Care Provider Office Phone # Fax #    Adriana NovaYAAKOV Zaldivar [facility] [phone] [facility] [address]        Equal Access to Services     JUNIE GONSALES AH: Hadii aad ku hadasho Soomaali, waaxda luqadaha, qaybta kaalmada adeegyada, danny funes . So [facility] [phone].    ATENCIÓN: Si habla español, tiene a michaels disposición servicios gratuitos de asistencia lingüística. Jud al [phone].    We comply with applicable federal civil rights laws and [place] laws. We do not discriminate on the basis of race, color, national origin, age, disability, sex, sexual orientation, or gender identity.            Thank you!     Thank you for choosing [facility]  for your care. Our goal is always to provide " you with excellent care. Hearing back from our patients is one way we can continue to improve our services. Please take a few minutes to complete the written survey that you may receive in the mail after your visit with us. Thank you!             Your Updated Medication List - Protect others around you: Learn how to safely use, store and throw away your medicines at www.disposemymeds.org.          This list is accurate as of 6/4/18 11:59 PM.  Always use your most recent med list.                   Brand Name Dispense Instructions for use Diagnosis    * Acidophilus/Goat Milk Caps           * PROBIOTIC COLON SUPPORT PO      Take 1 tablet by mouth daily        apixaban ANTICOAGULANT 5 MG tablet    ELIQUIS    20 tablet    Take 1 tablet (5 mg) by mouth 2 times daily    Atrial fibrillation, unspecified type (H)       atorvastatin 10 MG tablet    LIPITOR    30 tablet    Take 1 tablet (10 mg) by mouth daily    Other hyperlipidemia       calcium carbonate 1250 (500 Ca) MG Tabs tablet    OSCAL 500     Take 1 tablet by mouth 2 times daily        calcium carbonate 500 MG tablet    OS-ELISE 500 mg [place]. Ca     Take 1 tablet by mouth 2 times daily        levothyroxine 50 MCG tablet    SYNTHROID/LEVOTHROID    90 tablet    Take 1 tablet (50 mcg) by mouth daily    Acquired hypothyroidism       MAGNESIUM OXIDE PO           metoprolol succinate 25 MG 24 hr tablet    TOPROL-XL    90 tablet    Take 1 tablet (25 mg) by mouth daily        TYLENOL PO      Take 500 mg by mouth as needed for mild pain or fever        UNABLE TO FIND      MEDICATION NAME: fish oil - 1200 mg twice daily        zinc sulfate 220 (50 Zn) MG capsule    ZINCATE     Take 220 mg by mouth daily        * Notice:  This list has 2 medication(s) that are the same as other medications prescribed for you. Read the directions carefully, and ask your doctor or other care provider to review them with you.

## 2018-06-04 NOTE — LETTER
"  6/4/2018      RE: Nicole Stanley  [address] [place] [address]        Subjective:   Nicole Stanley is a 68 year old female who presents with right elbow pain. She was in [place] in March and \"bumped her elbow while on a trip, initial bruising was noted. She is right handed.    Background:   Date of injury: 3/2018   Duration of symptoms: 6 weeks  Mechanism of Injury: Acute; Activity Related hit to right elbow.  Aggravating factors: TTP  Relieving Factors: ice and compression  Prior Evaluation: Prior Physician Evalutation:     PAST MEDICAL, SOCIAL, SURGICAL AND FAMILY HISTORY:   Patient Active Problem List   Diagnosis     Essential hypertension     Mixed hyperlipidemia     Bunion, left     Osteoarthritis of both hands, unspecified osteoarthritis type     Hypothyroidism     Uterovaginal prolapse, incomplete     Mixed incontinence     Myalgia of pelvic floor     Pelvic floor dysfunction     Dyspareunia in female     Left ovarian cyst     Influenza B     Atrial fibrillation, unspecified type (H)     Irritable larynx syndrome     Muscle tension dysphonia       ALLERGIES: She has No Known Allergies.    CURRENT MEDICATIONS: She has a current medication list which includes the following prescription(s): acetaminophen, apixaban anticoagulant, atorvastatin, calcium carbonate, calcium carbonate, levothyroxine, magnesium oxide, metoprolol succinate, acidophilus/goat milk, probiotic product, UNABLE TO FIND, and zinc sulfate.     REVIEW OF SYSTEMS: 3 point review of systems is negative except as noted above.     Exam:   Resp 16  Ht 5' 5\" (1.651 m)  Wt 147 lb (66.7 kg)  LMP  (LMP Unknown)  BMI 24.46 kg/m2     CONSTITUTIONAL: healthy, alert and no distress  HEAD: Normocephalic. No masses, lesions, tenderness or abnormalities  SKIN: no suspicious lesions or rashes  GAIT: normal  NEUROLOGIC: Non-focal  PSYCHIATRIC: affect normal/bright and mentation appears normal.    MUSCULOSKELETAL:   R elbow with swelling at the " olecranon bursa, there is no redness or warmth. Mild tenderness. AROM elbow including flexion, ext pronation and supination are full without pain.  Some degnerative changes of both hands suggestive of OA are noted.       Assessment/Plan:   R olecranon traumatic Bursitis  --we discussed that this appears traumatic and should resolve with protection but it may take longer due to some retraumatization. We discussed I would not recommend aspiration as I believe the risk of secondary infection or creation of a sinus tract outweights the benefit of resolving this benign condition. I did agree to look for secondary causes including gout or inflammatory d/o though given the appearance I think this is unlikely.  If not resolving in 3-6 months despite protection consider revisiting aspiration or injection.    Dominic Hi MD CAQ

## 2018-06-04 NOTE — PROGRESS NOTES
Dear Dr. Allison:    I had the pleasure of meeting Nicole Stanley in consultation at the [facility] of the [facility] at your request, for evaluation of dysphonia. The note from our visit follows.  I appreciate the opportunity to participate in the care of this pleasant patient.    Please feel free to contact me with any questions.    Sincerely yours,    Jacinda Saleem M.D., M.P.H.  , Laryngology  Director, [facility]  Otolaryngology- Head & Neck Surgery  [phone]          =====    History of Present Illness:   Nicole Stanley is a pleasant 68-year-old female with hypothyroidism and atrial fibrillation with rapid ventricular response who presents with a history of dysphonia. Symptoms include throat irritation, mucus in throat, frequent throat-clearing, frequent cough, poor voice quality, gravelly voice, raspy voice and scratchy voice.      Voice  The patient notes a 12 year history of rough voice and concurrent throat clearing. When it first started, she had more voice use. She stopped volunteering because of the voice quality.    She was seen by an ENT locally and was told her voice problems were associated with her age, and increased her hydration.  She is now retired but feels like her voice quality is frustrating and is concerned about it being annoying to others.  She notes that her voice is sometimes normal.  Singing vocal effort is extremely increased      Swallowing  She does not feel like things get down the wrong way, but sometimes sour or very sweet foods irritate her throat. This can lead to a coughing fit.  She does not experience increased swallowing effort with any texture(s).      Cough/Throat-clearing  She reports chronic throat clearing that is bothersome and has a perpetual globus sensation.  She sometimes has a wet voice quality.  She also has chronic rhinitis, which her father also had..  She tries to  clear her throat but the sensation never clears.  About once a month, she gets throat irritation from eating something, but this is inconsistent and unpredictable.  Triggers for throat clearing include cold air, talking, laughing, eating, drinking.      Reflux-type symptoms  She never experiences heartburn/indigestion. She is not taking reflux medications.        MEDICATIONS:     Current Outpatient Prescriptions   Medication Sig Dispense Refill     Acetaminophen (TYLENOL PO) Take 500 mg by mouth as needed for mild pain or fever       apixaban ANTICOAGULANT (ELIQUIS) 5 MG tablet Take 1 tablet (5 mg) by mouth 2 times daily 20 tablet 0     atorvastatin (LIPITOR) 10 MG tablet Take 1 tablet (10 mg) by mouth daily 30 tablet 2     calcium carbonate (OS-ELISE 500 MG [place]. [place]) 1250 MG tablet Take 1 tablet by mouth 2 times daily       levothyroxine (SYNTHROID/LEVOTHROID) 50 MCG tablet Take 1 tablet (50 mcg) by mouth daily 90 tablet 3     MAGNESIUM OXIDE PO        metoprolol succinate (TOPROL-XL) 25 MG 24 hr tablet Take 1 tablet (25 mg) by mouth daily 90 tablet 3     Probiotic Product (PROBIOTIC COLON SUPPORT PO) Take 1 tablet by mouth daily       UNABLE TO FIND MEDICATION NAME: fish oil - 1200 mg twice daily       VITAMIN D, CHOLECALCIFEROL, PO Take by mouth daily       zinc sulfate (ZINCATE) 220 (50 Zn) MG capsule Take 220 mg by mouth daily       calcium carbonate (OSCAL 500) 1250 (500 Ca) MG TABS tablet Take 1 tablet by mouth 2 times daily       Probiotic Product (ACIDOPHILUS/GOAT MILK) CAPS          ALLERGIES:  No Known Allergies    PAST MEDICAL HISTORY:   Past Medical History:   Diagnosis Date     Arthritis     osteoarth     Spider veins         PAST SURGICAL HISTORY:   Past Surgical History:   Procedure Laterality Date     CHOLECYSTECTOMY  2000     CYSTOSCOPY       EYE SURGERY      blepharoplasty     GI SURGERY  2006    rectal sphincter     VASCULAR SURGERY  1988    vein stripping       HABITS/SOCIAL HISTORY:    Social  History   Substance Use Topics     Smoking status: Never Smoker     Smokeless tobacco: Never Used     Alcohol use 0.0 oz/week     0 Standard drinks or equivalent per week      Comment: 1-2 glass of wine per day         FAMILY HISTORY:    Family History   Problem Relation Age of Onset     Hypertension Mother           Hyperlipidemia Mother      Family History Negative Father           Alzheimer Disease Father      Family History Negative Maternal Grandmother              REVIEW OF SYSTEMS:  The patient completed a comprehensive 11 point review of systems (below), which was reviewed. Positives are as noted below; pertinent findings are as noted in the HPI.     Patient Supplied Answers to Review of Systems   ENT ROS 2018   Constitutional Problems with sleep   Neurology Numbness   Ears, Nose, Throat Hearing loss, Hoarseness   Musculoskeletal Sore or stiff joints       PHYSICAL EXAMINATION:  General: The patient was alert and conversant, and in no acute distress.    Eyes: PERRL, conjunctiva and lids normal, sclera nonicteric.  Nose: Anterior rhinoscopy: no gross abnormalities. no  bleeding; no  mucopurulence; septum grossly normal, mild mucoid drainage and/or crusting.  Oral cavity/oropharynx: No masses or lesions. Dentition in good condition. Floor of mouth and oral tongue soft to palpation. Tongue mobility and palate elevation intact and symmetric.  Ears: Normal auricles, external auditory canals bilaterally. Visualized portions of tympanic membranes normal bilaterally.   Neck: No palpable cervical lymphadenopathy. There was moderate  tenderness to palpation of the thyrohyoid space, which was narrow. No obvious thyroid abnormality. Landmarks palpable.  Resp: Breathing comfortably, no stridor or stertor.  Neuro: Symmetric facial function. Other cranial nerves as documented above.  Psych: Normal affect, pleasant and cooperative.  Voice/speech: Mild to moderate dysphonia characterized by  roughness, strain, glottal guzman and poor respiratory recruitment.  Extremities: No cyanosis, clubbing, or edema of the upper extremities.    Intake scores  Total Score for Last Patient-Answered VHI Questionnaire  VHI Total Score 5/30/2018   VHI Total Score 11     Total Score for Last Patient-Answered EAT Questionnaire  EAT Total Score 5/30/2018   EAT Total Score 1     Total Score for Last Patient-Answered CSI Questionnaire  CSI Total Score 5/30/2018   CSI Total Score 8       PROCEDURE:   Flexible fiberoptic laryngoscopy and laryngovideostroboscopy  Indications: This procedure was warranted to evaluate the patient's laryngeal anatomy and function. Risks, benefits, and alternatives were discussed.  Description: After written informed consent was obtained, a time-out was performed to confirm patient identity, procedure, and procedure site. Topical 3% lidocaine with 0.25% phenylephrine was applied to the nasal cavities. I performed the endoscopy and no complications were apparent. Continuous and stroboscopic light were utilized to assess routine phonation and variable frequency phonation.  Performed by: Jacinda Saleem MD MPH  SLP: Ben Corey MM, MA, CCC-SLP   Findings: Normal nasopharynx. Normal base of tongue, valleculae, and epiglottis. Vocal fold mobility: right: normal; left: normal. Medial edges of the vocal folds: smooth and straight. No focal mucosal lesions were observed on the true vocal folds. Glissade produced appropriate elongation. There was mild to moderate supraglottic recruitment with connected speech. Mucosa of false vocal folds, aryepiglottic folds, piriform sinuses, and posterior glottis unremarkable. Airway was patent. Response to the therapy probes was fair.      The addition of stroboscopy allowed evaluation of the mucosal wave.   Amplitude: right: normal; left: normal. Symmetry: intermittent symmetry. Closure pattern: complete. Closure plane: at glottic level. Phase distribution:  open-phase predominant.      IMPRESSION AND PLAN:   Nicole Stanley presents with irritable larynx and muscle tension dysphonia.  She does not perceive a significant contribution from reflux, allergies, or asthma.     We discussed that chronic throat clearing can often be multifactorial but frequently has a component of laryngeal hyperreactivity that is perpetuated by the ongoing vocal fold trauma. I recommended speech therapy as initial primary management, with goals including reducing laryngeal irritability, improving laryngeal efficiency and improving respiratory/phonatory coordination.      She is feeling a little overwhelmed right now with all of her new medical issues, and may hold off on starting therapy until later in the summer.  We discussed that this is just fine, given that her laryngeal exam is unremarkable.  I did ask her to return to see me if she has any worsening of her throat symptoms. If her neck tightness persists we could also consider physical therapy, but right now I think it would be wiser to focus on speech therapy when she is ready to pursue it.     She will return as needed. I appreciate the opportunity to participate in the care of this pleasant patient.

## 2018-06-04 NOTE — MR AVS SNAPSHOT
After Visit Summary   6/4/2018    Nicole Stanley    MRN: [id number]           Patient Information     Date Of Birth          1950        Visit Information        Provider Department      6/4/2018 11:00 AM Jacinda Saleem MD [facility] Ear Nose and Throat        Today's Diagnoses     Irritable larynx syndrome    -  1    Muscle tension dysphonia          Care Instructions    1.  You were seen in the ENT Clinic today by Dr. Saleem.  If you have any questions or concerns after your appointment, please call [phone].  Press option #1 for scheduling related needs.  Press option #3 for Nurse advice.  2.  Plan is to return to clinic as needed.  3. Please initiate speech therapy at the [facility].  Call Diana at [phone] if you require assistance in scheduling.      Adriana DOMINIQUE, RN  [facility] ENT   Head & Neck Surgery               Follow-ups after your visit        Additional Services     OTOLARYNGOLOGY REFERRAL       SPEECH-LANGUAGE PATHOLOGY SERVICE(S) REQUESTED:  Evaluate and treat    Radha Alfaro, Ph.D., CCC/SLP  Speech-Language Pathologist  Director, [facility]  [phone]    Lori Soriano M.M., M.A., CCC/SLP  Speech-Language Pathologist  [facility]  [phone]                  Your next 10 appointments already scheduled     Jul 12, 2018 10:00 AM CDT   (Arrive by 9:45 AM)   Return Visit with [place] [facility] ([facility])    [address] [address] [phone]            Jul 26, 2018 10:00 AM CDT   (Arrive by 9:45 AM)   Return Visit with [place] [facility] ([facility])    [address] [address] [phone]            Sep 26, 2018   Procedure with Simón Avila MD   [facility] [place], Endoscopy ([facility],  [facility])    [address] [facility] [address] [address] [phone]           The [facility] is located on the corner of [address] and [address] on the [facility]. It is easily accessible from virtually any point in the [place] area, via [address] and [address].            Nov 08, 2018  8:00 AM CST   LAB with  LAB   [facility]note 3[facility] ([facility]note 3])    [address] [place] [address] [phone]           Please do not eat 10-12 hours before your appointment if you are coming in fasting for labs on lipids, cholesterol, or glucose (sugar). This does not apply to pregnant women. Water, hot tea and black coffee (with nothing added) are okay. Do not drink other fluids, diet soda or chew gum.            Nov 08, 2018  1:00 PM CST   Return Visit with Maile Leonardo MD   [facility] ([facility][note 3])    [address] [place] [address] [phone]              Who to contact     Please call your clinic at [phone] to:    Ask questions about your health    Make or cancel appointments    Discuss your medicines    Learn about your test results    Speak to your doctor            Additional Information About Your Visit        [employer] Information     [employer] gives you secure access to your electronic health record. If you see a primary care provider, you can also send messages to your care team and make appointments. If you have questions, please call your primary care clinic.  If you do not have a primary care provider, please call [phone] and they will assist you.      [employer] is an electronic gateway that provides easy, online access to your medical records. With [employer], you can request a clinic appointment, read your test results, renew a prescription or communicate with your care team.     To access your existing account, please contact your [facility]  [facility] [facility] or call [phone] for assistance.        Care EveryWhere ID     This is your Care EveryWhere ID. This could be used by other organizations to access your [place] medical records  [id number]        Your Vitals Were     Pulse Height Last Period BMI (Body Mass Index)          92 1.651 m (5' 5\") (LMP Unknown) 24.58 kg/m2         Blood Pressure from Last 3 Encounters:   06/04/18 (!) 147/95   05/21/18 138/83   05/08/18 124/80    Weight from Last 3 Encounters:   06/04/18 66.7 kg (147 lb)   06/04/18 67 kg (147 lb 11.2 oz)   05/21/18 66.8 kg (147 lb 4 oz)              We Performed the Following     IMAGESTREAM RECORDING ORDER     LARYNGOSCOPY FLEX/RIGID W STROBOSCOPY     OTOLARYNGOLOGY REFERRAL        Primary Care Provider Office Phone # Fax #    Adriana Stern, APRN [facility] [phone]        Equal Access to Services     [facility]: Hadii aad ku hadasho Soomaali, waaxda luqadaha, qaybta kaalmada adeegyada, waxgely funes . So [facility] [phone].    ATENCIÓN: Si habla español, tiene a michaels disposición servicios gratuitos de asistencia lingüística. Llame al [phone].    We comply with applicable federal civil rights laws and [place] laws. We do not discriminate on the basis of race, color, national origin, age, disability, sex, sexual orientation, or gender identity.            Thank you!     Thank you for choosing [facility] EAR NOSE AND THROAT  for your care. Our goal is always to provide you with excellent care. Hearing back from our patients is one way we can continue to improve our services. Please take a few minutes to complete the written survey that you may receive in the mail after your visit with us. Thank you!             Your Updated Medication List - Protect others around you: Learn how to safely use, store and throw away your medicines at www.disposemymeds.org.          This list is " accurate as of 6/4/18 11:59 PM.  Always use your most recent med list.                   Brand Name Dispense Instructions for use Diagnosis    * Acidophilus/Goat Milk Caps           * PROBIOTIC COLON SUPPORT PO      Take 1 tablet by mouth daily        apixaban ANTICOAGULANT 5 MG tablet    ELIQUIS    20 tablet    Take 1 tablet (5 mg) by mouth 2 times daily    Atrial fibrillation, unspecified type (H)       atorvastatin 10 MG tablet    LIPITOR    30 tablet    Take 1 tablet (10 mg) by mouth daily    Other hyperlipidemia       calcium carbonate 1250 (500 Ca) MG Tabs tablet    OSCAL 500     Take 1 tablet by mouth 2 times daily        calcium carbonate 500 MG tablet    OS-ELISE 500 mg [place]. Ca     Take 1 tablet by mouth 2 times daily        levothyroxine 50 MCG tablet    SYNTHROID/LEVOTHROID    90 tablet    Take 1 tablet (50 mcg) by mouth daily    Acquired hypothyroidism       MAGNESIUM OXIDE PO           metoprolol succinate 25 MG 24 hr tablet    TOPROL-XL    90 tablet    Take 1 tablet (25 mg) by mouth daily        TYLENOL PO      Take 500 mg by mouth as needed for mild pain or fever        UNABLE TO FIND      MEDICATION NAME: fish oil - 1200 mg twice daily        zinc sulfate 220 (50 Zn) MG capsule    ZINCATE     Take 220 mg by mouth daily        * Notice:  This list has 2 medication(s) that are the same as other medications prescribed for you. Read the directions carefully, and ask your doctor or other care provider to review them with you.

## 2018-06-04 NOTE — LETTER
"6/4/2018       RE: Nicole Stanley  [address] [place] [address]     Dear Colleague,    Thank you for referring your patient, Nicole Stanley, to the [facility] VOICE at [facility]. Please see a copy of my visit note below.    [facility] VOICE CLINIC  Evaluation report    Clinician: Ben Corey M.M., M.A., CCC/SLP  Seen in conjunction with: Dr. Saleem  Patient: Nicole Stanley  Date of Visit: 6/4/2018    HISTORY  Chief complaint: Nicole Stanley is a 68 year old woman presenting today for evaluation of voice and throat clearing.    Onset: Gradually 12 years ago  Inciting incident: None  Course: Worsening  Salient history: She has a history significant for A. Fib, hypertension.  She has been experiencing gradual worsening voice quality for the past approximately 12 years.  She feels this is associated with aging, and also with frequent throat clearing.  She was seen previously by ENT in [facility] but this was approximately 10 years ago.  Nasal endoscopy was performed at that time and she reports that the exam was unremarkable.  No therapy or surgical management was recommended at that time.    CURRENT SYMPTOMS INCLUDE  VOICE    Poor voice quality    Voice can be normal    Increased effort for singing and speech    Describes voice as gravelly, raspy and scratchy    She describes her voice as \"a big problem\"    COUGH/THROAT CLEARING    Frequent cough and throat clearing    Throat irritation    Increased mucus in throat    She associates this with aging    Clearing doesn't seem to help    her cough/ throat clearing triggers include:  o Cold air  o Talking  o Laughing  o Eating  o Drinking    ADDITIONAL    Long-standing rhinitis but this is not a concern to her    Patient denies significant dyspnea, dysphagia and pain.     OTHER PERTINENT HISTORY    Otherwise unknown.  Please also refer to Dr. Saleem's dictation.     Past Medical History:   Diagnosis Date     Arthritis     " "osteoarth     Spider veins      Past Surgical History:   Procedure Laterality Date     CHOLECYSTECTOMY  2000     CYSTOSCOPY       EYE SURGERY      blepharoplasty     GI SURGERY  2006    rectal sphincter     VASCULAR SURGERY  1988    vein stripping       OBJECTIVE  PATIENT REPORTED MEASURES    Effort to talk: 5 / 10 (0-10 in which 10 represents maximal effort)    Effort to sin / 10 (0-10 in which 10 represents maximal effort)    Voice quality: 4 / 10 (0-10 in which 10 represents best possible voice)     Voice bother:  3 / 5    Cough bother:  4 / 5  Patient Supplied Answers To VHI Questionnaire  Voice Handicap Index (VHI-10) 2018   My voice makes it difficult for people to hear me 0   People have difficulty understanding me in a noisy room 1   My voice difficulties restrict my personal and social life.  2   I feel left out of conversations because of my voice 1   My voice problem causes me to lose income 0   I feel as though I have to strain to produce voice 1   The clarity of my voice is unpredictable 2   My voice problem upsets me 2   My voice makes me feel handicapped 1   People ask, \"What's wrong with your voice?\" 2   VHI-10 12       Patient Supplied Answers To CSI Questionnaire  Cough Severity Index (CSI) 2018   My cough is worse when I lie down 0   My coughing problem causes me to restrict my personal and social life 0   I tend to avoid places because of my cough problem 0   I feel embarrassed because of my coughing problem 2   People ask, ''What's wrong?'' because I cough a lot 2   I run out of air when I cough 0   My coughing problem affects my voice 3   My coughing problem limits my physical activity 0   My coughing problem upsets me 2   People ask me if I am sick because I cough a lot 2   CSI Score 11       Patient Supplied Answers To EAT Questionnaire  Eating Assessment Tool (EAT-10) 2018   My swallowing problem has caused me to lose weight 0   My swallowing problem interferes with my " ability to go out for meals 0   Swallowing liquids takes extra effort 0   Swallowing solids takes extra effort 0   Swallowing pills takes extra effort 0   Swallowing is painful 0   The pleasure of eating is affected by my swallowing 0   When I swallow food sticks in my throat 0   I cough when I eat 1   Swallowing is stressful 0   EAT-10 1     PERCEPTUAL EVALUATION (CPT 91043)  POSTURE / TENSION:     neck    BREATHING:     shallow    phonation is not coordinated with respiration    LARYNGEAL PALPATION:     tenderness of the thyrohyoid area    reduced thyrohyoid space    left greater than right    VOICE:    Roughness: Mild to moderate Consistent    Breathiness: Minimal    Strain: Mild Consistent    Intermittent gurgly voice quality    Loudness    Conversational speech:  WNL    Pitch:    Conversational speech:  Mild to moderately lowered    Pitch glide: strain to the point of disruption of voicing with extreme elevated F0, but adequate access to loft register    Resonance:     Conversational speech:  laryngeal pharyngeal resonance    Singing vs. Speech:  Consistent across contexts    [facility]- Overall Severity:  28/100    COUGH/THROAT CLEARING:    Frequent    Increased in conjunction with voice use    Wet    Locus of cough/ throat clear: sounds consistent with upper airway     THERAPY PROBES: Improvement was elicited with use of forward resonant stimuli, use of clear speech protocol and coordination of respiration and phonation    ____________________________________________________________________  Laryngeal Function Studies (CPT 27669)  Acoustic measures:  Fundamental frequency Metrics     /a/ mean F0 = 175 Hz (SD = 1.50 Hz)     /i/ mean F0 = 126 Hz (SD = 44.82 Hz)     [place] Mean f0 = 164 Hz (SD 31.95 Hz)     [place]:         Min F0 = 143 Hz        Max F0 = 727 Hz        Range = 585 Hz        Notes = disruptions of sound quality but no true breaks with elevated F0    Cepstral Measures     CPPS /a/ = 27.23 dB      "CPPS /i/ = 29.23 dB     CPPS \"all voiced\" = 23.49 dB     AVQI (v.3.01) = 1.10    Additional Measures     Harmonic to Noise Ratio /a/ = 26.22 dB     Harmonic to Noise Ratio: [place] = 15.64 dB     Jitter (local) /a/ = 0.355 %     Shimmer (local) /a/ = 1.381 %    Aerodynamic Measures     Protocol / Parameter Result Normative Mean (SD)   Vital Capacity     Expiratory Volume 3 Liters 2.24 (0.6) Liters   Comfortable Sustained Phonation 2.746 Lit/Sec    Mean SPL 74.35 dB 79.89 (5.47) dB   Mean Pitch 169.94 Hz 185.27 (25.93) Hz   Peak Expiratory Airflow 0.403 Lit/Sec 0.17 (0.09) Lit/Sec   Mean Expiratory Airflow 0.075 Lit/Sec 0.1 (0.06) Lit/Sec   Expiratory Volume 0.36 Liters 0.6 (0.34) Liters   Voicing Efficiency     Mean SPL 83.09 dB 84.05 (6.6) dB   Mean Pitch 161.42 Hz 179.42 (18.78) Hz   Peak Air Pressure 10.9 cm H2O 9.7 (5.12) cm H2O   Mean Peak Air Pressure 9.5 cm H2O 7.95 (4.28) cm H2O   Peak Expiratory Airflow 0.263 Lit/Sec 0.25 (0.19) Lit/Sec   Mean Airflow During Voicing 0.11 Lit/Sec 0.13 (0.07) Lit/Sec   Aerodynamic Power 0.097 bryan 0.12 (0.14) bryan   Aerodynamic Resistance 89.8 cm H2O/(l/s) 80 (51.98) cm H2O/(l/s)   Acoustic Ohms 91.57 ds/cm5 81.58 (53.01) ds/cm5   Aerodynamic Efficiency 122.22 ppm 97.94 (55.25) ppm   Running Speech: All Voiced Stimulus     Peak SPL 81.84 dB    Mean Pitch 172.09 Hz    Pitch Range 132.99 Hz    Peak Expiratory Airflow 0.267 Lit/Sec    Mean Expiratory Airflow 0.116 Lit/Sec    Expiratory Volume 0.18 Liters    Mean Airflow During Voicing 0.112 Lit/Sec    Peak Inspiratory Airflow -0.526 Lit/Sec    Inspiratory Volume -0.1 Liters    Running Speech: [place]     Peak SPL 84.41 dB    Mean Pitch 180.03 Hz    Pitch Range 267.51 Hz    Peak Expiratory Airflow 1.109 Lit/Sec    Mean Expiratory Airflow 0.159 Lit/Sec    Expiratory Volume 3.04 Liters    Mean Airflow During Voicing 0.122 Lit/Sec    Peak Inspiratory Airflow -1.798 Lit/Sec    Inspiratory Volume -1.83 Liters  "   __________________________________________________________________    LARYNGEAL EXAMINATION  Procedure: Flexible endoscopy with chip-tip technology with stroboscopy, right nostril; topical anesthesia with 3% Lidocaine and 0.25% phenylephrine was applied.   Performed by: Dr. Saleem   The laryngeal and pharyngeal structures were evaluated for gross appearance, mobility, function, and focal lesions / abnormalities of the associated mucosa.  Stroboscopy was warranted to evaluate closure, symmetry, and vibratory characteristics of the vocal folds.  All findings were within normal limits with the exception of the following salient features:     Mild diffusely thickened secretions throughout the larynx and at the level of the vocal folds    Essentially healthy laryngeal and vocal fold mucosa    Mild four-way constrictive supraglottic hyperfunction during running speech with R>L ventricular recruitment    Reduced supraglottic hyperfunction with forward resonant stimuli and coordination of respiration and phonation    Stroboscopy demonstrates    Mild reduction of the mucosal wave bilaterally right greater than left    Transient intermittent a periodicity and asymmetry      Supraglottic hyperfunction      Essentially healthy vocal fold mucosa    The laryngeal exam was reviewed with Ms. Stanley, and I provided pertinent explanations, as well as written and oral information.    ASSESSMENT / PLAN  IMPRESSIONS: Nicole Jaden is presenting today with R49.0 (Dysphonia) and R68.89 (Chronic Throat Clearing) in the context of J38.7 (Irritable Larynx Syndrome), an imbalance in function of the intrinsic and extrinsic muscles of the larynx and Nonoptimal coordination of respiration and phonation. Laryngeal evaluation demonstrates essentially healthy mucosa, with mild to moderate supraglottic hyperfunction during running speech.  Laryngeal function studies demonstrate reduced transglottal airflow, increased inferred sub-glottal pressure,  and long breath groups, which is in keeping with perceptual ratings of increased roughness and strain.     STIMULABILITY: results of therapy probes during perceptual and laryngeal evaluation demonstrate improvement with use of forward resonant stimuli, use of clear speech protocol and coordination of respiration and phonation    RECOMMENDATIONS:     A course of speech therapy is recommended to optimize vocal technique, improve voice quality and help reduce chronic cough and throat clear.     Patient became tearful as she has been recently overwhelmed by health issues, and feels weight down by the number of therapeutic exercises she has to do for physical therapy.  Over the course of our conversation she acknowledged the value of what we were asking, but may wait to initiate therapy until she has more time in her schedule to manage therapeutic recommendations.    She demonstrates a Good prognosis for improvement given adherence to therapeutic recommendations.     Positive indicators: positive response to therapy probes diagnosis is known to respond to treatment    Negative indicators: Concomitant health problems    DURATION / FREQUENCY: 4 biweekly and 2 monthly one-hour sessions    GOALS:  Patient goal:   To improve and maintain a healthy voice quality  To understand the problem and fix it as much as possible  To reduce her cough to acceptable levels    Short-term goal(s): Within the first 4 sessions, Ms. Stanley:  1. will demonstrate assigned laryngeal massage techniques with 80% accuracy or better with no clinician support  2. will be able to demonstrate provided cough suppression and substitution strategies from memory independently with 90% accuracy  3. will be able to independently list key factors in maintenance of good vocal hygiene with 80% accuracy, and report on their use outside the therapy room.  4. will utilize silent inhalation with good low-respiratory engagement 75% of the time during therapy tasks with  minimal clinician support  5. will demonstrate semi-occluded vocal tract (SOVT) exercises with at least 80% accuracy with no clinician support  6. will demonstrate the ability to alternate between target and habitual voice quality given clinician cue 75% of the time during therapy tasks    Long-term goal(s): In 6 months, Ms. Stanley will:  1. Report a week of typical vocal activities, in which dysphonia and cough do not exceed a level of 2 out of 10, 80% of the time     G-Codes:   - Functional limitation, current status, at evalution CJ - At least 20 percent but less than 40 percent impaired, limited, or restricted   - Functional limitation, projected goal status, at discharge from therapy CI - At least 1 percent but less than 20 percent impaired, limited, or restricted    This treatment plan was developed with the patient who agreed with the recommendations.      TOTAL SERVICE TIME: 60 minutes  EVALUATION OF VOICE AND RESONANCE (22386)  LARYNGEAL FUNCTION STUDIES (44192)  NO CHARGE FACILITY FEE (08601)    Ben Corey M.M., M.A., CCC-SLP  Speech-Language Pathologist  Certificate of Vocology  [phone]

## 2018-06-04 NOTE — PATIENT INSTRUCTIONS
1.  You were seen in the ENT Clinic today by Dr. Saleem.  If you have any questions or concerns after your appointment, please call [phone].  Press option #1 for scheduling related needs.  Press option #3 for Nurse advice.  2.  Plan is to return to clinic as needed.  3. Please initiate speech therapy at the [facility] Voice [facility] - [facility].  Call Diana at [phone] if you require assistance in scheduling.      Adriana HALLN, RN  [facility] ENT   Head & Neck Surgery

## 2018-06-04 NOTE — NURSING NOTE
"Chief Complaint   Patient presents with     Consult     raspy voice/throat     Blood pressure (!) 147/95, pulse 92, height 1.651 m (5' 5\"), weight 67 kg (147 lb 11.2 oz), not currently breastfeeding.    Kiet Tolbert    "

## 2018-06-04 NOTE — Clinical Note
6/4/2018       RE: Nicole Stanley  [address]  [place] [address]     Dear Colleague,    Thank you for referring your patient, Nicole Stanley, to the [facility] EAR NOSE AND THROAT at [facility]. Please see a copy of my visit note below.    Dear Dr. Allison:    I had the pleasure of meeting Nicole Stanley in consultation at the [facility] of the [facility] Otolaryngology Clinic at your request, for evaluation of dysphonia. The note from our visit follows.  I appreciate the opportunity to participate in the care of this pleasant patient.    Please feel free to contact me with any questions.    Sincerely yours,    Jacinda Saleem M.D., M.P.H.  , Laryngology  Director, [facility]  Otolaryngology- Head & Neck Surgery  [phone]          =====    History of Present Illness:   Nicole Stanley is a pleasant 68-year-old female with hypothyroidism and atrial fibrillation with rapid ventricular response who presents with a history of dysphonia. Symptoms include throat irritation, mucus in throat, frequent throat-clearing, frequent cough, poor voice quality, gravelly voice, raspy voice and scratchy voice.      Voice  The patient notes a 12 year history of rough voice and concurrent throat clearing. When it first started, she had more voice use. She stopped volunteering because of the voice quality.    She was seen by an ENT locally and was told her voice problems were associated with her age, and increased her hydration.  She is now retired but feels like her voice quality is frustrating and is concerned about it being annoying to others.  She notes that her voice is sometimes normal.  Singing vocal effort is extremely increased      Swallowing  She does not feel like things get down the wrong way, but sometimes sour or very sweet foods irritate her throat. This can lead to a coughing fit.  She does not experience  increased swallowing effort with any texture(s).      Cough/Throat-clearing  She reports chronic throat clearing that is bothersome and has a perpetual globus sensation.  She sometimes has a wet voice quality.  She also has chronic rhinitis, which her father also had..  She tries to clear her throat but the sensation never clears.  About once a month, she gets throat irritation from eating something, but this is inconsistent and unpredictable.  Triggers for throat clearing include cold air, talking, laughing, eating, drinking.      Reflux-type symptoms  She never experiences heartburn/indigestion. She is not taking reflux medications.      Prior EPIC*** outside*** records were reviewed for this visit.    MEDICATIONS:     Current Outpatient Prescriptions   Medication Sig Dispense Refill     Acetaminophen (TYLENOL PO) Take 500 mg by mouth as needed for mild pain or fever       apixaban ANTICOAGULANT (ELIQUIS) 5 MG tablet Take 1 tablet (5 mg) by mouth 2 times daily 20 tablet 0     atorvastatin (LIPITOR) 10 MG tablet Take 1 tablet (10 mg) by mouth daily 30 tablet 2     calcium carbonate (OS-ELISE 500 MG [place]. [place]) 1250 MG tablet Take 1 tablet by mouth 2 times daily       levothyroxine (SYNTHROID/LEVOTHROID) 50 MCG tablet Take 1 tablet (50 mcg) by mouth daily 90 tablet 3     MAGNESIUM OXIDE PO        metoprolol succinate (TOPROL-XL) 25 MG 24 hr tablet Take 1 tablet (25 mg) by mouth daily 90 tablet 3     Probiotic Product (PROBIOTIC COLON SUPPORT PO) Take 1 tablet by mouth daily       UNABLE TO FIND MEDICATION NAME: fish oil - 1200 mg twice daily       VITAMIN D, CHOLECALCIFEROL, PO Take by mouth daily       zinc sulfate (ZINCATE) 220 (50 Zn) MG capsule Take 220 mg by mouth daily       calcium carbonate (OSCAL 500) 1250 (500 Ca) MG TABS tablet Take 1 tablet by mouth 2 times daily       Probiotic Product (ACIDOPHILUS/GOAT MILK) CAPS          ALLERGIES:  No Known Allergies    PAST MEDICAL HISTORY:   Past Medical History:    Diagnosis Date     Arthritis     osteoarth     Spider veins         PAST SURGICAL HISTORY:   Past Surgical History:   Procedure Laterality Date     CHOLECYSTECTOMY  2000     CYSTOSCOPY       EYE SURGERY      blepharoplasty     GI SURGERY  2006    rectal sphincter     VASCULAR SURGERY  1988    vein stripping       HABITS/SOCIAL HISTORY:    Social History   Substance Use Topics     Smoking status: Never Smoker     Smokeless tobacco: Never Used     Alcohol use 0.0 oz/week     0 Standard drinks or equivalent per week      Comment: 1-2 glass of wine per day         FAMILY HISTORY:    Family History   Problem Relation Age of Onset     Hypertension Mother           Hyperlipidemia Mother      Family History Negative Father           Alzheimer Disease Father      Family History Negative Maternal Grandmother              REVIEW OF SYSTEMS:  The patient completed a comprehensive 11 point review of systems (below), which was reviewed. Positives are as noted below; pertinent findings are as noted in the HPI.     Patient Supplied Answers to Review of Systems   ENT ROS 2018   Constitutional Problems with sleep   Neurology Numbness   Ears, Nose, Throat Hearing loss, Hoarseness   Musculoskeletal Sore or stiff joints       PHYSICAL EXAMINATION:  General: The patient was alert and conversant, and in no acute distress.    Eyes: PERRL, conjunctiva and lids normal, sclera nonicteric.  Nose: Anterior rhinoscopy: no gross abnormalities. no  bleeding; no  mucopurulence; septum grossly normal, mild mucoid drainage and/or crusting.  Oral cavity/oropharynx: No masses or lesions. Dentition in good condition. Floor of mouth and oral tongue soft to palpation. Tongue mobility and palate elevation intact and symmetric.  Ears: Normal auricles, external auditory canals bilaterally. Visualized portions of tympanic membranes normal bilaterally.   Neck: No palpable cervical lymphadenopathy. There was moderate  tenderness  to palpation of the thyrohyoid space, which was narrow. No obvious thyroid abnormality. Landmarks palpable.  Resp: Breathing comfortably, no stridor or stertor.  Neuro: Symmetric facial function. Other cranial nerves as documented above.  Psych: Normal affect, pleasant and cooperative.  Voice/speech: Mild to moderate dysphonia characterized by roughness, strain, glottal guzman and poor respiratory recruitment.  Extremities: No cyanosis, clubbing, or edema of the upper extremities.    Intake scores  Total Score for Last Patient-Answered VHI Questionnaire  VHI Total Score 5/30/2018   VHI Total Score 11     Total Score for Last Patient-Answered EAT Questionnaire  EAT Total Score 5/30/2018   EAT Total Score 1     Total Score for Last Patient-Answered CSI Questionnaire  CSI Total Score 5/30/2018   CSI Total Score 8       PROCEDURE:   Flexible fiberoptic laryngoscopy and laryngovideostroboscopy  Indications: This procedure was warranted to evaluate the patient's laryngeal anatomy and function. Risks, benefits, and alternatives were discussed.  Description: After written informed consent was obtained, a time-out was performed to confirm patient identity, procedure, and procedure site. Topical 3% lidocaine with 0.25% phenylephrine was applied to the nasal cavities. I performed the endoscopy and no complications were apparent. Continuous and stroboscopic light were utilized to assess routine phonation and variable frequency phonation.  Performed by: Jacinda Saleem MD MPH  SLP: Ben Corey MM, MA, CCC-SLP   Findings: Normal nasopharynx. Normal base of tongue, valleculae, and epiglottis. Vocal fold mobility: right: normal; left: normal. Medial edges of the vocal folds: smooth and straight. No focal mucosal lesions were observed on the true vocal folds. Gl[place]ade produced appropriate elongation. There was mild to moderate supraglottic recruitment with connected speech. Mucosa of false vocal folds, aryepiglottic folds,  piriform sinuses, and posterior glottis unremarkable. Airway was patent. Response to the therapy probes was fair.      The addition of stroboscopy allowed evaluation of the mucosal wave.   Amplitude: right: normal; left: normal. Symmetry: intermittent symmetry. Closure pattern: complete. Closure plane: at glottic level. Phase distribution: open-phase predominant.      IMPRESSION AND PLAN:   Nicole Stanley presents with irritable larynx and muscle tension dysphonia.  She does not perceive a significant contribution from reflux, allergies, or asthma.     We discussed that chronic throat clearing can often be multifactorial but frequently has a component of laryngeal hyperreactivity that is perpetuated by the ongoing vocal fold trauma. I recommended speech therapy as initial primary management, with goals including reducing laryngeal irritability, improving laryngeal efficiency and improving respiratory/phonatory coordination.      She is feeling a little overwhelmed right now with all of her new medical issues, and may hold off on starting therapy until later in the summer.  We discussed that this is just fine, given that her laryngeal exam is unremarkable.  I did ask her to return to see me if she has any worsening of her throat symptoms. If her neck tightness persists we could also consider physical therapy, but right now I think it would be wiser to focus on speech therapy when she is ready to pursue it.     She will return as needed. I appreciate the opportunity to participate in the care of this pleasant patient.            Again, thank you for allowing me to participate in the care of your patient.      Sincerely,    Jacinda Saleem MD

## 2018-06-04 NOTE — PROGRESS NOTES
"[facility]  Evaluation report    Clinician: Ben Corey M.M., M.A., CCC/SLP  Seen in conjunction with: Dr. Saleem  Patient: Nicole Stanley  Date of Visit: 6/4/2018    HISTORY  Chief complaint: Nicole Stanley is a 68 year old woman presenting today for evaluation of voice and throat clearing.    Onset: Gradually 12 years ago  Inciting incident: None  Course: Worsening  Salient history: She has a history significant for A. Fib, hypertension.  She has been experiencing gradual worsening voice quality for the past approximately 12 years.  She feels this is associated with aging, and also with frequent throat clearing.  She was seen previously by ENT in [facility] but this was approximately 10 years ago.  Nasal endoscopy was performed at that time and she reports that the exam was unremarkable.  No therapy or surgical management was recommended at that time.    CURRENT SYMPTOMS INCLUDE  VOICE    Poor voice quality    Voice can be normal    Increased effort for singing and speech    Describes voice as gravelly, raspy and scratchy    She describes her voice as \"a big problem\"    COUGH/THROAT CLEARING    Frequent cough and throat clearing    Throat irritation    Increased mucus in throat    She associates this with aging    Clearing doesn't seem to help    her cough/ throat clearing triggers include:  o Cold air  o Talking  o Laughing  o Eating  o Drinking    ADDITIONAL    Long-standing rhinitis but this is not a concern to her    Patient denies significant dyspnea, dysphagia and pain.     OTHER PERTINENT HISTORY    Otherwise unknown.  Please also refer to Dr. Saleem's dictation.     Past Medical History:   Diagnosis Date     Arthritis     osteoarth     Spider veins      Past Surgical History:   Procedure Laterality Date     CHOLECYSTECTOMY  2000     CYSTOSCOPY       EYE SURGERY      blepharoplasty     GI SURGERY  2006    rectal sphincter     VASCULAR SURGERY  1988    vein stripping " "      OBJECTIVE  PATIENT REPORTED MEASURES    Effort to talk: 5 / 10 (0-10 in which 10 represents maximal effort)    Effort to sin / 10 (0-10 in which 10 represents maximal effort)    Voice quality: 4 / 10 (0-10 in which 10 represents best possible voice)     Voice bother:  3 / 5    Cough bother:  4 / 5  Patient Supplied Answers To VHI Questionnaire  Voice Handicap Index (VHI-10) 2018   My voice makes it difficult for people to hear me 0   People have difficulty understanding me in a noisy room 1   My voice difficulties restrict my personal and social life.  2   I feel left out of conversations because of my voice 1   My voice problem causes me to lose income 0   I feel as though I have to strain to produce voice 1   The clarity of my voice is unpredictable 2   My voice problem upsets me 2   My voice makes me feel handicapped 1   People ask, \"What's wrong with your voice?\" 2   VHI-10 12       Patient Supplied Answers To CSI Questionnaire  Cough Severity Index (CSI) 2018   My cough is worse when I lie down 0   My coughing problem causes me to restrict my personal and social life 0   I tend to avoid places because of my cough problem 0   I feel embarrassed because of my coughing problem 2   People ask, ''What's wrong?'' because I cough a lot 2   I run out of air when I cough 0   My coughing problem affects my voice 3   My coughing problem limits my physical activity 0   My coughing problem upsets me 2   People ask me if I am sick because I cough a lot 2   CSI Score 11       Patient Supplied Answers To EAT Questionnaire  Eating Assessment Tool (EAT-10) 2018   My swallowing problem has caused me to lose weight 0   My swallowing problem interferes with my ability to go out for meals 0   Swallowing liquids takes extra effort 0   Swallowing solids takes extra effort 0   Swallowing pills takes extra effort 0   Swallowing is painful 0   The pleasure of eating is affected by my swallowing 0   When I swallow " "food sticks in my throat 0   I cough when I eat 1   Swallowing is stressful 0   EAT-10 1     PERCEPTUAL EVALUATION (CPT 47303)  POSTURE / TENSION:     neck    BREATHING:     shallow    phonation is not coordinated with respiration    LARYNGEAL PALPATION:     tenderness of the thyrohyoid area    reduced thyrohyoid space    left greater than right    VOICE:    Roughness: Mild to moderate Consistent    Breathiness: Minimal    Strain: Mild Consistent    Intermittent gurgly voice quality    Loudness    Conversational speech:  WNL    Pitch:    Conversational speech:  Mild to moderately lowered    Pitch glide: strain to the point of disruption of voicing with extreme elevated F0, but adequate access to loft register    Resonance:     Conversational speech:  laryngeal pharyngeal resonance    Singing vs. Speech:  Consistent across contexts    CAPE-V Overall Severity:  28/100    COUGH/THROAT CLEARING:    Frequent    Increased in conjunction with voice use    Wet    Locus of cough/ throat clear: sounds consistent with upper airway     THERAPY PROBES: Improvement was elicited with use of forward resonant stimuli, use of clear speech protocol and coordination of respiration and phonation    ____________________________________________________________________  Laryngeal Function Studies (CPT 73922)  Acoustic measures:  Fundamental frequency Metrics     /a/ mean F0 = 175 Hz (SD = 1.50 Hz)     /i/ mean F0 = 126 Hz (SD = 44.82 Hz)     [employer] Passage Mean f0 = 164 Hz (SD 31.95 Hz)     [place]:         Min F0 = 143 Hz        Max F0 = 727 Hz        Range = 585 Hz        Notes = disruptions of sound quality but no true breaks with elevated F0    Cepstral Measures     CPPS /a/ = 27.23 dB     CPPS /i/ = 29.23 dB     CPPS \"all voiced\" = 23.49 dB     AVQI (v.3.01) = 1.10    Additional Measures     Harmonic to Noise Ratio /a/ = 26.22 dB     Harmonic to Noise Ratio: [employer] passage = 15.64 dB     Jitter (local) /a/ = 0.355 %     Shimmer " (local) /a/ = 1.381 %    Aerodynamic Measures     Protocol / Parameter Result Normative Mean (SD)   Vital Capacity     Expiratory Volume 3 Liters 2.24 (0.6) Liters   Comfortable Sustained Phonation 2.746 Lit/Sec    Mean SPL 74.35 dB 79.89 (5.47) dB   Mean Pitch 169.94 Hz 185.27 (25.93) Hz   Peak Expiratory Airflow 0.403 Lit/Sec 0.17 (0.09) Lit/Sec   Mean Expiratory Airflow 0.075 Lit/Sec 0.1 (0.06) Lit/Sec   Expiratory Volume 0.36 Liters 0.6 (0.34) Liters   Voicing Efficiency     Mean SPL 83.09 dB 84.05 (6.6) dB   Mean Pitch 161.42 Hz 179.42 (18.78) Hz   Peak Air Pressure 10.9 cm H2O 9.7 (5.12) cm H2O   Mean Peak Air Pressure 9.5 cm H2O 7.95 (4.28) cm H2O   Peak Expiratory Airflow 0.263 Lit/Sec 0.25 (0.19) Lit/Sec   Mean Airflow During Voicing 0.11 Lit/Sec 0.13 (0.07) Lit/Sec   Aerodynamic Power 0.097 bryan 0.12 (0.14) bryan   Aerodynamic Resistance 89.8 cm H2O/(l/s) 80 (51.98) cm H2O/(l/s)   Acoustic Ohms 91.57 ds/cm5 81.58 (53.01) ds/cm5   Aerodynamic Efficiency 122.22 ppm 97.94 (55.25) ppm   Running Speech: All Voiced Stimulus     Peak SPL 81.84 dB    Mean Pitch 172.09 Hz    Pitch Range 132.99 Hz    Peak Expiratory Airflow 0.267 Lit/Sec    Mean Expiratory Airflow 0.116 Lit/Sec    Expiratory Volume 0.18 Liters    Mean Airflow During Voicing 0.112 Lit/Sec    Peak Inspiratory Airflow -0.526 Lit/Sec    Inspiratory Volume -0.1 Liters    Running Speech: [employer] Passage     Peak SPL 84.41 dB    Mean Pitch 180.03 Hz    Pitch Range 267.51 Hz    Peak Expiratory Airflow 1.109 Lit/Sec    Mean Expiratory Airflow 0.159 Lit/Sec    Expiratory Volume 3.04 Liters    Mean Airflow During Voicing 0.122 Lit/Sec    Peak Inspiratory Airflow -1.798 Lit/Sec    Inspiratory Volume -1.83 Liters    __________________________________________________________________    LARYNGEAL EXAMINATION  Procedure: Flexible endoscopy with chip-tip technology with stroboscopy, right nostril; topical anesthesia with 3% Lidocaine and 0.25% phenylephrine was  applied.   Performed by: Dr. Saleem   The laryngeal and pharyngeal structures were evaluated for gross appearance, mobility, function, and focal lesions / abnormalities of the associated mucosa.  Stroboscopy was warranted to evaluate closure, symmetry, and vibratory characteristics of the vocal folds.  All findings were within normal limits with the exception of the following salient features:     Mild diffusely thickened secretions throughout the larynx and at the level of the vocal folds    Essentially healthy laryngeal and vocal fold mucosa    Mild four-way constrictive supraglottic hyperfunction during running speech with R>L ventricular recruitment    Reduced supraglottic hyperfunction with forward resonant stimuli and coordination of respiration and phonation    Stroboscopy demonstrates    Mild reduction of the mucosal wave bilaterally right greater than left    Transient intermittent a periodicity and asymmetry      Supraglottic hyperfunction      Essentially healthy vocal fold mucosa    The laryngeal exam was reviewed with Ms. Stanley, and I provided pertinent explanations, as well as written and oral information.    ASSESSMENT / PLAN  IMPRESSIONS: Nicole Jaden is presenting today with R49.0 (Dysphonia) and R68.89 (Chronic Throat Clearing) in the context of J38.7 (Irritable Larynx Syndrome), an imbalance in function of the intrinsic and extrinsic muscles of the larynx and Nonoptimal coordination of respiration and phonation. Laryngeal evaluation demonstrates essentially healthy mucosa, with mild to moderate supraglottic hyperfunction during running speech.  Laryngeal function studies demonstrate reduced transglottal airflow, increased inferred sub-glottal pressure, and long breath groups, which is in keeping with perceptual ratings of increased roughness and strain.     STIMULABILITY: results of therapy probes during perceptual and laryngeal evaluation demonstrate improvement with use of forward resonant  stimuli, use of clear speech protocol and coordination of respiration and phonation    RECOMMENDATIONS:     A course of speech therapy is recommended to optimize vocal technique, improve voice quality and help reduce chronic cough and throat clear.     Patient became tearful as she has been recently overwhelmed by health issues, and feels weight down by the number of therapeutic exercises she has to do for physical therapy.  Over the course of our conversation she acknowledged the value of what we were asking, but may wait to initiate therapy until she has more time in her schedule to manage therapeutic recommendations.    She demonstrates a Good prognosis for improvement given adherence to therapeutic recommendations.     Positive indicators: positive response to therapy probes diagnosis is known to respond to treatment    Negative indicators: Concomitant health problems    DURATION / FREQUENCY: 4 biweekly and 2 monthly one-hour sessions    GOALS:  Patient goal:   To improve and maintain a healthy voice quality  To understand the problem and fix it as much as possible  To reduce her cough to acceptable levels    Short-term goal(s): Within the first 4 sessions, Ms. Stanley:  1. will demonstrate assigned laryngeal massage techniques with 80% accuracy or better with no clinician support  2. will be able to demonstrate provided cough suppression and substitution strategies from memory independently with 90% accuracy  3. will be able to independently list key factors in maintenance of good vocal hygiene with 80% accuracy, and report on their use outside the therapy room.  4. will utilize silent inhalation with good low-respiratory engagement 75% of the time during therapy tasks with minimal clinician support  5. will demonstrate semi-occluded vocal tract (SOVT) exercises with at least 80% accuracy with no clinician support  6. will demonstrate the ability to alternate between target and habitual voice quality given  clinician cue 75% of the time during therapy tasks    Long-term goal(s): In 6 months, Ms. Stanley will:  1. Report a week of typical vocal activities, in which dysphonia and cough do not exceed a level of 2 out of 10, 80% of the time     G-Codes:   - Functional limitation, current status, at evalution CJ - At least 20 percent but less than 40 percent impaired, limited, or restricted   - Functional limitation, projected goal status, at discharge from therapy CI - At least 1 percent but less than 20 percent impaired, limited, or restricted    This treatment plan was developed with the patient who agreed with the recommendations.      TOTAL SERVICE TIME: 60 minutes  EVALUATION OF VOICE AND RESONANCE (50150)  LARYNGEAL FUNCTION STUDIES (29631)  NO CHARGE FACILITY FEE (09514)    Ben Corey M.M., M.A., CCC-SLP  Speech-Language Pathologist  Certificate of Vocology  [phone]

## 2018-06-28 ENCOUNTER — OFFICE VISIT (OUTPATIENT)
Dept: OTOLARYNGOLOGY | Facility: CLINIC | Age: 68
End: 2018-06-28
Payer: COMMERCIAL

## 2018-06-28 DIAGNOSIS — R09.89 CHRONIC THROAT CLEARING: ICD-10-CM

## 2018-06-28 DIAGNOSIS — R49.0 DYSPHONIA: Primary | ICD-10-CM

## 2018-06-28 DIAGNOSIS — J38.7 IRRITABLE LARYNX SYNDROME: ICD-10-CM

## 2018-06-28 NOTE — PROGRESS NOTES
"[facility]  THERAPY NOTE (CPT 04339)    Patient: Nicole Stanley  Date of Service: 6/28/2018  Referring physician: Dr. Saleem  Impressions from most recent evaluation:  \"Nicole Stanley is presenting today with R49.0 (Dysphonia) and R68.89 (Chronic Throat Clearing) in the context of J38.7 (Irritable Larynx Syndrome), an imbalance in function of the intrinsic and extrinsic muscles of the larynx and Nonoptimal coordination of respiration and phonation. Laryngeal evaluation demonstrates essentially healthy mucosa, with mild to moderate supraglottic hyperfunction during running speech.  Laryngeal function studies demonstrate reduced transglottal airflow, increased inferred sub-glottal pressure, and long breath groups, which is in keeping with perceptual ratings of increased roughness and strain. \"    SUBJECTIVE:  Since the patient's last session, they report the following:     Overall symptoms are about the same    Clarification of goals:    Clearer voice quality    Eliminating the coughing fits    OBJECTIVE:  PATIENT REPORTED MEASURES:  Patient Supplied Answers To SLP QOL Questionnaire  Therapy Quality of Life 6/28/2018   Since my l ast session, I used the speech therapy exercises and strategies as recommended by my speech pathologist. Not applicable   I feel that using my therapy techniques has become a habit Not applicable   I feel confident in my ability to manage my current and future symptoms. Disagree strongly   Since my last session I feel my symptoms have --------. Stayed the same   Overall, since starting therapy I feel my symptoms are --------. About the same     Patient Specific Goal Metrics:  Dysponia SLP Goals 6/28/2018   How much does your voice problem bother you? Somewhat   How much does your cough/throat-clearing problem bother you?            Somewhat     THERAPEUTIC ACTIVITIES    Counseling and Education:    Given the patient's concerns that she would be overloaded with therapeutic exercises, she " was encouraged to communicate openly throughout the process and inform me when and if the burden of exercises had gotten too large.  She stated she understood and would do so.    Instructed concepts and techniques for optimal vocal hygiene including:    Systemic hydration, including strategies for increasing daily water intake    Topical hydration - Gargling, saline nasal irrigation, humidification, steam, guaifenesin    Environmental barriers to healthy voicing - noise, inhaled irritants, room acoustics    Awareness and reduction of phonotraumatic behaviors    Moderating voice use    Substituting non-voice alternative behaviors    Avoiding cough and throat clearing    Exercises to promote optimal respiratory mechanics    I provided explanation of the anatomy and physiology of respiration for speech and singing; she found this to be helpful    She demonstrated excessive upper thoracic engagement during inhalation    Demonstrated difficulty allowing abdominal relaxation for inhalation    Practiced in a forward leaning seated posture with tactile cue of a hand on the low rib-cage to facilitate awareness of low respiratory engagement    With clinician support, patient was able to demonstrate improved abdominal relaxation and engagement on inhalation    Chronic cough / throat clearing reduction therapy    Suppression and substitution strategies were instructed including    Swallowing substitution techniques    Breathing suppression techniques to reduce laryngeal tension    Low impact glottic coup and soft cough    Techniques to raise awareness of habitual throat clearing    Additionally she was instructed to keep a log of what circumstances are eliciting cough / throat clear      A regimen for home practice was instructed.    I provided an audio recording and handouts of today's therapeutic activities to facilitate practice.    ASSESSMENT/PLAN  PROGRESS TOWARD LONG TERM GOALS:   Minimal at this point, as this is first  session, but good learning today    IMPRESSIONS: Nicole Stanley is presenting today with R49.0 (Dysphonia) and R68.89 (Chronic Throat Clearing) in the context of J38.7 (Irritable Larynx Syndrome), an imbalance in function of the intrinsic and extrinsic muscles of the larynx and Nonoptimal coordination of respiration and phonation.  She reported good understanding of therapeutic rationales and was able to demonstrate all exercises with adequate accuracy.  Breathing for both voice use and when asked to take a volitional deep breath was noted to have significant contracture of the accessory muscles of breathing, and this was able to be counter to effectively with focus on rescue breathing strategies and low respiratory engagement.    PLAN: I will see Ms. Stanley in 2 weeks, at which point we will advance cough suppression therapy, and begin to target improved respiratory phonatory coordination during voice use.       G-Codes were reported on 6/4/18    TOTAL SERVICE TIME: 60 minutes  TREATMENT (55333): 60 minutes  NO CHARGE FACILITY FEE (78594)    Ben Corey M.M., M.A., CCC-SLP  Speech-Language Pathologist  Certificate of Vocology  [phone]

## 2018-06-28 NOTE — MR AVS SNAPSHOT
After Visit Summary   6/28/2018    Nicole Stanley    MRN: [id number]           Patient Information     Date Of Birth          1950        Visit Information        Provider Department      6/28/2018 10:00 AM Juan Daniel Corey SLP [facility]        Today's Diagnoses     Dysphonia    -  1    Irritable larynx syndrome        Chronic throat clearing           Follow-ups after your visit        Your next 10 appointments already scheduled     Jul 12, 2018 10:00 AM CDT   (Arrive by 9:45 AM)   Return Visit with [facility] ([facility])    [address] [place] [address]   [phone]            Jul 26, 2018 10:00 AM CDT   (Arrive by 9:45 AM)   Return Visit with [facility] ([facility])    [address] [place] [id number] [phone]            Sep 26, 2018   Procedure with Simón Avila MD   [facility] [place], Endoscopy ([facility])    [address] [address]   [phone]           The [facility] is located on the corner of [facility] [facility] and [facility] on the [facility]. It is easily accessible from virtually any point in the [facility] area, via [address] and [address].            Nov 08, 2018  8:00 AM CST   LAB with  LAB   [facility] ([facility])    [address] [place] [id number] [phone]           Please do not eat 10-12 hours before your appointment if you are coming in fasting for labs on lipids, cholesterol, or glucose (sugar). This does not apply to pregnant women. Water, hot tea and black coffee (with nothing added) are okay. Do not drink other fluids, diet soda or chew gum.            Nov 08, 2018  1:00 PM CST   Return Visit with Maile Leonardo MD   [facility]    [place] ([facility])    [address] [place] [address] [phone]              Who to contact     Please call your clinic at [phone] to:    Ask questions about your health    Make or cancel appointments    Discuss your medicines    Learn about your test results    Speak to your doctor            Additional Information About Your Visit        [employer] Information     [employer] gives you secure access to your electronic health record. If you see a primary care provider, you can also send messages to your care team and make appointments. If you have questions, please call your primary care clinic.  If you do not have a primary care provider, please call [phone] and they will assist you.      [employer] is an electronic gateway that provides easy, online access to your medical records. With [employer], you can request a clinic appointment, read your test results, renew a prescription or communicate with your care team.     To access your existing account, please contact your [facility] or call [phone] for assistance.        Care EveryWhere ID     This is your Care EveryWhere ID. This could be used by other organizations to access your [place] medical records  [id number]        Your Vitals Were     Last Period                   (LMP Unknown)            Blood Pressure from Last 3 Encounters:   06/04/18 (!) 147/95   05/21/18 138/83   05/08/18 124/80    Weight from Last 3 Encounters:   06/04/18 66.7 kg (147 lb)   06/04/18 67 kg (147 lb 11.2 oz)   05/21/18 66.8 kg (147 lb 4 oz)              We Performed the Following     SPEECH/HEARING THERAPY, INDIVIDUAL        Primary Care Provider Office Phone # Fax #    YAAKOV Garcia [phone] [address] [facility] [address]        Equal Access to Services     JUNIE GONSALES AH: yusuf Johnson qaybta kaalmada adeegyada, waxay idiin  cedric kennediross juanitoemmanuelle funes ah. So [facility] [phone].    ATENCIÓN: Si aidala dixon, tiene a michaels disposición servicios gratuitos de asistencia lingüística. Jud al [phone].    We comply with applicable federal civil rights laws and [place] laws. We do not discriminate on the basis of race, color, national origin, age, disability, sex, sexual orientation, or gender identity.            Thank you!     Thank you for choosing  [facility]  for your care. Our goal is always to provide you with excellent care. Hearing back from our patients is one way we can continue to improve our services. Please take a few minutes to complete the written survey that you may receive in the mail after your visit with us. Thank you!             Your Updated Medication List - Protect others around you: Learn how to safely use, store and throw away your medicines at www.disposemymeds.org.          This list is accurate as of 6/28/18 11:17 AM.  Always use your most recent med list.                   Brand Name Dispense Instructions for use Diagnosis    * Acidophilus/Goat Milk Caps           * PROBIOTIC COLON SUPPORT PO      Take 1 tablet by mouth daily        apixaban ANTICOAGULANT 5 MG tablet    ELIQUIS    20 tablet    Take 1 tablet (5 mg) by mouth 2 times daily    Atrial fibrillation, unspecified type (H)       atorvastatin 10 MG tablet    LIPITOR    30 tablet    Take 1 tablet (10 mg) by mouth daily    Other hyperlipidemia       calcium carbonate 1250 (500 Ca) MG Tabs tablet    OSCAL 500     Take 1 tablet by mouth 2 times daily        calcium carbonate 500 MG tablet    OS-ELISE 500 mg [place]. Ca     Take 1 tablet by mouth 2 times daily        levothyroxine 50 MCG tablet    SYNTHROID/LEVOTHROID    90 tablet    Take 1 tablet (50 mcg) by mouth daily    Acquired hypothyroidism       MAGNESIUM OXIDE PO           metoprolol succinate 25 MG 24 hr tablet    TOPROL-XL    90 tablet    Take 1 tablet (25 mg) by mouth daily        TYLENOL PO       Take 500 mg by mouth as needed for mild pain or fever        UNABLE TO FIND      MEDICATION NAME: fish oil - 1200 mg twice daily        zinc sulfate 220 (50 Zn) MG capsule    ZINCATE     Take 220 mg by mouth daily        * Notice:  This list has 2 medication(s) that are the same as other medications prescribed for you. Read the directions carefully, and ask your doctor or other care provider to review them with you.

## 2018-06-28 NOTE — LETTER
"6/28/2018      RE: Nicole Stanley  [address] [place] [address]       [facility]  THERAPY NOTE (CPT 43570)    Patient: Nicole Stanley  Date of Service: 6/28/2018  Referring physician: Dr. Saleem  Impressions from most recent evaluation:  \"Nicole Stanley is presenting today with R49.0 (Dysphonia) and R68.89 (Chronic Throat Clearing) in the context of J38.7 (Irritable Larynx Syndrome), an imbalance in function of the intrinsic and extrinsic muscles of the larynx and Nonoptimal coordination of respiration and phonation. Laryngeal evaluation demonstrates essentially healthy mucosa, with mild to moderate supraglottic hyperfunction during running speech.  Laryngeal function studies demonstrate reduced transglottal airflow, increased inferred sub-glottal pressure, and long breath groups, which is in keeping with perceptual ratings of increased roughness and strain. \"    SUBJECTIVE:  Since the patient's last session, they report the following:     Overall symptoms are about the same    Clarification of goals:    Clearer voice quality    Eliminating the coughing fits    OBJECTIVE:  PATIENT REPORTED MEASURES:  Patient Supplied Answers To SLP QOL Questionnaire  Therapy Quality of Life 6/28/2018   Since my l ast session, I used the speech therapy exercises and strategies as recommended by my speech pathologist. Not applicable   I feel that using my therapy techniques has become a habit Not applicable   I feel confident in my ability to manage my current and future symptoms. Disagree strongly   Since my last session I feel my symptoms have --------. Stayed the same   Overall, since starting therapy I feel my symptoms are --------. About the same     Patient Specific Goal Metrics:  Dysponia SLP Goals 6/28/2018   How much does your voice problem bother you? Somewhat   How much does your cough/throat-clearing problem bother you?            Somewhat     THERAPEUTIC ACTIVITIES    Counseling and Education:    Given the " patient's concerns that she would be overloaded with therapeutic exercises, she was encouraged to communicate openly throughout the process and inform me when and if the burden of exercises had gotten too large.  She stated she understood and would do so.    Instructed concepts and techniques for optimal vocal hygiene including:    Systemic hydration, including strategies for increasing daily water intake    Topical hydration - Gargling, saline nasal irrigation, humidification, steam, guaifenesin    Environmental barriers to healthy voicing - noise, inhaled irritants, room acoustics    Awareness and reduction of phonotraumatic behaviors    Moderating voice use    Substituting non-voice alternative behaviors    Avoiding cough and throat clearing    Exercises to promote optimal respiratory mechanics    I provided explanation of the anatomy and physiology of respiration for speech and singing; she found this to be helpful    She demonstrated excessive upper thoracic engagement during inhalation    Demonstrated difficulty allowing abdominal relaxation for inhalation    Practiced in a forward leaning seated posture with tactile cue of a hand on the low rib-cage to facilitate awareness of low respiratory engagement    With clinician support, patient was able to demonstrate improved abdominal relaxation and engagement on inhalation    Chronic cough / throat clearing reduction therapy    Suppression and substitution strategies were instructed including    Swallowing substitution techniques    Breathing suppression techniques to reduce laryngeal tension    Low impact glottic coup and soft cough    Techniques to raise awareness of habitual throat clearing    Additionally she was instructed to keep a log of what circumstances are eliciting cough / throat clear      A regimen for home practice was instructed.    I provided an audio recording and handouts of today's therapeutic activities to facilitate  practice.    ASSESSMENT/PLAN  PROGRESS TOWARD LONG TERM GOALS:   Minimal at this point, as this is first session, but good learning today    IMPRESSIONS: Nicole Stanley is presenting today with R49.0 (Dysphonia) and R68.89 (Chronic Throat Clearing) in the context of J38.7 (Irritable Larynx Syndrome), an imbalance in function of the intrinsic and extrinsic muscles of the larynx and Nonoptimal coordination of respiration and phonation.  She reported good understanding of therapeutic rationales and was able to demonstrate all exercises with adequate accuracy.  Breathing for both voice use and when asked to take a volitional deep breath was noted to have significant contracture of the accessory muscles of breathing, and this was able to be counter to effectively with focus on rescue breathing strategies and low respiratory engagement.    PLAN: I will see Ms. Stanley in 2 weeks, at which point we will advance cough suppression therapy, and begin to target improved respiratory phonatory coordination during voice use.       G-Codes were reported on 6/4/18    TOTAL SERVICE TIME: 60 minutes  TREATMENT (51858): 60 minutes  NO CHARGE FACILITY FEE (15138)    Ben Corey M.M., M.A., CCC-SLP  Speech-Language Pathologist  Certificate of Vocology  [phone]      Juan Daniel Corey, SLP

## 2018-07-12 ENCOUNTER — OFFICE VISIT (OUTPATIENT)
Dept: OTOLARYNGOLOGY | Facility: CLINIC | Age: 68
End: 2018-07-12
Payer: COMMERCIAL

## 2018-07-12 DIAGNOSIS — R49.0 MUSCLE TENSION DYSPHONIA: ICD-10-CM

## 2018-07-12 DIAGNOSIS — R09.89 CHRONIC THROAT CLEARING: ICD-10-CM

## 2018-07-12 DIAGNOSIS — J38.7 IRRITABLE LARYNX SYNDROME: Primary | ICD-10-CM

## 2018-07-12 NOTE — MR AVS SNAPSHOT
After Visit Summary   7/12/2018    Nicole Stanley    MRN: [id number]           Patient Information     Date Of Birth          1950        Visit Information        Provider Department      7/12/2018 10:00 AM Juan Daniel Corey SLP [facility]        Today's Diagnoses     Irritable larynx syndrome    -  1    Muscle tension dysphonia        Chronic throat clearing           Follow-ups after your visit        Your next 10 appointments already scheduled     Jul 26, 2018 10:00 AM CDT   (Arrive by 9:45 AM)   [facility] Visit with [facility] ([facility])    [address] [facility]  [address] [facility] [place] [id number] [phone]            Sep 26, 2018   Procedure with Simón Avila MD   [facility] [place], Endoscopy ([facility])    [address] [facility] [id number] [phone]           The [facility] is located on the corner of [facility] [address] [facility] and [facility] on the [facility]. It is easily accessible from virtually any point in the [facility] area, via [employer] and [employer].            Nov 08, 2018  8:00 AM CST   LAB with  LAB   [facility] ([facility])    [address] [place] [address] [phone]           Please do not eat 10-12 hours before your appointment if you are coming in fasting for labs on lipids, cholesterol, or glucose (sugar). This does not apply to pregnant women. Water, hot tea and black coffee (with nothing added) are okay. Do not drink other fluids, diet soda or chew gum.            Nov 08, 2018  1:00 PM CST   Return Visit with Maile Leonardo MD   [facility] ([facility])    [address] [place] [address] [phone]              Who to contact     Please call your clinic at [phone] to:    Ask questions about your  health    Make or cancel appointments    Discuss your medicines    Learn about your test results    Speak to your doctor            Additional Information About Your Visit        [employer] Information     [employer] gives you secure access to your electronic health record. If you see a primary care provider, you can also send messages to your care team and make appointments. If you have questions, please call your primary care clinic.  If you do not have a primary care provider, please call [phone] and they will assist you.      [employer] is an electronic gateway that provides easy, online access to your medical records. With [employer], you can request a clinic appointment, read your test results, renew a prescription or communicate with your care team.     To access your existing account, please contact your [facility] or call [phone] for assistance.        Care EveryWhere ID     This is your Care EveryWhere ID. This could be used by other organizations to access your [place] medical records  [id number]        Your Vitals Were     Last Period                   (LMP Unknown)            Blood Pressure from Last 3 Encounters:   06/04/18 (!) 147/95   05/21/18 138/83   05/08/18 124/80    Weight from Last 3 Encounters:   06/04/18 66.7 kg (147 lb)   06/04/18 67 kg (147 lb 11.2 oz)   05/21/18 66.8 kg (147 lb 4 oz)              We Performed the Following     SPEECH/HEARING THERAPY, INDIVIDUAL        Primary Care Provider Office Phone # Fax #    Adriana NovaYAAKOV Zaldivar [facility] [phone] [address] [facility] [address]        Equal Access to Services     [facility] DRU AH: Hadii aad ku hadasho Soomaali, waaxda luqadaha, qaybta kaalmada adeegyada, danny funes . So [facility] [phone].    ATENCIÓN: Si habla español, tiene a michaels disposición servicios gratuitos de asistencia lingüística. Llame al [phone].    We comply with applicable  federal civil rights laws and [place] laws. We do not discriminate on the basis of race, color, national origin, age, disability, sex, sexual orientation, or gender identity.            Thank you!     Thank you for choosing [facility]  for your care. Our goal is always to provide you with excellent care. Hearing back from our patients is one way we can continue to improve our services. Please take a few minutes to complete the written survey that you may receive in the mail after your visit with us. Thank you!             Your Updated Medication List - Protect others around you: Learn how to safely use, store and throw away your medicines at www.disposemymeds.org.          This list is accurate as of 7/12/18 11:59 PM.  Always use your most recent med list.                   Brand Name Dispense Instructions for use Diagnosis    * Acidophilus/Goat Milk Caps           * PROBIOTIC COLON SUPPORT PO      Take 1 tablet by mouth daily        apixaban ANTICOAGULANT 5 MG tablet    ELIQUIS    20 tablet    Take 1 tablet (5 mg) by mouth 2 times daily    Atrial fibrillation, unspecified type (H)       atorvastatin 10 MG tablet    LIPITOR    30 tablet    Take 1 tablet (10 mg) by mouth daily    Other hyperlipidemia       calcium carbonate 1250 (500 Ca) MG Tabs tablet    OSCAL 500     Take 1 tablet by mouth 2 times daily        calcium carbonate 500 MG tablet    OS-ELISE 500 mg [place]. Ca     Take 1 tablet by mouth 2 times daily        levothyroxine 50 MCG tablet    SYNTHROID/LEVOTHROID    90 tablet    Take 1 tablet (50 mcg) by mouth daily    Acquired hypothyroidism       MAGNESIUM OXIDE PO           metoprolol succinate 25 MG 24 hr tablet    TOPROL-XL    90 tablet    Take 1 tablet (25 mg) by mouth daily        TYLENOL PO      Take 500 mg by mouth as needed for mild pain or fever        UNABLE TO FIND      MEDICATION NAME: fish oil - 1200 mg twice daily        zinc sulfate 220 (50 Zn) MG capsule    ZINCATE     Take 220 mg by mouth  daily        * Notice:  This list has 2 medication(s) that are the same as other medications prescribed for you. Read the directions carefully, and ask your doctor or other care provider to review them with you.

## 2018-07-12 NOTE — LETTER
"7/12/2018      RE: Nicole Stanley  [address] [place] [address]       [facility]ICE CLINIC  THERAPY NOTE (CPT 89810)    Patient: Nicole Stanley  Date of Service: 7/12/2018  Referring physician: Dr. Saleem  Impressions from most recent evaluation:  \"Nicole Stanley is presenting today with R49.0 (Dysphonia) and R68.89 (Chronic Throat Clearing) in the context of J38.7 (Irritable Larynx Syndrome), an imbalance in function of the intrinsic and extrinsic muscles of the larynx and Nonoptimal coordination of respiration and phonation. Laryngeal evaluation demonstrates essentially healthy mucosa, with mild to moderate supraglottic hyperfunction during running speech.  Laryngeal function studies demonstrate reduced transglottal airflow, increased inferred sub-glottal pressure, and long breath groups, which is in keeping with perceptual ratings of increased roughness and strain. \"    SUBJECTIVE:  Since the patient's last session, they report the following:     Overall symptoms are about the same    She doesn't feel that she has been able to use the strategies to avoid throat clearing    Two coughing fit episodes    Hurdles:  Gravelly, Coughing fits in response    OBJECTIVE:  PATIENT REPORTED MEASURES:  Patient Supplied Answers To SLP QOL Questionnaire  Therapy Quality of Life 7/12/2018   Since my l ast session, I used the speech therapy exercises and strategies as recommended by my speech pathologist. Agree strongly   I feel that using my therapy techniques has become a habit Agree   I feel confident in my ability to manage my current and future symptoms. Neither agree nor disagree   Since my last session I feel my symptoms have --------. Stayed the same   Overall, since starting therapy I feel my symptoms are --------. About the same     Patient Specific Goal Metrics:  Dysponia SLP Goals 6/28/2018   How much does your voice problem bother you? Somewhat   How much does your cough/throat-clearing problem bother you?            " "Somewhat     THERAPEUTIC ACTIVITIES    Counseling and education    Patient continues to seem resistant to the use and value of cough suppression techniques, citing inevitablity of needing to clear her throat    The need and rationale for ongoing vigilance was emphasized    Semi-Occluded Vocal Tract (SOVT) exercises instructed to reduce laryngeal tension, promote vocal fold pliability, and coordinate respiration and phonation    Straw with water resistance was found to be most facilitating     Sustained phonation, and voice vs. voiceless productions used to promote easy voicing and raise awareness of laryngeal tension    Ascending and descending glides utilized to promote vocal fold pliability    Instructed to use these exercises as a warm-up / cooldown, and to re-calibrate the voice throughout the day.    good accuracy with minimal clinician support    Based on her success with these techniques coordination of respiration and phonation was advanced using flow phonation stimuli; however, this was not noted to be beneficial and tactics were changed to resonant voice therapy as result    Resonant Voice Therapy (RVT) exercises to promote forward locus of resonance and optimized pattern of laryngeal adduction    Easy descending glide on /m/ utilized in conjunction with relaxed jaw, tongue, and lightly closed lips to facilitate forward resonant sound    Use of the carrier phrase \"mmhmm\" instructed to promote generalization to everyday speech    Syllable level using /m/ in alternation with cardinal vowels on sustained pitches and speech inflection    Word level exercises featuring nasal continuant loaded stimuli    She demonstrated good accuracy with minimal clincian support    Negative practice was employed and was facilitative      A regimen for home practice was instructed.    I provided an audio recording and handouts of today's therapeutic activities to facilitate practice.    ASSESSMENT/PLAN  PROGRESS TOWARD LONG " "TERM GOALS:   Modest progress to date, therapeutic methods have been altered to account for this; please see above    IMPRESSIONS:  Nicole Stanley presents with R49.0 (Dysphonia) and R68.89 (Chronic Throat Clearing) in the context of J38.7 (Irritable Larynx Syndrome), an imbalance in function of the intrinsic and extrinsic muscles of the larynx and Nonoptimal coordination of respiration and phonation.   Although the patient initially seemed resistant  based on limited progress to date, the change in focus to improve voice quality through forward resonant tasks and coordination of respiration and phonation was significantly facilitating.  Patient demonstrated the ability to \"switch out\" of gravelly voice quality using resonant voice therapy techniques with minimal to moderate clinician support.    PLAN: I will see Ms. Stanley in approximately 2 weeks, at which point we will continue to advance the concept of resonant voice therapy by introducing conversational training therapy.     G-Codes were reported on 6/4/18    TOTAL SERVICE TIME: 60 minutes  TREATMENT (57365): 60 minutes  NO CHARGE FACILITY FEE (19530)    Ben Corey M.M., M.A., CCC-SLP  Speech-Language Pathologist  Certificate of Vocology  [phone]      "

## 2018-07-12 NOTE — PROGRESS NOTES
"[facility] VOICE CLINIC  THERAPY NOTE (CPT 30553)    Patient: Nicole Stanley  Date of Service: 7/12/2018  Referring physician: Dr. Saleem  Impressions from most recent evaluation:  \"Nicole Stanley is presenting today with R49.0 (Dysphonia) and R68.89 (Chronic Throat Clearing) in the context of J38.7 (Irritable Larynx Syndrome), an imbalance in function of the intrinsic and extrinsic muscles of the larynx and Nonoptimal coordination of respiration and phonation. Laryngeal evaluation demonstrates essentially healthy mucosa, with mild to moderate supraglottic hyperfunction during running speech.  Laryngeal function studies demonstrate reduced transglottal airflow, increased inferred sub-glottal pressure, and long breath groups, which is in keeping with perceptual ratings of increased roughness and strain. \"    SUBJECTIVE:  Since the patient's last session, they report the following:     Overall symptoms are about the same    She doesn't feel that she has been able to use the strategies to avoid throat clearing    Two coughing fit episodes    Hurdles:  Gravelly, Coughing fits in response    OBJECTIVE:  PATIENT REPORTED MEASURES:  Patient Supplied Answers To SLP QOL Questionnaire  Therapy Quality of Life 7/12/2018   Since my l ast session, I used the speech therapy exercises and strategies as recommended by my speech pathologist. Agree strongly   I feel that using my therapy techniques has become a habit Agree   I feel confident in my ability to manage my current and future symptoms. Neither agree nor disagree   Since my last session I feel my symptoms have --------. Stayed the same   Overall, since starting therapy I feel my symptoms are --------. About the same     Patient Specific Goal Metrics:  Dysponia SLP Goals 6/28/2018   How much does your voice problem bother you? Somewhat   How much does your cough/throat-clearing problem bother you?            Somewhat     THERAPEUTIC ACTIVITIES    Counseling and education    Patient " "continues to seem resistant to the use and value of cough suppression techniques, citing inevitablity of needing to clear her throat    The need and rationale for ongoing vigilance was emphasized    Semi-Occluded Vocal Tract (SOVT) exercises instructed to reduce laryngeal tension, promote vocal fold pliability, and coordinate respiration and phonation    Straw with water resistance was found to be most facilitating     Sustained phonation, and voice vs. voiceless productions used to promote easy voicing and raise awareness of laryngeal tension    Ascending and descending glides utilized to promote vocal fold pliability    Instructed to use these exercises as a warm-up / cooldown, and to re-calibrate the voice throughout the day.    good accuracy with minimal clinician support    Based on her success with these techniques coordination of respiration and phonation was advanced using flow phonation stimuli; however, this was not noted to be beneficial and tactics were changed to resonant voice therapy as result    Resonant Voice Therapy (RVT) exercises to promote forward locus of resonance and optimized pattern of laryngeal adduction    Easy descending glide on /m/ utilized in conjunction with relaxed jaw, tongue, and lightly closed lips to facilitate forward resonant sound    Use of the carrier phrase \"mmhmm\" instructed to promote generalization to everyday speech    Syllable level using /m/ in alternation with cardinal vowels on sustained pitches and speech inflection    Word level exercises featuring nasal continuant loaded stimuli    She demonstrated good accuracy with minimal clincian support    Negative practice was employed and was facilitative      A regimen for home practice was instructed.    I provided an audio recording and handouts of today's therapeutic activities to facilitate practice.    ASSESSMENT/PLAN  PROGRESS TOWARD LONG TERM GOALS:   Modest progress to date, therapeutic methods have been altered " "to account for this; please see above    IMPRESSIONS:  Nicole Stanley presents with R49.0 (Dysphonia) and R68.89 (Chronic Throat Clearing) in the context of J38.7 (Irritable Larynx Syndrome), an imbalance in function of the intrinsic and extrinsic muscles of the larynx and Nonoptimal coordination of respiration and phonation.   Although the patient initially seemed resistant  based on limited progress to date, the change in focus to improve voice quality through forward resonant tasks and coordination of respiration and phonation was significantly facilitating.  Patient demonstrated the ability to \"switch out\" of gravelly voice quality using resonant voice therapy techniques with minimal to moderate clinician support.    PLAN: I will see Ms. Stanley in approximately 2 weeks, at which point we will continue to advance the concept of resonant voice therapy by introducing conversational training therapy.     G-Codes were reported on 6/4/18    TOTAL SERVICE TIME: 60 minutes  TREATMENT (72931): 60 minutes  NO CHARGE FACILITY FEE (72313)    Ben Corey M.M., M.A., CCC-SLP  Speech-Language Pathologist  Certificate of Vocology  [phone]    "

## 2018-07-18 DIAGNOSIS — E78.49 OTHER HYPERLIPIDEMIA: ICD-10-CM

## 2018-07-18 NOTE — TELEPHONE ENCOUNTER
"Requested Prescriptions   Pending Prescriptions Disp Refills     atorvastatin (LIPITOR) 10 MG tablet  Last Written Prescription Date:  5/3/18  Last Fill Quantity: 30 tablet,  # refills: 2   Last Office Visit: 5/21/2018   Future Office Visit:      30 tablet 2     Sig: Take 1 tablet (10 mg) by mouth daily    Statins Protocol Passed    7/18/2018  4:34 PM       Passed - LDL on file in past 12 months    Recent Labs   Lab Test  04/26/18   0852   LDL  151*            Passed - No abnormal creatine kinase in past 12 months    No lab results found.            Passed - Recent (12 mo) or future (30 days) visit within the authorizing provider's specialty    Patient had office visit in the last 12 months or has a visit in the next 30 days with authorizing provider or within the authorizing provider's specialty.  See \"Patient Info\" tab in inbasket, or \"Choose Columns\" in Meds & Orders section of the refill encounter.           Passed - Patient is age 18 or older       Passed - No active pregnancy on record       Passed - No positive pregnancy test in past 12 months          "

## 2018-07-23 NOTE — TELEPHONE ENCOUNTER
Routing refill request to provider for review/approval because:  --So far ordered by [facility] only so needs [facility] PCP to order this time.    Lab Results   Component Value Date    CHOL 221 04/26/2018     Lab Results   Component Value Date    HDL 54 04/26/2018     Lab Results   Component Value Date     04/26/2018     Lab Results   Component Value Date    TRIG 81 04/26/2018     No results found for: CHOLCHRISSO

## 2018-07-25 RX ORDER — ATORVASTATIN CALCIUM 10 MG/1
10 TABLET, FILM COATED ORAL DAILY
Qty: 90 TABLET | Refills: 2 | Status: SHIPPED | OUTPATIENT
Start: 2018-07-25 | End: 2019-02-12

## 2018-07-26 ENCOUNTER — OFFICE VISIT (OUTPATIENT)
Dept: OTOLARYNGOLOGY | Facility: CLINIC | Age: 68
End: 2018-07-26
Payer: COMMERCIAL

## 2018-07-26 DIAGNOSIS — R49.0 MUSCLE TENSION DYSPHONIA: ICD-10-CM

## 2018-07-26 DIAGNOSIS — R09.89 CHRONIC THROAT CLEARING: ICD-10-CM

## 2018-07-26 DIAGNOSIS — J38.7 IRRITABLE LARYNX SYNDROME: Primary | ICD-10-CM

## 2018-07-26 NOTE — LETTER
"7/26/2018      RE: Nicole Stanley  [address] [place] [address]       [facility] VOICE CLINIC  THERAPY NOTE (CPT 05759)    Patient: Nicole Stanley  Date of Service: 7/26/2018  Referring physician: Dr. Saleem  Impressions from most recent evaluation:  \"Nicole Stanley is presenting today with R49.0 (Dysphonia) and R68.89 (Chronic Throat Clearing) in the context of J38.7 (Irritable Larynx Syndrome), an imbalance in function of the intrinsic and extrinsic muscles of the larynx and Nonoptimal coordination of respiration and phonation. Laryngeal evaluation demonstrates essentially healthy mucosa, with mild to moderate supraglottic hyperfunction during running speech.  Laryngeal function studies demonstrate reduced transglottal airflow, increased inferred sub-glottal pressure, and long breath groups, which is in keeping with perceptual ratings of increased roughness and strain. \"    SUBJECTIVE:  Since the patient's last session, they report the following:     Overall symptoms are about the same    Significant travel and disruptions to schedule have made practice challenging over the past two weeks    Trying to utilize more forward resonance in running speech.     Throat clearing is variable    OBJECTIVE:  PATIENT REPORTED MEASURES:  Patient Supplied Answers To SLP QOL Questionnaire  Therapy Quality of Life 7/26/2018   Since my l ast session, I used the speech therapy exercises and strategies as recommended by my speech pathologist. Neither agree nor disagree   I feel that using my therapy techniques has become a habit Neither agree nor disagree   I feel confident in my ability to manage my current and future symptoms. Disagree   Since my last session I feel my symptoms have --------. Stayed the same   Overall, since starting therapy I feel my symptoms are --------. About the same     Patient Specific Goal Metrics:  Dysponia SLP Goals 6/28/2018 7/26/2018   How much does your voice problem bother you? Somewhat Somewhat   How " much does your cough/throat-clearing problem bother you?            Somewhat A little bit     THERAPEUTIC ACTIVITIES    Resonant Voice Therapy (RVT) exercises to promote forward locus of resonance and optimized pattern of laryngeal adduction    Demonstrated previously assigned exercises    Good accuracy with word level and minimal clinician support    She was encouraged to advance more rapidly toward naturalistic productions    Phrase level exercises featuring nasal continuants in more complex phonemic contexts were employed    She demonstrated good accuracy with minimal to moderaet clincian support    She demonstrated a tendency towards instability of pitch associated with reduced respiratory drive cues to use voice as her speaking to someone approximately 5 feet further away were facilitating for reducing this.    Manual Laryngeal massage was performed in combination with gentle forward resonant sounds    Significant tenderness of the thyrohyoid space with corresponding reduction of space     Thyrohyoid space, and base of tongue were targeted with gentle circular massage    Sternocleidomastoid massage was also performed.    Patient was trained to focus on intentional relaxation of jaw and tongue in addition to area of massage during these maneuvers.    Comfortably quiet forward resonant /m/ on descending glides was utilized throughout massage    Self-massage was instructed and patient was able to demonstrate this with acceptable accuracy  Conversational Training Therapy     Negative practice targeting awareness of forward locus of resonance, through alternation between target voice quality and habitual voice quality    Patient was able to articulate the differences between two voice qualities, ultimately labeling them to promote patient ownership over therapy targets. She referred to them as:    Target voice - pleasant voice    Sounds - clearer, more pleasant, higher pitch    Feels - feeling like less effort,  mental focus, more lip movement    Habitual voice - gravelly / scratchy voice    Sounds - gravelly, lower in pitch    Feels - back in the throat, increases sensation of phlegm,     She was able to alternate effectively between these two voices with 75% accuracy and minimal clinician support.      A regimen for home practice was instructed.    I provided an audio recording and handouts of today's therapeutic activities to facilitate practice.    ASSESSMENT/PLAN  PROGRESS TOWARD LONG TERM GOALS:   Adequate progress; please see above    IMPRESSIONS: Nicole Stanley presents with R49.0 (Dysphonia) and R68.89 (Chronic Throat Clearing) in the context of J38.7 (Irritable Larynx Syndrome), an imbalance in function of the intrinsic and extrinsic muscles of the larynx and Nonoptimal coordination of respiration and phonation.  Despite equivocal responses to practice time and progress, she demonstrates fair adherence to therapeutic recommendations and improved awareness of target voice quality which she is carrying over to running speech.  The addition of laryngeal manual therapy and conversational training therapy will aid in advancing these goals.    PLAN: I will see Ms. Stanley in 4-6-weeks, at which point we will gauge maintenance of gains and determine the need for ongoing therapy.       G-Codes were reported on 6/4/18    TOTAL SERVICE TIME: 60 minutes  TREATMENT (71955): 60 minutes  NO CHARGE FACILITY FEE (73476)    Ben Corey M.M., M.A., CCC-SLP  Speech-Language Pathologist  Certificate of Vocology  [phone]      Juan Daniel Corey, [place]

## 2018-07-26 NOTE — PROGRESS NOTES
"[facility] VOICE CLINIC  THERAPY NOTE (CPT 17801)    Patient: Nicole Stanley  Date of Service: 7/26/2018  Referring physician: Dr. Saleem  Impressions from most recent evaluation:  \"Nicole Stanley is presenting today with R49.0 (Dysphonia) and R68.89 (Chronic Throat Clearing) in the context of J38.7 (Irritable Larynx Syndrome), an imbalance in function of the intrinsic and extrinsic muscles of the larynx and Nonoptimal coordination of respiration and phonation. Laryngeal evaluation demonstrates essentially healthy mucosa, with mild to moderate supraglottic hyperfunction during running speech.  Laryngeal function studies demonstrate reduced transglottal airflow, increased inferred sub-glottal pressure, and long breath groups, which is in keeping with perceptual ratings of increased roughness and strain. \"    SUBJECTIVE:  Since the patient's last session, they report the following:     Overall symptoms are about the same    Significant travel and disruptions to schedule have made practice challenging over the past two weeks    Trying to utilize more forward resonance in running speech.     Throat clearing is variable    OBJECTIVE:  PATIENT REPORTED MEASURES:  Patient Supplied Answers To SLP QOL Questionnaire  Therapy Quality of Life 7/26/2018   Since my l ast session, I used the speech therapy exercises and strategies as recommended by my speech pathologist. Neither agree nor disagree   I feel that using my therapy techniques has become a habit Neither agree nor disagree   I feel confident in my ability to manage my current and future symptoms. Disagree   Since my last session I feel my symptoms have --------. Stayed the same   Overall, since starting therapy I feel my symptoms are --------. About the same     Patient Specific Goal Metrics:  Dysponia SLP Goals 6/28/2018 7/26/2018   How much does your voice problem bother you? Somewhat Somewhat   How much does your cough/throat-clearing problem bother you?            Somewhat " A little bit     THERAPEUTIC ACTIVITIES    Resonant Voice Therapy (RVT) exercises to promote forward locus of resonance and optimized pattern of laryngeal adduction    Demonstrated previously assigned exercises    Good accuracy with word level and minimal clinician support    She was encouraged to advance more rapidly toward naturalistic productions    Phrase level exercises featuring nasal continuants in more complex phonemic contexts were employed    She demonstrated good accuracy with minimal to moderaet clincian support    She demonstrated a tendency towards instability of pitch associated with reduced respiratory drive cues to use voice as her speaking to someone approximately 5 feet further away were facilitating for reducing this.    Manual Laryngeal massage was performed in combination with gentle forward resonant sounds    Significant tenderness of the thyrohyoid space with corresponding reduction of space     Thyrohyoid space, and base of tongue were targeted with gentle circular massage    Sternocleidomastoid massage was also performed.    Patient was trained to focus on intentional relaxation of jaw and tongue in addition to area of massage during these maneuvers.    Comfortably quiet forward resonant /m/ on descending glides was utilized throughout massage    Self-massage was instructed and patient was able to demonstrate this with acceptable accuracy  Conversational Training Therapy     Negative practice targeting awareness of forward locus of resonance, through alternation between target voice quality and habitual voice quality    Patient was able to articulate the differences between two voice qualities, ultimately labeling them to promote patient ownership over therapy targets. She referred to them as:    Target voice - pleasant voice    Sounds - clearer, more pleasant, higher pitch    Feels - feeling like less effort, mental focus, more lip movement    Habitual voice - gravelly / scratchy  voice    Sounds - gravelly, lower in pitch    Feels - back in the throat, increases sensation of phlegm,     She was able to alternate effectively between these two voices with 75% accuracy and minimal clinician support.      A regimen for home practice was instructed.    I provided an audio recording and handouts of today's therapeutic activities to facilitate practice.    ASSESSMENT/PLAN  PROGRESS TOWARD LONG TERM GOALS:   Adequate progress; please see above    IMPRESSIONS: Nicole Stanley presents with R49.0 (Dysphonia) and R68.89 (Chronic Throat Clearing) in the context of J38.7 (Irritable Larynx Syndrome), an imbalance in function of the intrinsic and extrinsic muscles of the larynx and Nonoptimal coordination of respiration and phonation.  Despite equivocal responses to practice time and progress, she demonstrates fair adherence to therapeutic recommendations and improved awareness of target voice quality which she is carrying over to running speech.  The addition of laryngeal manual therapy and conversational training therapy will aid in advancing these goals.    PLAN: I will see Ms. Stanley in 4-6-weeks, at which point we will gauge maintenance of gains and determine the need for ongoing therapy.       G-Codes were reported on 6/4/18    TOTAL SERVICE TIME: 60 minutes  TREATMENT (22685): 60 minutes  NO CHARGE FACILITY FEE (24334)    Ben Corey M.M., M.A., CCC-SLP  Speech-Language Pathologist  Certificate of Vocology  [phone]

## 2018-07-26 NOTE — MR AVS SNAPSHOT
After Visit Summary   7/26/2018    Nicole Stanley    MRN: [id number]           Patient Information     Date Of Birth          1950        Visit Information        Provider Department      7/26/2018 10:00 AM Juan Daniel Corey S[facility]        Today's Diagnoses     Irritable larynx syndrome    -  1    Muscle tension dysphonia        Chronic throat clearing           Follow-ups after your visit        Your next 10 appointments already scheduled     Sep 05, 2018  1:00 PM CDT   (Arrive by 12:45 PM)   Return Visit with [place] ([facility] [place])    [address]  4th [facility] [id number] [phone]            Sep 26, 2018   Procedure with Simón Avila MD   [facility] [place] ([facility], [facility])    [address] [id number] [phone]           The [facility] is located on the corner of [facility] and [facility] on the [facility]. It is easily accessible from virtually any point in the [facility] area, via [employer] and [employer].            Nov 08, 2018  8:00 AM CST   LAB with  LAB   [facility] ([facility])    [address] [place] [address] [phone]           Please do not eat 10-12 hours before your appointment if you are coming in fasting for labs on lipids, cholesterol, or glucose (sugar). This does not apply to pregnant women. Water, hot tea and black coffee (with nothing added) are okay. Do not drink other fluids, diet soda or chew gum.            Nov 08, 2018  1:00 PM CST   Return Visit with Maile Leonardo MD   [facility] ([facility])    [address] [place] [address] [phone]              Who to contact     Please call your clinic at [phone] to:    Ask questions about your  health    Make or cancel appointments    Discuss your medicines    Learn about your test results    Speak to your doctor            Additional Information About Your Visit        [employer] Information     [email or web address] gives you secure access to your electronic health record. If you see a primary care provider, you can also send messages to your care team and make appointments. If you have questions, please call your primary care clinic.  If you do not have a primary care provider, please call [phone] and they will assist you.      [email or web address] is an electronic gateway that provides easy, online access to your medical records. With [email or web address], you can request a clinic appointment, read your test results, renew a prescription or communicate with your care team.     To access your existing account, please contact your [facility] or call [phone] for assistance.        Care EveryWhere ID     This is your Care EveryWhere ID. This could be used by other organizations to access your [place] medical records  [id number]        Your Vitals Were     Last Period                   (LMP Unknown)            Blood Pressure from Last 3 Encounters:   06/04/18 (!) 147/95   05/21/18 138/83   05/08/18 124/80    Weight from Last 3 Encounters:   06/04/18 66.7 kg (147 lb)   06/04/18 67 kg (147 lb 11.2 oz)   05/21/18 66.8 kg (147 lb 4 oz)              We Performed the Following     SPEECH/HEARING THERAPY, INDIVIDUAL        Primary Care Provider Office Phone # Fax #    Adriana NovaYAAKOV Zaldivar [facility] [phone] [address] [facility] [address]        Equal Access to Services     [facility] DRU AH: Hadii aad ku hadasho Soomaali, waaxda luqadaha, qaybta kaalmada adeegyada, danny funes . So [facility] [phone].    ATENCIÓN: Si habla español, tiene a michaels disposición servicios gratuitos de asistencia lingüística. Llame al [phone].    We comply with applicable  federal civil rights laws and [place] laws. We do not discriminate on the basis of race, color, national origin, age, disability, sex, sexual orientation, or gender identity.            Thank you!     Thank you for choosing [facility]  for your care. Our goal is always to provide you with excellent care. Hearing back from our patients is one way we can continue to improve our services. Please take a few minutes to complete the written survey that you may receive in the mail after your visit with us. Thank you!             Your Updated Medication List - Protect others around you: Learn how to safely use, store and throw away your medicines at www.disposemymeds.org.          This list is accurate as of 7/26/18 12:42 PM.  Always use your most recent med list.                   Brand Name Dispense Instructions for use Diagnosis    * Acidophilus/Goat Milk Caps           * PROBIOTIC COLON SUPPORT PO      Take 1 tablet by mouth daily        apixaban ANTICOAGULANT 5 MG tablet    ELIQUIS    20 tablet    Take 1 tablet (5 mg) by mouth 2 times daily    Atrial fibrillation, unspecified type (H)       atorvastatin 10 MG tablet    LIPITOR    90 tablet    Take 1 tablet (10 mg) by mouth daily    Other hyperlipidemia       calcium carbonate 1250 (500 Ca) MG Tabs tablet    OSCAL 500     Take 1 tablet by mouth 2 times daily        calcium carbonate 500 MG tablet    OS-ELISE 500 mg [place]. Ca     Take 1 tablet by mouth 2 times daily        levothyroxine 50 MCG tablet    SYNTHROID/LEVOTHROID    90 tablet    Take 1 tablet (50 mcg) by mouth daily    Acquired hypothyroidism       MAGNESIUM OXIDE PO           metoprolol succinate 25 MG 24 hr tablet    TOPROL-XL    90 tablet    Take 1 tablet (25 mg) by mouth daily        TYLENOL PO      Take 500 mg by mouth as needed for mild pain or fever        UNABLE TO FIND      MEDICATION NAME: fish oil - 1200 mg twice daily        zinc sulfate 220 (50 Zn) MG capsule    ZINCATE     Take 220 mg by mouth  daily        * Notice:  This list has 2 medication(s) that are the same as other medications prescribed for you. Read the directions carefully, and ask your doctor or other care provider to review them with you.

## 2018-08-06 ENCOUNTER — TELEPHONE (OUTPATIENT)
Dept: GASTROENTEROLOGY | Facility: CLINIC | Age: 68
End: 2018-08-06

## 2018-08-06 NOTE — TELEPHONE ENCOUNTER
Pt currently scheduled at [address] with Francisco on 9/26.    LVM for pt-- francisco will now be on service. Informed pt he is available the Wednesday before or the Wednesday after    Number provided for pt to call back

## 2018-09-05 ENCOUNTER — OFFICE VISIT (OUTPATIENT)
Dept: OTOLARYNGOLOGY | Facility: CLINIC | Age: 68
End: 2018-09-05
Payer: COMMERCIAL

## 2018-09-05 DIAGNOSIS — R49.0 MUSCLE TENSION DYSPHONIA: ICD-10-CM

## 2018-09-05 DIAGNOSIS — J38.7 IRRITABLE LARYNX SYNDROME: ICD-10-CM

## 2018-09-05 NOTE — PROGRESS NOTES
"[facility] VOICE CLINIC  THERAPY NOTE (CPT 67478)    Patient: Nicole Stanley  Date of Service: 9/5/2018  Referring physician: Dr. Saleem  Impressions from most recent evaluation:  \"Nicole Stanley is presenting today with R49.0 (Dysphonia) and R68.89 (Chronic Throat Clearing) in the context of J38.7 (Irritable Larynx Syndrome), an imbalance in function of the intrinsic and extrinsic muscles of the larynx and Nonoptimal coordination of respiration and phonation. Laryngeal evaluation demonstrates essentially healthy mucosa, with mild to moderate supraglottic hyperfunction during running speech.  Laryngeal function studies demonstrate reduced transglottal airflow, increased inferred sub-glottal pressure, and long breath groups, which is in keeping with perceptual ratings of increased roughness and strain. \"    SUBJECTIVE:  Since the patient's last session, they report the following:     Overall symptoms are significantly improved    Successes: Awareness of voice patterns and throat clearing, hasn't had any coughing / sneezing fits, she is catching herself using worse voice quality and switch    Hurdles:  Sometimes her voice gets raspy (a couple of times a week)     OBJECTIVE:  PATIENT REPORTED MEASURES:  Patient Supplied Answers To SLP QOL Questionnaire  Therapy Quality of Life 9/5/2018   Since my l ast session, I used the speech therapy exercises and strategies as recommended by my speech pathologist. Agree strongly   I feel that using my therapy techniques has become a habit Neither agree nor disagree   I feel confident in my ability to manage my current and future symptoms. Neither agree nor disagree   Since my last session I feel my symptoms have --------. Improved   Overall, since starting therapy I feel my symptoms are --------. Better   Overall, how much better? Somewhat better     Patient Specific Goal Metrics:  Dysponia SLP Goals 6/28/2018 7/26/2018 9/5/2018   How much does your voice problem bother you? Somewhat " "Somewhat A little bit   How much does your cough/throat-clearing problem bother you?            Somewhat A little bit Somewhat     Patient Supplied Answers To Last 2 VHI Questionnaires  Voice Handicap Index (VHI-10) 5/30/2018 9/5/2018   My voice makes it difficult for people to hear me 0 2   People have difficulty understanding me in a noisy room 1 2   My voice difficulties restrict my personal and social life.  2 1   I feel left out of conversations because of my voice 1 1   My voice problem causes me to lose income 0 0   I feel as though I have to strain to produce voice 1 0   The clarity of my voice is unpredictable 2 2   My voice problem upsets me 2 2   My voice makes me feel handicapped 1 0   People ask, \"What's wrong with your voice?\" 2 2   VHI-10 12 12     Patient Supplied Answers To Last 2 CSI Questionnaires  Cough Severity Index (CSI) 5/30/2018 9/5/2018   My cough is worse when I lie down 0 0   My coughing problem causes me to restrict my personal and social life 0 0   I tend to avoid places because of my cough problem 0 0   I feel embarrassed because of my coughing problem 2 2   People ask, ''What's wrong?'' because I cough a lot 2 1   I run out of air when I cough 0 0   My coughing problem affects my voice 3 1   My coughing problem limits my physical activity 0 0   My coughing problem upsets me 2 1   People ask me if I am sick because I cough a lot 2 0   CSI Score 11 5     THERAPEUTIC ACTIVITIES    Counseling and Education:    Managing symptoms on an ongoing basis    Being an impartial observer, rather than allowing herself to become hyperfocused    Long-term therapeutic regimen was developed with patient building on previously assigned therapeutic techniques   Including:    Semi-occluded vocal tract exercises as a warmup, cool down, and as needed if recognizing vocal fatigue    Ongoing use of most helpful cough suppression and substitution strategies on an as-needed basis    Maintenance of good vocal " hygiene including elements such as humidification which will become more necessary as we change seasons    Continued use of resonant voice techniques within the context of conversational training therapy    Weekly use of negative practice to reinforce target versus habitual voice quality    She stated that she kept copies of all therapeutic handouts as well as recordings made on previous sessions and she feels confident in her ability to maintain the use of optimized techniques moving forward    Although roughened voice quality was noted initially when walking to the therapy room, improved voice quality was noted subsequently and she was able to maintain this without clinician support throughout the session.    ASSESSMENT/PLAN  PROGRESS TOWARD LONG TERM GOALS:   Good progress; please see report above for objective measures    IMPRESSIONS: Nicole Stanley presents with R49.0 (Dysphonia) and R68.89 (Chronic Throat Clearing) in the context of J38.7 (Irritable Larynx Syndrome), an imbalance in function of the intrinsic and extrinsic muscles of the larynx and Nonoptimal coordination of respiration and phonation.  She has made good progress over the course of the past sessions, and felt that she did not note any particular restrictions with regards to her voice nor feel limited by current levels of cough and throat clearing.  With that in mind today's session focused on holding the provided therapeutic techniques into a regimen which can be maintained over time without undue burden on her daily life.  She stated that she feels confident in her ability to utilize these techniques moving forward and continue to make gains in terms of habituating her optimal voice quality.    PLAN: Ms. Stanley is formally discharged from therapy as of today's date.  She was encouraged to maintain contact with the clinic and reengage if her needs or status change in the future.      G-Codes:   - Functional limitation, current status, at Final  session of therapy CI - At least 1 percent but less than 20 percent impaired, limited, or restricted   - Functional limitation, projected goal status, at discharge from therapy CI - At least 1 percent but less than 20 percent impaired, limited, or restricted   - Functional limitation, discharge status, at discharge from therapy CI - At least 1 percent but less than 20 percent impaired, limited, or restricted    TOTAL SERVICE TIME: 60 minutes  TREATMENT (75381): 60 minutes  NO CHARGE FACILITY FEE (24884)    Ben Corey M.M., M.A., CCC-SLP  Speech-Language Pathologist  Certificate of Vocology  [phone]

## 2018-09-05 NOTE — MR AVS SNAPSHOT
After Visit Summary   9/5/2018    Nicole Stanley    MRN: [id number]           Patient Information     Date Of Birth          1950        Visit Information        Provider Department      9/5/2018 1:00 PM Juan Daniel Corey, [facility]        Today's Diagnoses     Irritable larynx syndrome        Muscle tension dysphonia           Follow-ups after your visit        Your next 10 appointments already scheduled     Sep 19, 2018   Procedure with Simón Avila MD   [facility] ([facility])    [address] [facility] [facility] [address] [phone]           The [facility] is located on the corner of [address] and [facility] [facility] on the [facility]. It is easily accessible from virtually any point in the [facility] area, via [address].            Nov 15, 2018  8:00 AM CST   LAB with  LAB   [facility] ([facility])    [address] [place] [address] [phone]           Please do not eat 10-12 hours before your appointment if you are coming in fasting for labs on lipids, cholesterol, or glucose (sugar). This does not apply to pregnant women. Water, hot tea and black coffee (with nothing added) are okay. Do not drink other fluids, diet soda or chew gum.            Nov 15, 2018  1:00 PM CST   Return Visit with Maile Leonardo MD   [facility] ([facility])    [address] [place] [address] [phone]              Who to contact     Please call your clinic at [phone] to:    Ask questions about your health    Make or cancel appointments    Discuss your medicines    Learn about your test results    Speak to your doctor            Additional Information About Your Visit        MyChart Information     [employer] gives you secure access to your electronic health record. If you  see a primary care provider, you can also send messages to your care team and make appointments. If you have questions, please call your primary care clinic.  If you do not have a primary care provider, please call [phone] and they will assist you.      [employer] is an electronic gateway that provides easy, online access to your medical records. With [employer], you can request a clinic appointment, read your test results, renew a prescription or communicate with your care team.     To access your existing account, please contact your [facility] or call [phone] for assistance.        Care EveryWhere ID     This is your Care EveryWhere ID. This could be used by other organizations to access your [place] medical records  [id number]        Your Vitals Were     Last Period                   (LMP Unknown)            Blood Pressure from Last 3 Encounters:   06/04/18 (!) 147/95   05/21/18 138/83   05/08/18 124/80    Weight from Last 3 Encounters:   06/04/18 66.7 kg (147 lb)   06/04/18 67 kg (147 lb 11.2 oz)   05/21/18 66.8 kg (147 lb 4 oz)              We Performed the Following     HC  SLP VOICE CURRENT STATUS     HC SLP VOICE D/C STATUS     HC SLP VOICE GOAL STATUS     SPEECH/HEARING THERAPY, INDIVIDUAL        Primary Care Provider Office Phone # Fax #    Adriana NovaYAAKOV Zaldivar [phone] [address] [id number]        Equal Access to Services     JUNIE GONSALES AH: Hadii aad ku hadasho Soomaali, waaxda luqadaha, qaybta kaalmada adeegyada, danny dunbar. So [facility] [phone].    ATENCIÓN: Si habla español, tiene a michaels disposición servicios gratuitos de asistencia lingüística. Llame al [phone].    We comply with applicable federal civil rights laws and [place] laws. We do not discriminate on the basis of race, color, national origin, age, disability, sex, sexual orientation, or gender identity.             Thank you!     Thank you for choosing [facility]  for your care. Our goal is always to provide you with excellent care. Hearing back from our patients is one way we can continue to improve our services. Please take a few minutes to complete the written survey that you may receive in the mail after your visit with us. Thank you!             Your Updated Medication List - Protect others around you: Learn how to safely use, store and throw away your medicines at www.disposemymeds.org.          This list is accurate as of 9/5/18  1:51 PM.  Always use your most recent med list.                   Brand Name Dispense Instructions for use Diagnosis    * Acidophilus/Goat Milk Caps           * PROBIOTIC COLON SUPPORT PO      Take 1 tablet by mouth daily        apixaban ANTICOAGULANT 5 MG tablet    ELIQUIS    20 tablet    Take 1 tablet (5 mg) by mouth 2 times daily    Atrial fibrillation, unspecified type (H)       atorvastatin 10 MG tablet    LIPITOR    90 tablet    Take 1 tablet (10 mg) by mouth daily    Other hyperlipidemia       calcium carbonate 500 mg {elemental} 1250 (500 Ca) MG Tabs tablet    OSCAL 500     Take 1 tablet by mouth 2 times daily        calcium carbonate 500 mg {elemental} 500 MG tablet    OS-ELISE     Take 1 tablet by mouth 2 times daily        levothyroxine 50 MCG tablet    SYNTHROID/LEVOTHROID    90 tablet    Take 1 tablet (50 mcg) by mouth daily    Acquired hypothyroidism       MAGNESIUM OXIDE PO           metoprolol succinate 25 MG 24 hr tablet    TOPROL-XL    90 tablet    Take 1 tablet (25 mg) by mouth daily        TYLENOL PO      Take 500 mg by mouth as needed for mild pain or fever        UNABLE TO FIND      MEDICATION NAME: fish oil - 1200 mg twice daily        zinc sulfate 220 (50 Zn) MG capsule    ZINCATE     Take 220 mg by mouth daily        * Notice:  This list has 2 medication(s) that are the same as other medications prescribed for you. Read the directions carefully, and ask your doctor  or other care provider to review them with you.

## 2018-09-05 NOTE — LETTER
"9/5/2018      RE: Nicole Stanley  [address] [place] [address]       [facility] VOICE CLINIC  THERAPY NOTE (CPT 57826)    Patient: Nicole Stanley  Date of Service: 9/5/2018  Referring physician: Dr. Saleem  Impressions from most recent evaluation:  \"Nicole Stanley is presenting today with R49.0 (Dysphonia) and R68.89 (Chronic Throat Clearing) in the context of J38.7 (Irritable Larynx Syndrome), an imbalance in function of the intrinsic and extrinsic muscles of the larynx and Nonoptimal coordination of respiration and phonation. Laryngeal evaluation demonstrates essentially healthy mucosa, with mild to moderate supraglottic hyperfunction during running speech.  Laryngeal function studies demonstrate reduced transglottal airflow, increased inferred sub-glottal pressure, and long breath groups, which is in keeping with perceptual ratings of increased roughness and strain. \"    SUBJECTIVE:  Since the patient's last session, they report the following:     Overall symptoms are significantly improved    Successes: Awareness of voice patterns and throat clearing, hasn't had any coughing / sneezing fits, she is catching herself using worse voice quality and switch    Hurdles:  Sometimes her voice gets raspy (a couple of times a week)     OBJECTIVE:  PATIENT REPORTED MEASURES:  Patient Supplied Answers To SLP QOL Questionnaire  Therapy Quality of Life 9/5/2018   Since my l ast session, I used the speech therapy exercises and strategies as recommended by my speech pathologist. Agree strongly   I feel that using my therapy techniques has become a habit Neither agree nor disagree   I feel confident in my ability to manage my current and future symptoms. Neither agree nor disagree   Since my last session I feel my symptoms have --------. Improved   Overall, since starting therapy I feel my symptoms are --------. Better   Overall, how much better? Somewhat better     Patient Specific Goal Metrics:  Dysponia SLP Goals 6/28/2018 " "7/26/2018 9/5/2018   How much does your voice problem bother you? Somewhat Somewhat A little bit   How much does your cough/throat-clearing problem bother you?            Somewhat A little bit Somewhat     Patient Supplied Answers To Last 2 VHI Questionnaires  Voice Handicap Index (VHI-10) 5/30/2018 9/5/2018   My voice makes it difficult for people to hear me 0 2   People have difficulty understanding me in a noisy room 1 2   My voice difficulties restrict my personal and social life.  2 1   I feel left out of conversations because of my voice 1 1   My voice problem causes me to lose income 0 0   I feel as though I have to strain to produce voice 1 0   The clarity of my voice is unpredictable 2 2   My voice problem upsets me 2 2   My voice makes me feel handicapped 1 0   People ask, \"What's wrong with your voice?\" 2 2   VHI-10 12 12     Patient Supplied Answers To Last 2 CSI Questionnaires  Cough Severity Index (CSI) 5/30/2018 9/5/2018   My cough is worse when I lie down 0 0   My coughing problem causes me to restrict my personal and social life 0 0   I tend to avoid places because of my cough problem 0 0   I feel embarrassed because of my coughing problem 2 2   People ask, ''What's wrong?'' because I cough a lot 2 1   I run out of air when I cough 0 0   My coughing problem affects my voice 3 1   My coughing problem limits my physical activity 0 0   My coughing problem upsets me 2 1   People ask me if I am sick because I cough a lot 2 0   CSI Score 11 5     THERAPEUTIC ACTIVITIES    Counseling and Education:    Managing symptoms on an ongoing basis    Being an impartial observer, rather than allowing herself to become hyperfocused    Long-term therapeutic regimen was developed with patient building on previously assigned therapeutic techniques   Including:    Semi-occluded vocal tract exercises as a warmup, cool down, and as needed if recognizing vocal fatigue    Ongoing use of most helpful cough suppression and " substitution strategies on an as-needed basis    Maintenance of good vocal hygiene including elements such as humidification which will become more necessary as we change seasons    Continued use of resonant voice techniques within the context of conversational training therapy    Weekly use of negative practice to reinforce target versus habitual voice quality    She stated that she kept copies of all therapeutic handouts as well as recordings made on previous sessions and she feels confident in her ability to maintain the use of optimized techniques moving forward    Although roughened voice quality was noted initially when walking to the therapy room, improved voice quality was noted subsequently and she was able to maintain this without clinician support throughout the session.    ASSESSMENT/PLAN  PROGRESS TOWARD LONG TERM GOALS:   Good progress; please see report above for objective measures    IMPRESSIONS: Nicole Stanley presents with R49.0 (Dysphonia) and R68.89 (Chronic Throat Clearing) in the context of J38.7 (Irritable Larynx Syndrome), an imbalance in function of the intrinsic and extrinsic muscles of the larynx and Nonoptimal coordination of respiration and phonation.  She has made good progress over the course of the past sessions, and felt that she did not note any particular restrictions with regards to her voice nor feel limited by current levels of cough and throat clearing.  With that in mind today's session focused on holding the provided therapeutic techniques into a regimen which can be maintained over time without undue burden on her daily life.  She stated that she feels confident in her ability to utilize these techniques moving forward and continue to make gains in terms of habituating her optimal voice quality.    PLAN: Ms. Stanley is formally discharged from therapy as of today's date.  She was encouraged to maintain contact with the clinic and reengage if her needs or status change in the  future.      G-Codes:   - Functional limitation, current status, at Final session of therapy CI - At least 1 percent but less than 20 percent impaired, limited, or restricted   - Functional limitation, projected goal status, at discharge from therapy CI - At least 1 percent but less than 20 percent impaired, limited, or restricted   - Functional limitation, discharge status, at discharge from therapy CI - At least 1 percent but less than 20 percent impaired, limited, or restricted    TOTAL SERVICE TIME: 60 minutes  TREATMENT (54206): 60 minutes  NO CHARGE FACILITY FEE (45531)    Ben Corey M.M., M.A., CCC-SLP  Speech-Language Pathologist  Certificate of Vocology  [phone]

## 2018-09-10 ENCOUNTER — TELEPHONE (OUTPATIENT)
Dept: GASTROENTEROLOGY | Facility: CLINIC | Age: 68
End: 2018-09-10

## 2018-09-10 DIAGNOSIS — Z12.11 SPECIAL SCREENING FOR MALIGNANT NEOPLASMS, COLON: Primary | ICD-10-CM

## 2018-09-13 DIAGNOSIS — Z12.11 ENCOUNTER FOR SCREENING COLONOSCOPY: Primary | ICD-10-CM

## 2018-09-18 RX ORDER — GLUCOSAMINE HCL 500 MG
1 TABLET ORAL DAILY
COMMUNITY

## 2018-09-19 ENCOUNTER — SURGERY (OUTPATIENT)
Age: 68
End: 2018-09-19

## 2018-09-19 ENCOUNTER — HOSPITAL ENCOUNTER (OUTPATIENT)
Facility: CLINIC | Age: 68
Discharge: HOME | End: 2018-09-19
Attending: INTERNAL MEDICINE | Admitting: INTERNAL MEDICINE
Payer: MEDICARE

## 2018-09-19 VITALS
DIASTOLIC BLOOD PRESSURE: 76 MMHG | BODY MASS INDEX: 23.32 KG/M2 | OXYGEN SATURATION: 95 % | SYSTOLIC BLOOD PRESSURE: 128 MMHG | HEIGHT: 65 IN | RESPIRATION RATE: 13 BRPM | WEIGHT: 140 LBS

## 2018-09-19 DIAGNOSIS — Z12.11 SPECIAL SCREENING FOR MALIGNANT NEOPLASMS, COLON: ICD-10-CM

## 2018-09-19 LAB — COLONOSCOPY: NORMAL

## 2018-09-19 PROCEDURE — G0500 MOD SEDAT ENDO SERVICE >5YRS: HCPCS | Performed by: INTERNAL MEDICINE

## 2018-09-19 PROCEDURE — 99153 MOD SED SAME PHYS/QHP EA: CPT | Performed by: INTERNAL MEDICINE

## 2018-09-19 PROCEDURE — 88305 TISSUE EXAM BY PATHOLOGIST: CPT | Performed by: INTERNAL MEDICINE

## 2018-09-19 PROCEDURE — 45385 COLONOSCOPY W/LESION REMOVAL: CPT | Mod: PT | Performed by: INTERNAL MEDICINE

## 2018-09-19 PROCEDURE — 25000128 H RX IP 250 OP 636: Performed by: INTERNAL MEDICINE

## 2018-09-19 RX ORDER — LIDOCAINE 40 MG/G
CREAM TOPICAL
Status: DISCONTINUED | OUTPATIENT
Start: 2018-09-19 | End: 2018-09-19 | Stop reason: HOSPADM

## 2018-09-19 RX ORDER — FLUMAZENIL 0.1 MG/ML
0.2 INJECTION, SOLUTION INTRAVENOUS
Status: CANCELLED | OUTPATIENT
Start: 2018-09-19 | End: 2018-09-20

## 2018-09-19 RX ORDER — FENTANYL CITRATE 50 UG/ML
INJECTION, SOLUTION INTRAMUSCULAR; INTRAVENOUS PRN
Status: DISCONTINUED | OUTPATIENT
Start: 2018-09-19 | End: 2018-09-19 | Stop reason: HOSPADM

## 2018-09-19 RX ORDER — NALOXONE HYDROCHLORIDE 0.4 MG/ML
.1-.4 INJECTION, SOLUTION INTRAMUSCULAR; INTRAVENOUS; SUBCUTANEOUS
Status: CANCELLED | OUTPATIENT
Start: 2018-09-19 | End: 2018-09-20

## 2018-09-19 RX ORDER — ONDANSETRON 2 MG/ML
4 INJECTION INTRAMUSCULAR; INTRAVENOUS
Status: DISCONTINUED | OUTPATIENT
Start: 2018-09-19 | End: 2018-09-19 | Stop reason: HOSPADM

## 2018-09-19 RX ORDER — ONDANSETRON 2 MG/ML
4 INJECTION INTRAMUSCULAR; INTRAVENOUS EVERY 6 HOURS PRN
Status: CANCELLED | OUTPATIENT
Start: 2018-09-19

## 2018-09-19 RX ORDER — ONDANSETRON 4 MG/1
4 TABLET, ORALLY DISINTEGRATING ORAL EVERY 6 HOURS PRN
Status: CANCELLED | OUTPATIENT
Start: 2018-09-19

## 2018-09-19 RX ADMIN — FENTANYL CITRATE 50 MCG: 50 INJECTION, SOLUTION INTRAMUSCULAR; INTRAVENOUS at 11:33

## 2018-09-19 RX ADMIN — MIDAZOLAM 1 MG: 1 INJECTION INTRAMUSCULAR; INTRAVENOUS at 12:02

## 2018-09-19 RX ADMIN — FENTANYL CITRATE 50 MCG: 50 INJECTION, SOLUTION INTRAMUSCULAR; INTRAVENOUS at 11:36

## 2018-09-19 RX ADMIN — MIDAZOLAM 1 MG: 1 INJECTION INTRAMUSCULAR; INTRAVENOUS at 11:33

## 2018-09-19 NOTE — IP AVS SNAPSHOT
[facility], [place], Endoscopy    [address] [facility] [address]    Phone:  [phone]                                       After Visit Summary   9/19/2018    Nicole Stanley    MRN: [id number]           After Visit Summary Signature Page     I have received my discharge instructions, and my questions have been answered. I have discussed any challenges I see with this plan with the nurse or doctor.    ..........................................................................................................................................  Patient/Patient Representative Signature      ..........................................................................................................................................  Patient Representative Print Name and Relationship to Patient    ..................................................               ................................................  Date                                   Time    ..........................................................................................................................................  Reviewed by Signature/Title    ...................................................              ..............................................  Date                                               Time          22EPIC Rev 08/18

## 2018-09-19 NOTE — IP AVS SNAPSHOT
MRN:[id number]                      After Visit Summary   9/19/2018    Nicole Stanley    MRN: [id number]           Thank you!     Thank you for choosing [facility] for your care. Our goal is always to provide you with excellent care. Hearing back from our patients is one way we can continue to improve our services. Please take a few minutes to complete the written survey that you may receive in the mail after you visit with us. Thank you!        Patient Information     Date Of Birth          1950        About your hospital stay     You were admitted on:  September 19, 2018 You last received care in the:  [facility], Endoscopy    You were discharged on:  September 19, 2018       Who to Call     For medical emergencies, please call 911.  For non-urgent questions about your medical care, please call your primary care provider or clinic, [phone]  For questions related to your surgery, please call your surgery clinic        Attending Provider     Provider Simón Eason MD Gastroenterology       Primary Care Provider Office Phone # Fax #    Adriana Torres YAAKOV [facility] [phone]      Your next 10 appointments already scheduled     Nov 15, 2018  8:00 AM CST   LAB with  LAB   [facility]    [address] [place] [address] [phone]           Please do not eat 10-12 hours before your appointment if you are coming in fasting for labs on lipids, cholesterol, or glucose (sugar). This does not apply to pregnant women. Water, hot tea and black coffee (with nothing added) are okay. Do not drink other fluids, diet soda or chew gum.            Nov 15, 2018  1:00 PM CST   Return Visit with Maile Leonardo MD   [facility] ([facility])    [address] [place] [address] [phone]            Nov 28, 2018 12:00 PM CST   Return Visit  "with Sheba See Juan A Song MD   [facility] ([facility])    [address]  [address]  [facility] [address] [phone]              Further instructions from your care team       Discharge Instructions after Colonoscopy  or Sigmoidoscopy    Today you had a ___x_ Colonoscopy     Activity and Diet  You were given medicine for pain. You may be dizzy or sleepy.  For 24 hours:    Do not drive or use heavy equipment.    Do not make important decisions.    Do not drink any alcohol.  You may return to your normal diet and medicines.    Discomfort    Air was placed in your colon during the exam in order to see it. Walking helps to pass the air.    You may take Tylenol (acetaminophen) for pain unless your doctor has told you not to.  Do not take aspirin or ibuprofen (Advil, Motrin, or other anti-inflammatory  drugs) for __3___ days.    Follow-up  _x___ We took small tissue samples or polyps to study. Your doctor will call you with the results  within two weeks.    When to call:    Call right away if you have:    Unusual pain in belly or chest pain not relieved with passing air.    More than 1 to 2 Tablespoons of bleeding from your rectum.    Fever above 100.6  F (37.5  C).    If you have severe pain, bleeding, or shortness of breath, go to an emergency room.    If you have questions, call:  Monday to Friday, 7 a.m. to 4:30 p.m.  Endoscopy: [phone] (We may have to call you back)    After hours  Hospital: [phone] (Ask for the GI fellow on call)    Pending Results     No orders found from 9/17/2018 to 9/20/2018.            Admission Information     Date & Time Provider Department Dept. Phone    9/19/2018 Simón Avila MD [facility] [place], Endoscopy [phone]      Your Vitals Were     Blood Pressure Respirations Height Weight Last Period Pulse Oximetry    141/85 12 1.651 m (5' 5\") 63.5 kg (140 lb) (LMP Unknown) 95%    BMI (Body Mass Index)             "       23.3 kg/m2           [facility] Information     [facility] gives you secure access to your electronic health record. If you see a primary care provider, you can also send messages to your care team and make appointments. If you have questions, please call your primary care clinic.  If you do not have a primary care provider, please call [phone] and they will assist you.        Care EveryWhere ID     This is your Care EveryWhere ID. This could be used by other organizations to access your [facility] medical records  [id number]        Equal Access to Services     JUNIE GONSALES : Hadii judith ku hadasho Soomaali, waaxda luqadaha, qaybta kaalmada aderossyageoff, danny funes . So [facility] [phone].    ATENCIÓN: Si habla español, tiene a michaels disposición servicios gratuitos de asistencia lingüística. Llame al [phone].    We comply with applicable federal civil rights laws and [place] laws. We do not discriminate on the basis of race, color, national origin, age, disability, sex, sexual orientation, or gender identity.               Review of your medicines      UNREVIEWED medicines. Ask your doctor about these medicines        Dose / Directions    * Acidophilus/Goat Milk Caps        Refills:  0       * PROBIOTIC COLON SUPPORT PO        Dose:  1 tablet   Take 1 tablet by mouth daily   Refills:  0       apixaban ANTICOAGULANT 5 MG tablet   Commonly known as:  ELIQUIS   Used for:  Atrial fibrillation, unspecified type (H)        Dose:  5 mg   Take 1 tablet (5 mg) by mouth 2 times daily   Quantity:  20 tablet   Refills:  0       atorvastatin 10 MG tablet   Commonly known as:  LIPITOR   Used for:  Other hyperlipidemia        Dose:  10 mg   Take 1 tablet (10 mg) by mouth daily   Quantity:  90 tablet   Refills:  2       calcium carbonate 500 mg {elemental} 1250 (500 Ca) MG Tabs tablet   Commonly known as:  OSCAL 500        Dose:  1 tablet   Take 1 tablet by mouth 2 times daily   Refills:  0        calcium carbonate 500 mg {elemental} 500 MG tablet   Commonly known as:  OS-ELISE        Dose:  1 tablet   Take 1 tablet by mouth 2 times daily   Refills:  0       levothyroxine 50 MCG tablet   Commonly known as:  SYNTHROID/LEVOTHROID   Used for:  Acquired hypothyroidism        Dose:  50 mcg   Take 1 tablet (50 mcg) by mouth daily   Quantity:  90 tablet   Refills:  3       MAGNESIUM OXIDE PO        Refills:  0       metoprolol succinate 25 MG 24 hr tablet   Commonly known as:  TOPROL-XL        Dose:  25 mg   Take 1 tablet (25 mg) by mouth daily   Quantity:  90 tablet   Refills:  3       TYLENOL PO        Dose:  500 mg   Take 500 mg by mouth as needed for mild pain or fever   Refills:  0       UNABLE TO FIND        MEDICATION NAME: fish oil - 1200 mg twice daily   Refills:  0       Vitamin D3 3000 units Tabs        Dose:  1 tablet   Take 1 tablet by mouth daily   Refills:  0       zinc sulfate 220 (50 Zn) MG capsule   Commonly known as:  ZINCATE        Dose:  220 mg   Take 220 mg by mouth daily   Refills:  0       * Notice:  This list has 2 medication(s) that are the same as other medications prescribed for you. Read the directions carefully, and ask your doctor or other care provider to review them with you.      START taking        Dose / Directions    polyethylene glycol 236 g suspension   Commonly known as:  GoLYTELY/NuLYTELY   Used for:  Special screening for malignant neoplasms, colon        Dose:  4 L   Take 4,000 mLs (4 L) by mouth once for 1 dose   Quantity:  4000 mL   Refills:  0            Where to get your medicines      These medications were sent to [facility] [address] [place] [address], [address]) [place] [address]     Phone:  [phone]     polyethylene glycol 236 g suspension                Protect others around you: Learn how to safely use, store and throw away your medicines at www.disposemymeds.org.             Medication List: This  is a list of all your medications and when to take them. Check marks below indicate your daily home schedule. Keep this list as a reference.      Medications           Morning Afternoon Evening Bedtime As Needed    * Acidophilus/Goat Milk Caps                                * PROBIOTIC COLON SUPPORT PO   Take 1 tablet by mouth daily                                apixaban ANTICOAGULANT 5 MG tablet   Commonly known as:  ELIQUIS   Take 1 tablet (5 mg) by mouth 2 times daily                                atorvastatin 10 MG tablet   Commonly known as:  LIPITOR   Take 1 tablet (10 mg) by mouth daily                                calcium carbonate 500 mg {elemental} 1250 (500 Ca) MG Tabs tablet   Commonly known as:  OSCAL 500   Take 1 tablet by mouth 2 times daily                                calcium carbonate 500 mg {elemental} 500 MG tablet   Commonly known as:  OS-ELISE   Take 1 tablet by mouth 2 times daily                                levothyroxine 50 MCG tablet   Commonly known as:  SYNTHROID/LEVOTHROID   Take 1 tablet (50 mcg) by mouth daily                                MAGNESIUM OXIDE PO                                metoprolol succinate 25 MG 24 hr tablet   Commonly known as:  TOPROL-XL   Take 1 tablet (25 mg) by mouth daily                                polyethylene glycol 236 g suspension   Commonly known as:  GoLYTELY/NuLYTELY   Take 4,000 mLs (4 L) by mouth once for 1 dose                                TYLENOL PO   Take 500 mg by mouth as needed for mild pain or fever                                UNABLE TO FIND   MEDICATION NAME: fish oil - 1200 mg twice daily                                Vitamin D3 3000 units Tabs   Take 1 tablet by mouth daily                                zinc sulfate 220 (50 Zn) MG capsule   Commonly known as:  ZINCATE   Take 220 mg by mouth daily                                * Notice:  This list has 2 medication(s) that are the same as other medications prescribed for  you. Read the directions carefully, and ask your doctor or other care provider to review them with you.              More Information        Diverticulosis    Diverticulosis means that small pouches have formed in the wall of your large intestine (colon). Most often, this problem causes no symptoms and is common as people age. But the pouches in the colon are at risk of becoming infected. When this happens, the condition is called diverticulitis. Although most people with diverticulosis never develop diverticulitis, it is still not uncommon. Rectal bleeding can also occur and in less common situations, a type of colon inflammation called colitis.  While most people do not have symptoms, some people with diverticulosis may have:    Abdominal cramps and pain    Bloating    Constipation    Change in bowel habits  Causes  The exact cause of diverticulosis (and diverticulitis) has not been proved, but a few things are associated with the condition:    Low-fiber diet    Constipation    Lack of exercise  Your healthcare provider will talk with you about how to manage your condition. Diet changes may be all that are needed to help control diverticulosis and prevent progression to diverticulitis. If you develop diverticulitis, you will likely need other treatments.  Home care  You may be told to take fiber supplements daily. Fiber adds bulk to the stool so that it passes through the colon more easily. Stool softeners may be recommended. You may also be given medications for pain relief. Be sure to take all medications as directed.  In the past, people were told to avoid corn, nuts, and seeds. This is no longer necessary.  Follow these guidelines when caring for yourself at home:    Eat unprocessed foods that are high in fiber. Whole grains, fruits, and vegetables are good choices.    Drink 6 to 8 glasses of water every day unless your healthcare provider has you limit how much fluid you should have.    Watch for changes in  your bowel movements. Tell your provider if you notice any changes.    Begin an exercise program. Ask your provider how to get started. Generally, walking is the best.    Get plenty of rest and sleep.  Follow-up care  Follow up with your healthcare provider, or as advised. Regular visits may be needed to check on your health. Sometimes special procedures such as colonoscopy, are needed after an episode of diverticulitis or blooding. Be sure to keep all your appointments.  If a stool sample was taken, or cultures were done, you should be told if they are positive, or if your treatment needs to be changed. You can call as directed for the results.  If X-rays were done, a radiologist will look at them. You will be told if there is a change in your treatment.  If antibiotics were prescribed, be sure to finish them all.  When to seek medical advice  Call your healthcare provider right away if any of these occur:    Fever of 100.4 F (38 C) or higher, or as directed by your healthcare provider    Severe cramps in the lower left side of the abdomen or pain that is getting worse    Tenderness in the lower left side of the abdomen or worsening pain throughout the abdomen    Diarrhea or constipation that doesn't get better within 24 hours    Nausea and vomiting    Bleeding from the rectum  Call 911  Call 911 if any of the following occur:    Trouble breathing    Confusion    Very drowsy or trouble awakening    Fainting or loss of consciousness    Rapid heart rate    Chest pain  Date Last Reviewed: 12/30/2015 2000-2017 [employer]. [address] [place] [address], [place] [address]. All rights reserved. This information is not intended as a substitute for professional medical care. Always follow your healthcare professional's instructions.

## 2018-09-19 NOTE — DISCHARGE INSTRUCTIONS
Discharge Instructions after Colonoscopy  or Sigmoidoscopy    Today you had a ___x_ Colonoscopy     Activity and Diet  You were given medicine for pain. You may be dizzy or sleepy.  For 24 hours:    Do not drive or use heavy equipment.    Do not make important decisions.    Do not drink any alcohol.  You may return to your normal diet and medicines.    Discomfort    Air was placed in your colon during the exam in order to see it. Walking helps to pass the air.    You may take Tylenol (acetaminophen) for pain unless your doctor has told you not to.  Do not take aspirin or ibuprofen (Advil, Motrin, or other anti-inflammatory  drugs) for __3___ days.    Follow-up  _x___ We took small tissue samples or polyps to study. Your doctor will call you with the results  within two weeks.    When to call:    Call right away if you have:    Unusual pain in belly or chest pain not relieved with passing air.    More than 1 to 2 Tablespoons of bleeding from your rectum.    Fever above 100.6  F (37.5  C).    If you have severe pain, bleeding, or shortness of breath, go to an emergency room.    If you have questions, call:  Monday to Friday, 7 a.m. to 4:30 p.m.  Endoscopy: [phone] (We may have to call you back)    After hours  Hospital: [phone] (Ask for the GI fellow on call)

## 2018-09-24 LAB — COPATH REPORT: NORMAL

## 2018-10-17 ENCOUNTER — TELEPHONE (OUTPATIENT)
Dept: GASTROENTEROLOGY | Facility: CLINIC | Age: 68
End: 2018-10-17

## 2018-10-17 DIAGNOSIS — Z86.0101 HISTORY OF ADENOMATOUS POLYP OF COLON: Primary | ICD-10-CM

## 2018-10-17 NOTE — TELEPHONE ENCOUNTER
Please arrange for repeat colonoscopy in 5 years in GI lab with IV sedation. Pt currently on Eliquis.    Order placed.

## 2018-11-01 ENCOUNTER — TELEPHONE (OUTPATIENT)
Dept: FAMILY MEDICINE | Facility: CLINIC | Age: 68
End: 2018-11-01

## 2018-11-01 ENCOUNTER — MYC MEDICAL ADVICE (OUTPATIENT)
Dept: FAMILY MEDICINE | Facility: CLINIC | Age: 68
End: 2018-11-01

## 2018-11-01 DIAGNOSIS — H91.90 CHANGE IN HEARING, UNSPECIFIED LATERALITY: Primary | ICD-10-CM

## 2018-11-01 NOTE — TELEPHONE ENCOUNTER
Dr. Allison     Patient requesting order for audiology - pended please review and sign if okay     Thank you,   Ainsley Key Registered Nurse   [facility]

## 2018-11-02 NOTE — TELEPHONE ENCOUNTER
[employer] message sent to patient with referral information.    Lexy Cornejo, RN  Triage Nurse

## 2018-11-15 ENCOUNTER — OFFICE VISIT (OUTPATIENT)
Dept: CARDIOLOGY | Facility: CLINIC | Age: 68
End: 2018-11-15
Payer: COMMERCIAL

## 2018-11-15 VITALS
SYSTOLIC BLOOD PRESSURE: 123 MMHG | HEART RATE: 58 BPM | OXYGEN SATURATION: 99 % | BODY MASS INDEX: 24.59 KG/M2 | WEIGHT: 147.75 LBS | DIASTOLIC BLOOD PRESSURE: 77 MMHG

## 2018-11-15 DIAGNOSIS — E78.49 OTHER HYPERLIPIDEMIA: ICD-10-CM

## 2018-11-15 DIAGNOSIS — I10 ESSENTIAL HYPERTENSION: ICD-10-CM

## 2018-11-15 DIAGNOSIS — I48.0 PAROXYSMAL ATRIAL FIBRILLATION (H): ICD-10-CM

## 2018-11-15 DIAGNOSIS — I48.0 PAROXYSMAL ATRIAL FIBRILLATION (H): Primary | ICD-10-CM

## 2018-11-15 LAB
ANION GAP SERPL CALCULATED.3IONS-SCNC: 8 MMOL/L (ref 3–14)
BUN SERPL-MCNC: 17 MG/DL (ref 7–30)
CALCIUM SERPL-MCNC: 9.3 MG/DL (ref 8.5–10.1)
CHLORIDE SERPL-SCNC: 103 MMOL/L (ref 94–109)
CHOLEST SERPL-MCNC: 213 MG/DL
CO2 SERPL-SCNC: 28 MMOL/L (ref 20–32)
CREAT SERPL-MCNC: 0.87 MG/DL (ref 0.52–1.04)
GFR SERPL CREATININE-BSD FRML MDRD: 65 ML/MIN/1.7M2
GLUCOSE SERPL-MCNC: 77 MG/DL (ref 70–99)
HDLC SERPL-MCNC: 88 MG/DL
LDLC SERPL CALC-MCNC: 105 MG/DL
NONHDLC SERPL-MCNC: 125 MG/DL
POTASSIUM SERPL-SCNC: 4.1 MMOL/L (ref 3.4–5.3)
SODIUM SERPL-SCNC: 139 MMOL/L (ref 133–144)
TRIGL SERPL-MCNC: 101 MG/DL

## 2018-11-15 PROCEDURE — 93000 ELECTROCARDIOGRAM COMPLETE: CPT | Performed by: INTERNAL MEDICINE

## 2018-11-15 PROCEDURE — 80061 LIPID PANEL: CPT | Performed by: INTERNAL MEDICINE

## 2018-11-15 PROCEDURE — 80048 BASIC METABOLIC PNL TOTAL CA: CPT | Performed by: INTERNAL MEDICINE

## 2018-11-15 PROCEDURE — 36415 COLL VENOUS BLD VENIPUNCTURE: CPT | Performed by: INTERNAL MEDICINE

## 2018-11-15 PROCEDURE — 99214 OFFICE O/P EST MOD 30 MIN: CPT | Performed by: INTERNAL MEDICINE

## 2018-11-15 NOTE — PATIENT INSTRUCTIONS
"You were seen today in the Cardiovascular Clinic at [facility].     Cardiology Providers you saw during your visit: Dr. Maile Leonardo    Diagnosis:  Atrial fibrillation    Results: discussed with patient      Orders:   Blood test every 6 months (next May 2019): BMP    Medication Changes:   none    Recommendations:   As above    Follow-up:  As needed       Please feel free to call me with any questions or concerns.        Questions and [phone].   First press #1 for the [employer] and then press #3 for \"Medical Questions\" to reach us Cardiology Nurses.      On Call Cardiologist for after hours or on weekends: [phone]   option #4 and ask to speak to the on-call Cardiologist.          If you need a medication refill please contact your pharmacy.  Please allow 3 business days for your refill to be completed.    "

## 2018-11-15 NOTE — LETTER
11/15/2018      RE: Nicole Stanley  [address]  [place] [address]       Dear Colleague,    Thank you for the opportunity to participate in the care of your patient, Nicole Stanley, at the [facility]. Please see a copy of my visit note below.    I am delighted to see Nicole Stanley for follow up of atrial fibrillation.     As you know, the patient is a 67 year old  Female whom I met in the hospital 4/18/18 when she presented with URI symptoms, found to be positive for influenza B, and was in AF with RVR, hemodynamically stable. She was on HCTZ at home for HTN, which was switched to metoprolol. [id number]-[id number] score is 3 for hypertension, age > 65, female. Apixaban started. She was sent home, still in AF but rates were better controlled. Spontaneously converted to sinus after discharge. Asymptomatic. We continued apixaban and metoprolol, and added low intensity statin for CV disease prevention. She has been tolerating her medications without any side effects.    I last saw her 5/3/18. Since then, she has not had any hospitalizations for cardiac reasons. She remains very active in her daily life without symptoms of chest pressure, palpitations, dizziness, dyspnea. This past fall she spent a few weeks in [place] hiking and had dyspnea at high altitude, associated with what she thought was fast and irregular pulse. Did not interfere with her hiking plans. No issues when she returned to [place]. She's had dyspnea with hiking at high altitudes in the past.     The following portions of the patient's history were reviewed and updated as appropriate: allergies, current medications, past family history, past medical history, past social history, past surgical history, and the problem list.    Past Medical History:  1. Atrial fibrillation. Initial diagnosis 4/2018 in the setting of Influenza B. Spontaneously converted to sinus  rhythm.  2. Hypertension.  3. Hyperlipidemia. She had previously been on simvastatin but had some muscle aches. She is reluctant to add more medications.  4. Hypothyroidism.  5. Cholecystectomy  6. Vein stripping     Allergies:  No Known Allergies    Medications:   Apixaban 5 mg bid  Metoprolol XL 25 mg qd  Synthroid 50 mcg every day  Atorvastatin 10 qd    Family History:   Family History   Problem Relation Age of Onset     Hypertension Mother           Hyperlipidemia Mother      Family History Negative Father           Alzheimer Disease Father      Family History Negative Maternal Grandmother              Psychosocial history:  reports that she has never smoked. She has never used smokeless tobacco. She reports that she drinks alcohol. She reports that she does not use illicit drugs.    Review of systems: Cardiovascular - no chest pain, palpitations, dizziness, shortness of breath, dyspnea, orthopnea, PND.    In addition,   Constitutional: No change in weight, sleep or appetite.  Normal energy.  No fever or chills  Eyes: Negative for vision changes or eye problems  ENT: No problems with ears, nose or throat.  No difficulty swallowing.  Resp: No coughing, wheezing or shortness of breath  GI: No nausea, vomiting,  heartburn, abdominal pain, diarrhea, constipation or change in bowel habits  : No urinary frequency or dysuria, bladder or kidney problems  Musculoskeletal: No significant muscle or joint pains  Neurologic: No headaches, numbness, tingling, weakness, problems with balance or coordination  Psychiatric: No problems with anxiety, depression or mental health  Heme/immune/allergy: No history of bleeding or clotting problems or anemia.  No allergies or immune system problems  Integumentary: No rashes,worrisome lesions or skin problems      Physical examination  Vitals: /77  Pulse 58  Wt 67 kg (147 lb 12 oz)  LMP  (LMP Unknown)  SpO2 99%  BMI 24.59 kg/m2  BMI= Body mass  index is 24.59 kg/(m^2).    Constitutional: In general, the patient is a pleasant female in no apparent distress.    Eyes: PERRLA.  EOMI.  Sclerae white, not injected.  ENT/mouth: Normiocephalic and atraumatic.  Nares clear.  Pharynx without erythema or exudate.  Dentition intact.  No adenopathy.  No thyromegaly. Carotids +2/2 bilaterally without bruits.  No jugular venous distension.   Card/Vasc: The PMI is in the 5th ICS in the midclavicular line. There is no heave. Regular rate and rhythm. Normal S1, S2. No murmur, rub, click, or gallop. Pulses are normal bilaterally throughout. No peripheral edema.  Respiratory: Clear to asculation.  No ronchi, wheezes, rales.  No dullness to percussion.   GI: Abdomen is soft, nontender, nondistended. No organomegaly. No AAA.  No bruits.   Integument: No significant bruises or rashes  Neurological: The neurological examination reveal a patient who was oriented to person, place, and time.    Psych: Normal  Heme/Lymph/Immun: no significant adenopathy      I have reviewed the following labs/imaging:  Echo 4/19/18 (in AF): normal LV/RV, no valvular abnormalities  Labs: 4/26/18: fasting lipid profile: cholesterol 221, HDL 54, , TG 81  4/19/18: cr 0.8, K 4.1, hgb 13, plt 155K, TSH 2.62, ALT 25, AST 16    I have personally and independently reviewed the following:  EKG 11/15/18: sinus bradycardia 55 bpm, normal intervals  EKG 4/13/18:  bpm    Assessment :  1. Atrial fibrillation. Paroxysmal. Asymptomatic. [id number]-EIBd score is 3. Continue apixaban 5 mg bid. It is not clear that her symptoms while hiking in [place] are due to recurrent PAF; symptoms did not alter her activity levels. She is tolerating well. BP/HR stable on low dose metoprolol. Continue at 25 mg qd. BMP pending today.  2. Hypertension. Controlled.  3. Hyperlipidemia. Tolerating low intensity statin. Lipid panel today.    Plan:  1. Continue all current meds  2. BMP every 6 months to assess creatinine as long  as she is on apixaban - I will place a standing order.    I spent a total of 30 minutes face to face with  Nicole Stanley during today's office visit. Over 50% of this time was spent counseling the patient and/or coordinating care regarding AF management.      The patient is to return as needed. She will f/u with Dr. Allison for primary care. The patient understood the treatment plan as outlined above.  There were no barriers to learning.      Maile Leonardo MD

## 2018-11-15 NOTE — PROGRESS NOTES
I am delighted to see Nicole Stanley for follow up of atrial fibrillation.     As you know, the patient is a 67 year old  Female whom I met in the hospital 4/18/18 when she presented with URI symptoms, found to be positive for influenza B, and was in AF with RVR, hemodynamically stable. She was on HCTZ at home for HTN, which was switched to metoprolol. [id number]-[id number] score is 3 for hypertension, age > 65, female. Apixaban started. She was sent home, still in AF but rates were better controlled. Spontaneously converted to sinus after discharge. Asymptomatic. We continued apixaban and metoprolol, and added low intensity statin for CV disease prevention. She has been tolerating her medications without any side effects.    I last saw her 5/3/18. Since then, she has not had any hospitalizations for cardiac reasons. She remains very active in her daily life without symptoms of chest pressure, palpitations, dizziness, dyspnea. This past fall she spent a few weeks in [place] hiking and had dyspnea at high altitude, associated with what she thought was fast and irregular pulse. Did not interfere with her hiking plans. No issues when she returned to [place]. She's had dyspnea with hiking at high altitudes in the past.     The following portions of the patient's history were reviewed and updated as appropriate: allergies, current medications, past family history, past medical history, past social history, past surgical history, and the problem list.    Past Medical History:  1. Atrial fibrillation. Initial diagnosis 4/2018 in the setting of Influenza B. Spontaneously converted to sinus rhythm.  2. Hypertension.  3. Hyperlipidemia. She had previously been on simvastatin but had some muscle aches. She is reluctant to add more medications.  4. Hypothyroidism.  5. Cholecystectomy  6. Vein stripping 1988    Allergies:  No Known Allergies    Medications:   Apixaban 5 mg bid  Metoprolol XL 25 mg qd  Synthroid 50 mcg every  day  Atorvastatin 10 qd    Family History:   Family History   Problem Relation Age of Onset     Hypertension Mother           Hyperlipidemia Mother      Family History Negative Father           Alzheimer Disease Father      Family History Negative Maternal Grandmother              Psychosocial history:  reports that she has never smoked. She has never used smokeless tobacco. She reports that she drinks alcohol. She reports that she does not use illicit drugs.    Review of systems: Cardiovascular - no chest pain, palpitations, dizziness, shortness of breath, dyspnea, orthopnea, PND.    In addition,   Constitutional: No change in weight, sleep or appetite.  Normal energy.  No fever or chills  Eyes: Negative for vision changes or eye problems  ENT: No problems with ears, nose or throat.  No difficulty swallowing.  Resp: No coughing, wheezing or shortness of breath  GI: No nausea, vomiting,  heartburn, abdominal pain, diarrhea, constipation or change in bowel habits  : No urinary frequency or dysuria, bladder or kidney problems  Musculoskeletal: No significant muscle or joint pains  Neurologic: No headaches, numbness, tingling, weakness, problems with balance or coordination  Psychiatric: No problems with anxiety, depression or mental health  Heme/immune/allergy: No history of bleeding or clotting problems or anemia.  No allergies or immune system problems  Integumentary: No rashes,worrisome lesions or skin problems      Physical examination  Vitals: /77  Pulse 58  Wt 67 kg (147 lb 12 oz)  LMP  (LMP Unknown)  SpO2 99%  BMI 24.59 kg/m2  BMI= Body mass index is 24.59 kg/(m^2).    Constitutional: In general, the patient is a pleasant female in no apparent distress.    Eyes: PERRLA.  EOMI.  Sclerae white, not injected.  ENT/mouth: Normiocephalic and atraumatic.  Nares clear.  Pharynx without erythema or exudate.  Dentition intact.  No adenopathy.  No thyromegaly. Carotids +2/2  bilaterally without bruits.  No jugular venous distension.   Card/Vasc: The PMI is in the 5th ICS in the midclavicular line. There is no heave. Regular rate and rhythm. Normal S1, S2. No murmur, rub, click, or gallop. Pulses are normal bilaterally throughout. No peripheral edema.  Respiratory: Clear to asculation.  No ronchi, wheezes, rales.  No dullness to percussion.   GI: Abdomen is soft, nontender, nondistended. No organomegaly. No AAA.  No bruits.   Integument: No significant bruises or rashes  Neurological: The neurological examination reveal a patient who was oriented to person, place, and time.    Psych: Normal  Heme/Lymph/Immun: no significant adenopathy      I have reviewed the following labs/imaging:  Echo 4/19/18 (in AF): normal LV/RV, no valvular abnormalities  Labs: 4/26/18: fasting lipid profile: cholesterol 221, HDL 54, , TG 81  4/19/18: cr 0.8, K 4.1, hgb 13, plt 155K, TSH 2.62, ALT 25, AST 16    I have personally and independently reviewed the following:  EKG 11/15/18: sinus bradycardia 55 bpm, normal intervals  EKG 4/13/18:  bpm    Assessment :  1. Atrial fibrillation. Paroxysmal. Asymptomatic. APB7BU0-QERe score is 3. Continue apixaban 5 mg bid. It is not clear that her symptoms while hiking in [place] are due to recurrent PAF; symptoms did not alter her activity levels. She is tolerating well. BP/HR stable on low dose metoprolol. Continue at 25 mg qd. BMP pending today.  2. Hypertension. Controlled.  3. Hyperlipidemia. Tolerating low intensity statin. Lipid panel today.    Plan:  1. Continue all current meds  2. BMP every 6 months to assess creatinine as long as she is on apixaban - I will place a standing order.    I spent a total of 30 minutes face to face with  Nicole Stanley during today's office visit. Over 50% of this time was spent counseling the patient and/or coordinating care regarding AF management.      The patient is to return as needed. She will f/u with Dr. Allison for  primary care. The patient understood the treatment plan as outlined above.  There were no barriers to learning.      Maile Leonardo MD

## 2018-11-15 NOTE — MR AVS SNAPSHOT
"              After Visit Summary   11/15/2018    Nicole Stanley    MRN: [id number]           Patient Information     Date Of Birth          1950        Visit Information        Provider Department      11/15/2018 1:00 PM Maile Leonardo MD [facility]        Today's Diagnoses     Atrial fibrillation, unspecified type (H)    -  1      Care Instructions    You were seen today in the Cardiovascular Clinic at [facility].     Cardiology Providers you saw during your visit: Dr. Maile Leonardo    Diagnosis:  Atrial fibrillation    Results: discussed with patient      Orders:   Blood test every 6 months (next May 2019): BMP    Medication Changes:   none    Recommendations:   As above    Follow-up:  As needed       Please feel free to call me with any questions or concerns.        Questions and [phone].   First press #1 for the [employer] and then press #3 for \"Medical Questions\" to reach us Cardiology Nurses.      On Call Cardiologist for after hours or on weekends: [phone]   option #4 and ask to speak to the on-call Cardiologist.          If you need a medication refill please contact your pharmacy.  Please allow 3 business days for your refill to be completed.            Follow-ups after your visit        Your next 10 appointments already scheduled     2018  1:00 PM CST   (Arrive by 12:45 PM)   Hearing Evaluation with [facility] ([facility])    [address] [place] [id number] [phone]           Please see your medical professional for ear cleaning prior to this appointment if you believe wax buildup may be an issue. All patients are required to have a physician's order stating the medical reason for the hearing test. Your doctor can send an electronic order, use their own form or we have provided a form (called Physician's Order for Audiology Services). " It states that there is a medical reason for your exam. Without an order you may need to be rescheduled until the order can be obtained.            Nov 28, 2018 12:00 PM CST   Return Visit with Sheba Song MD   [facility] ([facility])    [address]  [address]  [facility] [address] [phone]              Who to contact     If you have questions or need follow up information about today's clinic visit or your schedule please contact [facility]   [facility] directly at [phone].  Normal or non-critical lab and imaging results will be communicated to you by [employer]art, letter or phone within 4 business days after the clinic has received the results. If you do not hear from us within 7 days, please contact the clinic through [employer] or phone. If you have a critical or abnormal lab result, we will notify you by phone as soon as possible.  Submit refill requests through [employer] or call your pharmacy and they will forward the refill request to us. Please allow 3 business days for your refill to be completed.          Additional Information About Your Visit        Sc[employer]ishart Information     [employer] gives you secure access to your electronic health record. If you see a primary care provider, you can also send messages to your care team and make appointments. If you have questions, please call your primary care clinic.  If you do not have a primary care provider, please call [phone] and they will assist you.        Care EveryWhere ID     This is your Care EveryWhere ID. This could be used by other organizations to access your [place] medical records  [id number]        Your Vitals Were     Pulse Last Period Pulse Oximetry BMI (Body Mass Index)          58 (LMP Unknown) 99% 24.59 kg/m2         Blood Pressure from Last 3 Encounters:   11/15/18 123/77   09/19/18 128/76   06/04/18 (!) 147/95    Weight from Last 3  Encounters:   11/15/18 147 lb 12 oz (67 kg)   09/19/18 140 lb (63.5 kg)   06/04/18 147 lb (66.7 kg)              We Performed the Following     EKG 12-lead complete w/read - Clinics          Where to get your medicines      These medications were sent to [facility] [address] [place] [address]), [place] - [address] [facility]  [address] [facility]) [place] [address]     Phone:  [phone]     apixaban ANTICOAGULANT 5 MG tablet          Primary Care Provider Office Phone # Fax #    Lauryn Allison [phone]       [address]  [facility] [address]        Equal Access to Services     JUNIE GONSALES : Hadii judith bear hadasho Sosuzyali, waaxda luqadaha, qaybta kaalmada adeegyada, waxay baudilioin cedric dunbar. So [facility] [phone].    ATENCIÓN: Si habla español, tiene a michaels disposición servicios gratuitos de asistencia lingüística. [facility] [phone].    We comply with applicable federal civil rights laws and [place] laws. We do not discriminate on the basis of race, color, national origin, age, disability, sex, sexual orientation, or gender identity.            Thank you!     Thank you for choosing [facility]  for your care. Our goal is always to provide you with excellent care. Hearing back from our patients is one way we can continue to improve our services. Please take a few minutes to complete the written survey that you may receive in the mail after your visit with us. Thank you!             Your Updated Medication List - Protect others around you: Learn how to safely use, store and throw away your medicines at www.disposemymeds.org.          This list is accurate as of 11/15/18  1:32 PM.  Always use your most recent med list.                   Brand Name Dispense Instructions for use Diagnosis    * Acidophilus/Goat Milk Caps           * PROBIOTIC COLON SUPPORT PO      Take 1 tablet by mouth daily        apixaban  ANTICOAGULANT 5 MG tablet    ELIQUIS    20 tablet    Take 1 tablet (5 mg) by mouth 2 times daily    Atrial fibrillation, unspecified type (H)       atorvastatin 10 MG tablet    LIPITOR    90 tablet    Take 1 tablet (10 mg) by mouth daily    Other hyperlipidemia       calcium carbonate 500 mg (elemental) 1250 (500 Ca) MG Tabs tablet    OSCAL 500     Take 1 tablet by mouth 2 times daily        calcium carbonate 500 mg (elemental) 500 MG tablet    OS-ELISE     Take 1 tablet by mouth 2 times daily        levothyroxine 50 MCG tablet    SYNTHROID/LEVOTHROID    90 tablet    Take 1 tablet (50 mcg) by mouth daily    Acquired hypothyroidism       MAGNESIUM OXIDE PO           metoprolol succinate 25 MG 24 hr tablet    TOPROL-XL    90 tablet    Take 1 tablet (25 mg) by mouth daily        TYLENOL PO      Take 500 mg by mouth as needed for mild pain or fever        UNABLE TO FIND      MEDICATION NAME: fish oil - 1200 mg twice daily        Vitamin D3 3000 units Tabs      Take 1 tablet by mouth daily        zinc sulfate 220 (50 Zn) MG capsule    ZINCATE     Take 220 mg by mouth daily        * Notice:  This list has 2 medication(s) that are the same as other medications prescribed for you. Read the directions carefully, and ask your doctor or other care provider to review them with you.

## 2018-11-20 ENCOUNTER — OFFICE VISIT (OUTPATIENT)
Dept: AUDIOLOGY | Facility: CLINIC | Age: 68
End: 2018-11-20
Payer: COMMERCIAL

## 2018-11-20 DIAGNOSIS — H90.3 BILATERAL SENSORINEURAL HEARING LOSS: Primary | ICD-10-CM

## 2018-11-20 NOTE — PROGRESS NOTES
AUDIOLOGY REPORT    SUBJECTIVE: Nicole Stanley is a 68 year old female who was seen in Audiology at the [facility] for audiologic evaluation, referred by Anjelica Resendiz PA-C. The patient reports gradual hearing loss over the last several years. She denies bilateral pain, bilateral drainage, bilateral aural fullness, bilateral tinnitus, dizziness, or a history of ear surgeries. The patient notes having to turn the TV up and that she struggles with noisy environments.    OBJECTIVE:  Fall Risk Screen:  1. Have you fallen two or more times in the past year? No  2. Have you fallen and had an injury in the past year? No    Otoscopic exam indicates ears are clear of cerumen bilaterally     Pure Tone Thresholds assessed using conventional audiometry with good reliability from 250-8000 Hz bilaterally using insert earphones and circumaural headphones     RIGHT:  Normal hearing through 3000 Hz sloping to moderate sensorineural hearing loss    LEFT:    Normal hearing through 3000 Hz sloping to moderate sensorineural hearing loss    Tympanogram:    RIGHT: Normal eardrum mobility    LEFT:   Normal eardrum mobility    Reflexes (reported by stimulus ear):    RIGHT: Ipsilateral is present at normal levels    RIGHT: Contralateral is present at normal levels    LEFT:   Ipsilateral is present at normal levels    LEFT:   Contralateral is present at normal levels    Speech Reception Threshold:    RIGHT: 10 dB HL    LEFT:   10 dB HL    Word Recognition Score:     RIGHT: 100% at 55 dB HL using NU-6 recorded word list    LEFT:   100% at 55 dB HL using NU-6 recorded word list      ASSESSMENT: Normal hearing through 3000 Hz sloping to moderate sensorineural hearing loss bilaterally. Today s results were discussed with the patient in detail.    PLAN: Patient was counseled regarding hearing loss and impact on communication. Patient is a borderline candidate for amplification at this time. She  expressed that she is not interested in pursuing hearing aids at this time. Handout on good communication strategies was given to patient. It is recommended that the patient return for repeat audiologic evaluation in 1-2 years, sooner if concerns arise. Please call this clinic with questions regarding these results or recommendations.    Britni Mcghee M.A.  Audiology Doctoral Extern        I was present with the patient for the entire Audiology appointment including all procedures/testing performed by the AuD student, and agree with the student s assessment and plan as documented.      Shakeel Barboza., [facility]  Licensed Audiologist  [place] #[id number]

## 2018-11-20 NOTE — MR AVS SNAPSHOT
After Visit Summary   11/20/2018    Nicole Stanley    MRN: [id number]           Patient Information     Date Of Birth          1950        Visit Information        Provider Department      11/20/2018 1:00 PM Tatum Holloway AuD M [facility] Audiology        Today's Diagnoses     Bilateral sensorineural hearing loss    -  1       Follow-ups after your visit        Your next 10 appointments already scheduled     Nov 28, 2018 12:00 PM CST   Return Visit with Sheba Song MD   [facility] [place] ([facility])    [address]  [address]  [facility] [address] [phone]            May 15, 2019  8:00 AM CDT   LAB with  LAB   [facility] ([facility])    [address] [place] [address] [phone]           Please do not eat 10-12 hours before your appointment if you are coming in fasting for labs on lipids, cholesterol, or glucose (sugar). This does not apply to pregnant women. Water, hot tea and black coffee (with nothing added) are okay. Do not drink other fluids, diet soda or chew gum.              Who to contact     Please call your clinic at [phone] to:    Ask questions about your health    Make or cancel appointments    Discuss your medicines    Learn about your test results    Speak to your doctor            Additional Information About Your Visit        [employer] Information     [employer] gives you secure access to your electronic health record. If you see a primary care provider, you can also send messages to your care team and make appointments. If you have questions, please call your primary care clinic.  If you do not have a primary care provider, please call [phone] and they will assist you.      [employer] is an electronic gateway that provides easy, online access to your medical records. With [employer], you can request a clinic appointment, read your test results, renew a  prescription or communicate with your care team.     To access your existing account, please contact your [facility] or call [phone] for assistance.        Care EveryWhere ID     This is your Care EveryWhere ID. This could be used by other organizations to access your [facility] medical records  [id number]        Your Vitals Were     Last Period                   (LMP Unknown)            Blood Pressure from Last 3 Encounters:   11/15/18 123/77   09/19/18 128/76   06/04/18 (!) 147/95    Weight from Last 3 Encounters:   11/15/18 67 kg (147 lb 12 oz)   09/19/18 63.5 kg (140 lb)   06/04/18 66.7 kg (147 lb)              We Performed the Following     AUDIOGRAM/TYMPANOGRAM - INTERFACE     [facility] Audiometry [facility]ld Eval & Speech Recog (80399)     Tymps / Reflex   ([id number])        Primary Care Provider Office Phone # Fax #    Lauryn Allison [phone]       [address] [id number]        Equal Access to Services     [facility]: Hadii aad ku hadasho Soomaali, waaxda luqadaha, qaybta kaalmada adeegyada, waxay idiin hayaan sophie kharaemmie funes . So [facility] [phone].    ATENCIÓN: Si habla español, tiene a michaels disposición servicios gratuitos de asistencia lingüística. Llame al [phone].    We comply with applicable federal civil rights laws and [place] laws. We do not discriminate on the basis of race, color, national origin, age, disability, sex, sexual orientation, or gender identity.            Thank you!     Thank you for choosing [facility] AUDIOLOGY  for your care. Our goal is always to provide you with excellent care. Hearing back from our patients is one way we can continue to improve our services. Please take a few minutes to complete the written survey that you may receive in the mail after your visit with us. Thank you!             Your Updated Medication List - Protect others around you: Learn how to safely use, store and throw away your  medicines at www.disposemymeds.org.          This list is accurate as of 11/20/18  2:09 PM.  Always use your most recent med list.                   Brand Name Dispense Instructions for use Diagnosis    * Acidophilus/Goat Milk Caps           * PROBIOTIC COLON SUPPORT PO      Take 1 tablet by mouth daily        apixaban ANTICOAGULANT 5 MG tablet    ELIQUIS    20 tablet    Take 1 tablet (5 mg) by mouth 2 times daily    Paroxysmal atrial fibrillation (H)       atorvastatin 10 MG tablet    LIPITOR    90 tablet    Take 1 tablet (10 mg) by mouth daily    Other hyperlipidemia       calcium carbonate 500 mg (elemental) 1250 (500 Ca) MG Tabs tablet    OSCAL 500     Take 1 tablet by mouth 2 times daily        calcium carbonate 500 mg (elemental) 500 MG tablet    OS-ELISE     Take 1 tablet by mouth 2 times daily        levothyroxine 50 MCG tablet    SYNTHROID/LEVOTHROID    90 tablet    Take 1 tablet (50 mcg) by mouth daily    Acquired hypothyroidism       MAGNESIUM OXIDE PO           metoprolol succinate 25 MG 24 hr tablet    TOPROL-XL    90 tablet    Take 1 tablet (25 mg) by mouth daily        TYLENOL PO      Take 500 mg by mouth as needed for mild pain or fever        UNABLE TO FIND      MEDICATION NAME: fish oil - 1200 mg twice daily        Vitamin D3 3000 units Tabs      Take 1 tablet by mouth daily        zinc sulfate 220 (50 Zn) MG capsule    ZINCATE     Take 220 mg by mouth daily        * Notice:  This list has 2 medication(s) that are the same as other medications prescribed for you. Read the directions carefully, and ask your doctor or other care provider to review them with you.

## 2018-11-28 ENCOUNTER — OFFICE VISIT (OUTPATIENT)
Dept: UROLOGY | Facility: CLINIC | Age: 68
End: 2018-11-28
Payer: COMMERCIAL

## 2018-11-28 VITALS
SYSTOLIC BLOOD PRESSURE: 118 MMHG | WEIGHT: 145 LBS | DIASTOLIC BLOOD PRESSURE: 70 MMHG | BODY MASS INDEX: 24.16 KG/M2 | HEIGHT: 65 IN

## 2018-11-28 DIAGNOSIS — N81.2 UTEROVAGINAL PROLAPSE, INCOMPLETE: ICD-10-CM

## 2018-11-28 DIAGNOSIS — M62.89 PELVIC FLOOR DYSFUNCTION: ICD-10-CM

## 2018-11-28 DIAGNOSIS — N39.46 MIXED INCONTINENCE: Primary | ICD-10-CM

## 2018-11-28 LAB
ALBUMIN UR-MCNC: NEGATIVE MG/DL
APPEARANCE UR: CLEAR
BILIRUB UR QL STRIP: NEGATIVE
COLOR UR AUTO: YELLOW
GLUCOSE UR STRIP-MCNC: NEGATIVE MG/DL
HGB UR QL STRIP: ABNORMAL
KETONES UR STRIP-MCNC: NEGATIVE MG/DL
LEUKOCYTE ESTERASE UR QL STRIP: ABNORMAL
NITRATE UR QL: POSITIVE
PH UR STRIP: 7.5 PH (ref 5–7)
RESIDUAL VOLUME (RV) (EXTERNAL): 0
SOURCE: ABNORMAL
SP GR UR STRIP: 1.01 (ref 1–1.03)
UROBILINOGEN UR STRIP-ACNC: 0.2 EU/DL (ref 0.2–1)

## 2018-11-28 PROCEDURE — 81003 URINALYSIS AUTO W/O SCOPE: CPT | Performed by: UROLOGY

## 2018-11-28 PROCEDURE — 51798 US URINE CAPACITY MEASURE: CPT | Performed by: UROLOGY

## 2018-11-28 PROCEDURE — 99213 OFFICE O/P EST LOW 20 MIN: CPT | Mod: 25 | Performed by: UROLOGY

## 2018-11-28 ASSESSMENT — PAIN SCALES - GENERAL: PAINLEVEL: NO PAIN (0)

## 2018-11-28 NOTE — MR AVS SNAPSHOT
After Visit Summary   11/28/2018    Nicole Stanley    MRN: [id number]           Patient Information     Date Of Birth          1950        Visit Information        Provider Department      11/28/2018 12:00 PM Sheba Song MD [facility] [place]        Today's Diagnoses     Mixed incontinence    -  1    Uterovaginal prolapse, incomplete        Pelvic floor dysfunction          Care [facility] [address] [place] [address] 4A December 6th at 2pm for pessary fitting    It was a pleasure meeting with you today.  Thank you for allowing me and my team the privilege of caring for you today.  YOU are the reason we are here, and I truly hope we provided you with the excellent service you deserve.  Please let us know if there is anything else we can do for you so that we can be sure you are leaving completely satisfied with your care experience.            Follow-ups after your visit        Your next 10 appointments already scheduled     Dec 06, 2018  2:00 PM CST   (Arrive by 1:45 PM)   Return Visit with Sheba Song MD   [facility] ([email or web address])    [address] [place] [address] [phone]            May 15, 2019  8:00 AM CDT   LAB with  LAB   [facility] ([facility])    [address] [place] [address] [phone]           Please do not eat 10-12 hours before your appointment if you are coming in fasting for labs on lipids, cholesterol, or glucose (sugar). This does not apply to pregnant women. Water, hot tea and black coffee (with nothing added) are okay. Do not drink other fluids, diet soda or chew gum.              Who to contact     If you have questions or need follow up information about today's clinic visit or your schedule please contact [facility]  "[facility] [place] directly at [phone].  Normal or non-critical lab and imaging results will be communicated to you by MyChart, letter or phone within 4 business days after the clinic has received the results. If you do not hear from us within 7 days, please contact the clinic through [employer] or phone. If you have a critical or abnormal lab result, we will notify you by phone as soon as possible.  Submit refill requests through [employer] or call your pharmacy and they will forward the refill request to us. Please allow 3 business days for your refill to be completed.          Additional Information About Your Visit        [employer] Information     [employer] gives you secure access to your electronic health record. If you see a primary care provider, you can also send messages to your care team and make appointments. If you have questions, please call your primary care clinic.  If you do not have a primary care provider, please call [phone] and they will assist you.        Care EveryWhere ID     This is your Care EveryWhere ID. This could be used by other organizations to access your [place] medical records  [id number]        Your Vitals Were     Height Last Period BMI (Body Mass Index)             1.651 m (5' 5\") (LMP Unknown) 24.13 kg/m2          Blood Pressure from Last 3 Encounters:   11/28/18 118/70   11/15/18 123/77   09/19/18 128/76    Weight from Last 3 Encounters:   11/28/18 65.8 kg (145 lb)   11/15/18 67 kg (147 lb 12 oz)   09/19/18 63.5 kg (140 lb)              We Performed the Following     MEASURE POST-VOID RESIDUAL URINE/BLADDER CAPACITY, US NON-IMAGING     UA without Microscopic [[id number]]          Today's Medication Changes          These changes are accurate as of 11/28/18 12:57 PM.  If you have any questions, ask your nurse or doctor.               These medicines have changed or have updated prescriptions.        Dose/Directions    PROBIOTIC COLON SUPPORT PO   This may have changed:  " Another medication with the same name was removed. Continue taking this medication, and follow the directions you see here.   Changed by:  Sheba Song MD        Dose:  1 tablet   Take 1 tablet by mouth daily   Refills:  0         Stop taking these medicines if you haven't already. Please contact your care team if you have questions.     calcium carbonate 500 MG tablet   Commonly known as:  OS-ELISE   Stopped by:  Sheba Song MD                    Primary Care Provider Office Phone # Fax #    Lauryn HOOKS MD [place] [phone] [address]  [facility] [address]        Equal Access to Services     [facility]: Hadii aad ku hadasho Soomaali, waaxda luqadaha, qaybta kaalmada adeegyada, waxay idiin hayaan adeeg sharonda funes . So [facility] [phone].    ATENCIÓN: Si habla español, tiene a michaels disposición servicios gratuitos de asistencia lingüística. Ll[facility] [place] [phone].    We comply with applicable federal civil rights laws and [place] laws. We do not discriminate on the basis of race, color, national origin, age, disability, sex, sexual orientation, or gender identity.            Thank you!     Thank you for choosing [facility] [place]  for your care. Our goal is always to provide you with excellent care. Hearing back from our patients is one way we can continue to improve our services. Please take a few minutes to complete the written survey that you may receive in the mail after your visit with us. Thank you!             Your Updated Medication List - Protect others around you: Learn how to safely use, store and throw away your medicines at www.disposemymeds.org.          This list is accurate as of 11/28/18 12:57 PM.  Always use your most recent med list.                   Brand Name Dispense Instructions for use Diagnosis    apixaban ANTICOAGULANT 5 MG tablet    ELIQUIS    20 tablet    Take 1 tablet (5 mg) by mouth 2 times daily     Paroxysmal atrial fibrillation (H)       atorvastatin 10 MG tablet    LIPITOR    90 tablet    Take 1 tablet (10 mg) by mouth daily    Other hyperlipidemia       calcium carbonate 500 mg (elemental) 1250 (500 Ca) MG Tabs tablet    OSCAL 500     Take 1 tablet by mouth 2 times daily        levothyroxine 50 MCG tablet    SYNTHROID/LEVOTHROID    90 tablet    Take 1 tablet (50 mcg) by mouth daily    Acquired hypothyroidism       MAGNESIUM OXIDE PO           metoprolol succinate 25 MG 24 hr tablet    TOPROL-XL    90 tablet    Take 1 tablet (25 mg) by mouth daily        PROBIOTIC COLON SUPPORT PO      Take 1 tablet by mouth daily        TYLENOL PO      Take 500 mg by mouth as needed for mild pain or fever        UNABLE TO FIND      MEDICATION NAME: fish oil - 1200 mg twice daily        Vitamin D3 3000 units Tabs      Take 1 tablet by mouth daily        zinc sulfate 220 (50 Zn) MG capsule    ZINCATE     Take 220 mg by mouth daily

## 2018-11-28 NOTE — PATIENT INSTRUCTIONS
[facility] [address] 4A    December 6th at 2pm for pessary fitting    It was a pleasure meeting with you today.  Thank you for allowing me and my team the privilege of caring for you today.  YOU are the reason we are here, and I truly hope we provided you with the excellent service you deserve.  Please let us know if there is anything else we can do for you so that we can be sure you are leaving completely satisfied with your care experience.

## 2018-11-28 NOTE — LETTER
"11/28/2018       RE: Nicole Stanley  [address]  [place] [address]     Dear Colleague,    Thank you for referring your patient, Nicole Stanley, to the [facility] [place] at [facility]. Please see a copy of my visit note below.    November 28, 2018    Return visit    Patient returns today for follow up.  Since last visit she stopped the oxybutynin.  She was hospitalized in April for influenza and then afib.  She was then started on medications for that and so she stopped the oxybutynin.  Wears a pad just in case, but does not that mostly dry.  She notes the most likely time she will have leakage is either first thing in the AM when she wakes up or occasionally when she just comes in the house.  What actually bothers her is her vaginal bulge that comes and goes.  She denies any other changes in her health since last visit.    /70  Ht 1.651 m (5' 5\")  Wt 65.8 kg (145 lb)  LMP  (LMP Unknown)  BMI 24.13 kg/m2  She is comfortable, in no distress, non-labored breathing.      PVR 0 mL by bladder scan    A/P: 68 year old F with mixed incontinence, dyspareunia, stage II uterovaginal prolapse, myofascial tenderness of the pelvic floor and pelvic floor dysfunction improved with pelvic floor therapy    At this time we discussed options for her prolapse.  She is very much not interested in surgery but would like to try a pessary    RTC in a couple weeks to [facility] for pessary fitting    15 minutes were spent with the patient today, > 50% in counseling and coordination of care    Sheba Song MD MPH   of Urology    CC  Patient Care Team:  Lauryn Allison MD as PCP - General (Family Practice)  Jacinda Saleem MD as MD (Otolaryngology)  LINDSEY ONEAL    "

## 2018-11-28 NOTE — PROGRESS NOTES
"November 28, 2018    Return visit    Patient returns today for follow up.  Since last visit she stopped the oxybutynin.  She was hospitalized in April for influenza and then afib.  She was then started on medications for that and so she stopped the oxybutynin.  Wears a pad just in case, but does not that mostly dry.  She notes the most likely time she will have leakage is either first thing in the AM when she wakes up or occasionally when she just comes in the house.  What actually bothers her is her vaginal bulge that comes and goes.  She denies any other changes in her health since last visit.    /70  Ht 1.651 m (5' 5\")  Wt 65.8 kg (145 lb)  LMP  (LMP Unknown)  BMI 24.13 kg/m2  She is comfortable, in no distress, non-labored breathing.      PVR 0 mL by bladder scan    A/P: 68 year old F with mixed incontinence, dyspareunia, stage II uterovaginal prolapse, myofascial tenderness of the pelvic floor and pelvic floor dysfunction improved with pelvic floor therapy    At this time we discussed options for her prolapse.  She is very much not interested in surgery but would like to try a pessary    [place] [facility] [place]    15 minutes were spent with the patient today, > 50% in counseling and coordination of care    Sheba Song MD MPH   of Urology    CC  Patient Care Team:  Lauryn Allison MD as PCP - General (Family Practice)  Jacinda Saleem MD as MD (Otolaryngology)  LINDSEY ONEAL    "

## 2018-11-29 ENCOUNTER — PRE VISIT (OUTPATIENT)
Dept: UROLOGY | Facility: CLINIC | Age: 68
End: 2018-11-29

## 2018-11-29 NOTE — TELEPHONE ENCOUNTER
Reason for visit: Pessary fitting     Relevant information: pt saw Dr. Song in [place]    Records/imaging/labs: all records available    Pt called: no need for a call    Rooming: have pessary kit available and pt undress.

## 2018-12-06 ENCOUNTER — OFFICE VISIT (OUTPATIENT)
Dept: UROLOGY | Facility: CLINIC | Age: 68
End: 2018-12-06
Payer: COMMERCIAL

## 2018-12-06 VITALS
SYSTOLIC BLOOD PRESSURE: 137 MMHG | WEIGHT: 143.7 LBS | HEART RATE: 64 BPM | DIASTOLIC BLOOD PRESSURE: 87 MMHG | HEIGHT: 65 IN | BODY MASS INDEX: 23.94 KG/M2

## 2018-12-06 DIAGNOSIS — N39.46 MIXED INCONTINENCE: ICD-10-CM

## 2018-12-06 DIAGNOSIS — M62.89 PELVIC FLOOR DYSFUNCTION: ICD-10-CM

## 2018-12-06 DIAGNOSIS — N81.2 UTEROVAGINAL PROLAPSE, INCOMPLETE: Primary | ICD-10-CM

## 2018-12-06 ASSESSMENT — PAIN SCALES - GENERAL: PAINLEVEL: NO PAIN (0)

## 2018-12-06 NOTE — NURSING NOTE
"Chief Complaint   Patient presents with     Follow Up For     pessary fitting       Blood pressure 137/87, pulse 64, height 1.651 m (5' 5\"), weight 65.2 kg (143 lb 11.2 oz), not currently breastfeeding. Body mass index is 23.91 kg/(m^2).    Patient Active Problem List   Diagnosis     Essential hypertension     Mixed hyperlipidemia     Bunion, left     Osteoarthritis of both hands, unspecified osteoarthritis type     Hypothyroidism     Uterovaginal prolapse, incomplete     Mixed incontinence     Myalgia of pelvic floor     Pelvic floor dysfunction     Dyspareunia in female     Left ovarian cyst     Influenza B     Atrial fibrillation, unspecified type (H)     Irritable larynx syndrome     Muscle tension dysphonia       No Known Allergies    Current Outpatient Prescriptions   Medication Sig Dispense Refill     Acetaminophen (TYLENOL PO) Take 500 mg by mouth as needed for mild pain or fever       apixaban ANTICOAGULANT (ELIQUIS) 5 MG tablet Take 1 tablet (5 mg) by mouth 2 times daily 20 tablet 0     atorvastatin (LIPITOR) 10 MG tablet Take 1 tablet (10 mg) by mouth daily 90 tablet 2     calcium carbonate (OSCAL 500) 1250 (500 Ca) MG TABS tablet Take 1 tablet by mouth 2 times daily       Cholecalciferol (VITAMIN D3) 3000 units TABS Take 1 tablet by mouth daily       levothyroxine (SYNTHROID/LEVOTHROID) 50 MCG tablet Take 1 tablet (50 mcg) by mouth daily 90 tablet 3     metoprolol succinate (TOPROL-XL) 25 MG 24 hr tablet Take 1 tablet (25 mg) by mouth daily 90 tablet 3     Probiotic Product (PROBIOTIC COLON SUPPORT PO) Take 1 tablet by mouth daily       zinc sulfate (ZINCATE) 220 (50 Zn) MG capsule Take 220 mg by mouth daily       MAGNESIUM OXIDE PO        UNABLE TO FIND MEDICATION NAME: fish oil - 1200 mg twice daily         Social History   Substance Use Topics     Smoking status: Never Smoker     Smokeless tobacco: Never Used     Alcohol use 0.0 oz/week     0 Standard drinks or equivalent per week      Comment: 1-2 " glass of wine per day       Fabienne Shafer LPN  12/6/2018  1:50 PM

## 2018-12-06 NOTE — PROGRESS NOTES
"December 6, 2018    Return visit    Patient returns today for follow up for pessary fitting. She denies any changes in her health since last visit.    /87  Pulse 64  Ht 1.651 m (5' 5\")  Wt 65.2 kg (143 lb 11.2 oz)  LMP  (LMP Unknown)  BMI 23.91 kg/m2  She is comfortable, in no distress, non-labored breathing.  Abdomen is soft, non-tender, non-distended.  Normal external female genitalia.  Negative CST.  Pelvic exam is unremarkable.  She was a fitted with a #3 ring with support and knob which she was able to demonstrate self care.  There was a small amount of external bleeding from her removing her pessary    A/P: 68 year old F with mixed incontinence, dyspareunia, stage II uterovaginal prolapse, myofascial tenderness of the pelvic floor and pelvic floor dysfunction improved with pelvic floor therapy    Discussed pessary self care    Return in 6 weeks to reassess, sooner if needed    25 minutes were spent with the patient today, > 50% in counseling and coordination of care    Sheba Song MD MPH   of Urology    CC  Patient Care Team:  Lauryn Allison MD as PCP - General (Family Practice)  Jacinda Saleem MD as MD (Otolaryngology)  SELF, REFERRED              "

## 2018-12-06 NOTE — MR AVS SNAPSHOT
After Visit Summary   12/6/2018    Nicole Stanley    MRN: [id number]           Patient Information     Date Of Birth          1950        Visit Information        Provider Department      12/6/2018 2:00 PM Sheba Song MD [facility] Urology and [facility]        Today's Diagnoses     Uterovaginal prolapse, incomplete    -  1    Mixed incontinence        Pelvic floor dysfunction          Care Instructions    Websites with free information:    American Urogynec[employer] Society patient website: www.voicesforpfd.org    Total Control Program: www.totalcontrolprogram.com    Please take the pessary out at night and leave out overnight    Return to see us about 6 weeks, sooner if needed    It was a pleasure meeting with you today.  Thank you for allowing me and my team the privilege of caring for you today.  YOU are the reason we are here, and I truly hope we provided you with the excellent service you deserve.  Please let us know if there is anything else we can do for you so that we can be sure you are leaving completely satisfied with your care experience.            Follow-ups after your visit        Your next 10 appointments already scheduled     May 15, 2019  8:00 AM CDT   LAB with  LAB   [facility] ([facility])    [address] [place] [id number] [phone]           Please do not eat 10-12 hours before your appointment if you are coming in fasting for labs on lipids, cholesterol, or glucose (sugar). This does not apply to pregnant women. Water, hot tea and black coffee (with nothing added) are okay. Do not drink other fluids, diet soda or chew gum.              Who to contact     Please call your clinic at [phone] to:    Ask questions about your health    Make or cancel appointments    Discuss your medicines    Learn about your test results    Speak to your doctor            Additional Information About Your  "Visit        [employer] Information     [employer] gives you secure access to your electronic health record. If you see a primary care provider, you can also send messages to your care team and make appointments. If you have questions, please call your primary care clinic.  If you do not have a primary care provider, please call [phone] and they will assist you.      [employer] is an electronic gateway that provides easy, online access to your medical records. With [employer], you can request a clinic appointment, read your test results, renew a prescription or communicate with your care team.     To access your existing account, please contact your [facility] or call [phone] for assistance.        Care EveryWhere ID     This is your Care EveryWhere ID. This could be used by other organizations to access your [place] medical records  [id number]        Your Vitals Were     Pulse Height Last Period BMI (Body Mass Index)          64 1.651 m (5' 5\") (LMP Unknown) 23.91 kg/m2         Blood Pressure from Last 3 Encounters:   12/06/18 137/87   11/28/18 118/70   11/15/18 123/77    Weight from Last 3 Encounters:   12/06/18 65.2 kg (143 lb 11.2 oz)   11/28/18 65.8 kg (145 lb)   11/15/18 67 kg (147 lb 12 oz)              We Performed the Following     Fit/Insert Intravaginal Support Device/Pessary ([address])     PESSARY, NON RUBBER,ANY TYPE        Primary Care Provider Office Phone # Fax #    Lauryn Allison [phone]       [address]  [facility] [address]        Equal Access to Services     [facility]: Hadii aad ku hadasho Soomaali, waaxda luqadaha, qaybta kaalmada danny russell . So [facility] [phone].    ATENCIÓN: Si habla español, tiene a michaels disposición servicios gratuitos de asistencia lingüística. Llame al [phone].    We comply with applicable federal civil rights laws and [place] laws. We do not discriminate on the basis " of race, color, national origin, age, disability, sex, sexual orientation, or gender identity.            Thank you!     Thank you for choosing [facility] UROLOGY AND [facility] FOR PROSTATE AND UROLOGIC CANCERS  for your care. Our goal is always to provide you with excellent care. Hearing back from our patients is one way we can continue to improve our services. Please take a few minutes to complete the written survey that you may receive in the mail after your visit with us. Thank you!             Your Updated Medication List - Protect others around you: Learn how to safely use, store and throw away your medicines at www.disposemymeds.org.          This list is accurate as of 12/6/18  3:10 PM.  Always use your most recent med list.                   Brand Name Dispense Instructions for use Diagnosis    apixaban ANTICOAGULANT 5 MG tablet    ELIQUIS    20 tablet    Take 1 tablet (5 mg) by mouth 2 times daily    Paroxysmal atrial fibrillation (H)       atorvastatin 10 MG tablet    LIPITOR    90 tablet    Take 1 tablet (10 mg) by mouth daily    Other hyperlipidemia       calcium carbonate 500 mg (elemental) 1250 (500 Ca) MG Tabs tablet    OSCAL 500     Take 1 tablet by mouth 2 times daily        levothyroxine 50 MCG tablet    SYNTHROID/LEVOTHROID    90 tablet    Take 1 tablet (50 mcg) by mouth daily    Acquired hypothyroidism       MAGNESIUM OXIDE PO           metoprolol succinate ER 25 MG 24 hr tablet    TOPROL-XL    90 tablet    Take 1 tablet (25 mg) by mouth daily        PROBIOTIC COLON SUPPORT PO      Take 1 tablet by mouth daily        TYLENOL PO      Take 500 mg by mouth as needed for mild pain or fever        UNABLE TO FIND      MEDICATION NAME: fish oil - 1200 mg twice daily        Vitamin D3 3000 units Tabs      Take 1 tablet by mouth daily        zinc sulfate 220 (50 Zn) MG capsule    ZINCATE     Take 220 mg by mouth daily

## 2018-12-06 NOTE — LETTER
"12/6/2018       RE: Nicole Stanley  [address]  [place] [address]     Dear Colleague,    Thank you for referring your patient, Nicole Stanley, to the [facility] UROLOGY AND INST FOR PROSTATE AND UROLOGIC CANCERS at [facility]. Please see a copy of my visit note below.    December 6, 2018    Return visit    Patient returns today for follow up for pessary fitting. She denies any changes in her health since last visit.    /87  Pulse 64  Ht 1.651 m (5' 5\")  Wt 65.2 kg (143 lb 11.2 oz)  LMP  (LMP Unknown)  BMI 23.91 kg/m2  She is comfortable, in no distress, non-labored breathing.  Abdomen is soft, non-tender, non-distended.  Normal external female genitalia.  Negative CST.  Pelvic exam is unremarkable.  She was a fitted with a #3 ring with support and knob which she was able to demonstrate self care.  There was a small amount of external bleeding from her removing her pessary    A/P: 68 year old F with mixed incontinence, dyspareunia, stage II uterovaginal prolapse, myofascial tenderness of the pelvic floor and pelvic floor dysfunction improved with pelvic floor therapy    Discussed pessary self care    Return in 6 weeks to reassess, sooner if needed    25 minutes were spent with the patient today, > 50% in counseling and coordination of care    Sheba Song MD MPH   of Urology    CC  Patient Care Team:  Lauryn Allison MD as PCP - General (Family Practice)  Jacinda Saleem MD as MD (Otolaryngology)  SELF, REFERRED                    "

## 2018-12-06 NOTE — PATIENT INSTRUCTIONS
Websites with free information:    American Urogynecologic Society patient website: www.voicesforpfd.org    Total Control Program: www.totalcontrolprogram.com    Please take the pessary out at night and leave out overnight    Return to see us about 6 weeks, sooner if needed    It was a pleasure meeting with you today.  Thank you for allowing me and my team the privilege of caring for you today.  YOU are the reason we are here, and I truly hope we provided you with the excellent service you deserve.  Please let us know if there is anything else we can do for you so that we can be sure you are leaving completely satisfied with your care experience.

## 2018-12-10 DIAGNOSIS — I48.0 PAROXYSMAL ATRIAL FIBRILLATION (H): ICD-10-CM

## 2018-12-10 DIAGNOSIS — Z79.01 ON APIXABAN THERAPY: Primary | ICD-10-CM

## 2018-12-18 RX ORDER — METOPROLOL SUCCINATE 25 MG/1
25 TABLET, EXTENDED RELEASE ORAL DAILY
Qty: 90 TABLET | Refills: 3 | OUTPATIENT
Start: 2018-12-18

## 2018-12-18 NOTE — TELEPHONE ENCOUNTER
"Requested Prescriptions   Pending Prescriptions Disp Refills     metoprolol succinate ER (TOPROL-XL) 25 MG 24 hr tablet  Last Written Prescription Date:  5/3/2018  Last Fill Quantity: 90 tablet,  # refills: 3   Last Office Visit: 5/21/2018   Future Office Visit:      90 tablet 3     Sig: Take 1 tablet (25 mg) by mouth daily    Beta-Blockers Protocol Passed - 12/18/2018 12:52 PM       Passed - Blood pressure under 140/90 in past 12 months    BP Readings from Last 3 Encounters:   12/06/18 137/87   11/28/18 118/70   11/15/18 123/77          Passed - Patient is age 6 or older       Passed - Recent (12 mo) or future (30 days) visit within the authorizing provider's specialty    Patient had office visit in the last 12 months or has a visit in the next 30 days with authorizing provider or within the authorizing provider's specialty.  See \"Patient Info\" tab in inbasket, or \"Choose Columns\" in Meds & Orders section of the refill encounter.                "

## 2019-01-15 ENCOUNTER — PRE VISIT (OUTPATIENT)
Dept: UROLOGY | Facility: CLINIC | Age: 69
End: 2019-01-15

## 2019-01-15 NOTE — TELEPHONE ENCOUNTER
Reason for visit: pessary check     Relevant information: pelvic floor dysfunction    Records/imaging/labs: all records available    Pt called: no need for a call    Rooming: have pt undress for an exam

## 2019-01-21 NOTE — TELEPHONE ENCOUNTER
Done    
I sent a message to lab.  Do not know if they had the right tubes drawn.    
Patient called requesting more labs for a Metabolic panel to have her kidney & Liver function checked    Ok to leave message   
SB can we add on a metabolic panel to labs?  
Spoke with Lyn to let her know that it could be done.  Lashonda Mcelroy  
21-Jan-2019 13:55

## 2019-01-31 ENCOUNTER — OFFICE VISIT (OUTPATIENT)
Dept: UROLOGY | Facility: CLINIC | Age: 69
End: 2019-01-31
Payer: COMMERCIAL

## 2019-01-31 VITALS
DIASTOLIC BLOOD PRESSURE: 84 MMHG | WEIGHT: 150 LBS | BODY MASS INDEX: 24.99 KG/M2 | SYSTOLIC BLOOD PRESSURE: 127 MMHG | HEART RATE: 67 BPM | HEIGHT: 65 IN

## 2019-01-31 DIAGNOSIS — N39.46 MIXED INCONTINENCE: Primary | ICD-10-CM

## 2019-01-31 DIAGNOSIS — N81.2 UTEROVAGINAL PROLAPSE, INCOMPLETE: ICD-10-CM

## 2019-01-31 ASSESSMENT — MIFFLIN-ST. JEOR: SCORE: 1206.28

## 2019-01-31 NOTE — PATIENT INSTRUCTIONS
Websites with free information:    American Urogynecologic Society patient website: www.voicesforpfd.org    Total Control Program: www.totalcontrolprogram.com    Continue the pessary     Schedule a telephone appointment in about 6 weeks (beginning of March)    It was a pleasure meeting with you today.  Thank you for allowing me and my team the privilege of caring for you today.  YOU are the reason we are here, and I truly hope we provided you with the excellent service you deserve.  Please let us know if there is anything else we can do for you so that we can be sure you are leaving completely satisfied with your care experience.

## 2019-01-31 NOTE — NURSING NOTE
"Chief Complaint   Patient presents with     RECHECK     pessary check       Blood pressure 127/84, pulse 67, height 1.651 m (5' 5\"), weight 68 kg (150 lb), not currently breastfeeding. Body mass index is 24.96 kg/m .    Patient Active Problem List   Diagnosis     Essential hypertension     Mixed hyperlipidemia     Bunion, left     Osteoarthritis of both hands, unspecified osteoarthritis type     Hypothyroidism     Uterovaginal prolapse, incomplete     Mixed incontinence     Myalgia of pelvic floor     Pelvic floor dysfunction     Dyspareunia in female     Left ovarian cyst     Influenza B     Atrial fibrillation, unspecified type (H)     Irritable larynx syndrome     Muscle tension dysphonia       No Known Allergies    Current Outpatient Medications   Medication Sig Dispense Refill     Acetaminophen (TYLENOL PO) Take 500 mg by mouth as needed for mild pain or fever       apixaban ANTICOAGULANT (ELIQUIS) 5 MG tablet Take 1 tablet (5 mg) by mouth 2 times daily 20 tablet 0     atorvastatin (LIPITOR) 10 MG tablet Take 1 tablet (10 mg) by mouth daily 90 tablet 2     calcium carbonate (OSCAL 500) 1250 (500 Ca) MG TABS tablet Take 1 tablet by mouth 2 times daily       Cholecalciferol (VITAMIN D3) 3000 units TABS Take 1 tablet by mouth daily       levothyroxine (SYNTHROID/LEVOTHROID) 50 MCG tablet Take 1 tablet (50 mcg) by mouth daily 90 tablet 3     MAGNESIUM OXIDE PO        metoprolol succinate (TOPROL-XL) 25 MG 24 hr tablet Take 1 tablet (25 mg) by mouth daily 90 tablet 3     Probiotic Product (PROBIOTIC COLON SUPPORT PO) Take 1 tablet by mouth daily       zinc sulfate (ZINCATE) 220 (50 Zn) MG capsule Take 220 mg by mouth daily         Social History     Tobacco Use     Smoking status: Never Smoker     Smokeless tobacco: Never Used   Substance Use Topics     Alcohol use: Yes     Alcohol/week: 0.0 oz     Comment: 1-2 glass of wine per day     Drug use: No       TARYN Abreu  1/31/2019  1:34 PM     "

## 2019-01-31 NOTE — LETTER
"1/31/2019       RE: Nicole Stanley  [address]  [place] [address]     Dear Colleague,    Thank you for referring your patient, Nicole Stanley, to the [facility] UROLOGY AND INST FOR PROSTATE AND UROLOGIC CANCERS at [facility]. Please see a copy of my visit note below.    Urology Clinic Note      Date: 1/31/2019  Time: 1:46 PM  Patient: Nicole Stanley  MRN: [id number]    HPI/Subjective: Patient returns today for follow up for pessary check.  She was seen for mixed incontinence, dyspareunia, stage II uterovaginal prolapse, myofascial tenderness of the pelvic floor and pelvic floor dysfunction improved with pelvic floor therapy. She was fitted with #3 incontinence ring last time and returns today to say that she only tried it 3 times because her leakage was was worse and continuous that she wets her pants.     Objective:  /84   Pulse 67   Ht 1.651 m (5' 5\")   Wt 68 kg (150 lb)   LMP  (LMP Unknown)   BMI 24.96 kg/m     Gen: In NAD, conversant  Resp: Breathing non-labored  CV: Extremities warm, .  Abd: Soft, non-distended, non-tender.  Today we fitted #5      A/P: 68 year old F with mixed incontinence, dyspareunia, stage II uterovaginal prolapse, myofascial tenderness of the pelvic floor and pelvic floor dysfunction improved with pelvic floor therapy     - we fitted size #5, and will recheck in 6 weeks via telephone     Mariah Salcedo MD  Urology PGY4    Addendum:    The patient was seen and evaluated with the resident.  The plan was formulated in conjunction with me and I agree with the above note with changes made as necessary.    Fitted with a larger pessary and no leakage.  She wishes to try this and if this does not work she will just observe as she is not quite ready to pursue surgery at this time    25 minutes were spent with patient today, >50% in counseling and coordination of care    Sheba Song MD MPH   of Urology    CC  Patient Care Team:  House, " Lauryn HOOKS MD as PCP - General (Family Practice)  [facility], Jacinda Lee MD as MD (Otolaryngology)  SELF, REFERRED

## 2019-01-31 NOTE — PROGRESS NOTES
"Urology Clinic Note      Date: 1/31/2019  Time: 1:46 PM  Patient: Nicole Stanley  MRN: [id number]    HPI/Subjective: Patient returns today for follow up for pessary check.  She was seen for mixed incontinence, dyspareunia, stage II uterovaginal prolapse, myofascial tenderness of the pelvic floor and pelvic floor dysfunction improved with pelvic floor therapy. She was fitted with #3 incontinence ring last time and returns today to say that she only tried it 3 times because her leakage was was worse and continuous that she wets her pants.     Objective:  /84   Pulse 67   Ht 1.651 m (5' 5\")   Wt 68 kg (150 lb)   LMP  (LMP Unknown)   BMI 24.96 kg/m    Gen: In NAD, conversant  Resp: Breathing non-labored  CV: Extremities warm, .  Abd: Soft, non-distended, non-tender.  Today we fitted #5      A/P: 68 year old F with mixed incontinence, dyspareunia, stage II uterovaginal prolapse, myofascial tenderness of the pelvic floor and pelvic floor dysfunction improved with pelvic floor therapy     - we fitted size #5, and will recheck in 6 weeks via telephone     Mariah Salcedo MD  Urology PGY4    Addendum:    The patient was seen and evaluated with the resident.  The plan was formulated in conjunction with me and I agree with the above note with changes made as necessary.    Fitted with a larger pessary and no leakage.  She wishes to try this and if this does not work she will just observe as she is not quite ready to pursue surgery at this time    25 minutes were spent with patient today, >50% in counseling and coordination of care    Sheba Song MD MPH   of Urology    CC  Patient Care Team:  Lauryn Allison MD as PCP - General (Family Practice)  Lauryn Allison MD as PCP - Assigned PCP  Jacinda Saleem MD as MD (Otolaryngology)  SELF, REFERRED          "

## 2019-02-12 DIAGNOSIS — I10 ESSENTIAL HYPERTENSION: Primary | ICD-10-CM

## 2019-02-12 DIAGNOSIS — E78.49 OTHER HYPERLIPIDEMIA: ICD-10-CM

## 2019-02-12 DIAGNOSIS — I48.0 PAROXYSMAL ATRIAL FIBRILLATION (H): ICD-10-CM

## 2019-02-12 RX ORDER — ATORVASTATIN CALCIUM 10 MG/1
10 TABLET, FILM COATED ORAL DAILY
Qty: 90 TABLET | Refills: 0 | Status: SHIPPED | OUTPATIENT
Start: 2019-02-12 | End: 2019-05-17

## 2019-02-12 NOTE — TELEPHONE ENCOUNTER
Prescription approved per [facility] [place].  LOV: 05/21/2018  Labs: up to date    Lexy Cornejo, RN  Triage Nurse

## 2019-02-12 NOTE — TELEPHONE ENCOUNTER
"Requested Prescriptions   Pending Prescriptions Disp Refills     metoprolol succinate ER (TOPROL-XL) 25 MG 24 hr tablet  Last Written Prescription Date:  5/3/2018  Last Fill Quantity: 90 tablet,  # refills: 3   Last Office Visit: 5/21/2018   Future Office Visit:      90 tablet 3     Sig: Take 1 tablet (25 mg) by mouth daily    Beta-Blockers Protocol Passed - 2/12/2019 11:24 AM       Passed - Blood pressure under 140/90 in past 12 months    BP Readings from Last 3 Encounters:   01/31/19 127/84   12/06/18 137/87   11/28/18 118/70          Passed - Patient is age 6 or older       Passed - Recent (12 mo) or future (30 days) visit within the authorizing provider's specialty    Patient had office visit in the last 12 months or has a visit in the next 30 days with authorizing provider or within the authorizing provider's specialty.  See \"Patient Info\" tab in inbasket, or \"Choose Columns\" in Meds & Orders section of the refill encounter.           Passed - Medication is active on med list     _________________________________________________________________________________________________________________________       apixaban ANTICOAGULANT (ELIQUIS) 5 MG tablet  Last Written Prescription Date:  11/15/2018  Last Fill Quantity: 20 tablet,  # refills: 0   Last Office Visit: 5/21/2018   Future Office Visit:      20 tablet 0     Sig: Take 1 tablet (5 mg) by mouth 2 times daily    Direct Oral Anticoagulant Agents Failed - 2/12/2019 11:24 AM       Failed - Recent (6 mo) or future (30 days) visit within the authorizing provider's specialty       Passed - Normal Platelets on file in past 12 months    Recent Labs   Lab Test 04/18/18  1937             Passed - Medication is active on med list       Passed - Patient is 18-79 years of age       Passed - Serum creatinine less than or equal to 1.4 on file in past 12 mos    Recent Labs   Lab Test 11/15/18  0754   CR 0.87          Passed - Weight is greater than 60 kg for the past " year    Wt Readings from Last 3 Encounters:   01/31/19 68 kg (150 lb)   12/06/18 65.2 kg (143 lb 11.2 oz)   11/28/18 65.8 kg (145 lb)          Passed - No active pregnancy on record       Passed - No positive pregnancy test within past 12 months

## 2019-02-16 RX ORDER — METOPROLOL SUCCINATE 25 MG/1
25 TABLET, EXTENDED RELEASE ORAL DAILY
Qty: 90 TABLET | Refills: 0 | Status: SHIPPED | OUTPATIENT
Start: 2019-02-16 | End: 2019-05-17

## 2019-02-17 NOTE — TELEPHONE ENCOUNTER
Prescription approved per [facility] [place].  Lori Conroy RN

## 2019-03-17 DIAGNOSIS — I10 ESSENTIAL HYPERTENSION: ICD-10-CM

## 2019-03-18 NOTE — TELEPHONE ENCOUNTER
"Requested Prescriptions   Pending Prescriptions Disp Refills     metoprolol succinate ER (TOPROL-XL) 25 MG 24 hr tablet [Pharmacy Med Name: METOPROLOL SUCCINATE ER 25 MG Tablet Extended Release 24 Hour]  Last Written Prescription Date:  2/16/2019  Last Fill Quantity: 90 tablet,  # refills: 0   Last Office Visit: 5/21/2018   Future Office Visit:      90 tablet 0     Sig: TAKE 1 TABLET EVERY DAY    Beta-Blockers Protocol Passed - 3/17/2019  4:05 PM       Passed - Blood pressure under 140/90 in past 12 months    BP Readings from Last 3 Encounters:   01/31/19 127/84   12/06/18 137/87   11/28/18 118/70          Passed - Patient is age 6 or older       Passed - Recent (12 mo) or future (30 days) visit within the authorizing provider's specialty    Patient had office visit in the last 12 months or has a visit in the next 30 days with authorizing provider or within the authorizing provider's specialty.  See \"Patient Info\" tab in inbasket, or \"Choose Columns\" in Meds & Orders section of the refill encounter.           Passed - Medication is active on med list          "

## 2019-03-20 RX ORDER — METOPROLOL SUCCINATE 25 MG/1
TABLET, EXTENDED RELEASE ORAL
Qty: 90 TABLET | Refills: 0 | OUTPATIENT
Start: 2019-03-20

## 2019-03-20 NOTE — TELEPHONE ENCOUNTER
Message sent to pharmacy - Patient has requested refill too soon (SEE ORDER SENT 2/16/19 FOR THREE MONTH SUPPLY.)    Felicia NÚÑEZ

## 2019-03-26 ENCOUNTER — PRE VISIT (OUTPATIENT)
Dept: UROLOGY | Facility: CLINIC | Age: 69
End: 2019-03-26

## 2019-04-03 ENCOUNTER — VIRTUAL VISIT (OUTPATIENT)
Dept: UROLOGY | Facility: CLINIC | Age: 69
End: 2019-04-03
Payer: COMMERCIAL

## 2019-04-03 DIAGNOSIS — N81.2 UTEROVAGINAL PROLAPSE, INCOMPLETE: Primary | ICD-10-CM

## 2019-04-03 DIAGNOSIS — N39.46 MIXED INCONTINENCE: ICD-10-CM

## 2019-04-03 NOTE — PROGRESS NOTES
April 3, 2019    Telephone visit    Called patient today to discuss how she is doing after her recent pessary.  She states that she stopped using the other pessary because she was leaking again.  Still at this time not ready for surgery.  Planning a  trip for the fall and would be willing to come back to discuss things further after that    She denies any changes in her health since last visit.    A/P:  Nicole Stanley is a 68 year old F with mixed urinary incontinence, uterovaginal prolapse not well managed with a pessary    At this time she wishes to observe    Plan for follow up in November 5 minutes were spent in discussion today    Sheba Song MD MPH   of Urology    CC  Patient Care Team:  Lauryn Allison MD as PCP - General (Family Practice)  Jacinda Saleem MD as MD (Otolaryngology)  Lauryn Allison MD as Assigned PCP  SELF, REFERRED

## 2019-04-19 ENCOUNTER — OFFICE VISIT (OUTPATIENT)
Dept: URGENT CARE | Facility: URGENT CARE | Age: 69
End: 2019-04-19
Payer: COMMERCIAL

## 2019-04-19 ENCOUNTER — HOSPITAL ENCOUNTER (EMERGENCY)
Facility: CLINIC | Age: 69
Discharge: HOME | End: 2019-04-19
Attending: EMERGENCY MEDICINE | Admitting: EMERGENCY MEDICINE
Payer: COMMERCIAL

## 2019-04-19 VITALS
HEIGHT: 65 IN | DIASTOLIC BLOOD PRESSURE: 66 MMHG | WEIGHT: 145 LBS | TEMPERATURE: 97.3 F | RESPIRATION RATE: 12 BRPM | OXYGEN SATURATION: 100 % | HEART RATE: 53 BPM | SYSTOLIC BLOOD PRESSURE: 92 MMHG | BODY MASS INDEX: 24.16 KG/M2

## 2019-04-19 VITALS
WEIGHT: 151 LBS | TEMPERATURE: 98 F | DIASTOLIC BLOOD PRESSURE: 79 MMHG | BODY MASS INDEX: 25.16 KG/M2 | HEIGHT: 65 IN | SYSTOLIC BLOOD PRESSURE: 116 MMHG | OXYGEN SATURATION: 100 % | RESPIRATION RATE: 16 BRPM | HEART RATE: 63 BPM

## 2019-04-19 DIAGNOSIS — R00.2 PALPITATIONS: ICD-10-CM

## 2019-04-19 DIAGNOSIS — I48.91 ATRIAL FIBRILLATION WITH RVR (H): ICD-10-CM

## 2019-04-19 DIAGNOSIS — I48.0 PAROXYSMAL ATRIAL FIBRILLATION (H): Primary | ICD-10-CM

## 2019-04-19 LAB
ANION GAP SERPL CALCULATED.3IONS-SCNC: 9 MMOL/L (ref 3–14)
BASOPHILS # BLD AUTO: 0 10E9/L (ref 0–0.2)
BASOPHILS NFR BLD AUTO: 0.6 %
BUN SERPL-MCNC: 20 MG/DL (ref 7–30)
CALCIUM SERPL-MCNC: 8.8 MG/DL (ref 8.5–10.1)
CHLORIDE SERPL-SCNC: 106 MMOL/L (ref 94–109)
CO2 SERPL-SCNC: 24 MMOL/L (ref 20–32)
CREAT SERPL-MCNC: 0.99 MG/DL (ref 0.52–1.04)
DIFFERENTIAL METHOD BLD: NORMAL
EOSINOPHIL # BLD AUTO: 0.1 10E9/L (ref 0–0.7)
EOSINOPHIL NFR BLD AUTO: 2.2 %
ERYTHROCYTE [DISTWIDTH] IN BLOOD BY AUTOMATED COUNT: 13.2 % (ref 10–15)
GFR SERPL CREATININE-BSD FRML MDRD: 58 ML/MIN/{1.73_M2}
GLUCOSE SERPL-MCNC: 81 MG/DL (ref 70–99)
HCT VFR BLD AUTO: 42.1 % (ref 35–47)
HGB BLD-MCNC: 14 G/DL (ref 11.7–15.7)
IMM GRANULOCYTES # BLD: 0 10E9/L (ref 0–0.4)
IMM GRANULOCYTES NFR BLD: 0.3 %
LYMPHOCYTES # BLD AUTO: 1.6 10E9/L (ref 0.8–5.3)
LYMPHOCYTES NFR BLD AUTO: 24.8 %
MAGNESIUM SERPL-MCNC: 2 MG/DL (ref 1.6–2.3)
MCH RBC QN AUTO: 32.5 PG (ref 26.5–33)
MCHC RBC AUTO-ENTMCNC: 33.3 G/DL (ref 31.5–36.5)
MCV RBC AUTO: 98 FL (ref 78–100)
MONOCYTES # BLD AUTO: 0.6 10E9/L (ref 0–1.3)
MONOCYTES NFR BLD AUTO: 9.5 %
NEUTROPHILS # BLD AUTO: 3.9 10E9/L (ref 1.6–8.3)
NEUTROPHILS NFR BLD AUTO: 62.6 %
NRBC # BLD AUTO: 0 10*3/UL
NRBC BLD AUTO-RTO: 0 /100
PLATELET # BLD AUTO: 205 10E9/L (ref 150–450)
POTASSIUM SERPL-SCNC: 3.9 MMOL/L (ref 3.4–5.3)
RBC # BLD AUTO: 4.31 10E12/L (ref 3.8–5.2)
SODIUM SERPL-SCNC: 139 MMOL/L (ref 133–144)
TROPONIN I SERPL-MCNC: 0.02 UG/L (ref 0–0.04)
WBC # BLD AUTO: 6.3 10E9/L (ref 4–11)

## 2019-04-19 PROCEDURE — 96374 THER/PROPH/DIAG INJ IV PUSH: CPT | Mod: 59

## 2019-04-19 PROCEDURE — 25000125 ZZHC RX 250: Performed by: EMERGENCY MEDICINE

## 2019-04-19 PROCEDURE — 85025 COMPLETE CBC W/AUTO DIFF WBC: CPT | Performed by: EMERGENCY MEDICINE

## 2019-04-19 PROCEDURE — 84484 ASSAY OF TROPONIN QUANT: CPT | Performed by: EMERGENCY MEDICINE

## 2019-04-19 PROCEDURE — 99285 EMERGENCY DEPT VISIT HI MDM: CPT | Mod: 25

## 2019-04-19 PROCEDURE — 99214 OFFICE O/P EST MOD 30 MIN: CPT | Mod: 24 | Performed by: PHYSICIAN ASSISTANT

## 2019-04-19 PROCEDURE — 99291 CRITICAL CARE FIRST HOUR: CPT | Mod: 25 | Performed by: EMERGENCY MEDICINE

## 2019-04-19 PROCEDURE — 93010 ELECTROCARDIOGRAM REPORT: CPT | Mod: 59 | Performed by: EMERGENCY MEDICINE

## 2019-04-19 PROCEDURE — 93000 ELECTROCARDIOGRAM COMPLETE: CPT | Performed by: PHYSICIAN ASSISTANT

## 2019-04-19 PROCEDURE — 25000128 H RX IP 250 OP 636: Performed by: EMERGENCY MEDICINE

## 2019-04-19 PROCEDURE — 92960 CARDIOVERSION ELECTRIC EXT: CPT

## 2019-04-19 PROCEDURE — 96361 HYDRATE IV INFUSION ADD-ON: CPT | Mod: 59

## 2019-04-19 PROCEDURE — 92960 CARDIOVERSION ELECTRIC EXT: CPT | Mod: Z6 | Performed by: EMERGENCY MEDICINE

## 2019-04-19 PROCEDURE — 93005 ELECTROCARDIOGRAM TRACING: CPT

## 2019-04-19 PROCEDURE — 83735 ASSAY OF MAGNESIUM: CPT | Performed by: EMERGENCY MEDICINE

## 2019-04-19 PROCEDURE — 80048 BASIC METABOLIC PNL TOTAL CA: CPT | Performed by: EMERGENCY MEDICINE

## 2019-04-19 RX ORDER — DILTIAZEM HYDROCHLORIDE 5 MG/ML
10 INJECTION INTRAVENOUS ONCE
Status: COMPLETED | OUTPATIENT
Start: 2019-04-19 | End: 2019-04-19

## 2019-04-19 RX ORDER — FENTANYL CITRATE 50 UG/ML
50 INJECTION, SOLUTION INTRAMUSCULAR; INTRAVENOUS
Status: DISCONTINUED | OUTPATIENT
Start: 2019-04-19 | End: 2019-04-19 | Stop reason: HOSPADM

## 2019-04-19 RX ORDER — NALOXONE HYDROCHLORIDE 0.4 MG/ML
.1-.4 INJECTION, SOLUTION INTRAMUSCULAR; INTRAVENOUS; SUBCUTANEOUS
Status: DISCONTINUED | OUTPATIENT
Start: 2019-04-19 | End: 2019-04-19 | Stop reason: HOSPADM

## 2019-04-19 RX ORDER — FENTANYL CITRATE 50 UG/ML
50 INJECTION, SOLUTION INTRAMUSCULAR; INTRAVENOUS ONCE
Status: COMPLETED | OUTPATIENT
Start: 2019-04-19 | End: 2019-04-19

## 2019-04-19 RX ORDER — FLUMAZENIL 0.1 MG/ML
0.2 INJECTION, SOLUTION INTRAVENOUS
Status: DISCONTINUED | OUTPATIENT
Start: 2019-04-19 | End: 2019-04-19 | Stop reason: HOSPADM

## 2019-04-19 RX ADMIN — MIDAZOLAM 1 MG: 1 INJECTION INTRAMUSCULAR; INTRAVENOUS at 17:00

## 2019-04-19 RX ADMIN — FENTANYL CITRATE 50 MCG: 50 INJECTION, SOLUTION INTRAMUSCULAR; INTRAVENOUS at 16:57

## 2019-04-19 RX ADMIN — SODIUM CHLORIDE 1000 ML: 9 INJECTION, SOLUTION INTRAVENOUS at 14:59

## 2019-04-19 RX ADMIN — MIDAZOLAM 2 MG: 1 INJECTION INTRAMUSCULAR; INTRAVENOUS at 16:58

## 2019-04-19 RX ADMIN — DILTIAZEM HYDROCHLORIDE 10 MG: 5 INJECTION INTRAVENOUS at 15:18

## 2019-04-19 ASSESSMENT — ENCOUNTER SYMPTOMS
WEAKNESS: 1
PALPITATIONS: 1
FATIGUE: 1
DIZZINESS: 1

## 2019-04-19 ASSESSMENT — MIFFLIN-ST. JEOR
SCORE: 1210.81
SCORE: 1183.6

## 2019-04-19 NOTE — ED NOTES
Discharge instructions reviewed with patient regarding A fib and use of conscious sedation. AVSS on RA prior to discharge. Pt. Tolerating PO fluids.

## 2019-04-19 NOTE — ED NOTES
Bed: ED08  Expected date:   Expected time:   Means of arrival:   Comments:  SPF with a 69 yo female c/o afib RVR

## 2019-04-19 NOTE — ED NOTES
Bed: IN03  Expected date: 4/19/19  Expected time:   Means of arrival:   Comments:  Nicole Stanley 69 yo F with FONTENOT and palpitations  [phone], coming from [facility].     Michael Clemons MD  04/19/19 0522

## 2019-04-19 NOTE — PROGRESS NOTES
"SUBJECTIVE:   Nicole Stanley is a 68 year old female presenting with a chief complaint of   Chief Complaint   Patient presents with     Irregular Heart Beat     woke up at 2:30 am with a headache, took tylenol, feels fuzzy and sinus pain. Noticed heart is irregular.        She is an established patient of [facility].        Onset of symptoms was 9 hour(s) ago.  Course of illness is worsening.    Severity moderate. Dyspnea with exertion most noticed on walk this am. No chest pain. Palpitations weak and lightheaded. Head feels fuzzy. One episode in [place] in January but \"rode it out\". Also [facility] visit Last October similar symptoms.   Current and Associated symptoms: fatigue  Treatment measures tried include Tylenol/Ibuprofen.  Predisposing factors include history of Afib currently on metoprolol and apixaban for PAF. .        Review of Systems   Constitutional: Positive for fatigue.   Cardiovascular: Positive for palpitations.   Neurological: Positive for dizziness and weakness.       Past Medical History:   Diagnosis Date     Arthritis     osteoarth     Spider veins      Family History   Problem Relation Age of Onset     Hypertension Mother              Hyperlipidemia Mother      Family History Negative Father              Alzheimer Disease Father      Family History Negative Maternal Grandmother               Current Outpatient Medications   Medication Sig Dispense Refill     Acetaminophen (TYLENOL PO) Take 500 mg by mouth as needed for mild pain or fever       apixaban ANTICOAGULANT (ELIQUIS) 5 MG tablet Take 1 tablet (5 mg) by mouth 2 times daily 180 tablet 0     atorvastatin (LIPITOR) 10 MG tablet Take 1 tablet (10 mg) by mouth daily 90 tablet 0     calcium carbonate (OSCAL 500) 1250 (500 Ca) MG TABS tablet Take 1 tablet by mouth 2 times daily       Cholecalciferol (VITAMIN D3) 3000 units TABS Take 1 tablet by mouth daily       levothyroxine (SYNTHROID/LEVOTHROID) 50 MCG tablet Take 1 " "tablet (50 mcg) by mouth daily 90 tablet 3     MAGNESIUM OXIDE PO        metoprolol succinate ER (TOPROL-XL) 25 MG 24 hr tablet Take 1 tablet (25 mg) by mouth daily 90 tablet 0     Probiotic Product (PROBIOTIC COLON SUPPORT PO) Take 1 tablet by mouth daily       zinc sulfate (ZINCATE) 220 (50 Zn) MG capsule Take 220 mg by mouth daily       Social History     Tobacco Use     Smoking status: Never Smoker     Smokeless tobacco: Never Used   Substance Use Topics     Alcohol use: Yes     Alcohol/week: 0.0 oz     Comment: 1-2 glass of wine per day       OBJECTIVE  BP 92/66   Pulse 53   Temp 97.3  F (36.3  C) (Tympanic)   Resp 12   Ht 1.651 m (5' 5\")   Wt 65.8 kg (145 lb)   LMP  (LMP Unknown)   SpO2 100%   BMI 24.13 kg/m      Physical Exam   Constitutional: She appears distressed.   HENT:   Head: Normocephalic and atraumatic.   Eyes: Pupils are equal, round, and reactive to light. Conjunctivae and EOM are normal.   Pulmonary/Chest: Effort normal and breath sounds normal.   Heart irregular irregular with RVR    ASSESSMENT:    1. Paroxysmal atrial fibrillation (H)   with RVR    2. Palpitations    - EKG 12-lead complete w/read - Clinics    PLAN:   she will go to [facility] ED for evaluation now with ambulance assistance. ED is aware patient will arrive soon.     "

## 2019-04-19 NOTE — ED PROVIDER NOTES
[place] EMERGENCY DEPARTMENT ([facility])  2019    History     Chief Complaint   Patient presents with     Palpitations     Shortness of Breath     HPI  Nicole Stanley is a 68 year old female with history of atrial fibrillation (on Eliquis), essential HTN, HLD, and hypothyroidism presents to the ED with her  for shortness of breath and palpitations.  Patient states that around 8:52 AM this morning while on her daily walk she started to have more shortness of breath with exertion as well as palpitations.  She went to an urgent care to be evaluated, but the advised her to come to the ED for further management and treatment.  Patient has previous history of cardioversions.  She states that she last felt like she was in atrial fibrillation back in January of this year, but this spontaneously resolved.  She reports she has not been drinking alcohol for the past 3 weeks.  She otherwise denies recent changes in her thyroid medications, chest pain, previous history of PE/DVT, or leg swelling.     PAST MEDICAL HISTORY  Past Medical History:   Diagnosis Date     Arthritis     osteoarth     Spider veins      PAST SURGICAL HISTORY  Past Surgical History:   Procedure Laterality Date     CHOLECYSTECTOMY       CYSTOSCOPY       EYE SURGERY      blepharoplasty     GI SURGERY  2006    rectal sphincter     VASCULAR SURGERY  1988    vein stripping     FAMILY HISTORY  Family History   Problem Relation Age of Onset     Hypertension Mother              Hyperlipidemia Mother      Family History Negative Father              Alzheimer Disease Father      Family History Negative Maternal Grandmother               SOCIAL HISTORY  Social History     Tobacco Use     Smoking status: Never Smoker     Smokeless tobacco: Never Used   Substance Use Topics     Alcohol use: Yes     Alcohol/week: 0.0 oz     Comment: 1-2 glass of wine per day     MEDICATIONS  No current facility-administered  "medications for this encounter.      Current Outpatient Medications   Medication     Acetaminophen (TYLENOL PO)     apixaban ANTICOAGULANT (ELIQUIS) 5 MG tablet     atorvastatin (LIPITOR) 10 MG tablet     calcium carbonate (OSCAL 500) 1250 (500 Ca) MG TABS tablet     Cholecalciferol (VITAMIN D3) 3000 units TABS     levothyroxine (SYNTHROID/LEVOTHROID) 50 MCG tablet     MAGNESIUM OXIDE PO     metoprolol succinate ER (TOPROL-XL) 25 MG 24 hr tablet     Probiotic Product (PROBIOTIC COLON SUPPORT PO)     zinc sulfate (ZINCATE) 220 (50 Zn) MG capsule     ALLERGIES  No Known Allergies      I have reviewed the Medications, Allergies, Past Medical and Surgical History, and Social History in the Epic system.    Review of Systems   All other systems reviewed and are negative.           Physical Exam   BP: (!) 125/103  Pulse: 143  Temp: 98  F (36.7  C)  Resp: 16  Height: 165.1 cm (5' 5\")  Weight: 68.5 kg (151 lb)  SpO2: 100 %      Physical Exam   Constitutional: She is oriented to person, place, and time. No distress.   HENT:   Head: Normocephalic and atraumatic.   Eyes: Pupils are equal, round, and reactive to light. Conjunctivae are normal.   Neck: Normal range of motion. Neck supple.   Cardiovascular: Intact distal pulses.   Tachycardic, irregular rhythm   Pulmonary/Chest: Effort normal. No respiratory distress. She has no wheezes.   Abdominal: Soft. There is no tenderness.   Musculoskeletal: Normal range of motion.   Neurological: She is alert and oriented to person, place, and time.   Skin: Skin is warm and dry. She is not diaphoretic.   Psychiatric: She has a normal mood and affect. Her behavior is normal. Thought content normal.   Nursing note and vitals reviewed.      ED Course        Procedures   2:31 PM  The patient was seen and examined by Dr. Clemons in Room 8.                EKG Interpretation:      Interpreted by Michael Clemons  Time reviewed: 8086  Symptoms at time of EKG: palpitations   Rhythm: a " fib  Rate: 153  Axis: normal  Ectopy: none  Conduction: normal  ST Segments/ T Waves: No ST-T wave changes  Q Waves: none  Comparison to prior: atrial fibrillation with RVR    Clinical Impression: atrial fibrillation with RVR          Critical Care time:  was 30 minutes for this patient excluding procedures.    [facility] Procedure Note        Sedation:      Performed by: Michael Clemons  Authorized by: Michael Clemons    Pre-Procedure Assessment done at 0300.    Expected Level:  Moderate Sedation    Indication:  Sedation is required to allow for Cardioversion    Consent obtained from patient after discussing the risks, benefits and alternatives.    PO Intake:  Appropriately NPO for procedure    ASA Class:  Class 2 - MILD SYSTEMIC DISEASE, NO ACUTE PROBLEMS, NO FUNCTIONAL LIMITATIONS.    Mallampati:  Grade 2:  Soft palate, base of uvula, tonsillar pillars, and portion of posterior pharyngeal wall visible    Lungs: Lungs Clear with good breath sounds bilaterally.     Heart: tachycardic irregular    History and physical reviewed and no updates needed. I have reviewed the lab findings, diagnostic data, medications, and the plan for sedation. I have determined this patient to be an appropriate candidate for the planned sedation and procedure and have reassessed the patient IMMEDIATELY PRIOR to sedation and procedure.      Sedation Post Procedure Summary:    Prior to the start of the procedure and with procedural staff participation, I verbally confirmed the patient s identity using two indicators, relevant allergies, that the procedure was appropriate and matched the consent or emergent situation, and that the correct equipment/implants were available. Immediately prior to starting the procedure I conducted the Time Out with the procedural staff and re-confirmed the patient s name, procedure, and site/side. (The Joint Commission universal protocol was followed.)  Yes      Sedatives: Fentanyl and  "Midazolam (Versed)    Vital signs, airway, End Tidal CO2 and pulse oximetry were monitored and remained stable throughout the procedure and sedation was maintained until the procedure was complete.  The patient was monitored by staff until sedation discharge criteria were met.    Patient tolerance: Patient tolerated the procedure well with no immediate complications.    Time of sedation in minutes:  15 minutes from beginning to end of physician one to one monitoring.            Cardioversion/Defibrillation:      Performed by Michael Clemons     Pre Procedure Rhythm:  Atrial Fibrillation  Consent: Consent given by: Patient who states understanding of the procedure being performed after discussing the  risks, benefits and alternatives.  Time out: Immediately prior to procedure a \"time out\" was called to verify the correct patient, procedure, equipment, support staff and site/side marked as required.    Sedation:  Patient sedated with Fentanyl and Midazolam (Versed)  Vital signs, airway and pulse oximetry were monitored and remained stable throughout the procedure and sedation was maintained until the procedure was complete.  The patient was monitored with staff at the bedside until she fully regained consciousness.  Procedure: The patient was placed in the supine position and the chest area was exposed. The cardioversion pads were applied in the standard manner and configuration.     The Biphasic defibrillator was set on the Synchronized mode and the patient was shocked at 120 Joules, resulting in Sinus Rhythm.     The Patient tolerated the procedure well with no immediate complications.             ECG Post Procedure:      EKG Number: 2.  Done at 1600 hours.  Interpreted by Michael Clemons  Symptoms at time of EKG: post cardioversion   Rhythm: Normal sinus   Rate: Normal  Axis: Normal  Ectopy: None  Conduction: Normal  ST Segments/ T Waves: No ST-T wave changes and No acute ischemic changes  Q Waves: " None  Comparison to prior: NSR    Clinical Impression: normal EKG              Labs Ordered and Resulted from Time of ED Arrival Up to the Time of Departure from the ED - No data to display         Assessments & Plan (with Medical Decision Making)   67 yo F with a history of atrial fibrillation on Eliquis who presents with palpitations.  Her initial EKG showed atrial fibrillation.  Blood pressure was in the 90s-120s systolic.  She was treated with a normal saline bolus and her blood pressure normalized.  Her lab evaluation is unremarkable.  She was treated with Cardizem 10 mg IV and continued to have atrial fibrillation with a rapid ventricular rate.  She was then cardioverted under conscious sedation and converted to a normal sinus rhythm.  She was discharged and will follow up with her cardiologist and if any further concerns return to the ER.     I have reviewed the nursing notes.    I have reviewed the findings, diagnosis, plan and need for follow up with the patient.       Medication List      There are no discharge medications for this visit.         Final diagnoses:   None     Warren HONG, am serving as a trained medical scribe to document services personally performed by Michael Clemons MD, based on the provider's statements to me.      Michael HONG MD, was physically present and have reviewed and verified the accuracy of this note documented by Warren Estrada.     4/19/2019   [facility], [place], EMERGENCY DEPARTMENT     Michael Clemons MD  04/20/19 3003

## 2019-04-19 NOTE — ED AVS SNAPSHOT
[facility], [place], Emergency Department  [address] [place] [id number]  Phone:  [phone]                                    Nicole Stanley   MRN: [id number]    Department:  [facility], Emergency Department   Date of Visit:  4/19/2019           After Visit Summary Signature Page    I have received my discharge instructions, and my questions have been answered. I have discussed any challenges I see with this plan with the nurse or doctor.    ..........................................................................................................................................  Patient/Patient Representative Signature      ..........................................................................................................................................  Patient Representative Print Name and Relationship to Patient    ..................................................               ................................................  Date                                   Time    ..........................................................................................................................................  Reviewed by Signature/Title    ...................................................              ..............................................  Date                                               Time          22EPIC Rev 08/18

## 2019-04-19 NOTE — ED TRIAGE NOTES
Patient BIBA from clinic with palpitations, probable afib with RVR. Pt denies chest pain but c/o SOB with exertion. She does take Eliquis twice a day with no missed doses. Also takes metoprolol daily.

## 2019-04-20 LAB — INTERPRETATION ECG - MUSE: NORMAL

## 2019-04-25 ENCOUNTER — OFFICE VISIT (OUTPATIENT)
Dept: CARDIOLOGY | Facility: CLINIC | Age: 69
End: 2019-04-25
Payer: COMMERCIAL

## 2019-04-25 VITALS
WEIGHT: 148 LBS | HEART RATE: 62 BPM | OXYGEN SATURATION: 97 % | DIASTOLIC BLOOD PRESSURE: 71 MMHG | BODY MASS INDEX: 24.63 KG/M2 | SYSTOLIC BLOOD PRESSURE: 107 MMHG

## 2019-04-25 DIAGNOSIS — R07.9 CHEST PAIN, UNSPECIFIED TYPE: ICD-10-CM

## 2019-04-25 DIAGNOSIS — I48.0 PAROXYSMAL ATRIAL FIBRILLATION (H): Primary | ICD-10-CM

## 2019-04-25 PROCEDURE — 99214 OFFICE O/P EST MOD 30 MIN: CPT | Performed by: INTERNAL MEDICINE

## 2019-04-25 PROCEDURE — 93000 ELECTROCARDIOGRAM COMPLETE: CPT | Performed by: INTERNAL MEDICINE

## 2019-04-25 NOTE — LETTER
4/25/2019      RE: Nicole Stanley  [address]  [place] [address]       Dear Colleague,    Thank you for the opportunity to participate in the care of your patient, Nicole Stanley, at the [facility]. Please see a copy of my visit note below.    I am delighted to see Nicole Stanley for follow up of atrial fibrillation.      As you know, the patient is a 68 year old  female with AFib initially noted when she presented with URI symptoms and influenza B in 4/2018. She was not symptomatic at that time. [id number] score is 3 for hypertension, age > 65, female. Apixaban started. She spontaneously converted to sinus. I last saw her 11/15/18 at which time she had no recurrence.     She had an episode of irregular heart beats when she was in [place] 1/2019. It was at night and she didn't want to wake up her family, so she went to sleep, woke up with normal rhythm.    She presented to ED 4/19/19 with shortness of breath during her morning walk. She usually walks with her  2 miles to the gym, does weights, walks 1 mile back to the house. That morning she did not feel well, went for her walk anyway, when she arrived at the gym she was too tired to do weights. She felt ok and walked a mile back to the house. Still felt weak so ultimately went to urgent care. She states that initially her heart rate was 51 but jumped to 130, and an EKG showed AFib. Urgent care called an ambulance to take her to [facility] ED where an EKG showed  bpm, BP was 125/100. She was cardioverted in ED to sinus rhythm and sent home. No other episodes.    She is very active and healthy otherwise. Today she reports that she's had left arm achiness for the last 6-12 months, usually occurs at rest, does not interfere with her daily exercise routine. She also wonders why her liver function tests have not been checked since she moved to [place] from [facility]  apparently she was getting CMPs every 6 months or so since she takes tylenol. She takes 500 mg bid of tylenol for arthritis.      The following portions of the patient's history were reviewed and updated as appropriate: allergies, current medications, past family history, past medical history, past social history, past surgical history, and the problem list.      Past Medical History:  1. Atrial fibrillation. Initial diagnosis 2018 in the setting of Influenza B. Spontaneously converted to sinus rhythm.  2. Hypertension.  3. Hyperlipidemia. She had previously been on simvastatin but had some muscle aches. She is reluctant to add more medications.  4. Hypothyroidism.  5. Cholecystectomy  6. Vein stripping 1988    Allergies:  No Known Allergies    Medications:   Apixaban 5 mg bid  Metoprolol XL 25 mg qd  Synthroid 50 mcg every day  Atorvastatin 10 qd  Family History:   Family History   Problem Relation Age of Onset     Hypertension Mother              Hyperlipidemia Mother      Family History Negative Father              Alzheimer Disease Father      Family History Negative Maternal Grandmother                 Psychosocial history:  reports that she has never smoked. She has never used smokeless tobacco. She reports that she drinks alcohol. She reports that she does not use drugs.    Review of systems: Cardiovascular - no shortness of breath, dyspnea, orthopnea, PND.    In addition,   Constitutional: No change in weight, sleep or appetite.  Normal energy.  No fever or chills  Eyes: Negative for vision changes or eye problems  ENT: No problems with ears, nose or throat.  No difficulty swallowing.  Resp: No coughing, wheezing or shortness of breath  GI: No nausea, vomiting,  heartburn, abdominal pain, diarrhea, constipation or change in bowel habits  : No urinary frequency or dysuria, bladder or kidney problems  Musculoskeletal: No significant muscle or joint pains  Neurologic: No headaches,  numbness, tingling, weakness, problems with balance or coordination  Psychiatric: No problems with anxiety, depression or mental health  Heme/immune/allergy: No history of bleeding or clotting problems or anemia.  No allergies or immune system problems  Integumentary: No rashes,worrisome lesions or skin problems      Physical examination  Vitals: 107/71, 62 bpm  BMI= 24    Constitutional: In general, the patient is a pleasant female in no apparent distress.    Card/Vasc: The PMI is in the 5th ICS in the midclavicular line. There is no heave. Regular rate and rhythm. Normal S1, S2. No murmur, rub, click, or gallop. Pulses are normal bilaterally throughout. No peripheral edema.    I have previously reviewed the following labs/imaging:  Echo 4/19/18 (in AF): normal LV/RV, no valvular abnormalities    I have reviewed the following labs/imaging:  Labs 4/19/19: K 3.9, cr 0.99, hgb 14, plt 205K  11/15/18: cholesterol 213, , HDL 88,     I have personally and independently reviewed the following:  EKG 4/19/19:  bpm    Assessment :  1. Atrial fibrillation, paroxysmal. [id number] score is 3 for age > 65, hypertension, female. She's had 4 episodes in 1 year. This recent episode was the first one that she had a cardioversion, and that was just because she went to ED. Spent some time discussing what to do if she should have another episode; offered reassurance. Suggested that if she knows she has irregular heart rhythm she should avoid exertion which will increase heart rate even more. She does not need to go to ED unless she has significant chest pain, dyspnea, dizziness. Most episodes of AF will spontaneous convert within 24 hours. Also discussed future antiarrhythmic drugs use and/or catheter ablation.  2. Left arm pain. This is the first she's mentioned it. Does not appear to be cardiac. She has excellent functional status without pain. She is very anxious about it. Will order exercise stress echo. If  antiarrhythmic drug is entertained it would be good have a normal stress test before initiation      Plan:  1. Exercise stress echo  2. Continue apixaban and metoprolol  3. Repeat labs in 6 months to monitor creatinine - can do CMP to check liver function tests at her request      I spent a total of 30 minutes face to face with  Nicole Stanley during today's office visit. Over 50% of this time was spent counseling the patient and/or coordinating care regarding management strategies, including antiarrhythmic drugs and catheter ablation.      The patient is to return 6 months. The patient understood the treatment plan as outlined above.  There were no barriers to learning.      Maile Leonardo MD

## 2019-04-25 NOTE — PROGRESS NOTES
I am delighted to see Nicole Stanley for follow up of atrial fibrillation.      As you know, the patient is a 68 year old  female with AFib initially noted when she presented with URI symptoms and influenza B in 4/2018. She was not symptomatic at that time. [id number] score is 3 for hypertension, age > 65, female. Apixaban started. She spontaneously converted to sinus. I last saw her 11/15/18 at which time she had no recurrence.     She had an episode of irregular heart beats when she was in [place] 1/2019. It was at night and she didn't want to wake up her family, so she went to sleep, woke up with normal rhythm.    She presented to ED 4/19/19 with shortness of breath during her morning walk. She usually walks with her  2 miles to the gym, does weights, walks 1 mile back to the house. That morning she did not feel well, went for her walk anyway, when she arrived at the gym she was too tired to do weights. She felt ok and walked a mile back to the house. Still felt weak so ultimately went to urgent care. She states that initially her heart rate was 51 but jumped to 130, and an EKG showed AFib. Urgent care called an ambulance to take her to [facility] ED where an EKG showed  bpm, BP was 125/100. She was cardioverted in ED to sinus rhythm and sent home. No other episodes.    She is very active and healthy otherwise. Today she reports that she's had left arm achiness for the last 6-12 months, usually occurs at rest, does not interfere with her daily exercise routine. She also wonders why her liver function tests have not been checked since she moved to [place] from [place] - apparently she was getting CMPs every 6 months or so since she takes tylenol. She takes 500 mg bid of tylenol for arthritis.      The following portions of the patient's history were reviewed and updated as appropriate: allergies, current medications, past family history, past medical history, past social history, past surgical history,  and the problem list.      Past Medical History:  1. Atrial fibrillation. Initial diagnosis 2018 in the setting of Influenza B. Spontaneously converted to sinus rhythm.  2. Hypertension.  3. Hyperlipidemia. She had previously been on simvastatin but had some muscle aches. She is reluctant to add more medications.  4. Hypothyroidism.  5. Cholecystectomy  6. Vein stripping     Allergies:  No Known Allergies    Medications:   Apixaban 5 mg bid  Metoprolol XL 25 mg qd  Synthroid 50 mcg every day  Atorvastatin 10 qd  Family History:   Family History   Problem Relation Age of Onset     Hypertension Mother              Hyperlipidemia Mother      Family History Negative Father              Alzheimer Disease Father      Family History Negative Maternal Grandmother                 Psychosocial history:  reports that she has never smoked. She has never used smokeless tobacco. She reports that she drinks alcohol. She reports that she does not use drugs.    Review of systems: Cardiovascular - no shortness of breath, dyspnea, orthopnea, PND.    In addition,   Constitutional: No change in weight, sleep or appetite.  Normal energy.  No fever or chills  Eyes: Negative for vision changes or eye problems  ENT: No problems with ears, nose or throat.  No difficulty swallowing.  Resp: No coughing, wheezing or shortness of breath  GI: No nausea, vomiting,  heartburn, abdominal pain, diarrhea, constipation or change in bowel habits  : No urinary frequency or dysuria, bladder or kidney problems  Musculoskeletal: No significant muscle or joint pains  Neurologic: No headaches, numbness, tingling, weakness, problems with balance or coordination  Psychiatric: No problems with anxiety, depression or mental health  Heme/immune/allergy: No history of bleeding or clotting problems or anemia.  No allergies or immune system problems  Integumentary: No rashes,worrisome lesions or skin problems      Physical  examination  Vitals: 107/71, 62 bpm  BMI= 24    Constitutional: In general, the patient is a pleasant female in no apparent distress.    Card/Vasc: The PMI is in the 5th ICS in the midclavicular line. There is no heave. Regular rate and rhythm. Normal S1, S2. No murmur, rub, click, or gallop. Pulses are normal bilaterally throughout. No peripheral edema.    I have previously reviewed the following labs/imaging:  Echo 4/19/18 (in AF): normal LV/RV, no valvular abnormalities    I have reviewed the following labs/imaging:  Labs 4/19/19: K 3.9, cr 0.99, hgb 14, plt 205K  11/15/18: cholesterol 213, , HDL 88,     I have personally and independently reviewed the following:  EKG 4/19/19:  bpm    Assessment :  1. Atrial fibrillation, paroxysmal. [id number] score is 3 for age > 65, hypertension, female. She's had 4 episodes in 1 year. This recent episode was the first one that she had a cardioversion, and that was just because she went to ED. Spent some time discussing what to do if she should have another episode; offered reassurance. Suggested that if she knows she has irregular heart rhythm she should avoid exertion which will increase heart rate even more. She does not need to go to ED unless she has significant chest pain, dyspnea, dizziness. Most episodes of AF will spontaneous convert within 24 hours. Also discussed future antiarrhythmic drugs use and/or catheter ablation.  2. Left arm pain. This is the first she's mentioned it. Does not appear to be cardiac. She has excellent functional status without pain. She is very anxious about it. Will order exercise stress echo. If antiarrhythmic drug is entertained it would be good have a normal stress test before initiation      Plan:  1. Exercise stress echo  2. Continue apixaban and metoprolol  3. Repeat labs in 6 months to monitor creatinine - can do CMP to check liver function tests at her request      I spent a total of 30 minutes face to face with   Nicole Stanley during today's office visit. Over 50% of this time was spent counseling the patient and/or coordinating care regarding management strategies, including antiarrhythmic drugs and catheter ablation.      The patient is to return 6 months. The patient understood the treatment plan as outlined above.  There were no barriers to learning.      Maile Leonardo MD

## 2019-04-25 NOTE — PATIENT INSTRUCTIONS
You were seen today in the Cardiovascular Clinic at [facility].     Cardiology Providers you saw during your visit: Dr. Maile Leonardo    Diagnosis:  Atrial fibrillaiton    Results: discussed with patient      Orders:   none    Medication Changes:   none    Recommendations:   As above    Follow-up:    As needed    Please follow up with primary care provider for medication refills     Please feel free to call me with any questions or concerns.        Questions and [phone]      For Radiology / Imaging [phone]       On Call Cardiologist for after hours or on weekends: [phone]   option #4 and ask to speak to the on-call Cardiologist.          If you need a medication refill please contact your pharmacy.  Please allow 3 business days for your refill to be completed.

## 2019-04-29 ENCOUNTER — ANCILLARY PROCEDURE (OUTPATIENT)
Dept: CARDIOLOGY | Facility: CLINIC | Age: 69
End: 2019-04-29
Attending: INTERNAL MEDICINE
Payer: COMMERCIAL

## 2019-04-29 ENCOUNTER — MYC MEDICAL ADVICE (OUTPATIENT)
Dept: FAMILY MEDICINE | Facility: CLINIC | Age: 69
End: 2019-04-29

## 2019-04-29 DIAGNOSIS — R07.9 CHEST PAIN, UNSPECIFIED TYPE: ICD-10-CM

## 2019-04-29 RX ADMIN — Medication 5 ML: at 14:20

## 2019-04-29 NOTE — TELEPHONE ENCOUNTER
Currently no labs are coming due for .  Are there any labs you anticipate needing prior to her annual wellness exam?  Catie Ureña RN

## 2019-05-17 DIAGNOSIS — E78.49 OTHER HYPERLIPIDEMIA: ICD-10-CM

## 2019-05-17 DIAGNOSIS — I48.0 PAROXYSMAL ATRIAL FIBRILLATION (H): ICD-10-CM

## 2019-05-17 DIAGNOSIS — I10 ESSENTIAL HYPERTENSION: ICD-10-CM

## 2019-05-17 NOTE — TELEPHONE ENCOUNTER
"Requested Prescriptions   Pending Prescriptions Disp Refills     atorvastatin (LIPITOR) 10 MG tablet [Pharmacy Med Name: ATORVASTATIN CALCIUM 10 MG Tablet]  Last Written Prescription Date:  2/12/2019  Last Fill Quantity: 90 tabs,  # refills: 0   Last office visit: 5/21/2018 with prescribing provider:  House   Future Office Visit:   Next 5 appointments (look out 90 days)    May 28, 2019  9:00 AM CDT  PHYSICAL with Lauryn Allison MD  [facility] [address] [place] [id number] [phone]          90 tablet 0     Sig: TAKE 1 TABLET EVERY DAY       Statins Protocol Passed - 5/17/2019  1:09 PM        Passed - LDL on file in past 12 months     Recent Labs   Lab Test 11/15/18  0754   *             Passed - No abnormal creatine kinase in past 12 months     No lab results found.             Passed - Recent (12 mo) or future (30 days) visit within the authorizing provider's specialty     Patient had office visit in the last 12 months or has a visit in the next 30 days with authorizing provider or within the authorizing provider's specialty.  See \"Patient Info\" tab in inbasket, or \"Choose Columns\" in Meds & Orders section of the refill encounter.              Passed - Medication is active on med list        Passed - Patient is age 18 or older        Passed - No active pregnancy on record        Passed - No positive pregnancy test in past 12 months        metoprolol succinate ER (TOPROL-XL) 25 MG 24 hr tablet [Pharmacy Med Name: METOPROLOL SUCCINATE ER 25 MG Tablet Extended Release 24 Hour]  Last Written Prescription Date:  2/16/2019  Last Fill Quantity: 90 tabs,  # refills: 0   Last office visit: 5/21/2018 with prescribing provider:  House   Future Office Visit:   Next 5 appointments (look out 90 days)    May 28, 2019  9:00 AM CDT  PHYSICAL with Lauryn Allison MD  [facility] ([facility]) [address] [place] [address] " "[id number]  [phone]          90 tablet 0     Sig: TAKE 1 TABLET EVERY DAY       Beta-Blockers Protocol Passed - 5/17/2019  1:09 PM        Passed - Blood pressure under 140/90 in past 12 months     BP Readings from Last 3 Encounters:   04/25/19 107/71   04/19/19 116/79   04/19/19 92/66                 Passed - Patient is age 6 or older        Passed - Recent (12 mo) or future (30 days) visit within the authorizing provider's specialty     Patient had office visit in the last 12 months or has a visit in the next 30 days with authorizing provider or within the authorizing provider's specialty.  See \"Patient Info\" tab in inbasket, or \"Choose Columns\" in Meds & Orders section of the refill encounter.              Passed - Medication is active on med list        ELIQUIS 5 MG tablet [Pharmacy Med Name: ELIQUIS 5 MG Tablet]  Last Written Prescription Date:  2/16/2019  Last Fill Quantity: 180 tabs,  # refills: 0   Last office visit: 5/21/2018 with prescribing provider:  House   Future Office Visit:   Next 5 appointments (look out 90 days)    May 28, 2019  9:00 AM CDT  PHYSICAL with Lauryn Allison MD  [facility] ([facility]) [address] [place] [id number]  [phone]          180 tablet 0     Sig: TAKE 1 TABLET TWICE DAILY       Direct Oral Anticoagulant Agents Passed - 5/17/2019  1:09 PM        Passed - Normal Platelets on file in past 12 months     Recent Labs   Lab Test 04/19/19  1436                  Passed - Medication is active on med list        Passed - Patient is 18-79 years of age        Passed - Serum creatinine less than or equal to 1.4 on file in past 12 mos     Recent Labs   Lab Test 04/19/19  1436   CR 0.99             Passed - Weight is greater than 60 kg for the past year     Wt Readings from Last 3 Encounters:   04/25/19 67.1 kg (148 lb)   04/19/19 68.5 kg (151 lb)   04/19/19 65.8 kg (145 lb)             Passed - No active pregnancy on record    "     Passed - No positive pregnancy test within past 12 months        Passed - Recent (6 mo) or future (30 days) visit within the authorizing provider's specialty

## 2019-05-21 RX ORDER — ATORVASTATIN CALCIUM 10 MG/1
TABLET, FILM COATED ORAL
Qty: 90 TABLET | Refills: 0 | Status: SHIPPED | OUTPATIENT
Start: 2019-05-21 | End: 2019-05-28

## 2019-05-21 RX ORDER — APIXABAN 5 MG/1
TABLET, FILM COATED ORAL
Qty: 180 TABLET | Refills: 0 | Status: SHIPPED | OUTPATIENT
Start: 2019-05-21 | End: 2019-05-28

## 2019-05-21 RX ORDER — METOPROLOL SUCCINATE 25 MG/1
TABLET, EXTENDED RELEASE ORAL
Qty: 90 TABLET | Refills: 0 | Status: SHIPPED | OUTPATIENT
Start: 2019-05-21 | End: 2019-05-28

## 2019-05-22 NOTE — PROGRESS NOTES
"SUBJECTIVE:   Nicole Stanley is a 68 year old female who presents for Preventive Visit.    Are you in the first 12 months of your Medicare coverage?  No    Healthy Habits:     In general, how would you rate your overall health?  Good    Frequency of exercise:  4-5 days/week    Duration of exercise:  Greater than 60 minutes    Do you usually eat at least 4 servings of fruit and vegetables a day, include whole grains    & fiber and avoid regularly eating high fat or \"junk\" foods?  Yes    Taking medications regularly:  Yes    Medication side effects:  None    Ability to successfully perform activities of daily living:  No assistance needed    Home Safety:  No safety concerns identified    Hearing Impairment:  Difficulty following a conversation in a noisy restaurant or crowded room, difficulty following dialogue in the theater, need to ask people to speak up or repeat themselves and find that men's voices are easier to understand than woman's    In the past 6 months, have you been bothered by leaking of urine? Yes    In general, how would you rate your overall mental or emotional health?  Excellent      PHQ-2 Total Score: 0    Additional concerns today:  No      Medical assistant advised patient about addressing additional health concerns that could be billed, as it is not considered a part of a preventive physical. Patient verbalized understanding.    Lauryn Allison MD, MD on 5/28/2019 at 9:19 AM  Musculoskeletal problem/pain      Duration: 09/2018      Description  Location: left neck    Intensity:  moderate    Accompanying signs and symptoms: headaches and constant stiffness, left arm aching     History  Previous similar problem: no   Previous evaluation:  x-ray    Precipitating or alleviating factors:  Trauma or overuse: YES- in a position for a long period of time 3  Aggravating factors include: turning the left     Therapies tried and outcome: stretching, acetaminophen and chiropractor     Denies exertional " symptoms. Denies chest pain.    Do you feel safe in your environment? Yes    Do you have a Health Care Directive? No: Advance care planning reviewed with patient; information given to patient to review.    Fall risk  Fallen 2 or more times in the past year?: No  Any fall with injury in the past year?: No    Cognitive Screening   1) Repeat 3 items (Leader, Season, Table)    2) Clock draw: NORMAL  3) 3 item recall: Recalls 3 objects  Results: 3 items recalled: COGNITIVE IMPAIRMENT LESS LIKELY    Mini-CogTM Copyright S Tr. Licensed by the author for use in [facility] [employer]; reprinted with permission (sotracy@[facility]). All rights reserved.      Do you have sleep apnea, excessive snoring or daytime drowsiness?: no    Reviewed and updated as needed this visit by clinical staff  Tobacco  Allergies  Meds         Reviewed and updated as needed this visit by Provider        Social History     Tobacco Use     Smoking status: Never Smoker     Smokeless tobacco: Never Used   Substance Use Topics     Alcohol use: Yes     Alcohol/week: 0.0 oz     Comment: 1-2 glass of wine per day     If you drink alcohol do you typically have >3 drinks per day or >7 drinks per week? No    Alcohol Use 5/28/2019   Prescreen: >3 drinks/day or >7 drinks/week? No   Prescreen: >3 drinks/day or >7 drinks/week? -     Current providers sharing in care for this patient include:   Patient Care Team:  Lauryn Allison MD as PCP - General (Family Practice)  Jacinda Saleem MD as MD (Otolaryngology)  Lauryn Allison MD as Assigned PCP  Adriana Stern APRN CNP as Assigned PCP  Maile Leonardo MD as MD (Cardiology)  Sheba Song MD as MD (Urology)    The following health maintenance items are reviewed in Epic and correct as of today:  Health Maintenance   Topic Date Due     ADVANCED DIRECTIVE PLANNING  1950     DEXA  04/21/2017     FALL RISK ASSESSMENT  06/04/2019     BMP  10/19/2019     MAMMO SCREENING  05/16/2020      MEDICARE ANNUAL WELLNESS VISIT  2020     COLONOSCOPY  2021     LIPID  11/15/2023     DTAP/TDAP/TD IMMUNIZATION (4 - Td) 2028     HEPATITIS C SCREENING  Completed     PHQ-2  Completed     INFLUENZA VACCINE  Completed     PNEUMOCOCCAL IMMUNIZATION 65+ LOW/MEDIUM RISK  Completed     ZOSTER IMMUNIZATION  Completed     IPV IMMUNIZATION  Aged Out     MENINGITIS IMMUNIZATION  Aged Out     BP Readings from Last 3 Encounters:   19 128/88   19 107/71   19 116/79    Wt Readings from Last 3 Encounters:   19 65.4 kg (144 lb 4 oz)   19 67.1 kg (148 lb)   19 68.5 kg (151 lb)                  Patient Active Problem List   Diagnosis     Essential hypertension     Mixed hyperlipidemia     Bunion, left     Osteoarthritis of both hands, unspecified osteoarthritis type     Hypothyroidism     Uterovaginal prolapse, incomplete     Mixed incontinence     Myalgia of pelvic floor     Pelvic floor dysfunction     Dyspareunia in female     Left ovarian cyst     Influenza B     Atrial fibrillation, unspecified type (H)     Irritable larynx syndrome     Muscle tension dysphonia     Past Surgical History:   Procedure Laterality Date     CHOLECYSTECTOMY       CYSTOSCOPY       EYE SURGERY      blepharoplasty     GI SURGERY  2006    rectal sphincter     VASCULAR SURGERY  1988    vein stripping       Social History     Tobacco Use     Smoking status: Never Smoker     Smokeless tobacco: Never Used   Substance Use Topics     Alcohol use: Yes     Alcohol/week: 0.0 oz     Comment: 1-2 glass of wine per day     Family History   Problem Relation Age of Onset     Hypertension Mother              Hyperlipidemia Mother      Family History Negative Father              Alzheimer Disease Father      Family History Negative Maternal Grandmother                   Current Outpatient Medications   Medication Sig Dispense Refill     Acetaminophen (TYLENOL PO) Take 500 mg by mouth as  needed for mild pain or fever       apixaban ANTICOAGULANT (ELIQUIS) 5 MG tablet Take 1 tablet (5 mg) by mouth 2 times daily 180 tablet 3     atorvastatin (LIPITOR) 10 MG tablet TAKE 1 TABLET EVERY DAY 90 tablet 3     calcium carbonate (OSCAL 500) 1250 (500 Ca) MG TABS tablet Take 1 tablet by mouth 2 times daily       Cholecalciferol (VITAMIN D3) 3000 units TABS Take 1 tablet by mouth daily       levothyroxine (SYNTHROID/LEVOTHROID) 50 MCG tablet Take 1 tablet (50 mcg) by mouth daily 90 tablet 3     MAGNESIUM OXIDE PO        metoprolol succinate ER (TOPROL-XL) 25 MG 24 hr tablet TAKE 1 TABLET EVERY DAY 90 tablet 3     Probiotic Product (PROBIOTIC COLON SUPPORT PO) Take 1 tablet by mouth daily       zinc sulfate (ZINCATE) 220 (50 Zn) MG capsule Take 220 mg by mouth daily       No Known Allergies  Recent Labs   Lab Test 04/19/19  1436 11/15/18  0754 04/26/18  0852  04/18/18  1937 04/25/17  0842 04/08/16 03/16/15   LDL  --  105* 151*  --   --  151* 171* 121   HDL  --  88 54  --   --  112 133 108   TRIG  --  101 81  --   --  56 76 66   ALT  --   --   --   --  25  --  12 19   CR 0.99 0.87  --    < > 0.73 1.04 0.8 0.9   GFRESTIMATED 58* 65  --    < > 79 53* 72 63   GFRESTBLACK 67 78  --    < > >90 64 87 76   POTASSIUM 3.9 4.1  --    < > 3.4 3.5 4.4 4.8   TSH  --   --   --   --  2.62 3.14 3.400 4.130    < > = values in this interval not displayed.          Review of Systems   Constitutional: Negative for chills and fever.   HENT: Positive for hearing loss. Negative for congestion, ear pain and sore throat.    Eyes: Negative for pain.   Respiratory: Negative for cough.    Cardiovascular: Negative for chest pain, palpitations and peripheral edema.   Gastrointestinal: Negative for abdominal pain, constipation, diarrhea, heartburn, hematochezia and nausea.   Genitourinary: Positive for urgency. Negative for dysuria, frequency, genital sores and hematuria.   Musculoskeletal: Positive for myalgias. Negative for arthralgias and  "joint swelling.   Skin: Negative for rash.   Neurological: Positive for headaches. Negative for dizziness and paresthesias.   Psychiatric/Behavioral: Negative for mood changes. The patient is not nervous/anxious.           OBJECTIVE:   /88 (BP Location: Right arm, Patient Position: Chair, Cuff Size: Adult Regular)   Pulse 60   Temp 96.5  F (35.8  C) (Tympanic)   Resp 14   Ht 1.664 m (5' 5.5\")   Wt 65.4 kg (144 lb 4 oz)   LMP  (LMP Unknown)   SpO2 100%   BMI 23.64 kg/m   Estimated body mass index is 23.64 kg/m  as calculated from the following:    Height as of this encounter: 1.664 m (5' 5.5\").    Weight as of this encounter: 65.4 kg (144 lb 4 oz).  Physical Exam  GENERAL: healthy, alert and no distress  EYES: Eyes grossly normal to inspection, PERRL and conjunctivae and sclerae normal  HENT: ear canals and TM's normal, nose and mouth without ulcers or lesions  NECK: no adenopathy, no asymmetry, masses, or scars and thyroid normal to palpation  RESP: lungs clear to auscultation - no rales, rhonchi or wheezes  BREAST: normal without masses, tenderness or nipple discharge and no palpable axillary masses or adenopathy  CV: regular rate and rhythm, normal S1 S2, no S3 or S4, no murmur, click or rub, no peripheral edema and peripheral pulses strong  ABDOMEN: soft, nontender, no hepatosplenomegaly, no masses and bowel sounds normal  MS: no gross musculoskeletal defects noted, no edema, tender to palpation over the left trapezius  SKIN: no suspicious lesions or rashes  NEURO: Normal strength and tone, mentation intact and speech normal  PSYCH: mentation appears normal, affect normal/bright    Diagnostic Test Results:  Labs reviewed in Epic    ASSESSMENT / PLAN:   1. Encounter for Medicare annual wellness exam  She will schedule mammogram and dexa.     2. Other hyperlipidemia  Controlled   - atorvastatin (LIPITOR) 10 MG tablet; TAKE 1 TABLET EVERY DAY  Dispense: 90 tablet; Refill: 3    3. Paroxysmal atrial " "fibrillation (H)  Followed by cardiology, cardioverted on 4/19 in the ER. Continues eliquis   - apixaban ANTICOAGULANT (ELIQUIS) 5 MG tablet; Take 1 tablet (5 mg) by mouth 2 times daily  Dispense: 180 tablet; Refill: 3    4. Acquired hypothyroidism   stable   - levothyroxine (SYNTHROID/LEVOTHROID) 50 MCG tablet; Take 1 tablet (50 mcg) by mouth daily  Dispense: 90 tablet; Refill: 3  - TSH with free T4 reflex    5. Essential hypertension  Controlled   - metoprolol succinate ER (TOPROL-XL) 25 MG 24 hr tablet; TAKE 1 TABLET EVERY DAY  Dispense: 90 tablet; Refill: 3    6. Neck pain  Left neck muscular pain, I recommended PT. Pt had many questions today. Referral provided.   - [facility] REFERR[facility]; Future    7. Post-menopausal     - DEXA HIP/PELVIS/SPINE - Future; Future    8. Visit for screening mammogram     - MA Screen Bilateral w/Gregory; Future    End of Life Planning:  Patient currently has an advanced directive: No.  I have verified the patient's ablity to prepare an advanced directive/make health care decisions.  Literature was provided to assist patient in preparing an advanced directive.    COUNSELING:  Reviewed preventive health counseling, as reflected in patient instructions    Estimated body mass index is 23.64 kg/m  as calculated from the following:    Height as of this encounter: 1.664 m (5' 5.5\").    Weight as of this encounter: 65.4 kg (144 lb 4 oz).         reports that she has never smoked. She has never used smokeless tobacco.      Appropriate preventive services were discussed with this patient, including applicable screening as appropriate for cardiovascular disease, diabetes, osteopenia/osteoporosis, and glaucoma.  As appropriate for age/gender, discussed screening for colorectal cancer, prostate cancer, breast cancer, and cervical cancer. Checklist reviewing preventive services available has been given to the patient.    Reviewed patients plan of care and provided an AVS. The " Intermediate Care Plan ( asthma action plan, low back pain action plan, and migraine action plan) for Nicole meets the Care Plan requirement. This Care Plan has been established and reviewed with the Patient.    Counseling Resources:  ATP IV Guidelines  Pooled Cohorts Equation Calculator  Breast Cancer Risk Calculator  FRAX Risk Assessment  ICSI Preventive Guidelines  Dietary Guidelines for Americans, 2010  [employer] MyPlate  ASA Prophylaxis  Lung CA Screening    Lauryn Allison MD, MD  [facility]    Identified Health Risks:

## 2019-05-22 NOTE — PATIENT INSTRUCTIONS
Patient Education   Personalized Prevention Plan  You are due for the preventive services outlined below.  Your care team is available to assist you in scheduling these services.  If you have already completed any of these items, please share that information with your care team to update in your medical record.  Health Maintenance Due   Topic Date Due     Basic Metabolic Panel  1950     Osteoporosis Screening  1950     Hepatitis C Screening  1950     Discuss Advance Directive Planning  1950     Mammogram  1950     Colonscopy  05/31/1959     Cholesterol Lab  05/31/1994     Annual Wellness Visit  04/18/2018     FALL RISK ASSESSMENT  06/04/2019         1) Schedule your mammogram  2) schedule your bone density test if you would like  3) We will check your thyroid test today.

## 2019-05-28 ENCOUNTER — OFFICE VISIT (OUTPATIENT)
Dept: FAMILY MEDICINE | Facility: CLINIC | Age: 69
End: 2019-05-28
Payer: COMMERCIAL

## 2019-05-28 VITALS
HEART RATE: 60 BPM | WEIGHT: 144.25 LBS | HEIGHT: 66 IN | BODY MASS INDEX: 23.18 KG/M2 | DIASTOLIC BLOOD PRESSURE: 88 MMHG | OXYGEN SATURATION: 100 % | TEMPERATURE: 96.5 F | RESPIRATION RATE: 14 BRPM | SYSTOLIC BLOOD PRESSURE: 128 MMHG

## 2019-05-28 DIAGNOSIS — I10 ESSENTIAL HYPERTENSION: ICD-10-CM

## 2019-05-28 DIAGNOSIS — I48.0 PAROXYSMAL ATRIAL FIBRILLATION (H): ICD-10-CM

## 2019-05-28 DIAGNOSIS — Z12.31 VISIT FOR SCREENING MAMMOGRAM: ICD-10-CM

## 2019-05-28 DIAGNOSIS — Z78.0 POST-MENOPAUSAL: ICD-10-CM

## 2019-05-28 DIAGNOSIS — Z00.00 ENCOUNTER FOR MEDICARE ANNUAL WELLNESS EXAM: Primary | ICD-10-CM

## 2019-05-28 DIAGNOSIS — E78.49 OTHER HYPERLIPIDEMIA: ICD-10-CM

## 2019-05-28 DIAGNOSIS — E03.9 ACQUIRED HYPOTHYROIDISM: ICD-10-CM

## 2019-05-28 DIAGNOSIS — M54.2 NECK PAIN: ICD-10-CM

## 2019-05-28 LAB — TSH SERPL DL<=0.005 MIU/L-ACNC: 1.91 MU/L (ref 0.4–4)

## 2019-05-28 PROCEDURE — 99213 OFFICE O/P EST LOW 20 MIN: CPT | Mod: 25 | Performed by: FAMILY MEDICINE

## 2019-05-28 PROCEDURE — 36415 COLL VENOUS BLD VENIPUNCTURE: CPT | Performed by: FAMILY MEDICINE

## 2019-05-28 PROCEDURE — 99397 PER PM REEVAL EST PAT 65+ YR: CPT | Performed by: FAMILY MEDICINE

## 2019-05-28 PROCEDURE — 84443 ASSAY THYROID STIM HORMONE: CPT | Performed by: FAMILY MEDICINE

## 2019-05-28 RX ORDER — ATORVASTATIN CALCIUM 10 MG/1
TABLET, FILM COATED ORAL
Qty: 90 TABLET | Refills: 3 | Status: SHIPPED | OUTPATIENT
Start: 2019-05-28 | End: 2020-06-22

## 2019-05-28 RX ORDER — METOPROLOL SUCCINATE 25 MG/1
TABLET, EXTENDED RELEASE ORAL
Qty: 90 TABLET | Refills: 3 | Status: SHIPPED | OUTPATIENT
Start: 2019-05-28 | End: 2020-06-22

## 2019-05-28 RX ORDER — LEVOTHYROXINE SODIUM 50 UG/1
50 TABLET ORAL DAILY
Qty: 90 TABLET | Refills: 3 | Status: SHIPPED | OUTPATIENT
Start: 2019-05-28 | End: 2020-06-22

## 2019-05-28 ASSESSMENT — ENCOUNTER SYMPTOMS
PARESTHESIAS: 0
MYALGIAS: 1
FREQUENCY: 0
HEMATURIA: 0
HEARTBURN: 0
EYE PAIN: 0
SORE THROAT: 0
HEADACHES: 1
PALPITATIONS: 0
DYSURIA: 0
NAUSEA: 0
DIZZINESS: 0
NERVOUS/ANXIOUS: 0
FEVER: 0
CHILLS: 0
ABDOMINAL PAIN: 0
DIARRHEA: 0
JOINT SWELLING: 0
CONSTIPATION: 0
HEMATOCHEZIA: 0
ARTHRALGIAS: 0
COUGH: 0

## 2019-05-28 ASSESSMENT — MIFFLIN-ST. JEOR: SCORE: 1188.12

## 2019-05-28 ASSESSMENT — ACTIVITIES OF DAILY LIVING (ADL): CURRENT_FUNCTION: NO ASSISTANCE NEEDED

## 2019-05-29 NOTE — RESULT ENCOUNTER NOTE
Excellent! Please call or sent a [employer] message if you have any questions. Lauryn Allison M.D.

## 2019-06-05 ENCOUNTER — ANCILLARY PROCEDURE (OUTPATIENT)
Dept: BONE DENSITY | Facility: CLINIC | Age: 69
End: 2019-06-05
Payer: COMMERCIAL

## 2019-06-05 DIAGNOSIS — Z78.0 POST-MENOPAUSAL: ICD-10-CM

## 2019-06-05 PROCEDURE — 77080 DXA BONE DENSITY AXIAL: CPT | Performed by: INTERNAL MEDICINE

## 2019-06-06 ENCOUNTER — DOCUMENTATION ONLY (OUTPATIENT)
Dept: OTHER | Facility: CLINIC | Age: 69
End: 2019-06-06

## 2019-06-10 ENCOUNTER — ANCILLARY PROCEDURE (OUTPATIENT)
Dept: MAMMOGRAPHY | Facility: CLINIC | Age: 69
End: 2019-06-10
Attending: FAMILY MEDICINE
Payer: COMMERCIAL

## 2019-06-10 ENCOUNTER — THERAPY VISIT (OUTPATIENT)
Dept: PHYSICAL THERAPY | Facility: CLINIC | Age: 69
End: 2019-06-10
Payer: COMMERCIAL

## 2019-06-10 DIAGNOSIS — Z12.31 VISIT FOR SCREENING MAMMOGRAM: ICD-10-CM

## 2019-06-10 DIAGNOSIS — M54.2 CERVICAL PAIN: ICD-10-CM

## 2019-06-10 PROCEDURE — 97110 THERAPEUTIC EXERCISES: CPT | Mod: GP | Performed by: PHYSICAL THERAPIST

## 2019-06-10 PROCEDURE — 97140 MANUAL THERAPY 1/> REGIONS: CPT | Mod: GP | Performed by: PHYSICAL THERAPIST

## 2019-06-10 PROCEDURE — 77067 SCR MAMMO BI INCL CAD: CPT | Mod: TC

## 2019-06-10 PROCEDURE — 77063 BREAST TOMOSYNTHESIS BI: CPT | Mod: TC

## 2019-06-10 PROCEDURE — 97161 PT EVAL LOW COMPLEX 20 MIN: CPT | Mod: GP | Performed by: PHYSICAL THERAPIST

## 2019-06-10 NOTE — PROGRESS NOTES
[facility] Initial Evaluation  Subjective:  The history is provided by the patient. No  was used.                       Objective:  System    Physical Exam    General     [facility] Initial Evaluation -- Cervical  Referral: 9/2018  Work mechanical stresses: retired   Leisure mechanical stresses: reading, walking, weight machines, sewing, jewelry making, bike riding  Functional disability score (NDI): See epic  VAS score (0-10): 3/10  Patient goals/expectations: Reduce headache and neck pain with waking up    HISTORY:    Present symptoms: Left cervical pain with rotation or looking down and HA   Pain quality (sharp/shooting/stabbing/aching/burning/cramping):   ache.  Paresthesia (yes/no):  no    Present since (onset date): Sept 2018.     Symptoms (improving/unchanging/worsening):  Improving but has plateaued.    Symptoms commenced as a result of: pillow during colonscopy   Condition occurred in the following environment:  medical    Symptoms at onset (neck/arm/forearm/headache): neck/HA  Constant symptoms (neck/arm/forearm/headache): neck  Intermittent symptoms (neck/arm/forearm/headache): HA, arm pain    Symptoms are made worse with the following: Always Rising in AM- has HA  Symptoms are made better with the following: Always On the move    Disturbed sleep (yes/no): yes  Number of pillows: has tried 3 different pillows without help  Sleeping postures (prone/sup/side R/L): side    Previous episodes (0/1-5/6-10/11+): 1-5 Year of first episode: >10 yrs    Previous history: Generalized cervical pain and HA  Previous treatments: tyelonol, chiropractor helped initially    Specific Questions: (as reported by the patient)  Dizziness/Tinnitus/Nausea/Swallowing (pos/neg): neg  Gait/Upper Limbs (normal/abnormal): norm  Medications (nil/NSAIDS/anlag/steroids/anticoag/other): See Epic  Medical allergies:  See Epic  General health (excellent/good/fair/poor):   good  Pertinent medical history: Osteoarthritis  Imaging (None/Xray/MRI/Other):  none  Recent or major surgery (yes/no): n  Night pain (yes/no): n  Accidents (yes/no): n  Unexplained weight loss (yes/no): n  Barriers at home: n  Other red flags: n    EXAMINATION    Posture:   Sitting (good/fair/poor): poor  Standing (good/fair/poor): fair     Protruded head (yes/no): yes    Wry Neck (right/left/nil):  no  Relevant (yes/no):  na     Correction of posture(better/worse/no effect): NE  Other observations:  Hypomobility through thoracic    Neurological:    Motor Deficit: Intact myotomal testing   reflexes: Not tested  Sensory Deficit: Normal light touch   Dural signs: Not tested    Movement Loss:   Chele Mod Min Nil Pain   Protrusion    x    Flexion  3 finger from sternum   +   Retraction  <neutral   +   Extension  40   +   Lateral flexion R  20   +   Lateral flexion L  20   +   Rotation R   65  -   Rotation L  45   ++     Test Movements:   During: produces, abolishes, increases, decreases, no effect, centralizing, peripheralizing  After: better, worse, no better, no worse, no effect, centralized, peripheralized    Pretest symptoms sitting: Left cervical   Symptoms During Symptoms After ROM increased ROM decreased No Effect   PRO        Rep PRO        RET Inc  No Worse         Rep RET Decreases    Better    x     RET EXT        Rep RET EXT        Static Tests:     Retraction:  Held ret 5-10 sec      Other Tests: NT    Provisional Classification:  Derangement - Asymmetrical, unilateral, symptoms above elbow    Principle of Management:  Education: (Therapeutic Exercise) Pt education regarding four stages of healing: pain reduction, maintenance through exercise, recovery of function, and prevention of re-occurrence. Utilized prescription dosage of exercise theory, cut finger analogy, and scientific method to help patient understand purpose of exercise specificity and importance of compliance with exercise.  Pt also educated  in traffic light response to exercise, and self assessment to guide home exercise program.    Equipment provided:  Postural correction with instruct in use of lumbar roll and sitting posture when unsupported, w education provided re: impact of posture and body mechanics on symptom production. Pt encouraged to monitor this correlation as part of overall self management.  Mechanical therapy (Y/N):  y   Extension principle:  n   Lateral principle:  n  Flexion principle:  y   Other:  na    ASSESSMENT/PLAN:    Patient is a 69 year old female with cervical complaints.    Patient has the following significant findings with corresponding treatment plan.                Diagnosis 1:  Cervical  Pain -  manual therapy, self management, education, directional preference exercise and home program  Decreased ROM/flexibility - manual therapy and therapeutic exercise  Decreased joint mobility - manual therapy and therapeutic exercise  Decreased strength - therapeutic exercise and therapeutic activities  Decreased proprioception - neuro re-education and therapeutic activities  Impaired gait - gait training  Impaired muscle performance - neuro re-education  Decreased function - therapeutic activities  Impaired posture - neuro re-education    Previous and current functional limitations:  (See Goal Flow Sheet for this information)    Short term and Long term goals: (See Goal Flow Sheet for this information)     Communication ability:  Patient appears to be able to clearly communicate and understand verbal and written communication and follow directions correctly.  Treatment Explanation - The following has been discussed with the patient:   RX ordered/plan of care  Anticipated outcomes  Possible risks and side effects  This patient would benefit from PT intervention to resume normal activities.   Rehab potential is good.    Frequency:  1X week, once daily every 3 weeks  Duration:  for 12 weeks  Discharge Plan:  Achieve all  LTG.  Independent in home treatment program.  Reach maximal therapeutic benefit.    Please refer to the daily flowsheet for treatment today, total treatment time and time spent performing 1:1 timed codes.

## 2019-06-18 NOTE — RESULT ENCOUNTER NOTE
Excellent! Please call or sent a [employer] message if you have any questions. Lauryn Allison M.D.

## 2019-06-19 ENCOUNTER — ANCILLARY PROCEDURE (OUTPATIENT)
Dept: ULTRASOUND IMAGING | Facility: CLINIC | Age: 69
End: 2019-06-19
Attending: OBSTETRICS & GYNECOLOGY
Payer: COMMERCIAL

## 2019-06-19 DIAGNOSIS — N83.202 LEFT OVARIAN CYST: ICD-10-CM

## 2019-06-19 PROCEDURE — 76830 TRANSVAGINAL US NON-OB: CPT | Performed by: OBSTETRICS & GYNECOLOGY

## 2019-07-01 ENCOUNTER — THERAPY VISIT (OUTPATIENT)
Dept: PHYSICAL THERAPY | Facility: CLINIC | Age: 69
End: 2019-07-01
Attending: FAMILY MEDICINE
Payer: COMMERCIAL

## 2019-07-01 DIAGNOSIS — M54.2 NECK PAIN: ICD-10-CM

## 2019-07-01 DIAGNOSIS — M54.2 CERVICAL PAIN: ICD-10-CM

## 2019-07-01 PROCEDURE — 99207 C STAT DRY NEEDLING - IAM: CPT | Mod: 25 | Performed by: PHYSICAL THERAPIST

## 2019-07-01 PROCEDURE — 97140 MANUAL THERAPY 1/> REGIONS: CPT | Mod: GP | Performed by: PHYSICAL THERAPIST

## 2019-07-01 PROCEDURE — 97112 NEUROMUSCULAR REEDUCATION: CPT | Mod: GP | Performed by: PHYSICAL THERAPIST

## 2019-07-01 PROCEDURE — 97110 THERAPEUTIC EXERCISES: CPT | Mod: GP | Performed by: PHYSICAL THERAPIST

## 2019-07-17 ENCOUNTER — MYC MEDICAL ADVICE (OUTPATIENT)
Dept: CARDIOLOGY | Facility: CLINIC | Age: 69
End: 2019-07-17

## 2019-07-17 ENCOUNTER — THERAPY VISIT (OUTPATIENT)
Dept: PHYSICAL THERAPY | Facility: CLINIC | Age: 69
End: 2019-07-17
Payer: COMMERCIAL

## 2019-07-17 DIAGNOSIS — M54.2 CERVICAL PAIN: ICD-10-CM

## 2019-07-17 DIAGNOSIS — I48.0 PAROXYSMAL ATRIAL FIBRILLATION (H): Primary | ICD-10-CM

## 2019-07-17 PROCEDURE — 97140 MANUAL THERAPY 1/> REGIONS: CPT | Mod: GP | Performed by: PHYSICAL THERAPIST

## 2019-07-17 PROCEDURE — 99207 C STAT DRY NEEDLING - IAM: CPT | Mod: 25 | Performed by: PHYSICAL THERAPIST

## 2019-07-17 PROCEDURE — 97110 THERAPEUTIC EXERCISES: CPT | Mod: GP | Performed by: PHYSICAL THERAPIST

## 2019-07-19 ENCOUNTER — MYC MEDICAL ADVICE (OUTPATIENT)
Dept: CARDIOLOGY | Facility: CLINIC | Age: 69
End: 2019-07-19

## 2019-07-23 NOTE — TELEPHONE ENCOUNTER
Standing order for EKG placed to be done at [facility] per Dr. Maile Leonardo. Notified patient via [employer] to go to [facility] and have EKG completed should her Afib symptoms return.    Ashlyn Souza, ISABELN, RN, PHN  Electrophysiology Nurse Coordinator

## 2019-08-16 ENCOUNTER — OFFICE VISIT (OUTPATIENT)
Dept: FAMILY MEDICINE | Facility: CLINIC | Age: 69
End: 2019-08-16
Payer: COMMERCIAL

## 2019-08-16 ENCOUNTER — ALLIED HEALTH/NURSE VISIT (OUTPATIENT)
Dept: NURSING | Facility: CLINIC | Age: 69
End: 2019-08-16
Payer: COMMERCIAL

## 2019-08-16 VITALS
OXYGEN SATURATION: 95 % | BODY MASS INDEX: 23.19 KG/M2 | WEIGHT: 141.5 LBS | DIASTOLIC BLOOD PRESSURE: 72 MMHG | HEART RATE: 94 BPM | RESPIRATION RATE: 15 BRPM | SYSTOLIC BLOOD PRESSURE: 120 MMHG | TEMPERATURE: 98.4 F

## 2019-08-16 DIAGNOSIS — I10 ESSENTIAL HYPERTENSION: ICD-10-CM

## 2019-08-16 DIAGNOSIS — I48.0 PAROXYSMAL ATRIAL FIBRILLATION (H): ICD-10-CM

## 2019-08-16 DIAGNOSIS — I48.0 PAROXYSMAL ATRIAL FIBRILLATION (H): Primary | ICD-10-CM

## 2019-08-16 PROCEDURE — 99214 OFFICE O/P EST MOD 30 MIN: CPT | Performed by: FAMILY MEDICINE

## 2019-08-16 PROCEDURE — 99207 ZZC NO CHARGE NURSE ONLY: CPT

## 2019-08-16 PROCEDURE — 93005 ELECTROCARDIOGRAM TRACING: CPT

## 2019-08-16 NOTE — PATIENT INSTRUCTIONS
Increase metoprolol; take an additional 25 mg today (1 tab).     Try vagal maneuvers:     Rub the side of your neck forcibly.  Take a deep breath and hold it.  Put a cold cloth on your face.     Consider reducing caffeine intake.

## 2019-08-16 NOTE — PROGRESS NOTES
"Subjective     Nicole Stanley is a 69 year old female who presents to clinic today for the following health issues:    HPI       Pt is here for an EKG reading order by Dr. Leonardo.     EKG done, staff ask DOD Dr. Mann to look over EKG results.      Current status: Pt state state there are some palpation 1 1/2 hours ago. No dizziness and no chest pain.    Atrial Fibrillation, acute symptoms      Are you having any of the following symptoms? (Select all that apply) No complaints of shortness of breath, chest pain or pressure.  No increased sweating or nausea with activity.  No left-sided neck or arm pain.  No complaints of pain in calves when walking 1-2 blocks.     She does feel \"bad\", she can tell when she's in afib because she doesn't feel well  She's on metoprolol and apixaban.  She does have coffee daily but has not noted this triggers symptoms.  No triggers noted today, sudden onset of heart racing and feeling unwell, similar to prior episodes.    Patient Active Problem List   Diagnosis     Essential hypertension     Mixed hyperlipidemia     Bunion, left     Osteoarthritis of both hands, unspecified osteoarthritis type     Hypothyroidism     Uterovaginal prolapse, incomplete     Mixed incontinence     Myalgia of pelvic floor     Pelvic floor dysfunction     Dyspareunia in female     Left ovarian cyst     Influenza B     Paroxysmal atrial fibrillation (H)     Irritable larynx syndrome     Muscle tension dysphonia     Cervical pain     Past Surgical History:   Procedure Laterality Date     CHOLECYSTECTOMY  2000     CYSTOSCOPY       EYE SURGERY      blepharoplasty     GI SURGERY  2006    rectal sphincter     VASCULAR SURGERY  1988    vein stripping       Social History     Tobacco Use     Smoking status: Never Smoker     Smokeless tobacco: Never Used   Substance Use Topics     Alcohol use: Yes     Alcohol/week: 0.0 oz     Comment: 1-2 glass of wine per day     Family History   Problem Relation Age of Onset     " Hypertension Mother              Hyperlipidemia Mother      Family History Negative Father              Alzheimer Disease Father      Family History Negative Maternal Grandmother                   Current Outpatient Medications   Medication Sig Dispense Refill     Acetaminophen (TYLENOL PO) Take 500 mg by mouth as needed for mild pain or fever       apixaban ANTICOAGULANT (ELIQUIS) 5 MG tablet Take 1 tablet (5 mg) by mouth 2 times daily 180 tablet 3     atorvastatin (LIPITOR) 10 MG tablet TAKE 1 TABLET EVERY DAY 90 tablet 3     calcium carbonate (OSCAL 500) 1250 (500 Ca) MG TABS tablet Take 1 tablet by mouth 2 times daily       Cholecalciferol (VITAMIN D3) 3000 units TABS Take 1 tablet by mouth daily       levothyroxine (SYNTHROID/LEVOTHROID) 50 MCG tablet Take 1 tablet (50 mcg) by mouth daily 90 tablet 3     MAGNESIUM OXIDE PO        metoprolol succinate ER (TOPROL-XL) 25 MG 24 hr tablet TAKE 1 TABLET EVERY DAY 90 tablet 3     Probiotic Product (PROBIOTIC COLON SUPPORT PO) Take 1 tablet by mouth daily       zinc sulfate (ZINCATE) 220 (50 Zn) MG capsule Take 220 mg by mouth daily       No Known Allergies  Recent Labs   Lab Test 19  1019 19  1436 11/15/18  0754 18  0852  18  1937 17  0842 04/08/16 03/16/15   LDL  --   --  105* 151*  --   --  151* 171* 121   HDL  --   --  88 54  --   --  112 133 108   TRIG  --   --  101 81  --   --  56 76 66   ALT  --   --   --   --   --  25  --  12 19   CR  --  0.99 0.87  --    < > 0.73 1.04 0.8 0.9   GFRESTIMATED  --  58* 65  --    < > 79 53* 72 63   GFRESTBLACK  --  67 78  --    < > >90 64 87 76   POTASSIUM  --  3.9 4.1  --    < > 3.4 3.5 4.4 4.8   TSH 1.91  --   --   --   --  2.62 3.14 3.400 4.130    < > = values in this interval not displayed.      BP Readings from Last 3 Encounters:   19 120/72   19 128/88   19 107/71    Wt Readings from Last 3 Encounters:   19 64.2 kg (141 lb 8 oz)   19 65.4 kg  (144 lb 4 oz)   04/25/19 67.1 kg (148 lb)         Reviewed and updated as needed this visit by Provider         Review of Systems   ROS COMP: Constitutional, HEENT, cardiovascular, pulmonary, GI, , musculoskeletal, neuro, skin, endocrine and psych systems are negative, except as otherwise noted.      Objective    /72 (BP Location: Right arm, Patient Position: Chair, Cuff Size: Adult Regular)   Pulse 94   Temp 98.4  F (36.9  C) (Oral)   Resp 15   Wt 64.2 kg (141 lb 8 oz)   LMP  (LMP Unknown)   SpO2 95%   BMI 23.19 kg/m    Body mass index is 23.19 kg/m .  Physical Exam   GENERAL: healthy, alert and no respiratory distress  NECK: no adenopathy, no asymmetry, masses, or scars, thyroid normal to palpation and no carotid bruits  RESP: lungs clear to auscultation - no rales, rhonchi or wheezes  CV: irregularly irregular rhythm, normal S1 S2, no S3 or S4, no murmur, click or rub, peripheral pulses strong, no peripheral edema and no bruits heard   MS: no gross musculoskeletal defects noted, no edema  SKIN: no suspicious lesions or rashes  PSYCH: mentation appears normal, affect normal/bright but somewhat anxious, judgement and insight intact and appearance well groomed    Diagnostic Test Results:  Labs reviewed in Epic  EKG - afib, not noted on prior EKG        Assessment & Plan     1. Paroxysmal atrial fibrillation (H)  ASSESSMENT / PLAN:  (I48.0) Paroxysmal atrial fibrillation (H)  (primary encounter diagnosis)  Comment: acute episode today.   I recommend vagal maneuvers, increasing beta-blocker, avoiding caffeine, and going to ER if symptoms persist or become more symptomatic. She is on a blood thinner already (apixaban). Remainder of PE normal/negative.  I will route to cardiologist to see if there are additional changes to make.  The patient indicates understanding of these issues and agrees with the plan.     Patient Instructions   Increase metoprolol; take an additional 25 mg today (1 tab).     Try  vagal maneuvers:     Rub the side of your neck forcibly.  Take a deep breath and hold it.  Put a cold cloth on your face.     Consider reducing caffeine intake.           Return in about 1 day (around 8/17/2019) for follow up if not improving; go to ER if symptoms worsen..    Melonie Mann MD  [facility]

## 2019-08-16 NOTE — LETTER
[address] [address]  Phone: [phone]    August 16, 2019        Nicole Stanley  [address] [place] [address]          Increase metoprolol; take an additional 25 mg today (1 tab).    Try vagal maneuvers:    Rub the side of your neck forcibly.  Take a deep breath and hold it.  Put a cold cloth on your face.    Consider reducing caffeine intake.    [facility]

## 2019-08-16 NOTE — Clinical Note
Hi!  I saw Nicole today with acute afib, see note. Is there anything else you'd recommend for her? Thank you!

## 2019-08-19 ENCOUNTER — THERAPY VISIT (OUTPATIENT)
Dept: PHYSICAL THERAPY | Facility: CLINIC | Age: 69
End: 2019-08-19
Payer: COMMERCIAL

## 2019-08-19 DIAGNOSIS — M54.2 CERVICAL PAIN: ICD-10-CM

## 2019-08-19 PROCEDURE — 97110 THERAPEUTIC EXERCISES: CPT | Mod: GP | Performed by: PHYSICAL THERAPIST

## 2019-08-19 PROCEDURE — 99207 C STAT DRY NEEDLING - IAM: CPT | Mod: 25 | Performed by: PHYSICAL THERAPIST

## 2019-08-19 PROCEDURE — 97140 MANUAL THERAPY 1/> REGIONS: CPT | Mod: GP | Performed by: PHYSICAL THERAPIST

## 2019-08-20 PROBLEM — M54.2 CERVICAL PAIN: Status: RESOLVED | Noted: 2019-06-10 | Resolved: 2019-08-19

## 2019-08-20 NOTE — PROGRESS NOTES
Discharge Note    Progress reporting period is from initial eval to Aug 19, 2019.     Please see information below for last relevant information on current status.  Patient seen for 4 visits.  SUBJECTIVE  Subjective changes noted by patient:  Considerably better   .  Current pain level is 1/10.     Previous pain level was   .   Changes in function:  Yes (See Goal flowsheet attached for changes in current functional level)  Adverse reaction to treatment or activity: None    OBJECTIVE  Changes noted in objective findings: Reading/looking down trigger for increase in symptoms.  Left rotation mild limit w inc in pain.  Imp postural awareness and pain managment through use of directional preference ex.       ASSESSMENT/PLAN  Diagnosis: Cervical pain   DIAGP:  The encounter diagnosis was Cervical pain.  Updated problem list and treatment plan:   Decreased ROM/flexibility - HEP  STG/LTGs have been met or progress has been made towards goals:  Yes, please see goal flowsheet for most current information  Assessment of Progress: indep w self management.    Last current status:     Self Management Plans:  HEP  I have re-evaluated this patient and find that the nature, scope, duration and intensity of the therapy is appropriate for the medical condition of the patient.  Nicole continues to require the following intervention to meet STG and LTG's:  HEP.    Recommendations:  Discharge with current home program.  Patient to follow up with MD as needed.    Please refer to the daily flowsheet for treatment today, total treatment time and time spent performing 1:1 timed codes.

## 2019-09-19 ENCOUNTER — THERAPY VISIT (OUTPATIENT)
Dept: PHYSICAL THERAPY | Facility: CLINIC | Age: 69
End: 2019-09-19
Payer: COMMERCIAL

## 2019-09-19 DIAGNOSIS — M54.2 CERVICAL PAIN: Primary | ICD-10-CM

## 2019-09-19 PROCEDURE — 97112 NEUROMUSCULAR REEDUCATION: CPT | Mod: GP | Performed by: PHYSICAL THERAPIST

## 2019-09-19 PROCEDURE — 97110 THERAPEUTIC EXERCISES: CPT | Mod: GP | Performed by: PHYSICAL THERAPIST

## 2019-09-19 PROCEDURE — 99207 C STAT DRY NEEDLING - IAM: CPT | Mod: 25 | Performed by: PHYSICAL THERAPIST

## 2019-09-19 PROCEDURE — 97140 MANUAL THERAPY 1/> REGIONS: CPT | Mod: GP | Performed by: PHYSICAL THERAPIST

## 2019-10-21 ENCOUNTER — TELEPHONE (OUTPATIENT)
Dept: FAMILY MEDICINE | Facility: CLINIC | Age: 69
End: 2019-10-21

## 2019-10-21 ENCOUNTER — THERAPY VISIT (OUTPATIENT)
Dept: PHYSICAL THERAPY | Facility: CLINIC | Age: 69
End: 2019-10-21
Payer: COMMERCIAL

## 2019-10-21 DIAGNOSIS — M54.2 CERVICAL PAIN: Primary | ICD-10-CM

## 2019-10-21 PROCEDURE — 97112 NEUROMUSCULAR REEDUCATION: CPT | Mod: GP | Performed by: PHYSICAL THERAPIST

## 2019-10-21 PROCEDURE — 97530 THERAPEUTIC ACTIVITIES: CPT | Mod: GP | Performed by: PHYSICAL THERAPIST

## 2019-10-21 NOTE — TELEPHONE ENCOUNTER
Dr. Allison-Please review and complete Acupuncture referral.  Writer unable to address all sections of Acupuncture referral.    Thank you!  ISABEL GutierrezN, RN

## 2019-10-21 NOTE — PROGRESS NOTES
"{Progress Note or Discharge Report:377321}    Progress reporting period is from *** to ***.       SUBJECTIVE  Subjective changes noted by patient:  ***.  Subjective: Intermittant twinges of pain, overall improved but unable to do exercises without causing HA or neck/shoulder pain- even after modification.  Requesting xray and rec for next level of care.    Current pain level is {PAIN LEVEL (SCALE OF 10):974872} Current Pain level: 3/10.     Previous pain level was  {PAIN LEVEL (SCALE OF 10):676801}  .   Changes in function:  {Changes in Function:165737}  Adverse reaction to treatment or activity: {Adverse reaction to treatment or activity:504066::\"None\"}    OBJECTIVE  Changes noted in objective findings:  {Changes noted in objective findings:528216}  Objective: Poor postural awareness w prominent forward head.  Rot limited bilaterally.  No neurological deficits.  Time spent today in discussion of options for managment including chiro and acupuncture.     ASSESSMENT/PLAN  Updated problem list and treatment plan: {DIAGNOSIS/PLAN REHAB:524120}  STG/LTGs have been met or progress has been made towards goals:  {Goals met/progress made:494333::\"Yes (See Goal flow sheet completed today.)\"}  Assessment of Progress: {Assessment of progress:984104}  Self Management Plans:  {Self Management Plans:370244}  {Appropriateness of Rx:362135}  Nicole continues to require the following intervention to meet STG and LTG's:  {INTERVENTION REHAB:879283}    Recommendations:  {RECOMMENDATIONS REHAB:197127}    Please refer to the daily flowsheet for treatment today, total treatment time and time spent performing 1:1 timed codes.        "

## 2019-10-21 NOTE — TELEPHONE ENCOUNTER
Left message to call back and ask to speak with an available triage nurse.  JUDY Guzman, ISABELN, RN

## 2019-10-21 NOTE — TELEPHONE ENCOUNTER
----- Message from Lauryn Allison MD sent at 10/21/2019  4:16 PM CDT -----  Regarding: FW: patient request  Please place referral for chiropractor for her and I am not sure if acupuncture is part of the PT referral or would be offered through this, but if not we can make a separate referral. They may not be covered as her insurance will ultimately decide on coverage regardless of whether a referral is given typically. If she is thinking imaging may be necessary (like x-rays) I typically would recommend a clinic visit first and this could be done through sports medicine. I think the best thing would be for her to visit with the medical spine specialist and they can talk with her about whether any pictures are necessary and what kind (x-ray vs MRI).  Please place referrals and let her know. Thanks! Lauryn Allison M.D.        ----- Message -----  From: Annita Rodriguez, PT  Sent: 10/21/2019   1:09 PM CDT  To: Lauryn Allison MD  Subject: patient request                                  Hi Dr. AllisonNicole is doing better but still having some issues with pain.  We discussed chiro and acupuncture as a potential option for self management and she will need an order from you for that.   We have completed exercises and postural education.    She also wants to have an xray.  I do not think there are any significant signs to do one, but she reports that she would like to have the information.    I told her your office would contact her with next steps.    Let me know if you need anything, Alethea

## 2019-10-21 NOTE — PROGRESS NOTES
Discharge Note    Progress reporting period is from initial eval to Oct 21, 2019.     Nicole failed to return for next follow up visit and current status is unknown.  Please see information below for last relevant information on current status.  Patient seen for 6 visits.  SUBJECTIVE  Subjective changes noted by patient:  Intermittant twinges of pain, overall improved but unable to do exercises without causing HA or neck/shoulder pain- even after modification.  Requesting xray and rec for next level of care.  .  Current pain level is 3/10.     Previous pain level was   .   Changes in function:  Yes (See Goal flowsheet attached for changes in current functional level)  Adverse reaction to treatment or activity: None    OBJECTIVE  Changes noted in objective findings: Poor postural awareness w prominent forward head.  Rot limited bilaterally.  No neurological deficits.  Time spent today in discussion of options for managment including chiro and acupuncture.     ASSESSMENT/PLAN  Diagnosis: Cervical pain   DIAGP:  There were no encounter diagnoses.  Updated problem list and treatment plan:   Pain - HEP  Decreased ROM/flexibility - HEP  Decreased function - HEP  Impaired muscle performance - HEP  STG/LTGs have been met or progress has been made towards goals:  Yes, please see goal flowsheet for most current information  Assessment of Progress: current status is unknown.    Last current status: Pt has experienced exacerbation of symptoms   Self Management Plans:  HEP  I have re-evaluated this patient and find that the nature, scope, duration and intensity of the therapy is appropriate for the medical condition of the patient.  Nicole continues to require the following intervention to meet STG and LTG's:  HEP.    Recommendations:  Discharge with current home program.  Patient to follow up with MD as needed.    Please refer to the daily flowsheet for treatment today, total treatment time and time spent performing 1:1 timed  codes.

## 2019-10-22 NOTE — TELEPHONE ENCOUNTER
Return call to patient discussed provider recommendation, plan, and referrals - patient agrees with plan and will contact her insurance to discuss benefits/coverage

## 2019-10-23 DIAGNOSIS — M54.2 CERVICAL PAIN: Primary | ICD-10-CM

## 2019-10-23 NOTE — TELEPHONE ENCOUNTER
DIAGNOSIS: Cervical Pain/Problems - Neck (C1-C7) per referral and Pt - records in epic   APPOINTMENT DATE: Oct 29, 2019    NOTES STATUS DETAILS   OFFICE NOTE from referring provider Internal 5/28/19 Dr. Allison   OFFICE NOTE from other specialist Internal PT 10/21/19...   DISCHARGE SUMMARY from hospital N/A    DISCHARGE REPORT from the ER N/A    OPERATIVE REPORT N/A    MEDICATION LIST Internal    IMPLANT RECORD/STICKER N/A    LABS     CBC/DIFF N/A    CULTURES N/A    INJECTIONS DONE IN RADIOLOGY N/A    MRI N/A    CT SCAN N/A    XRAYS (IMAGES & REPORTS) Internal 2019   TUMOR     PATHOLOGY  Slides & report N/A

## 2019-10-28 DIAGNOSIS — M54.2 CERVICAL PAIN: Primary | ICD-10-CM

## 2019-10-29 ENCOUNTER — OFFICE VISIT (OUTPATIENT)
Dept: ORTHOPEDICS | Facility: CLINIC | Age: 69
End: 2019-10-29
Payer: COMMERCIAL

## 2019-10-29 ENCOUNTER — PRE VISIT (OUTPATIENT)
Dept: ORTHOPEDICS | Facility: CLINIC | Age: 69
End: 2019-10-29

## 2019-10-29 ENCOUNTER — ANCILLARY PROCEDURE (OUTPATIENT)
Dept: GENERAL RADIOLOGY | Facility: CLINIC | Age: 69
End: 2019-10-29
Attending: ORTHOPAEDIC SURGERY
Payer: COMMERCIAL

## 2019-10-29 ENCOUNTER — ANCILLARY PROCEDURE (OUTPATIENT)
Dept: MRI IMAGING | Facility: CLINIC | Age: 69
End: 2019-10-29
Attending: ORTHOPAEDIC SURGERY
Payer: COMMERCIAL

## 2019-10-29 VITALS — WEIGHT: 141 LBS | HEIGHT: 66 IN | BODY MASS INDEX: 22.66 KG/M2

## 2019-10-29 DIAGNOSIS — M54.2 CERVICAL PAIN: Primary | ICD-10-CM

## 2019-10-29 DIAGNOSIS — M54.2 CERVICAL PAIN: ICD-10-CM

## 2019-10-29 ASSESSMENT — MIFFLIN-ST. JEOR: SCORE: 1168.38

## 2019-10-29 ASSESSMENT — ENCOUNTER SYMPTOMS
MUSCLE CRAMPS: 1
JOINT SWELLING: 0
ARTHRALGIAS: 1
MUSCLE WEAKNESS: 0
BACK PAIN: 0
NECK PAIN: 1
MYALGIAS: 1
STIFFNESS: 1

## 2019-10-29 NOTE — PROGRESS NOTES
Spine Surgery Consultation    REFERRING PHYSICIAN: Lauryn Allison   PRIMARY CARE PHYSICIAN: Lauryn Allison           Chief Complaint:   Consult (cervical pain )      History of Present Illness:  Symptom Profile Including: location of symptoms, onset, severity, exacerbating/alleviating factors, previous treatments:        Nicole Stanley is a 69 year old female with A. fib on Eliquis who presents with a 2 year history of neck pain. Denies any new trauma but she does note that she had neck pain after she was positioned for colonoscopy for prolonged period of time.  She has gone to PT, which she thinks has significantly helped.  She was canceled her appointment because of this.  She takes Tylenol which occasionally helps.  She is unable to take ibuprofen because she is on Eliquis.  She has not tried gabapentin.  She tries heat with improvement.  Denies any radicular or myelopathic symptoms, however she thinks she had pain radiating down her left shoulder into her bicep for several months that she did not think was related to this.  Her cardiologist worked her up for possible angina.           Past Medical History:     Past Medical History:   Diagnosis Date     Arthritis     osteoarth     Spider veins      Afib   HLD          Past Surgical History:     Past Surgical History:   Procedure Laterality Date     CHOLECYSTECTOMY       CYSTOSCOPY       EYE SURGERY      blepharoplasty     GI SURGERY  2006    rectal sphincter     VASCULAR SURGERY  1988    vein stripping            Social History:     Social History     Tobacco Use     Smoking status: Never Smoker     Smokeless tobacco: Never Used   Substance Use Topics     Alcohol use: Yes     Alcohol/week: 0.0 standard drinks     Comment: 1-2 glass of wine per day            Family History:     Family History   Problem Relation Age of Onset     Hypertension Mother              Hyperlipidemia Mother      Family History Negative Father              Alzheimer  Disease Father      Family History Negative Maternal Grandmother                      Allergies:   No Known Allergies         Medications:     Current Outpatient Medications   Medication     Acetaminophen (TYLENOL PO)     apixaban ANTICOAGULANT (ELIQUIS) 5 MG tablet     atorvastatin (LIPITOR) 10 MG tablet     calcium carbonate (OSCAL 500) 1250 (500 Ca) MG TABS tablet     Cholecalciferol (VITAMIN D3) 3000 units TABS     levothyroxine (SYNTHROID/LEVOTHROID) 50 MCG tablet     MAGNESIUM OXIDE PO     metoprolol succinate ER (TOPROL-XL) 25 MG 24 hr tablet     Probiotic Product (PROBIOTIC COLON SUPPORT PO)     zinc sulfate (ZINCATE) 220 (50 Zn) MG capsule     No current facility-administered medications for this visit.              Review of Systems:     A 10 point ROS was performed and reviewed. Specific responses to these questions are noted at the end of the document.         Physical Exam:   Vitals: LMP  (LMP Unknown)   Constitutional: awake, alert, cooperative, no apparent distress, appears stated age.    Eyes: The sclera are white.  Ears, Nose, Throat: The trachea is midline.  Psychiatric: The patient has a normal affect.  Respiratory: breathing non-labored  Cardiovascular: The extremities are warm and perfused.  Skin: no obvious rashes or lesions.  Musculoskeletal, Neurologic, and Spine:   Cervical spine:    Appearance -no gross step-offs, kyphosis.  Palpation - Non-tender to palpation  ROM- Flex/Extend rotation without pain    Motor -     C5: Deltoids R 5/5 and L 5/5 strength    C6: Biceps R 5/5 and L 5/5 strength     C7: Triceps R 5/5 and L 5/5 strength     C8:  R 5/5 and L 5/5 strength     T1: Dorsal interossei R 4/5 and L 4/5 strength        Sensation: intact to light touch in C5-T1      Special Tests -      Lhermitte's Test - Negative     Spurling's Test - Negative      Rodriguez's Test - Negative      Neurologic:      REFLEXES Left Right   Biceps 1+ 1+   Triceps 1+ 1+   Brachioradialis 1+ 1+             Imaging:   We ordered and independently reviewed new radiographs at this clinic visit. The results were discussed with the patient.  Findings include:    October 29, 2019 cervical MRI shows multilevel spondylosis worst at the C5-6 level where there is moderate to severe bilateral foraminal stenosis and moderate canal stenosis with effacement of the CSF flow and mild change in the shape of the spinal cord.  Plain cervical radiographs October 29, 2019 show moderate diffuse cervical spondylosis worst at C5-6 where there is retrolisthesis of C5 on C6 and complete disc space height loss mild to moderate changes at C4-5 as well             Assessment and Plan:   Assessment:  69 year old female with multilevel cervical spondylosis, managed with nonoperative measures.  At this time her pain is quite a bit better than it was when she made the appointment.    We have a long discussion with the patient today regarding her imaging findings and conservative measures.  We agreed with the patient that she should continue conservative therapy, heat, acupuncture if needed and massage therapy.    If she were to call back to the clinic with increasing pain, we would recommend physical therapy focusing on periscapular strengthening.  We would avoid forced range of motion, including extension of the cervical spine given the multilevel spondylosis.  She would not need to be seen in person in clinic for this prescription.    If the physical therapy does not relieve her symptoms, the patient can return to see Dr. Weaver.    She is in agreement with this plan.    Patient seen and discussed with Dr. Weaver.       Attending MD (Dr. Sonido Weaver) :  I reviewed and verified the history and physical exam of the patient and discussed the patient's management with the other clinical providers involved in this patient's care including any involved residents or physicians assistants. I reviewed the above note and agree with the documented  findings and plan of care, which were communicated to the patient.      MD Annabelle Solomon MD       Answers for HPI/ROS submitted by the patient on 10/29/2019   General Symptoms: No  Skin Symptoms: No  HENT Symptoms: No  EYE SYMPTOMS: No  HEART SYMPTOMS: No  LUNG SYMPTOMS: No  INTESTINAL SYMPTOMS: No  URINARY SYMPTOMS: No  GYNECOLOGIC SYMPTOMS: No  BREAST SYMPTOMS: No  SKELETAL SYMPTOMS: Yes  BLOOD SYMPTOMS: No  NERVOUS SYSTEM SYMPTOMS: No  MENTAL HEALTH SYMPTOMS: No  Back pain: No  Muscle aches: Yes  Neck pain: Yes  Swollen joints: No  Joint pain: Yes  Bone pain: No  Muscle cramps: Yes  Muscle weakness: No  Joint stiffness: Yes  Bone fracture: No

## 2019-10-29 NOTE — NURSING NOTE
"Reason For Visit:   Chief Complaint   Patient presents with     Consult     cervical pain        Primary MD: Lauryn Allison  Ref. MD: Keegan     ?  No  Occupation retired .  Currently working? No.  Work status?  Retired.  Date of injury: 2 years ago   Type of injury: chronic .  Date of surgery: none   Type of surgery: none .  Smoker: No  Request smoking cessation information: No    Ht 1.664 m (5' 5.5\")   Wt 64 kg (141 lb)   LMP  (LMP Unknown)   BMI 23.11 kg/m      Pain Assessment  Patient Currently in Pain: Yes  0-10 Pain Scale: 3    Oswestry (CARMELO) Questionnaire    No flowsheet data found.         Neck Disability Index (NDI) Questionnaire    Neck Disability Index (NDI) 10/29/2019   Neck Disability Index: Count 10   NDI: Total Score = SUM (points for all 10 findings) 12   Neck Disability in Percent = (Total Score) / 50 * 100 24 (%)   Some recent data might be hidden                   Promis 10 Assessment    PROMIS 10 10/29/2019   In general, would you say your health is: Good   In general, would you say your quality of life is: Very good   In general, how would you rate your physical health? Good   In general, how would you rate your mental health, including your mood and your ability to think? Very good   In general, how would you rate your satisfaction with your social activities and relationships? Very good   In general, please rate how well you carry out your usual social activities and roles Very good   To what extent are you able to carry out your everyday physical activities such as walking, climbing stairs, carrying groceries, or moving a chair? Completely   How often have you been bothered by emotional problems such as feeling anxious, depressed or irritable? Never   How would you rate your fatigue on average? Mild   How would you rate your pain on average?   0 = No Pain  to  10 = Worst Imaginable Pain 5   In general, would you say your health is: 3   In general, would you say your quality of " life is: 4   In general, how would you rate your physical health? 3   In general, how would you rate your mental health, including your mood and your ability to think? 4   In general, how would you rate your satisfaction with your social activities and relationships? 4   In general, please rate how well you carry out your usual social activities and roles. (This includes activities at home, at work and in your community, and responsibilities as a parent, child, spouse, employee, friend, etc.) 4   To what extent are you able to carry out your everyday physical activities such as walking, climbing stairs, carrying groceries, or moving a chair? 5   In the past 7 days, how often have you been bothered by emotional problems such as feeling anxious, depressed, or irritable? 1   In the past 7 days, how would you rate your fatigue on average? 2   In the past 7 days, how would you rate your pain on average, where 0 means no pain, and 10 means worst imaginable pain? 5   Global Mental Health Score 17   Global Physical Health Score 15   PROMIS TOTAL - SUBSCORES 32   Some recent data might be hidden                Tommy Alberto, BLU

## 2019-10-29 NOTE — LETTER
10/29/2019       RE: Nicole Stanley  [address] [place] [address]     Dear Colleague,    Thank you for referring your patient, Nicole Stanley, to the [facility] at [facility]. Please see a copy of my visit note below.    Spine Surgery Consultation    REFERRING PHYSICIAN: Lauryn Allison   PRIMARY CARE PHYSICIAN: Lauryn Allison           Chief Complaint:   Consult (cervical pain )      History of Present Illness:  Symptom Profile Including: location of symptoms, onset, severity, exacerbating/alleviating factors, previous treatments:        Nicole Stanley is a 69 year old female with A. fib on Eliquis who presents with a 2 year history of neck pain. Denies any new trauma but she does note that she had neck pain after she was positioned for colonoscopy for prolonged period of time.  She has gone to PT, which she thinks has significantly helped.  She was canceled her appointment because of this.  She takes Tylenol which occasionally helps.  She is unable to take ibuprofen because she is on Eliquis.  She has not tried gabapentin.  She tries heat with improvement.  Denies any radicular or myelopathic symptoms, however she thinks she had pain radiating down her left shoulder into her bicep for several months that she did not think was related to this.  Her cardiologist worked her up for possible angina.           Past Medical History:     Past Medical History:   Diagnosis Date     Arthritis     osteoarth     Spider veins      Afib   HLD          Past Surgical History:     Past Surgical History:   Procedure Laterality Date     CHOLECYSTECTOMY  2000     CYSTOSCOPY       EYE SURGERY      blepharoplasty     GI SURGERY  2006    rectal sphincter     VASCULAR SURGERY  1988    vein stripping            Social History:     Social History     Tobacco Use     Smoking status: Never Smoker     Smokeless tobacco: Never Used   Substance Use Topics     Alcohol use: Yes      Alcohol/week: 0.0 standard drinks     Comment: 1-2 glass of wine per day            Family History:     Family History   Problem Relation Age of Onset     Hypertension Mother              Hyperlipidemia Mother      Family History Negative Father              Alzheimer Disease Father      Family History Negative Maternal Grandmother                      Allergies:   No Known Allergies         Medications:     Current Outpatient Medications   Medication     Acetaminophen (TYLENOL PO)     apixaban ANTICOAGULANT (ELIQUIS) 5 MG tablet     atorvastatin (LIPITOR) 10 MG tablet     calcium carbonate (OSCAL 500) 1250 (500 Ca) MG TABS tablet     Cholecalciferol (VITAMIN D3) 3000 units TABS     levothyroxine (SYNTHROID/LEVOTHROID) 50 MCG tablet     MAGNESIUM OXIDE PO     metoprolol succinate ER (TOPROL-XL) 25 MG 24 hr tablet     Probiotic Product (PROBIOTIC COLON SUPPORT PO)     zinc sulfate (ZINCATE) 220 (50 Zn) MG capsule     No current facility-administered medications for this visit.              Review of Systems:     A 10 point ROS was performed and reviewed. Specific responses to these questions are noted at the end of the document.         Physical Exam:   Vitals: LMP  (LMP Unknown)   Constitutional: awake, alert, cooperative, no apparent distress, appears stated age.    Eyes: The sclera are white.  Ears, Nose, Throat: The trachea is midline.  Psychiatric: The patient has a normal affect.  Respiratory: breathing non-labored  Cardiovascular: The extremities are warm and perfused.  Skin: no obvious rashes or lesions.  Musculoskeletal, Neurologic, and Spine:   Cervical spine:    Appearance -no gross step-offs, kyphosis.  Palpation - Non-tender to palpation  ROM- Flex/Extend rotation without pain    Motor -     C5: Deltoids R 5/5 and L 5/5 strength    C6: Biceps R 5/5 and L 5/5 strength     C7: Triceps R 5/5 and L 5/5 strength     C8:  R 5/5 and L 5/5 strength     T1: Dorsal interossei R 4/5 and  L 4/5 strength        Sensation: intact to light touch in C5-T1      Special Tests -      Lhermitte's Test - Negative     Spurling's Test - Negative      Rodriguez's Test - Negative      Neurologic:      REFLEXES Left Right   Biceps 1+ 1+   Triceps 1+ 1+   Brachioradialis 1+ 1+            Imaging:   We ordered and independently reviewed new radiographs at this clinic visit. The results were discussed with the patient.  Findings include:    October 29, 2019 cervical MRI shows multilevel spondylosis worst at the C5-6 level where there is moderate to severe bilateral foraminal stenosis and moderate canal stenosis with effacement of the CSF flow and mild change in the shape of the spinal cord.  Plain cervical radiographs October 29, 2019 show moderate diffuse cervical spondylosis worst at C5-6 where there is retrolisthesis of C5 on C6 and complete disc space height loss mild to moderate changes at C4-5 as well             Assessment and Plan:   Assessment:  69 year old female with multilevel cervical spondylosis, managed with nonoperative measures.  At this time her pain is quite a bit better than it was when she made the appointment.    We have a long discussion with the patient today regarding her imaging findings and conservative measures.  We agreed with the patient that she should continue conservative therapy, heat, acupuncture if needed and massage therapy.    If she were to call back to the clinic with increasing pain, we would recommend physical therapy focusing on periscapular strengthening.  We would avoid forced range of motion, including extension of the cervical spine given the multilevel spondylosis.  She would not need to be seen in person in clinic for this prescription.    If the physical therapy does not relieve her symptoms, the patient can return to see Dr. Weaver.    She is in agreement with this plan.    Patient seen and discussed with Dr. Weaver.       Attending MD (Dr. Sonido Weaver) :  I  reviewed and verified the history and physical exam of the patient and discussed the patient's management with the other clinical providers involved in this patient's care including any involved residents or physicians assistants. I reviewed the above note and agree with the documented findings and plan of care, which were communicated to the patient.      MD Annabelle Solomon MD     Answers for HPI/ROS submitted by the patient on 10/29/2019   General Symptoms: No  Skin Symptoms: No  HENT Symptoms: No  EYE SYMPTOMS: No  HEART SYMPTOMS: No  LUNG SYMPTOMS: No  INTESTINAL SYMPTOMS: No  URINARY SYMPTOMS: No  GYNECOLOGIC SYMPTOMS: No  BREAST SYMPTOMS: No  SKELETAL SYMPTOMS: Yes  BLOOD SYMPTOMS: No  NERVOUS SYSTEM SYMPTOMS: No  MENTAL HEALTH SYMPTOMS: No  Back pain: No  Muscle aches: Yes  Neck pain: Yes  Swollen joints: No  Joint pain: Yes  Bone pain: No  Muscle cramps: Yes  Muscle weakness: No  Joint stiffness: Yes  Bone fracture: No

## 2019-11-01 ENCOUNTER — PRE VISIT (OUTPATIENT)
Dept: UROLOGY | Facility: CLINIC | Age: 69
End: 2019-11-01

## 2019-11-06 ENCOUNTER — OFFICE VISIT (OUTPATIENT)
Dept: FAMILY MEDICINE | Facility: CLINIC | Age: 69
End: 2019-11-06
Payer: COMMERCIAL

## 2019-11-06 VITALS
DIASTOLIC BLOOD PRESSURE: 80 MMHG | HEART RATE: 68 BPM | WEIGHT: 144.75 LBS | OXYGEN SATURATION: 100 % | SYSTOLIC BLOOD PRESSURE: 130 MMHG | BODY MASS INDEX: 23.26 KG/M2 | HEIGHT: 66 IN | TEMPERATURE: 97.5 F

## 2019-11-06 DIAGNOSIS — E03.9 ACQUIRED HYPOTHYROIDISM: ICD-10-CM

## 2019-11-06 DIAGNOSIS — I48.91 ATRIAL FIBRILLATION, UNSPECIFIED TYPE (H): ICD-10-CM

## 2019-11-06 DIAGNOSIS — I10 ESSENTIAL HYPERTENSION: ICD-10-CM

## 2019-11-06 DIAGNOSIS — R94.4 ABNORMAL RENAL FUNCTION TEST: Primary | ICD-10-CM

## 2019-11-06 DIAGNOSIS — M54.2 NECK PAIN: ICD-10-CM

## 2019-11-06 DIAGNOSIS — E78.49 OTHER HYPERLIPIDEMIA: ICD-10-CM

## 2019-11-06 LAB
ANION GAP SERPL CALCULATED.3IONS-SCNC: 6 MMOL/L (ref 3–14)
BUN SERPL-MCNC: 19 MG/DL (ref 7–30)
CALCIUM SERPL-MCNC: 9.1 MG/DL (ref 8.5–10.1)
CHLORIDE SERPL-SCNC: 105 MMOL/L (ref 94–109)
CHOLEST SERPL-MCNC: 232 MG/DL
CO2 SERPL-SCNC: 28 MMOL/L (ref 20–32)
CREAT SERPL-MCNC: 0.88 MG/DL (ref 0.52–1.04)
CREAT UR-MCNC: 160 MG/DL
GFR SERPL CREATININE-BSD FRML MDRD: 66 ML/MIN/{1.73_M2}
GLUCOSE SERPL-MCNC: 87 MG/DL (ref 70–99)
HDLC SERPL-MCNC: 112 MG/DL
LDLC SERPL CALC-MCNC: 109 MG/DL
MICROALBUMIN UR-MCNC: 16 MG/L
MICROALBUMIN/CREAT UR: 9.69 MG/G CR (ref 0–25)
NONHDLC SERPL-MCNC: 120 MG/DL
POTASSIUM SERPL-SCNC: 3.9 MMOL/L (ref 3.4–5.3)
SODIUM SERPL-SCNC: 139 MMOL/L (ref 133–144)
TRIGL SERPL-MCNC: 53 MG/DL

## 2019-11-06 PROCEDURE — 36415 COLL VENOUS BLD VENIPUNCTURE: CPT | Performed by: FAMILY MEDICINE

## 2019-11-06 PROCEDURE — 80061 LIPID PANEL: CPT | Performed by: FAMILY MEDICINE

## 2019-11-06 PROCEDURE — 82043 UR ALBUMIN QUANTITATIVE: CPT | Performed by: FAMILY MEDICINE

## 2019-11-06 PROCEDURE — 99214 OFFICE O/P EST MOD 30 MIN: CPT | Performed by: FAMILY MEDICINE

## 2019-11-06 PROCEDURE — 80048 BASIC METABOLIC PNL TOTAL CA: CPT | Performed by: FAMILY MEDICINE

## 2019-11-06 PROCEDURE — 99207 C PAF COMPLETED  NO CHARGE: CPT | Performed by: FAMILY MEDICINE

## 2019-11-06 ASSESSMENT — MIFFLIN-ST. JEOR: SCORE: 1185.39

## 2019-11-06 NOTE — PROGRESS NOTES
SUBJECTIVE:   Nicole Stanley is a 69 year old female who presents to clinic today for the following health issues:      Hypertension Follow-up      Do you check your blood pressure regularly outside of the clinic? No     Are you following a low salt diet? Yes    Are your blood pressures ever more than 140 on the top number (systolic) OR more   than 90 on the bottom number (diastolic), for example 140/90? No     Hyperlipidemia Follow-Up      Are you having any of the following symptoms? (Select all that apply)  No complaints of shortness of breath, chest pain or pressure.  No increased sweating or nausea with activity.  No left-sided neck or arm pain.  No complaints of pain in calves when walking 1-2 blocks.    Are you regularly taking any medication or supplement to lower your cholesterol?   Yes- lipitor     Are you having muscle aches or other side effects that you think could be caused by your cholesterol lowering medication?  Yes- cramps     Chronic Kidney Disease Follow-up    Do you take any over the counter pain medicine?: Yes  What over the counter medicine are you taking for your pain?:  Tylenol        How often do you take this medicine?:  Two times daily      How many servings of fruits and vegetables do you eat daily?  4 or more    On average, how many sweetened beverages do you drink each day (soda, juice, sweet tea, etc)?   0 she does have wine at night most nights     How many days per week do you miss taking your medication? 0           Problem list and histories reviewed & adjusted, as indicated.  Additional history: as documented    Patient Active Problem List   Diagnosis     Essential hypertension     Mixed hyperlipidemia     Bunion, left     Osteoarthritis of both hands, unspecified osteoarthritis type     Hypothyroidism     Uterovaginal prolapse, incomplete     Mixed incontinence     Myalgia of pelvic floor     Pelvic floor dysfunction     Dyspareunia in female     Left ovarian cyst     Influenza  B     Paroxysmal atrial fibrillation (H)     Irritable larynx syndrome     Muscle tension dysphonia     Past Surgical History:   Procedure Laterality Date     CHOLECYSTECTOMY  2000     CYSTOSCOPY       EYE SURGERY      blepharoplasty     GI SURGERY  2006    rectal sphincter     VASCULAR SURGERY  1988    vein stripping       Social History     Tobacco Use     Smoking status: Never Smoker     Smokeless tobacco: Never Used   Substance Use Topics     Alcohol use: Yes     Alcohol/week: 0.0 standard drinks     Comment: 1-2 glass of wine per day     Family History   Problem Relation Age of Onset     Hypertension Mother              Hyperlipidemia Mother      Family History Negative Father              Alzheimer Disease Father      Family History Negative Maternal Grandmother                   Current Outpatient Medications   Medication Sig Dispense Refill     Acetaminophen (TYLENOL PO) Take 500 mg by mouth as needed for mild pain or fever       apixaban ANTICOAGULANT (ELIQUIS) 5 MG tablet Take 1 tablet (5 mg) by mouth 2 times daily 180 tablet 3     atorvastatin (LIPITOR) 10 MG tablet TAKE 1 TABLET EVERY DAY 90 tablet 3     calcium carbonate (OSCAL 500) 1250 (500 Ca) MG TABS tablet Take 1 tablet by mouth 2 times daily       Cholecalciferol (VITAMIN D3) 3000 units TABS Take 1 tablet by mouth daily       levothyroxine (SYNTHROID/LEVOTHROID) 50 MCG tablet Take 1 tablet (50 mcg) by mouth daily 90 tablet 3     MAGNESIUM OXIDE PO        metoprolol succinate ER (TOPROL-XL) 25 MG 24 hr tablet TAKE 1 TABLET EVERY DAY 90 tablet 3     Probiotic Product (PROBIOTIC COLON SUPPORT PO) Take 1 tablet by mouth daily       zinc sulfate (ZINCATE) 220 (50 Zn) MG capsule Take 220 mg by mouth daily       No Known Allergies  Recent Labs   Lab Test 19  1019 19  1436 11/15/18  0754 18  0852  18  1937 17  0842 04/08/16 03/16/15   LDL  --   --  105* 151*  --   --  151* 171* 121   HDL  --   --  88  "54  --   --  112 133 108   TRIG  --   --  101 81  --   --  56 76 66   ALT  --   --   --   --   --  25  --  12 19   CR  --  0.99 0.87  --    < > 0.73 1.04 0.8 0.9   GFRESTIMATED  --  58* 65  --    < > 79 53* 72 63   GFRESTBLACK  --  67 78  --    < > >90 64 87 76   POTASSIUM  --  3.9 4.1  --    < > 3.4 3.5 4.4 4.8   TSH 1.91  --   --   --   --  2.62 3.14 3.400 4.130    < > = values in this interval not displayed.      BP Readings from Last 3 Encounters:   11/06/19 130/80   08/16/19 120/72   05/28/19 128/88    Wt Readings from Last 3 Encounters:   11/06/19 65.7 kg (144 lb 12 oz)   10/29/19 64 kg (141 lb)   08/16/19 64.2 kg (141 lb 8 oz)              Reviewed and updated as needed this visit by clinical staff  Tobacco  Allergies  Meds       Reviewed and updated as needed this visit by Provider         ROS:  As above     OBJECTIVE:     /80 (BP Location: Right arm, Patient Position: Sitting, Cuff Size: Adult Regular)   Pulse 68   Temp 97.5  F (36.4  C) (Oral)   Ht 1.664 m (5' 5.5\")   Wt 65.7 kg (144 lb 12 oz)   LMP  (LMP Unknown)   SpO2 100%   BMI 23.72 kg/m    Body mass index is 23.72 kg/m .  GENERAL: healthy, alert and no distress    Diagnostic Test Results:  none   Last Comprehensive Metabolic Panel:  Sodium   Date Value Ref Range Status   04/19/2019 139 133 - 144 mmol/L Final     Potassium   Date Value Ref Range Status   04/19/2019 3.9 3.4 - 5.3 mmol/L Final     Chloride   Date Value Ref Range Status   04/19/2019 106 94 - 109 mmol/L Final     Carbon Dioxide   Date Value Ref Range Status   04/19/2019 24 20 - 32 mmol/L Final     Anion Gap   Date Value Ref Range Status   04/19/2019 9 3 - 14 mmol/L Final     Glucose   Date Value Ref Range Status   04/19/2019 81 70 - 99 mg/dL Final     Urea Nitrogen   Date Value Ref Range Status   04/19/2019 20 7 - 30 mg/dL Final     Creatinine   Date Value Ref Range Status   04/19/2019 0.99 0.52 - 1.04 mg/dL Final     GFR Estimate   Date Value Ref Range Status   04/19/2019 " 58 (L) >60 mL/min/[1.73_m2] Final     Comment:     Non  GFR Calc  Starting 12/18/2018, serum creatinine based estimated GFR (eGFR) will be   calculated using the Chronic Kidney Disease Epidemiology Collaboration   (CKD-EPI) equation.       Calcium   Date Value Ref Range Status   04/19/2019 8.8 8.5 - 10.1 mg/dL Final      ASSESSMENT/PLAN:         ICD-10-CM    1. Abnormal renal function test R94.4 Albumin Random Urine Quantitative with Creat Ratio   2. Essential hypertension I10 Basic metabolic panel   3. Other hyperlipidemia E78.49 Lipid panel reflex to direct LDL Fasting     1. Abnormal renal function test  Last GFR was mildly decreased at 58mL/min. Recheck bmp today.  Recommend avoidance of NSAID medications which can harm the kidneys. Check urine albumin today. If GFR persistently low or elevated urine albumin would recommend starting ACEI.      2. Other hyperlipidemia  Controlled.  Continue atorvastatin 10 mg daily       3. Paroxysmal atrial fibrillation (H)  Followed by cardiology, cardioverted on 4/19 in the ER. Continues eliquis  5 mg twice daily       4. Acquired hypothyroidism   stable.  Continues levothyroxine 50 mcg daily  Due for TSH in May     5. Essential hypertension  Controlled.  Continues metoprolol 25 mg daily. Recheck BMP today. Discussed the possibility of adding ACEI in the future. Will check urine albumin today as well.     6. Neck pain  Improving Has been working with physical therapy and saw Ortho on 10/29/2018.        Lauryn Allison M.D.        [facility]

## 2019-11-06 NOTE — LETTER
November 8, 2019      Nicole Stanley  [address] [place] [address]        Dear ,    We are writing to inform you of your test results.    Your lab results are stable. Please let us know if you have any questions.     Resulted Orders   Basic metabolic panel   Result Value Ref Range    Sodium 139 133 - 144 mmol/L    Potassium 3.9 3.4 - 5.3 mmol/L    Chloride 105 94 - 109 mmol/L    Carbon Dioxide 28 20 - 32 mmol/L    Anion Gap 6 3 - 14 mmol/L    Glucose 87 70 - 99 mg/dL    Urea Nitrogen 19 7 - 30 mg/dL    Creatinine 0.88 0.52 - 1.04 mg/dL    GFR Estimate 66 >60 mL/min/[1.73_m2]      Comment:      Non  GFR Calc  Starting 12/18/2018, serum creatinine based estimated GFR (eGFR) will be   calculated using the Chronic Kidney Disease Epidemiology Collaboration   (CKD-EPI) equation.      GFR Estimate If Black 76 >60 mL/min/[1.73_m2]      Comment:       GFR Calc  Starting 12/18/2018, serum creatinine based estimated GFR (eGFR) will be   calculated using the Chronic Kidney Disease Epidemiology Collaboration   (CKD-EPI) equation.      Calcium 9.1 8.5 - 10.1 mg/dL   Albumin Random Urine Quantitative with Creat Ratio   Result Value Ref Range    Creatinine Urine 160 mg/dL    Albumin Urine mg/L 16 mg/L    Albumin Urine mg/g Cr 9.69 0 - 25 mg/g Cr   Lipid panel reflex to direct LDL Fasting   Result Value Ref Range    Cholesterol 232 (H) <200 mg/dL      Comment:      Desirable:       <200 mg/dl    Triglycerides 53 <150 mg/dL      Comment:      Fasting specimen    HDL Cholesterol 112 >49 mg/dL    LDL Cholesterol Calculated 109 (H) <100 mg/dL      Comment:      Above desirable:  100-129 mg/dl  Borderline High:  130-159 mg/dL  High:             160-189 mg/dL  Very high:       >189 mg/dl      Non HDL Cholesterol 120 <130 mg/dL       If you have any questions or concerns, please call the clinic at the number listed above.       Sincerely,        Lauryn Allison MD/nr

## 2019-11-07 ENCOUNTER — OFFICE VISIT (OUTPATIENT)
Dept: UROLOGY | Facility: CLINIC | Age: 69
End: 2019-11-07
Payer: COMMERCIAL

## 2019-11-07 VITALS
SYSTOLIC BLOOD PRESSURE: 131 MMHG | DIASTOLIC BLOOD PRESSURE: 89 MMHG | WEIGHT: 142 LBS | HEIGHT: 66 IN | BODY MASS INDEX: 22.82 KG/M2 | HEART RATE: 61 BPM

## 2019-11-07 DIAGNOSIS — R10.31 ABDOMINAL PAIN, RIGHT LOWER QUADRANT: ICD-10-CM

## 2019-11-07 DIAGNOSIS — N39.46 MIXED INCONTINENCE: ICD-10-CM

## 2019-11-07 DIAGNOSIS — N81.2 UTEROVAGINAL PROLAPSE, INCOMPLETE: Primary | ICD-10-CM

## 2019-11-07 DIAGNOSIS — R82.90 ABNORMAL URINE: ICD-10-CM

## 2019-11-07 LAB
ALBUMIN UR-MCNC: NEGATIVE MG/DL
AMORPH CRY #/AREA URNS HPF: ABNORMAL /HPF
APPEARANCE UR: ABNORMAL
APPEARANCE UR: CLEAR
BACTERIA #/AREA URNS HPF: ABNORMAL /HPF
BILIRUB UR QL STRIP: NEGATIVE
BILIRUB UR QL: NORMAL
COLOR UR AUTO: YELLOW
COLOR UR: YELLOW
GLUCOSE UR STRIP-MCNC: NEGATIVE MG/DL
GLUCOSE URINE: NORMAL MG/DL
HGB UR QL STRIP: NEGATIVE
HGB UR QL: NORMAL
KETONES UR QL: NORMAL MG/DL
KETONES UR STRIP-MCNC: NEGATIVE MG/DL
LEUKOCYTE ESTERASE UR QL STRIP: NEGATIVE
LEUKOCYTE ESTERASE URINE: NORMAL
MUCOUS THREADS #/AREA URNS LPF: PRESENT /LPF
NITRATE UR QL: POSITIVE
NITRITE UR QL STRIP: NORMAL
PH UR STRIP: 7 PH (ref 5–7)
PH UR STRIP: 7 PH (ref 5–7)
PROTEIN ALBUMIN URINE: NORMAL MG/DL
RBC #/AREA URNS AUTO: 2 /HPF (ref 0–2)
SOURCE: ABNORMAL
SOURCE: NORMAL
SP GR UR STRIP: 1.01 (ref 1–1.03)
SP GR UR STRIP: 1.01 (ref 1–1.03)
UROBILINOGEN UR QL STRIP: 0.2 EU/DL (ref 0.2–1)
UROBILINOGEN UR STRIP-MCNC: 0 MG/DL (ref 0–2)
WBC #/AREA URNS AUTO: 1 /HPF (ref 0–5)

## 2019-11-07 PROCEDURE — 87086 URINE CULTURE/COLONY COUNT: CPT | Performed by: UROLOGY

## 2019-11-07 PROCEDURE — 87088 URINE BACTERIA CULTURE: CPT | Performed by: UROLOGY

## 2019-11-07 PROCEDURE — 87186 SC STD MICRODIL/AGAR DIL: CPT | Performed by: UROLOGY

## 2019-11-07 ASSESSMENT — MIFFLIN-ST. JEOR: SCORE: 1180.86

## 2019-11-07 ASSESSMENT — PAIN SCALES - GENERAL: PAINLEVEL: NO PAIN (0)

## 2019-11-07 NOTE — LETTER
"11/7/2019       RE: Nicole Stanley  [address]  [place] [address]     Dear Colleague,    Thank you for referring your patient, Nicole Stanley, to the [facility] UROLOGY AND INST FOR PROSTATE AND UROLOGIC CANCERS at [facility]. Please see a copy of my visit note below.    November 7, 2019    Return visit    Patient returns today for follow up.  She is overall not ready for any intervention for her prolapse or incontinence.  She has a question of some intermittent RLQ pain, not severe but bothersome.  She denies any changes in her health since last visit.    /89   Pulse 61   Ht 1.676 m (5' 6\")   Wt 64.4 kg (142 lb)   LMP  (LMP Unknown)   BMI 22.92 kg/m     She is comfortable, in no distress, non-labored breathing.     A/P: 69 year old F with uterovaginal prolapse, mixed urinary incontinence, RLQ pain and positive urine dip    Unclear if the urine is positive because voided specimen and the prolapse or if related to symptoms so will send culture    CT to evaluation for stones    RTC one year, sooner if needed    15 minutes were spent with the patient today, > 50% in counseling and coordination of care    Sheba Song MD MPH   of Urology    CC  Patient Care Team:  Lauryn Cardozo MD as PCP - General (Family Practice)  Jacinda Saleem MD as MD (Otolaryngology)  Maile Leonardo MD as MD (Cardiology)  Sheba Song MD as MD (Urology)  Lauryn Cardozo MD as Assigned PCP  LAURYN CARDOZO        "

## 2019-11-07 NOTE — PROGRESS NOTES
"November 7, 2019    Return visit    Patient returns today for follow up.  She is overall not ready for any intervention for her prolapse or incontinence.  She has a question of some intermittent RLQ pain, not severe but bothersome.  She denies any changes in her health since last visit.    /89   Pulse 61   Ht 1.676 m (5' 6\")   Wt 64.4 kg (142 lb)   LMP  (LMP Unknown)   BMI 22.92 kg/m    She is comfortable, in no distress, non-labored breathing.     A/P: 69 year old F with uterovaginal prolapse, mixed urinary incontinence, RLQ pain and positive urine dip    Unclear if the urine is positive because voided specimen and the prolapse or if related to symptoms so will send culture    CT to evaluation for stones    RTC one year, sooner if needed    15 minutes were spent with the patient today, > 50% in counseling and coordination of care    Sheba Song MD MPH   of Urology    CC  Patient Care Team:  Lauryn Cardozo MD as PCP - General (Family Practice)  Jacinda Saleem MD as MD (Otolaryngology)  Maile Leonardo MD as MD (Cardiology)  Sheba Song MD as MD (Urology)  Lauryn Cardozo MD as Assigned PCP  LAURYN CARDOZO              "

## 2019-11-07 NOTE — PATIENT INSTRUCTIONS
Please get a CT done and we will call you with the results.    Follow up with Dr. Song in one year.    Websites with free information:    [employer] patient website: [email or web address]    Total Control Program: www.totalcontrolprogram.com    Return in one year, sooner if needed    It was a pleasure meeting with you today.  Thank you for allowing me and my team the privilege of caring for you today.  YOU are the reason we are here, and I truly hope we provided you with the excellent service you deserve.  Please let us know if there is anything else we can do for you so that we can be sure you are leaving completely satisfied with your care experience.

## 2019-11-07 NOTE — NURSING NOTE
"Return-6 Mo  Pessary Follow up    She tried the pessary, but states it made her leaking worse and has not used it at all.  Causing to wear a pad 1 per day with stress and urge incontinence, and is not too bothered by it.        Chief Complaint   Patient presents with     RECHECK     Pessary Follow up       Blood pressure 131/89, pulse 61, height 1.676 m (5' 6\"), weight 64.4 kg (142 lb), not currently breastfeeding. Body mass index is 22.92 kg/m .    Patient Active Problem List   Diagnosis     Essential hypertension     Mixed hyperlipidemia     Bunion, left     Osteoarthritis of both hands, unspecified osteoarthritis type     Hypothyroidism     Uterovaginal prolapse, incomplete     Mixed incontinence     Myalgia of pelvic floor     Pelvic floor dysfunction     Dyspareunia in female     Left ovarian cyst     Influenza B     Paroxysmal atrial fibrillation (H)     Irritable larynx syndrome     Muscle tension dysphonia       No Known Allergies    Current Outpatient Medications   Medication Sig Dispense Refill     Acetaminophen (TYLENOL PO) Take 500 mg by mouth as needed for mild pain or fever       apixaban ANTICOAGULANT (ELIQUIS) 5 MG tablet Take 1 tablet (5 mg) by mouth 2 times daily 180 tablet 3     atorvastatin (LIPITOR) 10 MG tablet TAKE 1 TABLET EVERY DAY 90 tablet 3     calcium carbonate (OSCAL 500) 1250 (500 Ca) MG TABS tablet Take 1 tablet by mouth 2 times daily       Cholecalciferol (VITAMIN D3) 3000 units TABS Take 1 tablet by mouth daily       levothyroxine (SYNTHROID/LEVOTHROID) 50 MCG tablet Take 1 tablet (50 mcg) by mouth daily 90 tablet 3     MAGNESIUM OXIDE PO        metoprolol succinate ER (TOPROL-XL) 25 MG 24 hr tablet TAKE 1 TABLET EVERY DAY 90 tablet 3     Probiotic Product (PROBIOTIC COLON SUPPORT PO) Take 1 tablet by mouth daily       zinc sulfate (ZINCATE) 220 (50 Zn) MG capsule Take 220 mg by mouth daily         Social History     Tobacco Use     Smoking status: Never Smoker     Smokeless " tobacco: Never Used   Substance Use Topics     Alcohol use: Yes     Alcohol/week: 0.0 standard drinks     Comment: 1-2 glass of wine per day     Drug use: No       Harsh Leahy, EMT, EMT  11/7/2019  1:22 PM

## 2019-11-08 LAB
BACTERIA SPEC CULT: ABNORMAL
Lab: ABNORMAL
SPECIMEN SOURCE: ABNORMAL

## 2019-11-12 ENCOUNTER — ANCILLARY PROCEDURE (OUTPATIENT)
Dept: CT IMAGING | Facility: CLINIC | Age: 69
End: 2019-11-12
Attending: UROLOGY
Payer: COMMERCIAL

## 2019-11-12 DIAGNOSIS — R10.31 ABDOMINAL PAIN, RIGHT LOWER QUADRANT: ICD-10-CM

## 2019-11-15 ENCOUNTER — TRANSFERRED RECORDS (OUTPATIENT)
Dept: HEALTH INFORMATION MANAGEMENT | Facility: CLINIC | Age: 69
End: 2019-11-15

## 2019-11-15 ENCOUNTER — MYC MEDICAL ADVICE (OUTPATIENT)
Dept: UROLOGY | Facility: CLINIC | Age: 69
End: 2019-11-15

## 2019-11-15 NOTE — TELEPHONE ENCOUNTER
November 15, 2019    I called patient to clarify her questions    Discussed CT scan that did not show obvious source of the pain    We discussed the urine test results.  No pyuria and suspect the culture is vaginal layne.  Discussed could treat to see if pain gets better but my sense is not related and she does not want antibiotics unless absolutely needed    At this time will monitor.  She expressed understanding of the above and agreeable to the plan.  No further questions at this time    Sheba Song MD MPH   of Urology

## 2020-02-20 ENCOUNTER — TRANSFERRED RECORDS (OUTPATIENT)
Dept: HEALTH INFORMATION MANAGEMENT | Facility: CLINIC | Age: 70
End: 2020-02-20

## 2020-03-23 DIAGNOSIS — E03.9 ACQUIRED HYPOTHYROIDISM: ICD-10-CM

## 2020-03-24 RX ORDER — LEVOTHYROXINE SODIUM 50 UG/1
50 TABLET ORAL DAILY
Qty: 0.1 TABLET | Refills: 0 | OUTPATIENT
Start: 2020-03-24

## 2020-03-24 NOTE — TELEPHONE ENCOUNTER
Refill request greater than 30 days in advance    Refused Prescriptions:                       Disp   Refills    levothyroxine (SYNTHROID/LEVOTHROID) 50 MC*0.1 ta*0        Sig: TAKE 1 TABLET (50 MCG) BY MOUTH DAILY  Refused By: ELVA MEANS  Reason for Refusal: Patient has requested refill too soon

## 2020-03-24 NOTE — TELEPHONE ENCOUNTER
"Requested Prescriptions   Pending Prescriptions Disp Refills     levothyroxine (SYNTHROID/LEVOTHROID) 50 MCG tablet [Pharmacy Med Name: LEVOTHYROXINE SODIUM 50 MCG Tablet] 90 tablet 3     Sig: TAKE 1 TABLET (50 MCG) BY MOUTH DAILY  Last Written Prescription Date:  5/28/2019  Last Fill Quantity: 90 tablet,  # refills: 3   Last Office Visit: 11/6/2019   Future Office Visit:            Thyroid Protocol Passed - 3/23/2020 10:17 PM        Passed - Patient is 12 years or older        Passed - Recent (12 mo) or future (30 days) visit within the authorizing provider's specialty     Patient has had an office visit with the authorizing provider or a provider within the authorizing providers department within the previous 12 mos or has a future within next 30 days. See \"Patient Info\" tab in inbasket, or \"Choose Columns\" in Meds & Orders section of the refill encounter.            Passed - Medication is active on med list        Passed - Normal TSH on file in past 12 months     Recent Labs   Lab Test 05/28/19  1019   TSH 1.91            Passed - No active pregnancy on record     If patient is pregnant or has had a positive pregnancy test, please check TSH.        Passed - No positive pregnancy test in past 12 months     If patient is pregnant or has had a positive pregnancy test, please check TSH.               "

## 2020-06-17 DIAGNOSIS — I10 ESSENTIAL HYPERTENSION: ICD-10-CM

## 2020-06-17 DIAGNOSIS — I48.0 PAROXYSMAL ATRIAL FIBRILLATION (H): ICD-10-CM

## 2020-06-17 DIAGNOSIS — E78.49 OTHER HYPERLIPIDEMIA: ICD-10-CM

## 2020-06-17 DIAGNOSIS — E03.9 ACQUIRED HYPOTHYROIDISM: Primary | ICD-10-CM

## 2020-06-22 RX ORDER — LEVOTHYROXINE SODIUM 50 UG/1
50 TABLET ORAL DAILY
Qty: 90 TABLET | Refills: 0 | Status: SHIPPED | OUTPATIENT
Start: 2020-06-22 | End: 2020-07-28

## 2020-06-22 RX ORDER — APIXABAN 5 MG/1
TABLET, FILM COATED ORAL
Qty: 180 TABLET | Refills: 0 | Status: SHIPPED | OUTPATIENT
Start: 2020-06-22 | End: 2020-07-28

## 2020-06-22 RX ORDER — METOPROLOL SUCCINATE 25 MG/1
TABLET, EXTENDED RELEASE ORAL
Qty: 90 TABLET | Refills: 0 | Status: SHIPPED | OUTPATIENT
Start: 2020-06-22 | End: 2020-07-28

## 2020-06-22 RX ORDER — ATORVASTATIN CALCIUM 10 MG/1
TABLET, FILM COATED ORAL
Qty: 90 TABLET | Refills: 0 | Status: SHIPPED | OUTPATIENT
Start: 2020-06-22 | End: 2020-07-28

## 2020-06-22 NOTE — TELEPHONE ENCOUNTER
,  --Please contact patient and ask her to schedule a lab-only for monitoring labs for apixaban and levothyroxine.   She will then have labs done before CPE on 7/28/2020.    --A refill order for medications were sent to the pharmacy.         Last Office or Virtual visit: 10/31/2019   Future Office Visit:    Next 5 appointments (look out 90 days)    Jul 28, 2020  9:00 AM CDT  PHYSICAL with Lauryn Allison MD  [facility] [address] [place] [id number]  [phone]             -Prescription approved per [facility] Refill Protocol.  Mar Suazo RN

## 2020-06-25 DIAGNOSIS — E78.49 OTHER HYPERLIPIDEMIA: ICD-10-CM

## 2020-06-25 DIAGNOSIS — E03.9 ACQUIRED HYPOTHYROIDISM: ICD-10-CM

## 2020-06-25 LAB
PLATELET # BLD AUTO: 198 10E9/L (ref 150–450)
TSH SERPL DL<=0.005 MIU/L-ACNC: 2.19 MU/L (ref 0.4–4)

## 2020-06-25 PROCEDURE — 36415 COLL VENOUS BLD VENIPUNCTURE: CPT | Performed by: FAMILY MEDICINE

## 2020-06-25 PROCEDURE — 85049 AUTOMATED PLATELET COUNT: CPT | Performed by: FAMILY MEDICINE

## 2020-06-25 PROCEDURE — 84443 ASSAY THYROID STIM HORMONE: CPT | Performed by: FAMILY MEDICINE

## 2020-06-25 NOTE — RESULT ENCOUNTER NOTE
Robi JoRuss Stanley,  Your results came back and are within acceptable limits. . If you have any further concerns please do not hesitate to contact us by message, phone or making an appointment.  Have a good day   Dr Albert BOATNEG

## 2020-06-25 NOTE — RESULT ENCOUNTER NOTE
Robi Ms. Stanley,  Your results came back and are within acceptable limits. -TSH (thyroid stimulating hormone) level is normal which indicates normal thyroid function.. If you have any further concerns please do not hesitate to contact us by message, phone or making an appointment.  Have a good day   Dr Albert BOATENG

## 2020-06-30 ENCOUNTER — RECORDS - HEALTHEAST (OUTPATIENT)
Dept: SCHEDULING | Facility: CLINIC | Age: 70
End: 2020-06-30

## 2020-06-30 ENCOUNTER — NURSE TRIAGE (OUTPATIENT)
Dept: NURSING | Facility: CLINIC | Age: 70
End: 2020-06-30

## 2020-06-30 NOTE — TELEPHONE ENCOUNTER
"Pt calling  Sx started 6/29/2020  +cough \"always have this dry cough,  I have Irritable larynx syndrome\"  +HA   tylenol not helping, rates pain around eyes a 6 ,ith OTC pain meds   +congestion in nose  Pain around sinus area, pain increases with bending over  Mucus is clear at this time  +fatigue  Pt is camping & has no access to thermometer or other care advice suggestions of neti pot, nasal spray   Per protocol pt to be seen today or tomorrow.  Pt declines to be seen today   Pt agrees with plan   transferred to scheduling   Annita Sanchez RN  [facility]      COVID 19 Nurse Triage Plan/Patient Instructions    Please be aware that novel coronavirus (COVID-19) may be circulating in the community. If you develop symptoms such as fever, cough, or SOB or if you have concerns about the presence of another infection including coronavirus (COVID-19), please contact your health care provider or visit [email or web address].     Disposition/Instructions    Additional COVID19 information to add for patients.   How can I protect others?  If you have symptoms (fever, cough, body aches or trouble breathing): Stay home and away from others (self-isolate) until:    At least 10 days have passed since your symptoms started. And     You ve had no fever--and no medicine that reduces fever--for 3 full days (72 hours). And      Your other symptoms have resolved (gotten better).     If you don t have symptoms, but a test showed that you have COVID-19 (you tested positive):    Stay home and away from others (self-isolate) until at least 10 days have passed since the date of your first positive COVID-19 test.    During this time:    Stay in your own room, even for meals. Use your own bathroom if you can.     Stay away from others in your home. No hugging, kissing or shaking hands. No visitors.    Don t go to work, school or anywhere else.     Clean  high touch  surfaces often (doorknobs, counters, handles, etc.). Use a " household cleaning spray or wipes. You ll find a full list on the EPA website:  www.epa.gov/pesticide-registration/list-n-disinfectants-use-against-sars-cov-2.    Cover your mouth and nose with a mask, tissue or washcloth to avoid spreading germs.    Wash your hands and face often. Use soap and water.    Caregivers in these groups are at risk for severe illness due to COVID-19:  o People 65 years and older  o People who live in a nursing home or long-term care facility  o People with chronic disease (lung, heart, cancer, diabetes, kidney, liver, immunologic)  o People who have a weakened immune system, including those who:  - Are in cancer treatment  - Take medicine that weakens the immune system, such as corticosteroids  - Had a bone marrow or organ transplant  - Have an immune deficiency  - Have poorly controlled HIV or AIDS  - Are obese (body mass index of 40 or higher)  - Smoke regularly    Caregivers should wear gloves while washing dishes, handling laundry and cleaning bedrooms and bathrooms.    Use caution when washing and drying laundry: Don t shake dirty laundry, and use the warmest water setting that you can.    For more tips, go to www.cdc.gov/coronavirus/2019-ncov/downloads/10Things.pdf.    How can I take care of myself?  1. Get lots of rest. Drink extra fluids (unless a doctor has told you not to).     2. Take Tylenol (acetaminophen) for fever or pain. If you have liver or kidney problems, ask your family doctor if it s okay to take Tylenol.     Adults can take either:     650 mg (two 325 mg pills) every 4 to 6 hours, or     1,000 mg (two 500 mg pills) every 8 hours as needed.     Note: Don t take more than 3,000 mg in one day.   Acetaminophen is found in many medicines (both prescribed and over-the-counter medicines). Read all labels to be sure you don t take too much.     For children, check the Tylenol bottle for the right dose. The dose is based on the child s age or weight.    3. If you have other  health problems (like cancer, heart failure, an organ transplant or severe kidney disease): Call your specialty clinic if you don t feel better in the next 2 days.    4. Know when to call 911: Emergency warning signs include:    Trouble breathing or shortness of breath    Pain or pressure in the chest that doesn t go away    Feeling confused like you haven t felt before, or not being able to wake up    Bluish-colored lips or face    What are the symptoms of COVID-19?     The most common symptoms are cough, fever and trouble breathing.     Less common symptoms include body aches, chills, diarrhea (loose, watery poops), fatigue (feeling very tired), headache, runny nose, sore throat and loss of smell.    COVID-19 can cause severe coughing (bronchitis) and lung infection (pneumonia).    How does it spread?     The virus may spread when a person coughs or sneezes into the air. The virus can travel about 6 feet this way, and it can live on surfaces.      Common  (household disinfectants) will kill the virus.    Who is at risk?  Anyone can catch COVID-19 if they re around someone who has the virus.    How can others protect themselves?     Stay away from people who have COVID-19 (or symptoms of COVID-19).    Wash hands often with soap and water. Or, use hand  with at least 60% alcohol.    Avoid touching the eyes, nose or mouth.     Wear a face mask when you go out in public, when sick or when caring for a sick person.    Where can I get more information?    [facility]: About COVID-19: www."[email or web address]/    CDC: What to Do If You re Sick: www.cdc.gov/coronavirus/2019-ncov/about/steps-when-sick.html    CDC: Ending Home Isolation: www.cdc.gov/coronavirus/2019-ncov/hcp/disposition-in-home-patients.html     CDC: Caring for Someone: www.cdc.gov/coronavirus/2019-ncov/if-you-are-sick/care-for-someone.html     MD: Interim Guidance for Hospital Discharge to Home:  www.[facility]./diseases/coronavirus/hcp/hospdischarge.pdf    [facility] clinical trials (COVID-19 research studies): clinicalaffairs.[facility]-clinical-trials     Below are the COVID-19 hotlines at the [place] Department [facility] ([facility]). Interpreters are available.   o For health questions: Call 800-615-3615 or 1-996.604.7196 (7 a.m. to 7 p.m.)  o For questions about schools and childcare: Call 189-695-8059 or 1-494.937.7402 (7 a.m. to 7 p.m.)          Thank you for taking steps to prevent the spread of this virus.  o Limit your contact with others.  o Wear a simple mask to cover your cough.  o Wash your hands well and often.    Resources    [facility]: About COVID-19: [email or web address]    CDC: What to Do If You're Sick: www.cdc.gov/coronavirus/2019-ncov/about/steps-when-sick.html    CDC: Ending Home Isolation: www.cdc.gov/coronavirus/2019-ncov/hcp/disposition-in-home-patients.html     CDC: Caring for Someone: www.cdc.gov/coronavirus/2019-ncov/if-you-are-sick/care-for-someone.html     [facility]: Interim Guidance for Hospital Discharge to Home: www.[facility]./diseases/coronavirus/hcp/hospdischarge.pdf    [facility] clinical trials (COVID-19 research studies): clinicalaffairs.[facility]-clinical-trials     Below are the COVID-19 hotlines at the [place] [facility] ([facility]). Interpreters are available.   o For health questions: Call 949-563-6198 or 1-776.922.6901 (7 a.m. to 7 p.m.)  o For questions about schools and childcare: Call 447-237-2079 or 1-576.786.1673 (7 a.m. to 7 p.m.)                     Additional Information    Negative: Sounds like a life-threatening emergency to the triager    Negative: Difficulty breathing, and not from stuffy nose (e.g., not relieved by cleaning out the nose)    Negative: SEVERE headache and has fever    Negative: Patient sounds very sick or weak to the triager    Negative: SEVERE sinus pain    Negative: Severe  headache    Negative: Redness or swelling on the cheek, forehead, or around the eye    Negative: Fever > 103 F (39.4 C)    Negative: Fever > 101 F (38.3 C) and over 60 years of age    Negative: Fever > 100.0 F (37.8 C) and has diabetes mellitus or a weak immune system (e.g., HIV positive, cancer chemotherapy, organ transplant, splenectomy, chronic steroids)    Negative: Fever > 100.0 F (37.8 C) and bedridden (e.g., nursing home patient, stroke, chronic illness, recovering from surgery)    Negative: Fever present > 3 days (72 hours)    Negative: Fever returns after gone for over 24 hours and symptoms worse or not improved    Negative: Sinus pain (not just congestion) and fever    Negative: Earache    Patient wants to be seen    Negative: Lots of coughing    Negative: Using nasal washes and pain medicine > 24 hours and sinus pain (lower forehead, cheekbone, or eye) persists    Negative: Nasal discharge present > 10 days    Negative: Sinus congestion (pressure, fullness) present > 10 days    Protocols used: SINUS PAIN AND CONGESTION-A-[place]

## 2020-07-27 ASSESSMENT — ENCOUNTER SYMPTOMS
BREAST MASS: 0
EYE PAIN: 0
PARESTHESIAS: 0
MYALGIAS: 1
NERVOUS/ANXIOUS: 0
WEAKNESS: 0
FREQUENCY: 1
COUGH: 1
HEMATURIA: 0
ABDOMINAL PAIN: 0
HEMATOCHEZIA: 1
NAUSEA: 0
PALPITATIONS: 0
SORE THROAT: 0
ARTHRALGIAS: 1
HEARTBURN: 0
JOINT SWELLING: 0
SHORTNESS OF BREATH: 0
FEVER: 0
DIZZINESS: 0
CONSTIPATION: 0
DIARRHEA: 1
DYSURIA: 0
HEADACHES: 1

## 2020-07-27 ASSESSMENT — ACTIVITIES OF DAILY LIVING (ADL): CURRENT_FUNCTION: NO ASSISTANCE NEEDED

## 2020-07-28 ENCOUNTER — VIRTUAL VISIT (OUTPATIENT)
Dept: FAMILY MEDICINE | Facility: CLINIC | Age: 70
End: 2020-07-28
Payer: COMMERCIAL

## 2020-07-28 DIAGNOSIS — E03.9 HYPOTHYROIDISM, UNSPECIFIED TYPE: ICD-10-CM

## 2020-07-28 DIAGNOSIS — K62.5 RECTAL BLEEDING: ICD-10-CM

## 2020-07-28 DIAGNOSIS — Z00.00 ENCOUNTER FOR MEDICARE ANNUAL WELLNESS EXAM: Primary | ICD-10-CM

## 2020-07-28 DIAGNOSIS — I10 ESSENTIAL HYPERTENSION: ICD-10-CM

## 2020-07-28 DIAGNOSIS — N83.202 LEFT OVARIAN CYST: ICD-10-CM

## 2020-07-28 DIAGNOSIS — E78.2 MIXED HYPERLIPIDEMIA: ICD-10-CM

## 2020-07-28 DIAGNOSIS — I48.0 PAROXYSMAL ATRIAL FIBRILLATION (H): ICD-10-CM

## 2020-07-28 DIAGNOSIS — N95.1 MENOPAUSAL SYNDROME (HOT FLASHES): ICD-10-CM

## 2020-07-28 DIAGNOSIS — E78.49 OTHER HYPERLIPIDEMIA: ICD-10-CM

## 2020-07-28 DIAGNOSIS — E03.9 ACQUIRED HYPOTHYROIDISM: ICD-10-CM

## 2020-07-28 PROCEDURE — 99214 OFFICE O/P EST MOD 30 MIN: CPT | Mod: 95 | Performed by: FAMILY MEDICINE

## 2020-07-28 RX ORDER — METOPROLOL SUCCINATE 25 MG/1
TABLET, EXTENDED RELEASE ORAL
Qty: 90 TABLET | Refills: 3 | Status: SHIPPED | OUTPATIENT
Start: 2020-07-28 | End: 2021-04-26

## 2020-07-28 RX ORDER — GABAPENTIN 100 MG/1
100 CAPSULE ORAL AT BEDTIME
Qty: 90 CAPSULE | Refills: 0 | Status: SHIPPED | OUTPATIENT
Start: 2020-07-28 | End: 2020-11-02

## 2020-07-28 RX ORDER — ATORVASTATIN CALCIUM 10 MG/1
TABLET, FILM COATED ORAL
Qty: 90 TABLET | Refills: 3 | Status: SHIPPED | OUTPATIENT
Start: 2020-07-28 | End: 2021-01-20

## 2020-07-28 RX ORDER — LEVOTHYROXINE SODIUM 50 UG/1
50 TABLET ORAL DAILY
Qty: 90 TABLET | Refills: 3 | Status: SHIPPED | OUTPATIENT
Start: 2020-07-28 | End: 2021-04-26

## 2020-07-28 ASSESSMENT — ENCOUNTER SYMPTOMS
FREQUENCY: 1
SORE THROAT: 0
DIZZINESS: 0
EYE PAIN: 0
HEARTBURN: 0
PARESTHESIAS: 0
DYSURIA: 0
WEAKNESS: 0
MYALGIAS: 1
ARTHRALGIAS: 1
CONSTIPATION: 0
JOINT SWELLING: 0
ABDOMINAL PAIN: 0
HEMATURIA: 0
FEVER: 0
COUGH: 1
NAUSEA: 0
DIARRHEA: 1
BREAST MASS: 0
HEMATOCHEZIA: 1
NERVOUS/ANXIOUS: 0
PALPITATIONS: 0
HEADACHES: 1
SHORTNESS OF BREATH: 0

## 2020-07-28 ASSESSMENT — ACTIVITIES OF DAILY LIVING (ADL): CURRENT_FUNCTION: NO ASSISTANCE NEEDED

## 2020-07-28 NOTE — PATIENT INSTRUCTIONS
Patient Education   Personalized Prevention Plan  You are due for the preventive services outlined below.  Your care team is available to assist you in scheduling these services.  If you have already completed any of these items, please share that information with your care team to update in your medical record.  Health Maintenance Due   Topic Date Due     Basic Metabolic Panel  05/06/2020     Annual Wellness Visit  05/28/2020     FALL RISK ASSESSMENT  05/28/2020       Signs of Hearing Loss     Hearing much better with one ear can be a sign of hearing loss.     Hearing loss is a problem shared by many people. In fact, it is one of the most common health conditions, particularly as people age. Most people over age 65 have some hearing loss, and by age 80, almost everyone does. Because hearing loss usually occurs slowly over the years, you may not realize your hearing ability has gotten worse.  Have your hearing checked  Contact your healthcare provider if you:    Have to strain to hear normal conversation    Have to watch other people s faces very carefully to follow what they re saying    Need to ask people to repeat what they ve said    Often misunderstand what people are saying    Turn the volume of the television or radio up so high that others complain    Feel that people are mumbling when they re talking to you    Find that the effort to hear leaves you feeling tired and irritated    Notice, when using the phone, that you hear better with one ear than the other  Date Last Reviewed: 12/1/2016 2000-2019 [employer]. [address] [place] [id number]. All rights reserved. This information is not intended as a substitute for professional medical care. Always follow your healthcare professional's instructions.          Urinary Incontinence, Female (Adult)  Urinary incontinence means loss of control of the bladder. This problem affects many women, especially as they get older. If you have  incontinence, you may be embarrassed to ask for help. But know that this problem can be treated.  Types of Incontinence  There are different types of incontinence. Two of the main types are described here. You can have more than one type.    Stress incontinence. With this type, urine leaks when pressure (stress) is put on the bladder. This may happen when you cough, sneeze, or laugh. Stress incontinence most often occurs because the pelvic floor muscles that support the bladder and urethra are weak. This can happen after pregnancy and vaginal childbirth or a hysterectomy. It can also be due to excess body weight or hormone changes.    Urge incontinence (also called overactive bladder). With this type, a sudden urge to urinate is felt often. This may happen even though there may not be much urine in the bladder. The need to urinate often during the night is common. Urge incontinence most often occurs because of bladder spasms. This may be due to bladder irritation or infection. Damage to bladder nerves or pelvic muscles, constipation, and certain medicines can also lead to urge incontinence.  Treatment of urinary incontinence depends on the cause. Further evaluation is needed to find the type you have. This will likely include an exam and certain tests. Based on the results, you and your healthcare provider can then plan treatment. Until a diagnosis is made, the home care tips below can help relieve symptoms.  Home care    Do pelvic floor muscle exercises, if they are prescribed. The pelvic floor muscles help support the bladder and urethra. Many women find that their symptoms improve when doing special exercises that strengthen these muscles. To do the exercises contract the muscles you would use to stop your stream of urine, but do this when you re not urinating. Hold for 10 seconds, then relax. Repeat 10 to 20 times in a row, at least 3 times a day. Your provider may give you other instructions for how to do the  exercises and how often.    Keep a bladder diary. This helps track how often and how much you urinate over a set period of time. Bring this diary with you to your next visit with the provider. The information can help your provider learn more about your bladder problem.    Lose weight, if advised to by your provider. Excess weight puts pressure on the bladder. Your provider can help you create a weight-loss plan that s right for you. This may include exercising more and making certain diet changes.    Don't consume foods and drinks that may irritate the bladder. These can include alcohol and caffeinated drinks.    Quit smoking. Smoking and other tobacco use can lead to chronic cough that strains the pelvic floor muscles. Smoking may also damage the bladder and urethra. Talk with your provider about treatments or methods you can use to quit smoking.    If drinking large amounts of fluid causes you to have symptoms, you may be advised to limit your fluid intake. You may also be advised to drink most of your fluids during the day and to limit fluids at night.    If you re worried about urine leakage or accidents, you may wear absorbent pads to catch urine. Change the pads often. This helps reduce discomfort. It may also reduce the risk of skin or bladder infections.  Follow-up care  Follow up with your healthcare provider, or as directed. It may take some to find the right treatment for your problem. Your treatment plan may include special therapies or medicines. Certain procedures or surgery may also be options. Be sure to discuss any questions you have with your provider.  When to seek medical advice  Call the healthcare provider right away if any of these occur:    Fever of 100.4 F (38 C) or higher, or as directed by your provider    Bladder pain or fullness    Abdominal swelling    Nausea or vomiting    Back pain    Weakness, dizziness or fainting  Date Last Reviewed: 10/1/2017    2172-4133 [employer],  [facility]. [address] [place] [id number]. All rights reserved. This information is not intended as a substitute for professional medical care. Always follow your healthcare professional's instructions.

## 2020-07-28 NOTE — PROGRESS NOTES
"Nicole Stanley is a 70 year old female who is being evaluated via a billable video visit.      The patient has been notified of following:     \"This video visit will be conducted via a call between you and your physician/provider. We have found that certain health care needs can be provided without the need for an in-person physical exam.  This service lets us provide the care you need with a video conversation.  If a prescription is necessary we can send it directly to your pharmacy.  If lab work is needed we can place an order for that and you can then stop by our lab to have the test done at a later time.    Video visits are billed at different rates depending on your insurance coverage.  Please reach out to your insurance provider with any questions.    If during the course of the call the physician/provider feels a video visit is not appropriate, you will not be charged for this service.\"    Patient has given verbal consent for Video visit? Yes  How would you like to obtain your AVS? MyChart  If you are dropped from the video visit, the video invite should be resent to: Text to cell phone: [phone]  Will anyone else be joining your video visit? No  b        SUBJECTIVE:   Nicole Stanley is a 70 year old female who presents for Preventive Visit.    Video visit start time 9:07 AM   Are you in the first 12 months of your Medicare coverage?  No    Healthy Habits:     In general, how would you rate your overall health?  Good    Frequency of exercise:  6-7 days/week    Duration of exercise:  Greater than 60 minutes    Do you usually eat at least 4 servings of fruit and vegetables a day, include whole grains    & fiber and avoid regularly eating high fat or \"junk\" foods?  Yes    Taking medications regularly:  Yes    Medication side effects:  None    Ability to successfully perform activities of daily living:  No assistance needed    Home Safety:  No safety concerns identified    Hearing Impairment:  Difficulty " following a conversation in a noisy restaurant or crowded room, difficulty following dialogue in the theater, need to ask people to speak up or repeat themselves, find that men's voices are easier to understand than woman's and difficulty understanding soft or whispered speech    In the past 6 months, have you been bothered by leaking of urine? Yes    In general, how would you rate your overall mental or emotional health?  Excellent      PHQ-2 Total Score: 0    Additional concerns today:  Yes    Medical assistant advised patient about addressing additional health concerns that could be billed, as it is not considered a part of a preventive physical. Patient verbalized understanding.    Lauryn Allison MD, MD on 7/28/2020 at 8:46 AM    1. Concern about on going hot flashes. Has had hot flashes for years since prior to menopause and her hot flashes have persisted. They are basically only at night. She gets hot, sweaty and itchy. These keep her awake. She is frustrated that she does not get enough sleep. Also has restless legs and now takes Mg over the counter, which has helped that issue. Used to take clonazepam for her restless legs and found that helped her sleep overall. She was told when she had a new provider that clonazepam is not recommended and she stopped using it. Tried effexor for hot flashes, but did not find that effective. Was on hormone therapy for 5 years, then decided to stop due to risks of hormone therapy. Has not tried gabapentin. Her hot flashes have not changed over time. Denies fevers, unintentional weight loss.   2. Bowel movement concern - blood in stool. Sometimes she has dripping blood after the stool. Maybe once or twice a month for years. Continues to have that. Nothing showed up on her colonoscopy, though this was an issue at that time. She had it prior to her colonoscopy and it is continuing.  Not increasing in frequency.     Do you feel safe in your environment? Yes    Have you ever  done Advance Care Planning? (For example, a Health Directive, POLST, or a discussion with a medical provider or your loved ones about your wishes): Yes, advance care planning is on file.       Fall risk  Fallen 2 or more times in the past year?: No  Any fall with injury in the past year?: No    Cognitive Screening   1) Repeat 3 items (Leader, Season, Table)    2) Clock draw:    3) 3 item recall: Recalls 3 objects  Results: 3 items recalled: COGNITIVE IMPAIRMENT LESS LIKELY    Mini-CogTM Copyright HORACIO Armando. Licensed by the author for use in [facility] [employer]; reprinted with permission (london@[facility]). All rights reserved.      Do you have sleep apnea, excessive snoring or daytime drowsiness?: no    Reviewed and updated as needed this visit by clinical staff  Tobacco  Allergies  Meds  Med Hx  Surg Hx  Fam Hx  Soc Hx        Reviewed and updated as needed this visit by Provider        Social History     Tobacco Use     Smoking status: Never Smoker     Smokeless tobacco: Never Used   Substance Use Topics     Alcohol use: Yes     Alcohol/week: 0.0 standard drinks     Comment: 1-2 glass of wine per day     If you drink alcohol do you typically have >3 drinks per day or >7 drinks per week? Yes          AUDIT - Alcohol Use Disorders Identification Test - Reproduced from the World Health Organization Audit 2001 (Second Edition) 7/27/2020   1.  How often do you have a drink containing alcohol? 4 or more times a week   2.  How many drinks containing alcohol do you have on a typical day when you are drinking? 3 or 4   3.  How often do you have five or more drinks on one occasion? Never   4.  How often during the last year have you found that you were not able to stop drinking once you had started? Never   5.  How often during the last year have you failed to do what was normally expected of you because of drinking? Never   6.  How often during the last year have you needed a first drink in the morning to get yourself  going after a heavy drinking session? Never   7.  How often during the last year have you had a feeling of guilt or remorse after drinking? Less than monthly   8.  How often during the last year have you been unable to remember what happened the night before because of your drinking? Never   9.  Have you or someone else been injured because of your drinking? No   10. Has a relative, friend, doctor or other health care worker been concerned about your drinking or suggested you cut down? No   TOTAL SCORE 6                Current providers sharing in care for this patient include:   Patient Care Team:  Lauryn Allison MD as PCP - General (Family Practice)  Jacinda Saleem MD as MD (Otolaryngology)  Maile Leonardo MD as MD (Cardiology)  Sheba Song MD as MD (Urology)  Lauryn Allison MD as Assigned PCP    The following health maintenance items are reviewed in Epic and correct as of today:  Health Maintenance   Topic Date Due     BMP  05/06/2020     MEDICARE ANNUAL WELLNESS VISIT  05/28/2020     FALL RISK ASSESSMENT  05/28/2020     INFLUENZA VACCINE (1) 09/01/2020     LIPID  11/06/2020     MICROALBUMIN  11/06/2020     MAMMO SCREENING  06/10/2021     TSH W/FREE T4 REFLEX  06/25/2021     COLORECTAL CANCER SCREENING  09/19/2023     ADVANCE CARE PLANNING  06/06/2024     DTAP/TDAP/TD IMMUNIZATION (4 - Td) 05/21/2028     DEXA  Completed     HEPATITIS C SCREENING  Completed     PHQ-2  Completed     PNEUMOCOCCAL IMMUNIZATION 65+ LOW/MEDIUM RISK  Completed     ZOSTER IMMUNIZATION  Completed     IPV IMMUNIZATION  Aged Out     MENINGITIS IMMUNIZATION  Aged Out     HEPATITIS B IMMUNIZATION  Aged Out     BP Readings from Last 3 Encounters:   11/07/19 131/89   11/06/19 130/80   08/16/19 120/72    Wt Readings from Last 3 Encounters:   11/07/19 64.4 kg (142 lb)   11/06/19 65.7 kg (144 lb 12 oz)   10/29/19 64 kg (141 lb)                  Patient Active Problem List   Diagnosis     Essential hypertension     Mixed  hyperlipidemia     Bunion, left     Osteoarthritis of both hands, unspecified osteoarthritis type     Hypothyroidism     Uterovaginal prolapse, incomplete     Mixed incontinence     Myalgia of pelvic floor     Pelvic floor dysfunction     Dyspareunia in female     Left ovarian cyst     Influenza B     Paroxysmal atrial fibrillation (H)     Irritable larynx syndrome     Muscle tension dysphonia     Past Surgical History:   Procedure Laterality Date     CHOLECYSTECTOMY  2000     CYSTOSCOPY       EYE SURGERY      blepharoplasty     GI SURGERY  2006    rectal sphincter     VASCULAR SURGERY  1988    vein stripping       Social History     Tobacco Use     Smoking status: Never Smoker     Smokeless tobacco: Never Used   Substance Use Topics     Alcohol use: Yes     Alcohol/week: 0.0 standard drinks     Comment: 1-2 glass of wine per day     Family History   Problem Relation Age of Onset     Hypertension Mother              Hyperlipidemia Mother      Family History Negative Father              Alzheimer Disease Father      Family History Negative Maternal Grandmother                   Current Outpatient Medications   Medication Sig Dispense Refill     Acetaminophen (TYLENOL PO) Take 500 mg by mouth as needed for mild pain or fever       atorvastatin (LIPITOR) 10 MG tablet TAKE 1 TABLET EVERY DAY 90 tablet 3     calcium carbonate (OSCAL 500) 1250 (500 Ca) MG TABS tablet Take 1 tablet by mouth 2 times daily       Cholecalciferol (VITAMIN D3) 3000 units TABS Take 1 tablet by mouth daily       ELIQUIS ANTICOAGULANT 5 MG tablet TAKE 1 TABLET (5 MG) BY MOUTH 2 TIMES DAILY 180 tablet 0     gabapentin (NEURONTIN) 100 MG capsule Take 1 capsule (100 mg) by mouth At Bedtime 90 capsule 0     levothyroxine (SYNTHROID/LEVOTHROID) 50 MCG tablet Take 1 tablet (50 mcg) by mouth daily 90 tablet 3     metoprolol succinate ER (TOPROL-XL) 25 MG 24 hr tablet TAKE 1 TABLET EVERY DAY 90 tablet 3     Probiotic Product  (PROBIOTIC COLON SUPPORT PO) Take 1 tablet by mouth daily       zinc sulfate (ZINCATE) 220 (50 Zn) MG capsule Take 220 mg by mouth daily       MAGNESIUM OXIDE PO        No Known Allergies  Recent Labs   Lab Test 06/25/20  1108 11/06/19  0832 05/28/19  1019 04/19/19  1436 11/15/18  0754 04/26/18  0852  04/18/18  1937  04/08/16 03/16/15   LDL  --  109*  --   --  105* 151*  --   --    < > 171* 121   HDL  --  112  --   --  88 54  --   --    < > 133 108   TRIG  --  53  --   --  101 81  --   --    < > 76 66   ALT  --   --   --   --   --   --   --  25  --  12 19   CR  --  0.88  --  0.99 0.87  --    < > 0.73   < > 0.8 0.9   GFRESTIMATED  --  66  --  58* 65  --    < > 79   < > 72 63   GFRESTBLACK  --  76  --  67 78  --    < > >90   < > 87 76   POTASSIUM  --  3.9  --  3.9 4.1  --    < > 3.4   < > 4.4 4.8   TSH 2.19  --  1.91  --   --   --   --  2.62   < > 3.400 4.130    < > = values in this interval not displayed.        Review of Systems   Constitutional: Negative for fever.   HENT: Positive for congestion and hearing loss. Negative for ear pain and sore throat.    Eyes: Negative for pain and visual disturbance.   Respiratory: Positive for cough. Negative for shortness of breath.    Cardiovascular: Negative for chest pain, palpitations and peripheral edema.   Gastrointestinal: Positive for diarrhea and hematochezia. Negative for abdominal pain, constipation, heartburn and nausea.   Breasts:  Negative for tenderness, breast mass and discharge.   Genitourinary: Positive for frequency and urgency. Negative for dysuria, genital sores, hematuria, pelvic pain, vaginal bleeding and vaginal discharge.   Musculoskeletal: Positive for arthralgias and myalgias. Negative for joint swelling.   Skin: Negative for rash.   Neurological: Positive for headaches. Negative for dizziness, weakness and paresthesias.   Psychiatric/Behavioral: Negative for mood changes. The patient is not nervous/anxious.      Has some congestion during the night.  "Feels her nose is so plugged and sometimes feels pressure behind her sinuses. Uses nasal spray. Has some allergies. Does not feel concerned about this. Generic \"four in one\" with phenylephrine. Tried saline and it does not really do much. Does not use nasal spray very often. The congestion will last 2-3 nights in a row and then clears up. Typically once a month.     She had a cough from an irritable larynx. Throat is sensitive. Gets a dry cough. Did go to ENT years ago and she said she has an irritable larynx and she went to physical therapy for her voice and it helped a lot.     Has loose stools for years. Does not feel concerned. Twice a week will have a bowel movement or two in the morning that are normal. Then will have some abdominal cramps and will have a sort of dumping of sludge. When it hits she has no control over it. Years ago had sphincter muscle repaired and does not have as much strength. When she goes 3-4 miles walk every day, she will wear an absorptive undergarment just in case. Like an irritable bowel, for long time. Metamucil did not help.     She does not feel worried about her arthritis pain.     Headaches come with the nasal congestion, mild and she is not concerned about this.       Saw urology for her urinary symptoms.     OBJECTIVE:   LMP  (LMP Unknown)  Estimated body mass index is 22.92 kg/m  as calculated from the following:    Height as of 11/7/19: 1.676 m (5' 6\").    Weight as of 11/7/19: 64.4 kg (142 lb).  Physical Exam  GENERAL: Healthy, alert and no distress  EYES: Eyes grossly normal to inspection.  No discharge or erythema, or obvious scleral/conjunctival abnormalities.  RESP: No audible wheeze, cough, or visible cyanosis.  No visible retractions or increased work of breathing.    SKIN: Visible skin clear. No significant rash, abnormal pigmentation or lesions.  NEURO: Cranial nerves grossly intact.  Mentation and speech appropriate for age.  PSYCH: Mentation appears normal, affect " "normal/bright, judgement and insight intact, normal speech and appearance well-groomed.    Diagnostic Test Results:  none     ASSESSMENT / PLAN:      1. Wellness visit  She will schedule her mammogram  Colonoscopy was completed in 2018 and due after 5 years.      2. Other hyperlipidemia  Controlled.  Continue atorvastatin 10 mg daily. Will check lipids and CMP in 11/2020        3. Paroxysmal atrial fibrillation (H)  Followed by cardiology, cardioverted on 4/19 in the ER. Continues eliquis  5 mg twice daily. Refills given today.         4. Acquired hypothyroidism   stable.  Continues levothyroxine 50 mcg daily  Due for TSH in June     5. Essential hypertension  Historically controlled.  Continues metoprolol 25 mg daily.  At last visit, discussed the possibility of adding ACEI in the future. Will check urine albumin when she comes in for labs in November as well.      6. Neck pain  Improved. Worked with PT, which helped.     7. Hot flashes  Ongoing for years. Tried effexor without benefit in the past. Used clonazepam to treat restless legs in the past and found she was able to sleep better despite hot flashes. Used HRT for about 5 years, then stopped due to concern about the risks. Will try gabapentin 100mg at bedtime and discussed can increase the dose depending on her response. She will send mychart to let me know how this is going in a couple of weeks.      8. Rectal bleeding, painless  Suspicious for internal hemorrhoids. Present about twice monthly for years. No evidence of cause on colonoscopy completed in 2018.   Recommended colorectal specialist visit. Referral provided.     9. Left ovarian cyst  Due for repeat ultrasound. She plans on calling Dr. Dominguez for orders.     COUNSELING:  Reviewed preventive health counseling, as reflected in patient instructions    Estimated body mass index is 22.92 kg/m  as calculated from the following:    Height as of 11/7/19: 1.676 m (5' 6\").    Weight as of 11/7/19: 64.4 kg " (142 lb).         reports that she has never smoked. She has never used smokeless tobacco.      Appropriate preventive services were discussed with this patient, including applicable screening as appropriate for cardiovascular disease, diabetes, osteopenia/osteoporosis, and glaucoma.  As appropriate for age/gender, discussed screening for colorectal cancer, prostate cancer, breast cancer, and cervical cancer. Checklist reviewing preventive services available has been given to the patient.    Reviewed patients plan of care and provided an AVS. The Intermediate Care Plan ( asthma action plan, low back pain action plan, and migraine action plan) for Nicole meets the Care Plan requirement. This Care Plan has been established and reviewed with the Patient.    Counseling Resources:  ATP IV Guidelines  Pooled Cohorts Equation Calculator  Breast Cancer Risk Calculator  FRAX Risk Assessment  ICSI Preventive Guidelines  Dietary Guidelines for Americans, 2010  USDA's MyPlate  ASA Prophylaxis  Lung CA Screening          Video-Visit Details    Type of service:  Video Visit    Video End Time:9:44 AM    Originating Location (pt. Location): Home    Distant Location (provider location):  [facility]     Platform used for Video Visit: [facility]    No follow-ups on file.       Lauryn Allison MD, MD      The patient was provided with written information regarding signs of hearing loss.  Information on urinary incontinence and treatment options given to patient.

## 2020-07-29 ENCOUNTER — TELEPHONE (OUTPATIENT)
Dept: OBGYN | Facility: CLINIC | Age: 70
End: 2020-07-29

## 2020-07-29 DIAGNOSIS — N83.202 LEFT OVARIAN CYST: Primary | ICD-10-CM

## 2020-07-29 NOTE — TELEPHONE ENCOUNTER
Pt is calling to get schedule yearly pelvic US to check on her ovarian cyst.  Last US was 6/2019.  Please place desired order and then pt can be called to schedule.  Thanks.  Ashwini Duke RN

## 2020-08-21 ENCOUNTER — ANCILLARY PROCEDURE (OUTPATIENT)
Dept: MAMMOGRAPHY | Facility: CLINIC | Age: 70
End: 2020-08-21
Payer: COMMERCIAL

## 2020-08-21 DIAGNOSIS — Z12.31 VISIT FOR SCREENING MAMMOGRAM: ICD-10-CM

## 2020-08-21 PROCEDURE — 77067 SCR MAMMO BI INCL CAD: CPT | Mod: TC

## 2020-08-24 ENCOUNTER — MYC MEDICAL ADVICE (OUTPATIENT)
Dept: FAMILY MEDICINE | Facility: CLINIC | Age: 70
End: 2020-08-24

## 2020-08-28 ENCOUNTER — MYC MEDICAL ADVICE (OUTPATIENT)
Dept: FAMILY MEDICINE | Facility: CLINIC | Age: 70
End: 2020-08-28

## 2020-08-28 DIAGNOSIS — Z12.11 COLON CANCER SCREENING: Primary | ICD-10-CM

## 2020-09-01 NOTE — TELEPHONE ENCOUNTER
Dr Allison,    Can you sign the new referral. Difficult to see the details on the last referral to copy and RN not sure of time frame    Thanks    Tessie Rousseau, RN, BSN

## 2020-09-01 NOTE — TELEPHONE ENCOUNTER
It is okay for her to go to and see the same group. Please let her know and give her scheduling info and referral for the U if needed. Thanks,  Lauryn Allison M.D.

## 2020-09-15 ENCOUNTER — ANCILLARY PROCEDURE (OUTPATIENT)
Dept: ULTRASOUND IMAGING | Facility: CLINIC | Age: 70
End: 2020-09-15
Attending: OBSTETRICS & GYNECOLOGY
Payer: COMMERCIAL

## 2020-09-15 DIAGNOSIS — N83.202 LEFT OVARIAN CYST: ICD-10-CM

## 2020-09-15 DIAGNOSIS — Z11.59 ENCOUNTER FOR SCREENING FOR OTHER VIRAL DISEASES: Primary | ICD-10-CM

## 2020-09-15 PROCEDURE — 76830 TRANSVAGINAL US NON-OB: CPT | Performed by: OBSTETRICS & GYNECOLOGY

## 2020-10-21 ENCOUNTER — PRE VISIT (OUTPATIENT)
Dept: UROLOGY | Facility: CLINIC | Age: 70
End: 2020-10-21

## 2020-10-21 NOTE — TELEPHONE ENCOUNTER
Reason for visit: prolapse follow up             Relevant information: pt stopped using the Pessary, pelvic floor dysfunction     Records/imaging/labs: all records available     Pt called: no need for a call

## 2020-10-22 ENCOUNTER — TELEPHONE (OUTPATIENT)
Dept: UROLOGY | Facility: CLINIC | Age: 70
End: 2020-10-22

## 2020-10-22 NOTE — TELEPHONE ENCOUNTER
Mail box is full and not able to lvm    * need to convert appt on 10/29 with Dr Song to virtual same day and time.

## 2020-10-28 ENCOUNTER — VIRTUAL VISIT (OUTPATIENT)
Dept: FAMILY MEDICINE | Facility: OTHER | Age: 70
End: 2020-10-28

## 2020-10-28 NOTE — PROGRESS NOTES
"Date: 10/28/2020 15:03:54  Clinician: Sarah Alonzo  Clinician NPI: [id number]  Patient: Nicole Stanley  Patient : 1950  Patient Address: [address] [place] [address]  Patient Phone: [phone]  Visit Protocol: URI  Patient Summary:  Nicole is a 70 year old ( : 1950 ) female who initiated a [facility] Visit for COVID-19 (Coronavirus) evaluation and screening. When asked the question \"Please sign me up to receive news, health information and promotions. \", Nicole responded \"No\".    Nicole states her symptoms started today.   Her symptoms consist of myalgia, chills, and malaise. Nicole also feels feverish.   Symptom details   Temperature: Her current temperature is 100 degrees Fahrenheit.    Nicole denies having ear pain, headache, wheezing, cough, nasal congestion, nausea, facial pain or pressure, sore throat, teeth pain, ageusia, diarrhea, anosmia, vomiting, and rhinitis. She also denies having recent facial or sinus surgery in the past 60 days and taking antibiotic medication in the past month. She is not experiencing dyspnea.   Precipitating events  She has not recently been exposed to someone with influenza. Nicole has been in close contact with the following high risk individuals: adults 65 or older.   Pertinent COVID-19 (Coronavirus) information  Nicole does not work or volunteer as healthcare worker or a . In the past 14 days, Nicole has not worked or volunteered at a healthcare facility or group living setting.   In the past 14 days, she also has not lived in a congregate living setting.   Nicole has not had a close contact with a laboratory-confirmed COVID-19 patient within 14 days of symptom onset.    Since 2019, Nicole has not been diagnosed with lab-confirmed COVID-19 test and has not had upper respiratory infection or influenza-like illness.   Pertinent medical history  Nicole does not get yeast infections when she takes antibiotics.   Nicole does not " need a return to work/school note.   Weight: 150 lbs   Nicole does not smoke or use smokeless tobacco.   Additional information as reported by the patient (free text): I had contact with my brother on this past Sunday after he flew into [facility]. from a 2 week visit to his children &amp; grandchildren in [place].  He flew [facility] both ways and said all the seats were full- no spacing.  I am wanting to get a COVID test because of this &amp; because of my symptoms.   Weight: 150 lbs    MEDICATIONS: atorvastatin oral, Eliquis oral, levothyroxine oral, ALLERGIES: NKDA  Clinician Response:  Dear Nicole,   Your symptoms show that you may have coronavirus (COVID-19). This illness can cause fever, cough and trouble breathing. Many people get a mild case and get better on their own. Some people can get very sick.  Based on the symptoms you have shared, I would like you to be re-checked in 2 to 3 days. Please call your family clinic to set up a video or phone visit.  Will I be tested for COVID-19?  We would like to test you for this virus.   Please call [phone] to schedule your visit. Explain that you were referred by [facility] to have a COVID-19 test. Be ready to share [facility].    The following will serve as your written order for this COVID Test, ordered by me, for the indication of suspected COVID [Z20.828]: The test will be ordered in [employer], our electronic health record, after you are scheduled. It will show as ordered and authorized by Daniel Dorman MD.  Order: COVID-19 (Coronavirus) PCR for SYMPTOMATIC testing from [facility].   1.When it's time for your COVID test:   Stay at least 6 feet away from others. (If someone will drive you to your test, stay in the backseat, as far away from the  as you can.)   Cover your mouth and nose with a mask, tissue or washcloth.  Go straight to the testing site. Don't make any stops on the way there or back.      2.Starting now: Stay home and away from others  "(self-isolate) until:   You've had no fever---and no medicine that reduces fever---for one full day (24 hours). And...   Your other symptoms have gotten better. For example, your cough or breathing has improved. And...   At least 10 days have passed since your symptoms started.       During this time, don't leave the house except for testing or medical care.   Stay in your own room, even for meals. Use your own bathroom if you can.   Stay away from others in your home. No hugging, kissing or shaking hands. No visitors.  Don't go to work, school or anywhere else.    Clean \"high touch\" surfaces often (doorknobs, counters, handles, etc.). Use a household cleaning spray or wipes. You'll find a full list of  on the EPA website: www.epa.gov/pesticide-registration/list-n-disinfectants-use-against-sars-cov-2.   Cover your mouth and nose with a mask, tissue or washcloth to avoid spreading germs.  Wash your hands and face often. Use soap and water.  Caregivers in these groups are at risk for severe illness due to COVID-19:  o People 65 years and older  o People who live in a nursing home or long-term care facility  o People with chronic disease (lung, heart, cancer, diabetes, kidney, liver, immunologic)   o People who have a weakened immune system, including those who:   Are in cancer treatment  Take medicine that weakens the immune system, such as corticosteroids  Had a bone marrow or organ transplant  Have an immune deficiency  Have poorly controlled HIV or AIDS  Are obese (body mass index of 40 or higher)  Smoke regularly   o Caregivers should wear gloves while washing dishes, handling laundry and cleaning bedrooms and bathrooms.  o Use caution when washing and drying laundry: Don't shake dirty laundry, and use the warmest water setting that you can.  o For more tips, go to www.cdc.gov/coronavirus/2019-ncov/downloads/10Things.pdf.      How can I take care of myself?   Get lots of rest. Drink extra fluids (unless a " doctor has told you not to)   Take Tylenol (acetaminophen) for fever or pain. If you have liver or kidney problems, ask your family doctor if it's okay to take Tylenol.   Adults can take either:    650 mg (two 325 mg pills) every 4 to 6 hours, or...   1,000 mg (two 500 mg pills) every 8 hours as needed.    Note: Don't take more than 3,000 mg in one day. Acetaminophen is found in many medicines (both prescribed and over-the-counter medicines). Read all labels to be sure you don't take too much.   For children, check the Tylenol bottle for the right dose. The dose is based on the child's age or weight.    If you have other health problems (like cancer, heart failure, an organ transplant or severe kidney disease): Call your specialty clinic if you don't feel better in the next 2 days.       Know when to call 911. Emergency warning signs include:    Trouble breathing or shortness of breath Pain or pressure in the chest that doesn't go away Feeling confused like you haven't felt before, or not being able to wake up Bluish-colored lips or face  Where can I get more information?   [facility] -- About COVID-19: [email or web address]   CDC -- What to Do If You're Sick: www.cdc.gov/coronavirus/2019-ncov/about/steps-when-sick.html   CDC -- Ending Home Isolation: www.cdc.gov/coronavirus/2019-ncov/hcp/disposition-in-home-patients.html   CDC -- Caring for Someone: www.cdc.gov/coronavirus/2019-ncov/if-you-are-sick/care-for-someone.html   [facility] -- Interim Guidance for Hospital Discharge to Home: www.health.[facility].[place]/diseases/coronavirus/hcp/hospdischarge.pdf   [facility] clinical trials (COVID-19 research studies): clinicalaffairs.[facility]/umn-clinical-trials    Below are the COVID-19 hotlines at the [place] Department of Health ([facility]). Interpreters are available.    For health questions: Call [phone] or [phone] (7 a.m. to 7 p.m.) For questions about schools and childcare: Call  [phone] or [phone] (7 a.m. to 7 p.m.)       Diagnosis: Fever, unspecified  Diagnosis ICD: R50.9

## 2020-10-29 ENCOUNTER — VIRTUAL VISIT (OUTPATIENT)
Dept: UROLOGY | Facility: CLINIC | Age: 70
End: 2020-10-29
Payer: COMMERCIAL

## 2020-10-29 DIAGNOSIS — N81.4 UTEROVAGINAL PROLAPSE: Primary | ICD-10-CM

## 2020-10-29 DIAGNOSIS — N39.46 MIXED INCONTINENCE: ICD-10-CM

## 2020-10-29 PROCEDURE — 99212 OFFICE O/P EST SF 10 MIN: CPT | Mod: 95 | Performed by: UROLOGY

## 2020-10-29 RX ORDER — TRIAMCINOLONE ACETONIDE 1 MG/G
CREAM TOPICAL
COMMUNITY
Start: 2020-03-23 | End: 2023-10-19

## 2020-10-29 NOTE — PROGRESS NOTES
"October 29, 2020    Nicole Stanley is a 70 year old female who is being evaluated via a billable video visit.      Video Visit Technology for this patient: [facility] Video Visit- Patient was left in waiting room    The patient has been notified of following:     \"This video visit will be conducted via a call between you and your physician/provider. We have found that certain health care needs can be provided without the need for an in-person physical exam.  This service lets us provide the care you need with a video conversation.  If a prescription is necessary we can send it directly to your pharmacy.  If lab work is needed we can place an order for that and you can then stop by our lab to have the test done at a later time.    Video visits are billed at different rates depending on your insurance coverage.  Please reach out to your insurance provider with any questions.    If during the course of the call the physician/provider feels a video visit is not appropriate, you will not be charged for this service.\"    Patient has given verbal consent for Video visit? Yes  How would you like to obtain your AVS? MyChart  If you are dropped from the video visit, the video invite should be resent to: Send to e-mail at: [email or web address]  Will anyone else be joining your video visit? No        Video-Visit Details    Type of service:  Video Visit    Video Start Time: 1:30 PM  Video End Time: 13:35    Originating Location (pt. Location): [place]    Distant Location (provider location):  [facility] [place]     Platform used for Video Visit: Leida    Just following up for her urinary incontinence and prolapse.  She has tried a couple of pessaries in the past that did not really help.  She denies gross hematuria, UTI, vaginal bleeding    Patient denies any changes to her past medical, surgical or social history.  Patient denies any changes to her medications or allergies    LMP  (LMP Unknown)     GENERAL: " healthy, alert and no distress  EYES: Eyes grossly normal to inspection, conjunctivae and sclerae normal  HENT: normal cephalic/atraumatic.  External ears, nose and mouth without ulcers or lesions.  RESP: no audible wheeze, cough, or visible cyanosis.  No visible retractions or increased work of breathing.  Able to speak fully in complete sentences.  NEURO: Cranial nerves grossly intact, mentation intact and speech normal  PSYCH: mentation appears normal, affect normal/bright, judgement and insight intact, normal speech and appearance well-groomed    A/P:  Nicole Stanley is a 70 year old F with mixed urinary incontinence, stage II uterovaginal prolapse    The pessary did not really work and right now she is not overly bothered by her symptoms so she wishes to just monitor.  She will return to see us in about one year, sooner if needed    Sheba Song MD MPH   of Urology    CC  Patient Care Team:  Lauryn Cardozo MD as PCP - General (Family Practice)  Jacinda Saleem MD as MD (Otolaryngology)  Maile Leonardo MD as MD (Cardiology)  Sheba Song MD as MD (Urology)  Lauryn Cardozo MD as Assigned PCP  Sonido Weaver MD as Assigned Musculoskeletal Provider  Sheba Song MD as Assigned Surgical Provider  Maile Leonardo MD as Assigned Heart and Vascular Provider  LAURYN CARDOZO

## 2020-10-29 NOTE — LETTER
"10/29/2020       RE: Nicole Stanley  [address]  [place] [address]     Dear Colleague,    Thank you for referring your patient, Nicole Stanley, to the [facility] [place] at [facility]. Please see a copy of my visit note below.    October 29, 2020    Nicole Stanley is a 70 year old female who is being evaluated via a billable video visit.      Video Visit Technology for this patient: [facility] Video Visit- Patient was left in waiting room    The patient has been notified of following:     \"This video visit will be conducted via a call between you and your physician/provider. We have found that certain health care needs can be provided without the need for an in-person physical exam.  This service lets us provide the care you need with a video conversation.  If a prescription is necessary we can send it directly to your pharmacy.  If lab work is needed we can place an order for that and you can then stop by our lab to have the test done at a later time.    Video visits are billed at different rates depending on your insurance coverage.  Please reach out to your insurance provider with any questions.    If during the course of the call the physician/provider feels a video visit is not appropriate, you will not be charged for this service.\"    Patient has given verbal consent for Video visit? Yes  How would you like to obtain your AVS? MyChart  If you are dropped from the video visit, the video invite should be resent to: Send to e-mail at: [email or web address]  Will anyone else be joining your video visit? No        Video-Visit Details    Type of service:  Video Visit    Video Start Time: 1:30 PM  Video End Time: 13:35    Originating Location (pt. Location): Home    Distant Location (provider location):  [facility] [place]     Platform used for Video Visit: Leida    Just following up for her urinary incontinence and prolapse.  She has " tried a couple of pessaries in the past that did not really help.  She denies gross hematuria, UTI, vaginal bleeding    Patient denies any changes to her past medical, surgical or social history.  Patient denies any changes to her medications or allergies    LMP  (LMP Unknown)     GENERAL: healthy, alert and no distress  EYES: Eyes grossly normal to inspection, conjunctivae and sclerae normal  HENT: normal cephalic/atraumatic.  External ears, nose and mouth without ulcers or lesions.  RESP: no audible wheeze, cough, or visible cyanosis.  No visible retractions or increased work of breathing.  Able to speak fully in complete sentences.  NEURO: Cranial nerves grossly intact, mentation intact and speech normal  PSYCH: mentation appears normal, affect normal/bright, judgement and insight intact, normal speech and appearance well-groomed    A/P:  Nicole Stanley is a 70 year old F with mixed urinary incontinence, stage II uterovaginal prolapse    The pessary did not really work and right now she is not overly bothered by her symptoms so she wishes to just monitor.  She will return to see us in about one year, sooner if needed    Sheba Song MD MPH   of Urology    CC  Patient Care Team:  Lauryn Cardozo MD as PCP - General (Family Practice)  Jacinda Saleem MD as MD (Otolaryngology)  Maile Leonardo MD as MD (Cardiology)  Sheba Song MD as MD (Urology)  Lauryn Cardozo MD as Assigned PCP  Sonido Weaver MD as Assigned Musculoskeletal Provider  Sheba Song MD as Assigned Surgical Provider  Maile Leonardo MD as Assigned Heart and Vascular Provider  LAURYN CARDOZO

## 2020-10-29 NOTE — PATIENT INSTRUCTIONS
Websites with free information:    [employer] patient website: www.voicesforpfd.org    Total Control Program: www.totalcontrolprogram.com    Please return in about one year.     It was a pleasure meeting with you today.  Thank you for allowing me and my team the privilege of caring for you today.  YOU are the reason we are here, and I truly hope we provided you with the excellent service you deserve.  Please let us know if there is anything else we can do for you so that we can be sure you are leaving completely satisfied with your care experience.

## 2020-11-02 PROBLEM — N81.4 UTEROVAGINAL PROLAPSE: Status: ACTIVE | Noted: 2017-06-14

## 2020-11-09 DIAGNOSIS — Z11.59 ENCOUNTER FOR SCREENING FOR OTHER VIRAL DISEASES: ICD-10-CM

## 2020-11-09 DIAGNOSIS — Z12.11 ENCOUNTER FOR SCREENING COLONOSCOPY: Primary | ICD-10-CM

## 2020-11-09 PROCEDURE — U0003 INFECTIOUS AGENT DETECTION BY NUCLEIC ACID (DNA OR RNA); SEVERE ACUTE RESPIRATORY SYNDROME CORONAVIRUS 2 (SARS-COV-2) (CORONAVIRUS DISEASE [COVID-19]), AMPLIFIED PROBE TECHNIQUE, MAKING USE OF HIGH THROUGHPUT TECHNOLOGIES AS DESCRIBED BY CMS-2020-01-R: HCPCS | Performed by: INTERNAL MEDICINE

## 2020-11-09 RX ORDER — BISACODYL 5 MG
5 TABLET, DELAYED RELEASE (ENTERIC COATED) ORAL SEE ADMIN INSTRUCTIONS
Qty: 4 TABLET | Refills: 0 | Status: SHIPPED | OUTPATIENT
Start: 2020-11-09 | End: 2021-01-20

## 2020-11-10 LAB
SARS-COV-2 RNA SPEC QL NAA+PROBE: NOT DETECTED
SPECIMEN SOURCE: NORMAL

## 2020-11-12 ENCOUNTER — HOSPITAL ENCOUNTER (OUTPATIENT)
Facility: CLINIC | Age: 70
Discharge: HOME | End: 2020-11-12
Attending: INTERNAL MEDICINE | Admitting: INTERNAL MEDICINE
Payer: COMMERCIAL

## 2020-11-12 VITALS
DIASTOLIC BLOOD PRESSURE: 83 MMHG | WEIGHT: 146 LBS | HEART RATE: 61 BPM | SYSTOLIC BLOOD PRESSURE: 134 MMHG | HEIGHT: 65 IN | RESPIRATION RATE: 19 BRPM | BODY MASS INDEX: 24.32 KG/M2 | OXYGEN SATURATION: 99 %

## 2020-11-12 LAB — COLONOSCOPY: NORMAL

## 2020-11-12 PROCEDURE — 99153 MOD SED SAME PHYS/QHP EA: CPT | Performed by: INTERNAL MEDICINE

## 2020-11-12 PROCEDURE — G0500 MOD SEDAT ENDO SERVICE >5YRS: HCPCS | Performed by: INTERNAL MEDICINE

## 2020-11-12 PROCEDURE — 250N000011 HC RX IP 250 OP 636: Performed by: INTERNAL MEDICINE

## 2020-11-12 PROCEDURE — 45378 DIAGNOSTIC COLONOSCOPY: CPT | Mod: GC | Performed by: INTERNAL MEDICINE

## 2020-11-12 PROCEDURE — G0500 MOD SEDAT ENDO SERVICE >5YRS: HCPCS | Mod: GC | Performed by: INTERNAL MEDICINE

## 2020-11-12 PROCEDURE — 45378 DIAGNOSTIC COLONOSCOPY: CPT | Performed by: INTERNAL MEDICINE

## 2020-11-12 RX ORDER — ONDANSETRON 4 MG/1
4 TABLET, ORALLY DISINTEGRATING ORAL EVERY 6 HOURS PRN
Status: DISCONTINUED | OUTPATIENT
Start: 2020-11-12 | End: 2020-11-12 | Stop reason: HOSPADM

## 2020-11-12 RX ORDER — ONDANSETRON 2 MG/ML
4 INJECTION INTRAMUSCULAR; INTRAVENOUS
Status: DISCONTINUED | OUTPATIENT
Start: 2020-11-12 | End: 2020-11-12 | Stop reason: HOSPADM

## 2020-11-12 RX ORDER — LIDOCAINE 40 MG/G
CREAM TOPICAL
Status: DISCONTINUED | OUTPATIENT
Start: 2020-11-12 | End: 2020-11-12 | Stop reason: HOSPADM

## 2020-11-12 RX ORDER — NALOXONE HYDROCHLORIDE 0.4 MG/ML
.1-.4 INJECTION, SOLUTION INTRAMUSCULAR; INTRAVENOUS; SUBCUTANEOUS
Status: DISCONTINUED | OUTPATIENT
Start: 2020-11-12 | End: 2020-11-12 | Stop reason: HOSPADM

## 2020-11-12 RX ORDER — ONDANSETRON 2 MG/ML
4 INJECTION INTRAMUSCULAR; INTRAVENOUS EVERY 6 HOURS PRN
Status: DISCONTINUED | OUTPATIENT
Start: 2020-11-12 | End: 2020-11-12 | Stop reason: HOSPADM

## 2020-11-12 RX ORDER — PROCHLORPERAZINE MALEATE 5 MG
5 TABLET ORAL EVERY 6 HOURS PRN
Status: DISCONTINUED | OUTPATIENT
Start: 2020-11-12 | End: 2020-11-12 | Stop reason: HOSPADM

## 2020-11-12 RX ORDER — FENTANYL CITRATE 50 UG/ML
INJECTION, SOLUTION INTRAMUSCULAR; INTRAVENOUS PRN
Status: DISCONTINUED | OUTPATIENT
Start: 2020-11-12 | End: 2020-11-12 | Stop reason: HOSPADM

## 2020-11-12 RX ORDER — FLUMAZENIL 0.1 MG/ML
0.2 INJECTION, SOLUTION INTRAVENOUS
Status: DISCONTINUED | OUTPATIENT
Start: 2020-11-12 | End: 2020-11-12 | Stop reason: HOSPADM

## 2020-11-12 ASSESSMENT — MIFFLIN-ST. JEOR: SCORE: 1178.13

## 2020-11-12 NOTE — OR NURSING
Procedure: Colonoscopy without interventions  Sedation: Conscious sedation  Specimens: N/A  O2: 2L NC    Patient tolerated procedure well. Patient stable on transfer to [facility] recovery.

## 2020-11-12 NOTE — OR NURSING
Provider met with patient prior to discharge. Reviewed discharge instructions with pt and pts spouse. States understanding. Pt discharged ambulatory in care of .

## 2020-11-12 NOTE — DISCHARGE INSTRUCTIONS
Discharge Instructions after Colonoscopy  or Sigmoidoscopy    Today you had a Colonoscopy     Activity and Diet  You were given medicine for pain. You may be dizzy or sleepy.  For 24 hours:    Do not drive or use heavy equipment.    Do not make important decisions.    Do not drink any alcohol.  You may return to your normal diet and medicines.    Discomfort    Air was placed in your colon during the exam in order to see it. Walking helps to pass the air.    You may take Tylenol (acetaminophen) for pain unless your doctor has told you not to.    When to call:    Call right away if you have:    Unusual pain in belly or chest pain not relieved with passing air.    More than 1 to 2 Tablespoons of bleeding from your rectum.    Fever above 100.6  F (37.5  C).    If you have severe pain, bleeding, or shortness of breath, go to an emergency room.    If you have questions, call:  Monday to Friday, 7 a.m. to 4:30 p.m.  Endoscopy: [phone] (We may have to call you back)    After hours  Hospital: [phone] (Ask for the GI fellow on call)

## 2020-11-18 DIAGNOSIS — E78.2 MIXED HYPERLIPIDEMIA: ICD-10-CM

## 2020-11-18 DIAGNOSIS — I10 ESSENTIAL HYPERTENSION: ICD-10-CM

## 2020-11-18 LAB
ALBUMIN SERPL-MCNC: 4 G/DL (ref 3.4–5)
ALP SERPL-CCNC: 63 U/L (ref 40–150)
ALT SERPL W P-5'-P-CCNC: 31 U/L (ref 0–50)
ANION GAP SERPL CALCULATED.3IONS-SCNC: 4 MMOL/L (ref 3–14)
AST SERPL W P-5'-P-CCNC: 16 U/L (ref 0–45)
BILIRUB SERPL-MCNC: 0.7 MG/DL (ref 0.2–1.3)
BUN SERPL-MCNC: 19 MG/DL (ref 7–30)
CALCIUM SERPL-MCNC: 9.3 MG/DL (ref 8.5–10.1)
CHLORIDE SERPL-SCNC: 107 MMOL/L (ref 94–109)
CHOLEST SERPL-MCNC: 227 MG/DL
CO2 SERPL-SCNC: 29 MMOL/L (ref 20–32)
CREAT SERPL-MCNC: 0.87 MG/DL (ref 0.52–1.04)
CREAT UR-MCNC: 95 MG/DL
GFR SERPL CREATININE-BSD FRML MDRD: 67 ML/MIN/{1.73_M2}
GLUCOSE SERPL-MCNC: 81 MG/DL (ref 70–99)
HDLC SERPL-MCNC: 93 MG/DL
LDLC SERPL CALC-MCNC: 120 MG/DL
MICROALBUMIN UR-MCNC: 60 MG/L
MICROALBUMIN/CREAT UR: 63.03 MG/G CR (ref 0–25)
NONHDLC SERPL-MCNC: 134 MG/DL
POTASSIUM SERPL-SCNC: 4 MMOL/L (ref 3.4–5.3)
PROT SERPL-MCNC: 7.3 G/DL (ref 6.8–8.8)
SODIUM SERPL-SCNC: 140 MMOL/L (ref 133–144)
TRIGL SERPL-MCNC: 72 MG/DL

## 2020-11-18 PROCEDURE — 36415 COLL VENOUS BLD VENIPUNCTURE: CPT | Performed by: FAMILY MEDICINE

## 2020-11-18 PROCEDURE — 82043 UR ALBUMIN QUANTITATIVE: CPT | Performed by: FAMILY MEDICINE

## 2020-11-18 PROCEDURE — 80053 COMPREHEN METABOLIC PANEL: CPT | Performed by: FAMILY MEDICINE

## 2020-11-18 PROCEDURE — 80061 LIPID PANEL: CPT | Performed by: FAMILY MEDICINE

## 2020-11-24 DIAGNOSIS — R80.9 ALBUMINURIA: Primary | ICD-10-CM

## 2020-11-24 DIAGNOSIS — E78.2 MIXED HYPERLIPIDEMIA: ICD-10-CM

## 2020-11-24 NOTE — TELEPHONE ENCOUNTER
RN -- please call Nicole regarding her results. Her LDL was a little higher this time. I think we can do a better job of lowering lipid with a higher dose of atorvastatin, increasing it to 20mg daily. If she is open to this change, please send new rx to pharmacy.     Her kidney function blood test was stable, but there was mild leakage of protein in her urine which is a sign of mild chronic kidney disease. I would like to recheck her urine albumin in 6 months to see if the protein persists. If it does, we may consider adding a blood pressure medication for better protection of her kidneys. I recommend avoiding NSAID medications, which can cause kidney damage.       Recent Results (from the past 720 hour(s))   Asymptomatic COVID-19 Virus (Coronavirus) by PCR    Collection Time: 11/09/20 11:35 AM    Specimen: Nasopharyngeal   Result Value Ref Range    COVID-19 Virus PCR to [facility] - Source Nasopharyngeal     COVID-19 Virus PCR to [facility] - Result Not Detected    COLONOSCOPY    Collection Time: 11/12/20 11:15 AM   Result Value Ref Range    COLONOSCOPY       [address] [place] [id number] [phone]     Endoscopy Department  _______________________________________________________________________________  Patient Name: Nicole Stanley            Procedure Date: 11/12/2020 11:15 AM  MRN: [id number]                       Account Number: [id number]  YOB: 1950              Admit Type: Outpatient  Age: 70                               Room: [facility]  Gender: Female                        Note Status: Supervisor Override  Attending MD: Hugo Blanca MD       Pause for the Cause: pause for cause   completed  Total Sedation Time:                    _______________________________________________________________________________     Procedure:           Colonoscopy  Indications:         Hematochezia  Providers:           Hugo Blanca MD, Enedelia Young MD  Patient  Profile:     70 year old female seen for evaluation of BRBPR  Referring MD:        Lauryn Allison MD  Medicines:           Mid azolam 2 mg IV, Fentanyl 100 micrograms IV  Complications:       No immediate complications.  _______________________________________________________________________________  Procedure:           Pre-Anesthesia Assessment:                       - Prior to the procedure, a History and Physical was                        performed, and patient medications and allergies were                        reviewed. The patient is competent. The risks and                        benefits of the procedure and the sedation options and                        risks were discussed with the patient. All questions                        were answered and informed consent was obtained. Patient                        identification and proposed procedure were verified by                        the physician and the nurse in the pre-procedure area in                        the procedure room. Mental Status Examination: alert and                        oriented. Airway Examination: normal oropharyngeal                         airway and neck mobility. Respiratory Examination: clear                        to auscultation. CV Examination: normal. Prophylactic                        Antibiotics: The patient does not require prophylactic                        antibiotics. Prior Anticoagulants: The patient has taken                        no previous anticoagulant or antiplatelet agents. ASA                        Grade Assessment: II - A patient with mild systemic                        disease. After reviewing the risks and benefits, the                        patient was deemed in satisfactory condition to undergo                        the procedure. The anesthesia plan was to use moderate                        sedation / analgesia (conscious sedation). Immediately                        prior to administration  of medications, the patient was                        re-assessed for adequacy to receive sedatives. The heart                        rate, respiratory rate, oxygen saturations, bl ood                        pressure, adequacy of pulmonary ventilation, and                        response to care were monitored throughout the                        procedure. The physical status of the patient was                        re-assessed after the procedure.                       After obtaining informed consent, the colonoscope was                        passed under direct vision. Throughout the procedure,                        the patient's blood pressure, pulse, and oxygen                        saturations were monitored continuously. The Colonoscope                        was introduced through the anus and advanced to the                        cecum, identified by appendiceal orifice and ileocecal                        valve. The colonoscopy was performed without difficulty.                        The patient tolerated the procedure well. The quality of                        the bowel preparation was good.                                                                                    Findings:       Hemorrhoids were found on perianal exam.       Multiple small and large-mouthed diverticula were found in the sigmoid        colon and descending colon.       Internal hemorrhoids were found during retroflexion. The hemorrhoids        were small.       There is no endoscopic evidence of bleeding, mass or polyps in the        entire colon.                                                                                   Moderate Sedation:       Moderate (conscious) sedation was administered by the endoscopy nurse        and supervised by the endoscopist. The following parameters were        monitored: oxygen saturation, heart rate, blood pressure, and response        to care. Total physician intraservice time was  32 minutes.  Impression:          - Hemorrhoids found on perianal exam.                       - Diverticulosis in the sigmoid colon and in the                        descending colon.                       - Internal hemorrhoids.                        - No specimens collected.  Recommendation:      - Discharge patient to home (with escort).                       - Resume previous diet.                       - Return to primary care physician as previously                        scheduled.                       - Resume Eliquis (apixaban) at prior dose today.                       - Repeat colonoscopy in 5-10 years for surveillance.                                                                                     ________________  Hugo Blanca MD  11/12/2020 12:11:01 PM  I was physically present for the entire viewing portion of the exam.  __________________________  Signature of teaching physician  [id number] Blanca MD    ____________________________  Chimaobi Dominic Young MD  Number of Addenda: 0    Note Initiated On: 11/12/2020 11:15 AM  Scope In:  Scope Out:     **Comprehensive metabolic panel FUTURE anytime    Collection Time: 11/18/20  7:53 AM   Result Value Ref Range    Sodium 140 133 - 144 mmol/L    Potassium 4.0 3.4 - 5.3 mmol/L    Chloride 107 94 - 109 mmol/L    Carbon Dioxide 29 20 - 32 mmol/L    Anion Gap 4 3 - 14 mmol/L    Glucose 81 70 - 99 mg/dL    Urea Nitrogen 19 7 - 30 mg/dL    Creatinine 0.87 0.52 - 1.04 mg/dL    GFR Estimate 67 >60 mL/min/[1.73_m2]    GFR Estimate If Black 78 >60 mL/min/[1.73_m2]    Calcium 9.3 8.5 - 10.1 mg/dL    Bilirubin Total 0.7 0.2 - 1.3 mg/dL    Albumin 4.0 3.4 - 5.0 g/dL    Protein Total 7.3 6.8 - 8.8 g/dL    Alkaline Phosphatase 63 40 - 150 U/L    ALT 31 0 - 50 U/L    AST 16 0 - 45 U/L   Lipid panel reflex to direct LDL Fasting    Collection Time: 11/18/20  7:53 AM   Result Value Ref Range    Cholesterol 227 (H) <200 mg/dL    Triglycerides 72 <150 mg/dL    HDL  Cholesterol 93 >49 mg/dL    LDL Cholesterol Calculated 120 (H) <100 mg/dL    Non HDL Cholesterol 134 (H) <130 mg/dL   Albumin Random Urine Quantitative with Creat Ratio    Collection Time: 11/18/20  7:54 AM   Result Value Ref Range    Creatinine Urine 95 mg/dL    Albumin Urine mg/L 60 mg/L    Albumin Urine mg/g Cr 63.03 (H) 0 - 25 mg/g Cr

## 2020-11-25 ENCOUNTER — TRANSFERRED RECORDS (OUTPATIENT)
Dept: HEALTH INFORMATION MANAGEMENT | Facility: CLINIC | Age: 70
End: 2020-11-25

## 2020-11-25 RX ORDER — ATORVASTATIN CALCIUM 20 MG/1
20 TABLET, FILM COATED ORAL DAILY
Qty: 90 TABLET | Refills: 3 | Status: SHIPPED | OUTPATIENT
Start: 2020-11-25 | End: 2021-04-26

## 2020-11-29 ENCOUNTER — HEALTH MAINTENANCE LETTER (OUTPATIENT)
Age: 70
End: 2020-11-29

## 2021-01-20 ENCOUNTER — OFFICE VISIT (OUTPATIENT)
Dept: FAMILY MEDICINE | Facility: CLINIC | Age: 71
End: 2021-01-20
Payer: COMMERCIAL

## 2021-01-20 VITALS
SYSTOLIC BLOOD PRESSURE: 132 MMHG | HEART RATE: 97 BPM | BODY MASS INDEX: 24.79 KG/M2 | OXYGEN SATURATION: 97 % | RESPIRATION RATE: 14 BRPM | TEMPERATURE: 97.8 F | DIASTOLIC BLOOD PRESSURE: 86 MMHG | WEIGHT: 149 LBS

## 2021-01-20 DIAGNOSIS — R80.9 MICROALBUMINURIA: ICD-10-CM

## 2021-01-20 DIAGNOSIS — Z79.01 CHRONIC ANTICOAGULATION: ICD-10-CM

## 2021-01-20 DIAGNOSIS — N83.202 LEFT OVARIAN CYST: ICD-10-CM

## 2021-01-20 DIAGNOSIS — Z01.818 PRE-OPERATIVE GENERAL PHYSICAL EXAMINATION: Primary | ICD-10-CM

## 2021-01-20 DIAGNOSIS — E03.9 HYPOTHYROIDISM, UNSPECIFIED TYPE: ICD-10-CM

## 2021-01-20 DIAGNOSIS — E78.2 MIXED HYPERLIPIDEMIA: ICD-10-CM

## 2021-01-20 DIAGNOSIS — J38.7 IRRITABLE LARYNX SYNDROME: ICD-10-CM

## 2021-01-20 DIAGNOSIS — I48.0 PAROXYSMAL ATRIAL FIBRILLATION (H): ICD-10-CM

## 2021-01-20 DIAGNOSIS — I10 ESSENTIAL HYPERTENSION: ICD-10-CM

## 2021-01-20 PROBLEM — M79.18 MYALGIA OF PELVIC FLOOR: Status: RESOLVED | Noted: 2017-06-14 | Resolved: 2021-01-20

## 2021-01-20 PROBLEM — J10.1 INFLUENZA B: Status: RESOLVED | Noted: 2018-04-19 | Resolved: 2021-01-20

## 2021-01-20 PROCEDURE — 99214 OFFICE O/P EST MOD 30 MIN: CPT | Performed by: FAMILY MEDICINE

## 2021-01-20 RX ORDER — LISINOPRIL 2.5 MG/1
2.5 TABLET ORAL DAILY
Qty: 30 TABLET | Refills: 0 | Status: SHIPPED | OUTPATIENT
Start: 2021-01-20 | End: 2021-02-15

## 2021-01-20 NOTE — PROGRESS NOTES
[facility] [address] [place] [address]  Phone: [phone]  Primary Provider: Lauryn Allison  Pre-op Performing Provider: HECTOR SILVA    PREOPERATIVE EVALUATION:  Today's date: 1/20/2021    Nicole Stanley is a 70 year old female who presents for a preoperative evaluation.    Surgical Information:  Surgery/Procedure: Cartacart removal-right eye1/25 & / Left eye on 2/08/2021  Surgery Location: [facility]  Surgeon: Dr. Annita Lopez MD  Surgery Date: 01/25/2021  Time of Surgery: TBD  Where patient plans to recover: At home with family  Fax number for surgical facility: [phone]    Type of Anesthesia Anticipated: Local with MAC    Subjective     HPI related to upcoming procedure:   Hx of nuclear sclerosis cataracts  Close up reading difficult   Here for pre op  New to this provider    A fib on eliquis and metoprolol, rate controlled, has had prior cardioversion, can tell when is in a fib. Currently feels in sinus. Seen y cardiology in the past , currently PCP managing. Cbc was last in 4/2019 was normal. No bleeding issues occasional hemorrhoidal rectal bleed, colonoscopy 7/202 was normal. No symptoms or signs of anemia  HTN stable on metoprolol, previously on hydrochlorothiazide triamterene, notes hx of microalbuminuria  In nov when noted started checking BP more often  BP at home ranges any where form 108 to 153 / 81 to late 90's, no symptoms  Discussed previously probably considering adding an ACE inhibitor. Has plans to get rechecked with PCP Dr Allison in June but worried about elevated BP. Previously in past was high and then recheck was normal. Hesitant to go up on metoprolol. Currently BP wnl   HLd on Lipitor 20 mg since nov  Hypothyroid on levothyroxine, euthyroid, labs in nov were normal  Hx of irritable larynx syndrome  Left simple ovarian cyst     Currently no fever or chills, no headache or dizziness, no double or blurry vision, no facial pain,  earache, sore throat, runny nose, post nasal drip, no trouble hearing, smelling, tasting or swallowing, no cough , no chest pain, trouble breathing or palpitations, No abdominal pain, heart burn, reflux, nausea or vomiting or diarrhea or constipation, occasional blood in stools from hemorrhoids, colonoscopy 7/2020 was normal, no blood in stools or black stools, no weight loss or night sweats. No dysuria, hematuria, frequency, urgency, hesitancy, incontinence, No pelvic complaints. No leg swelling or joint pain. No rash.    Pre op Questions 1/13/2021   1. Have you ever had a heart attack or stroke? No   2. Have you ever had surgery on your heart or blood vessels, such as a stent placement, a coronary artery bypass, or surgery on an artery in your head, neck, heart, or legs? No   3. Do you have chest pain with activity? No   4. Do you have a history of  heart failure? No   5. Do you currently have a cold, bronchitis or symptoms of other infection? No   6. Do you have a cough, shortness of breath, or wheezing? No   7. Do you or anyone in your family have previous history of blood clots? No   8. Do you or does anyone in your family have a serious bleeding problem such as prolonged bleeding following surgeries or cuts? No   9. Have you ever had problems with anemia or been told to take iron pills? YES - when premenopausal but cbc in 4/2019 was normal   10. Have you had any abnormal blood loss such as black, tarry or bloody stools, or abnormal vaginal bleeding? No   11. Have you ever had a blood transfusion? No   12. Are you willing to have a blood transfusion if it is medically needed before, during, or after your surgery? Yes   13. Have you or any of your relatives ever had problems with anesthesia? No   14. Do you have sleep apnea, excessive snoring or daytime drowsiness? No   15. Do you have any artifical heart valves or other implanted medical devices like a pacemaker, defibrillator, or continuous glucose monitor? No    16. Do you have artificial joints? No   17. Are you allergic to latex? No     Health Care Directive:  Patient has a Health Care Directive on file    Preoperative Review of :  negative    Status of Chronic Conditions:  A-FIB - Patient has a longstanding history of chronic A-fib currently on rate control. Current treatment regimen includes Apixaban for stroke prevention and denies significant symptoms of lightheadedness, palpitations or dyspnea.     HYPERLIPIDEMIA - Patient has a long history of significant Hyperlipidemia requiring medication for treatment with recent good control. Patient reports no problems or side effects with the medication.     HYPERTENSION - Patient has longstanding history of HTN , currently denies any symptoms referable to elevated blood pressure. Specifically denies chest pain, palpitations, dyspnea, orthopnea, PND or peripheral edema. Blood pressure readings have been borderline and elevated at times. Current medication regimen is as listed below. Patient denies any side effects of medication.     HYPOTHYROIDISM - Patient has a longstanding history of chronic Hypothyroidism. Patient has been doing well, noting no tremor, insomnia, hair loss or changes in skin texture. Continues to take medications as directed, without adverse reactions or side effects. Last TSH   Lab Results   Component Value Date    TSH 2.19 06/25/2020   .    Hx of paroxysmal Afib first noted after bout of influenza B, fh of it too in mom, S/p cardioversion on eliquis and metoprolol, HLD on Lipitor, HTN, microalbuminuria, Hypothyroid on levothyroxine, hot flashes prior HRT use, gabapentin not effective in the past,   , UV prolapse, dyspareunia,pessary not effective, followed by uro gyn Dr Song, benign left ovarian cyst followed by Gyn Dr Dominguez , presbylarynx/ irritable larynx syndrome, muscle tension dysphonia, OA B/l hands, left bunion,  hemorrhoidal bleeding prn under care of colorectal, neck pain under care of PT, on  tylenol, calcium, vit d, probiotics, zinc and TAC cream as needed, hx prior blepharoplasty, cystoscopy, vein stripping, dino, rectal sphincter surgery and colonoscopy, under care of PCP Dr Allison  Seen by CP virtually 7/25/20 for a physical and meds renewed. Mammogram was normal in august and pelvic u/S unremarkable in sep, did on care in oct for covid symptoms, seen by Dr Song in oct and decided to monitor symptoms as not terrible. In nov LDL was elevated and Lipitor increase dto 20 and noted micro albumin was elevated and advised to consider an ACE  Seen by eye 12/7/20 noted nuclear sclerosis cataracts, dry eye syndrome, myopia, presbyopia and astigmatism        Review of Systems  CONSTITUTIONAL: NEGATIVE for fever, chills, change in weight  INTEGUMENTARY/SKIN: NEGATIVE for worrisome rashes, moles or lesions  EYES: NEGATIVE for vision changes or irritation  ENT/MOUTH: NEGATIVE for ear, mouth and throat problems  RESP: NEGATIVE for significant cough or SOB  BREAST: NEGATIVE for masses, tenderness or discharge  CV: NEGATIVE for chest pain, palpitations or peripheral edema  GI: NEGATIVE for nausea, abdominal pain, heartburn, or change in bowel habits  : NEGATIVE for frequency, dysuria, or hematuria  MUSCULOSKELETAL: NEGATIVE for significant arthralgias or myalgia  NEURO: NEGATIVE for weakness, dizziness or paresthesias  ENDOCRINE: NEGATIVE for temperature intolerance, skin/hair changes  HEME: NEGATIVE for bleeding problems  PSYCHIATRIC: NEGATIVE for changes in mood or affect    Patient Active Problem List    Diagnosis Date Noted     Irritable larynx syndrome 06/04/2018     Priority: Medium     Muscle tension dysphonia 06/04/2018     Priority: Medium     Paroxysmal atrial fibrillation (H) 04/25/2018     Priority: Medium     Left ovarian cyst 09/15/2017     Priority: Medium     Uterovaginal prolapse 06/14/2017     Priority: Medium     Mixed incontinence 06/14/2017     Priority: Medium     Myalgia of pelvic floor  06/14/2017     Priority: Medium     Pelvic floor dysfunction 06/14/2017     Priority: Medium     Dyspareunia in female 06/14/2017     Priority: Medium     Hypothyroidism 05/05/2017     Priority: Medium     Essential hypertension 01/31/2017     Priority: Medium     Mixed hyperlipidemia 01/31/2017     Priority: Medium     Bunion, left 01/31/2017     Priority: Medium     And right       Osteoarthritis of both hands, unspecified osteoarthritis type 01/31/2017     Priority: Medium      Past Medical History:   Diagnosis Date     Arthritis     osteoarth     Influenza B 4/19/2018     Spider veins      Past Surgical History:   Procedure Laterality Date     CHOLECYSTECTOMY  2000     COLONOSCOPY N/A 11/12/2020    Procedure: COLONOSCOPY;  Surgeon: Hugo Troncoso MD;  Location:  GI     CYSTOSCOPY       EYE SURGERY      blepharoplasty     GI SURGERY  2006    rectal sphincter     VASCULAR SURGERY  1988    vein stripping     Current Outpatient Medications   Medication Sig Dispense Refill     Acetaminophen (TYLENOL PO) Take 500 mg by mouth as needed for mild pain or fever       apixaban ANTICOAGULANT (ELIQUIS ANTICOAGULANT) 5 MG tablet TAKE 1 TABLET (5 MG) BY MOUTH 2 TIMES DAILY 180 tablet 3     atorvastatin (LIPITOR) 20 MG tablet Take 1 tablet (20 mg) by mouth daily 90 tablet 3     calcium carbonate (OSCAL 500) 1250 (500 Ca) MG TABS tablet Take 1 tablet by mouth 2 times daily       Cholecalciferol (VITAMIN D3) 3000 units TABS Take 1 tablet by mouth daily       levothyroxine (SYNTHROID/LEVOTHROID) 50 MCG tablet Take 1 tablet (50 mcg) by mouth daily 90 tablet 3     lisinopril (ZESTRIL) 2.5 MG tablet Take 1 tablet (2.5 mg) by mouth daily 30 tablet 0     metoprolol succinate ER (TOPROL-XL) 25 MG 24 hr tablet TAKE 1 TABLET EVERY DAY 90 tablet 3     Probiotic Product (PROBIOTIC COLON SUPPORT PO) Take 1 tablet by mouth daily       triamcinolone (KENALOG) 0.1 % external cream        zinc sulfate (ZINCATE) 220 (50 Zn) MG capsule  Take 220 mg by mouth daily         No Known Allergies     Social History     Tobacco Use     Smoking status: Never Smoker     Smokeless tobacco: Never Used   Substance Use Topics     Alcohol use: Yes     Alcohol/week: 0.0 standard drinks     Comment: 1-2 glass of wine per day     Family History   Problem Relation Age of Onset     Hypertension Mother              Hyperlipidemia Mother      Family History Negative Father              Alzheimer Disease Father      Cancer Father         sarcoma     Family History Negative Maternal Grandmother               History   Drug Use No         Objective     /86 (BP Location: Right arm, Patient Position: Sitting, Cuff Size: Adult Regular)   Pulse 97   Temp 97.8  F (36.6  C) (Oral)   Resp 14   Wt 67.6 kg (149 lb)   LMP  (LMP Unknown)   SpO2 97%   BMI 24.79 kg/m      Physical Exam    GENERAL APPEARANCE: healthy, alert and no distress     EYES: EOMI, PERRL     HENT: ear canals and TM's normal and nose and mouth without ulcers or lesions     NECK: no adenopathy, no asymmetry, masses, or scars and thyroid normal to palpation     RESP: lungs clear to auscultation - no rales, rhonchi or wheezes     CV: regular rates and rhythm, normal S1 S2, no S3 or S4 and no murmur, click or rub     ABDOMEN:  soft, nontender, no HSM or masses and bowel sounds normal     MS: extremities normal- no gross deformities noted, no evidence of inflammation in joints, FROM in all extremities.     SKIN: no suspicious lesions or rashes     NEURO: Normal strength and tone, sensory exam grossly normal, mentation intact and speech normal     PSYCH: mentation appears normal. and affect normal/bright     LYMPHATICS: No cervical adenopathy    Recent Labs   Lab Test 20  0753 20  1108 19  0832 19  1436   HGB  --   --   --  14.0   PLT  --  198  --  205     --  139 139   POTASSIUM 4.0  --  3.9 3.9   CR 0.87  --  0.88 0.99        Diagnostics:  No labs were  ordered during this visit.  No results found for this or any previous visit (from the past 720 hour(s)).   No EKG required for low risk surgery (cataract, skin procedure, breast biopsy, etc).    Revised Cardiac Risk Index (RCRI):  The patient has the following serious cardiovascular risks for perioperative complications:   - No serious cardiac risks = 0 points     RCRI Interpretation: 0 points: Class I (very low risk - 0.4% complication rate)         Assessment & Plan   The proposed surgical procedure is considered LOW risk.    Nicole was seen today for pre-op exam.    Diagnoses and all orders for this visit:    Pre-operative general physical examination  -     Comprehensive metabolic panel; Future    Chronic anticoagulation  -     CBC with platelets; Future    Paroxysmal atrial fibrillation (H)    Essential hypertension  -     lisinopril (ZESTRIL) 2.5 MG tablet; Take 1 tablet (2.5 mg) by mouth daily    Microalbuminuria  -     lisinopril (ZESTRIL) 2.5 MG tablet; Take 1 tablet (2.5 mg) by mouth daily    Mixed hyperlipidemia    Hypothyroidism, unspecified type    Left ovarian cyst    Irritable larynx syndrome         Labs and diagnostics today not required  Cbc normal in 4/2019 and no signs of anemia  No EKG needed as low risk  covid testing per eye  Will fax /send note to surgeon once completed   Due to borderline BP and microalbuminuria and reading prior note can start low dose ACE inhibitor lisinopril 2.5 mg  After cataract surgery and monitor BP and if stared return for lab only apt in 2 weeks of starting to check bmp  Also do cbc given over a year since checked and on eliquis.  Will send noted to PCP Dr Allison so can adjust ACE up if needed and or make further recommendations  Routine care as per PCP dr Allison     Risks and Recommendations:  The patient has the following additional risks and recommendations for perioperative complications:   - No identified additional risk factors other than previously  addressed    RECOMMENDATION:  APPROVAL GIVEN to proceed with proposed procedure, without further diagnostic evaluation.    Signed Electronically by: Ligia Price MD    Copy of this evaluation report is provided to requesting physician.    Pre op [facility] Pre op Guidelines    Revised Cardiac Risk Index

## 2021-01-20 NOTE — PATIENT INSTRUCTIONS
"Labs and diagnostics today not required  Cbc normal in 4/2019 and no signs of anemia  No EKG needed as low risk  covid testing per eye  Will fax /send note to surgeon once completed   Due to borderline BP and microalbuminuria and reading prior note can start low dose ACE inhibitor lisinopril 2.5 mg  After cataract surgery and monitor BP and if stared return for lab only apt in 2 weeks of starting to check bmp  Also do cbc given over a year since checked and on eliquis.  Will send noted to PCP Dr Allison so can adjust ACE up if needed and or make further recommendations  Routine care as per PCP dr Allison     Preparing for Your Surgery  Getting started  A surgery nurse will call you to review your health history and instructions. They will give you an arrival time based on your scheduled surgery time.  Please be ready to share the following:    Your doctor's clinic name and phone number    Your medical, surgical and anesthesia history    A list of allergies and sensitivities    A list of medicines, including herbal treatments and over-the-counter drugs    Whether the patient has a legal guardian (ask how to send us the papers in advance)  If your child is having surgery, please ask for a copy of Preparing for Your Child's Surgery.    Preparing for surgery    Within 30 days of surgery: Have an exam at your family clinic (primary care clinic), or go to a pre-operative clinic. This exam is called a \"History and Physical,\" or H&P.    At your H&P exam, talk to your care team about all medicines you take. If you need to stop any medicines before surgery, ask when to start taking them again.  ? We do this for your safety. Many medicines can make you bleed too much during surgery. Some change how well surgery (anesthesia) drugs work.    Call your insurance company to see what it will and won't pay for. Ask if they need to pre-approve the surgery. (If no insurance, call [phone].)    Call your surgeon's clinic if there's any " change in your health. This includes signs of a cold or flu (sore throat, runny nose, cough, rash, fever). It also includes a scrape or scratch near the surgery site.    If you have questions on the day of surgery, call your surgery center.  Eating and drinking guidelines  For your safety: Unless your surgeon tells you otherwise, follow the guidelines below.    Eat and drink as usual until 8 hours before surgery. After that, no food or milk.    Drink clear liquids until 2 hours before surgery. These are liquids you can see through, like water, Gatorade and Propel Water. You may also have black coffee and tea (no cream or milk).    Nothing by mouth within 2 hours of surgery. This includes gum, candy and breath mints.    Stop alcohol the midnight before surgery.    If your family clinic tells you to take medicine on the morning of surgery, it's okay to take it with a sip of water.  Preventing infection    Shower or bathe the night before and morning of your surgery. Follow the instructions your clinic gave you. (If no instructions, use regular soap.)    Don't shave or clip hair near your surgery site. This can lead to skin infection.    Don't smoke the morning of surgery. Smoking increases the risk of infection. You may chew nicotine gum up to 2 hours before surgery. A nicotine patch is okay.  ? Note: Some surgeries require you to completely quit smoking and nicotine. Check with your surgeon.    Your care team will make every effort to keep you safe from infection. We will:  ? Clean our hands often with soap and water (or an alcohol-based hand rub).  ? Clean the skin at your surgery site with a special soap that kills germs. We'll also remove hair from the site as needed.  ? Wear special hair covers, masks, gowns and gloves during surgery.  ? Give antibiotic medicine, if prescribed. Not all surgeries need antibiotics.  What to bring on the day of surgery    Photo ID and insurance card    Copy of your health care  directive, if you have one    Glasses and hearing aides (bring cases)  ? You can't wear contacts during surgery    Inhaler and eye drops, if you use them (tell us about these when you arrive)    CPAP machine or breathing device, if you use them    A few personal items, if spending the night    If you have . . .  ? A pacemaker or ICD (cardiac defibrillator): Bring the ID card.  ? An implanted stimulator: Bring the remote control.  ? A legal guardian: Bring a copy of the certified (court-stamped) guardianship papers.  Please remove any jewelry, including body piercings. Leave jewelry and other valuables at home.  If you're going home the day of surgery  Important: If you don't follow the rules below, we must cancel your surgery.     Arrange for someone to drive you home after surgery. You may not drive, take a taxi or take public transportation by yourself (unless you'll have local anesthesia only).    Arrange for a responsible adult to stay with you overnight. If you don't, we may keep you in the hospital overnight, and you may need to pay the costs yourself.  Questions?   If you have any questions for your care team, list them here: _________________________________________________________________________________________________________________________________________________________________________________________________________________________________________________________________________________________________________________________  For informational purposes only. Not to replace the advice of your health care provider. Copyright   4429-4124 [facility]. All rights reserved. Clinically reviewed by Ellie Ordaz MD. [employer] [id number] - REV 07/19.

## 2021-02-10 DIAGNOSIS — Z79.01 CHRONIC ANTICOAGULATION: ICD-10-CM

## 2021-02-10 DIAGNOSIS — Z01.818 PRE-OPERATIVE GENERAL PHYSICAL EXAMINATION: ICD-10-CM

## 2021-02-10 LAB
ALBUMIN SERPL-MCNC: 4.1 G/DL (ref 3.4–5)
ALP SERPL-CCNC: 57 U/L (ref 40–150)
ALT SERPL W P-5'-P-CCNC: 23 U/L (ref 0–50)
ANION GAP SERPL CALCULATED.3IONS-SCNC: 4 MMOL/L (ref 3–14)
AST SERPL W P-5'-P-CCNC: 10 U/L (ref 0–45)
BILIRUB SERPL-MCNC: 0.7 MG/DL (ref 0.2–1.3)
BUN SERPL-MCNC: 13 MG/DL (ref 7–30)
CALCIUM SERPL-MCNC: 9.8 MG/DL (ref 8.5–10.1)
CHLORIDE SERPL-SCNC: 105 MMOL/L (ref 94–109)
CO2 SERPL-SCNC: 30 MMOL/L (ref 20–32)
CREAT SERPL-MCNC: 0.85 MG/DL (ref 0.52–1.04)
ERYTHROCYTE [DISTWIDTH] IN BLOOD BY AUTOMATED COUNT: 12.8 % (ref 10–15)
GFR SERPL CREATININE-BSD FRML MDRD: 68 ML/MIN/{1.73_M2}
GLUCOSE SERPL-MCNC: 86 MG/DL (ref 70–99)
HCT VFR BLD AUTO: 40.5 % (ref 35–47)
HGB BLD-MCNC: 13.5 G/DL (ref 11.7–15.7)
MCH RBC QN AUTO: 32.1 PG (ref 26.5–33)
MCHC RBC AUTO-ENTMCNC: 33.3 G/DL (ref 31.5–36.5)
MCV RBC AUTO: 96 FL (ref 78–100)
PLATELET # BLD AUTO: 187 10E9/L (ref 150–450)
POTASSIUM SERPL-SCNC: 4.1 MMOL/L (ref 3.4–5.3)
PROT SERPL-MCNC: 7.5 G/DL (ref 6.8–8.8)
RBC # BLD AUTO: 4.21 10E12/L (ref 3.8–5.2)
SODIUM SERPL-SCNC: 139 MMOL/L (ref 133–144)
WBC # BLD AUTO: 3.9 10E9/L (ref 4–11)

## 2021-02-10 PROCEDURE — 85027 COMPLETE CBC AUTOMATED: CPT | Performed by: FAMILY MEDICINE

## 2021-02-10 PROCEDURE — 36415 COLL VENOUS BLD VENIPUNCTURE: CPT | Performed by: FAMILY MEDICINE

## 2021-02-10 PROCEDURE — 80053 COMPREHEN METABOLIC PANEL: CPT | Performed by: FAMILY MEDICINE

## 2021-02-10 NOTE — RESULT ENCOUNTER NOTE
Robi Ms. Stanley,  Your results came back and are within acceptable limits. -Normal red blood cell (hgb) levels, minimally lower white blood cell count and normal platelet levels.  -Liver and gallbladder tests are normal (ALT,AST, Alk phos, bilirubin), kidney function is normal (Cr, GFR), sodium is normal, potassium is normal, calcium is normal, glucose is normal.. If you have any further concerns please do not hesitate to contact us by message, phone or making an appointment.  Have a good day   Dr Albert BOATENG

## 2021-02-10 NOTE — RESULT ENCOUNTER NOTE
Robi JoRuss Stanley,  Your results came back and are within acceptable limits.  If you have any further concerns please do not hesitate to contact us by message, phone or making an appointment.  Have a good day   Dr Albert BOATENG

## 2021-02-22 ENCOUNTER — MYC MEDICAL ADVICE (OUTPATIENT)
Dept: FAMILY MEDICINE | Facility: CLINIC | Age: 71
End: 2021-02-22

## 2021-02-23 ENCOUNTER — VIRTUAL VISIT (OUTPATIENT)
Dept: FAMILY MEDICINE | Facility: CLINIC | Age: 71
End: 2021-02-23
Payer: COMMERCIAL

## 2021-02-23 DIAGNOSIS — R80.9 MICROALBUMINURIA: ICD-10-CM

## 2021-02-23 DIAGNOSIS — I10 ESSENTIAL HYPERTENSION: ICD-10-CM

## 2021-02-23 PROCEDURE — 99213 OFFICE O/P EST LOW 20 MIN: CPT | Mod: 95 | Performed by: FAMILY MEDICINE

## 2021-02-23 RX ORDER — LISINOPRIL 2.5 MG/1
2.5 TABLET ORAL DAILY
Qty: 90 TABLET | Refills: 1 | Status: SHIPPED | OUTPATIENT
Start: 2021-02-23 | End: 2021-08-02

## 2021-02-23 NOTE — PROGRESS NOTES
Nicole is a 70 year old who is being evaluated via a billable telephone visit.      What phone number would you like to be contacted at? [phone] (M)   How would you like to obtain your AVS? MyChart    Assessment & Plan     Essential hypertension  Controlled. Tolerating the lisinopril.  - lisinopril (ZESTRIL) 2.5 MG tablet  Dispense: 90 tablet; Refill: 1    Microalbuminuria    - lisinopril (ZESTRIL) 2.5 MG tablet  Dispense: 90 tablet; Refill: 1    BP controlled by home measures with the majority in goal range. Lisinopril was recently added due to microalbuminuria.    Continue lisinopril 2.5mg daily, metoprolol 25mg daily   Reviewed labs from 2/20/2021 and they are stable.  Plan for repeat urine albumin this May .           Lauryn Allison MD   [facility]   Nicole is a 70 year old who presents for the following health issues      HPI        Hyperlipidemia Follow-Up      Are you regularly taking any medication or supplement to lower your cholesterol?   Yes- lipitor     Are you having muscle aches or other side effects that you think could be caused by your cholesterol lowering medication?  Yes- unknown if related to arthritis     Hypertension Follow-up      Do you check your blood pressure regularly outside of the clinic? Yes     Are you following a low salt diet? Yes     Are your blood pressures ever more than 140 on the top number (systolic) OR more   than 90 on the bottom number (diastolic), for example 140/90? Yes      How many servings of fruits and vegetables do you eat daily?  2-3    On average, how many sweetened beverages do you drink each day (Examples: soda, juice, sweet tea, etc.  Do NOT count diet or artificially sweetened beverages)?   0    How many days per week do you exercise enough to make your heart beat faster? 7    How many minutes a day do you exercise enough to make your heart beat faster? 3-5 miles     How many days per week do you miss taking  your medication? 0         Review of Systems          Objective           Vitals:  No vitals were obtained today due to virtual visit.    Physical Exam   healthy, alert and no distress  PSYCH: Alert and oriented times 3; coherent speech, normal   rate and volume, able to articulate logical thoughts, able   to abstract reason, no tangential thoughts, no hallucinations   or delusions  Her affect is normal  RESP: No cough, no audible wheezing, able to talk in full sentences  Remainder of exam unable to be completed due to telephone visits                Phone call duration: 7 minutes

## 2021-04-29 ENCOUNTER — TRANSFERRED RECORDS (OUTPATIENT)
Dept: HEALTH INFORMATION MANAGEMENT | Facility: CLINIC | Age: 71
End: 2021-04-29

## 2021-05-13 DIAGNOSIS — R80.9 ALBUMINURIA: ICD-10-CM

## 2021-05-13 LAB
CREAT UR-MCNC: 53 MG/DL
MICROALBUMIN UR-MCNC: <5 MG/L
MICROALBUMIN/CREAT UR: NORMAL MG/G CR (ref 0–25)

## 2021-05-13 PROCEDURE — 82043 UR ALBUMIN QUANTITATIVE: CPT | Performed by: FAMILY MEDICINE

## 2021-05-17 NOTE — RESULT ENCOUNTER NOTE
Excellent! Please call or sent a [employer] message if you have any questions. Lauryn Allison M.D.

## 2021-06-16 ENCOUNTER — MYC MEDICAL ADVICE (OUTPATIENT)
Dept: CARDIOLOGY | Facility: CLINIC | Age: 71
End: 2021-06-16

## 2021-06-17 NOTE — TELEPHONE ENCOUNTER
Reached out to pt via telephone. Pt is feeling well today. I asked when her last run of Afib was, she states she was in Afib for about 4 hours on 6/16. I asked her how she knows she is in Afib. She states she has palpitations, SOB and overall does not feel well. I informed pt that the next in person Dr. Leonardo appointment is in August.     We could get her in sooner for a virtual appointment but we would need an EKG prior to the appointment. Pt stated she would like to attempt to get an EKG at her primary clinic since it is within walking distance. If she is unable to make that appointment she will call back and [place] [facility] [place]     Instructed pt to notify me when she has completed her EKG at outside clinic so I can follow up with the results and get her scheduled for an appointment with Dr. Leonardo.     Anibal Burden, RN   Cardiology Nurse Coordinator

## 2021-06-18 ENCOUNTER — MYC MEDICAL ADVICE (OUTPATIENT)
Dept: FAMILY MEDICINE | Facility: CLINIC | Age: 71
End: 2021-06-18
Payer: COMMERCIAL

## 2021-06-18 DIAGNOSIS — I48.91 ATRIAL FIBRILLATION, UNSPECIFIED TYPE (H): Primary | ICD-10-CM

## 2021-06-18 PROCEDURE — 93000 ELECTROCARDIOGRAM COMPLETE: CPT | Performed by: FAMILY MEDICINE

## 2021-06-18 NOTE — TELEPHONE ENCOUNTER
DR Allison  Please see pt msg  She wants a nurse only EKG Order per DR DUNHAM- and follow up with DR Dunham. I would have placed the order, but im unsure how to do that. sorry    Thanks!     Renetta Calderon RN

## 2021-06-22 NOTE — TELEPHONE ENCOUNTER
I put an EKG order and added Dr. Leonardo's name on it in hopes that the EKG will be directed to her. I will forward it to her if that does not work. Thanks! Lauryn Allison M.D.

## 2021-07-02 ENCOUNTER — ALLIED HEALTH/NURSE VISIT (OUTPATIENT)
Dept: NURSING | Facility: CLINIC | Age: 71
End: 2021-07-02
Payer: COMMERCIAL

## 2021-07-02 ENCOUNTER — VIRTUAL VISIT (OUTPATIENT)
Dept: OBGYN | Facility: CLINIC | Age: 71
End: 2021-07-02
Payer: COMMERCIAL

## 2021-07-02 DIAGNOSIS — N83.202 LEFT OVARIAN CYST: Primary | ICD-10-CM

## 2021-07-02 DIAGNOSIS — I48.91 ATRIAL FIBRILLATION, UNSPECIFIED TYPE (H): Primary | ICD-10-CM

## 2021-07-02 PROCEDURE — 99207 PR NO CHARGE NURSE ONLY: CPT

## 2021-07-02 PROCEDURE — 99212 OFFICE O/P EST SF 10 MIN: CPT | Mod: 95 | Performed by: OBSTETRICS & GYNECOLOGY

## 2021-07-02 NOTE — NURSING NOTE
EKG was done.  EKG is faxed to [phone] and placed in Provider folder.  Jenni Hargrove MA on 7/2/2021 at 3:22 PM

## 2021-07-02 NOTE — PROGRESS NOTES
Nicole is a 71 year old who is being evaluated via a billable telephone visit.      What phone number would you like to be contacted at? [phone]  How would you like to obtain your AVS? Christa LEONARDLesly Stanley is a 71 year old  who has a known 1cm simple left ovarian cyst that we monitor with yearly pelvic us.  She is not currently symptomatic and has no concerns, just was told she needs appt before scheduling us.    O: no vitals or exam due to phone visit    A/P; left ovarian cyst   Pelvic us due , will order and she can schedule when convenient.  Visit prn, otherwise can continue with routine care with PCP.    CHRISTINA TORRES MD    Phone call duration: 3 minutes

## 2021-07-30 ASSESSMENT — ENCOUNTER SYMPTOMS
SHORTNESS OF BREATH: 1
DYSURIA: 0
DIZZINESS: 0
JOINT SWELLING: 0
ABDOMINAL PAIN: 0
COUGH: 1
NAUSEA: 0
HEARTBURN: 0
HEADACHES: 1
HEMATOCHEZIA: 1
BREAST MASS: 0
NERVOUS/ANXIOUS: 0
CONSTIPATION: 0
ARTHRALGIAS: 1
WEAKNESS: 0
PARESTHESIAS: 0
FREQUENCY: 1
FEVER: 0
PALPITATIONS: 0
SORE THROAT: 0
CHILLS: 0
EYE PAIN: 0
HEMATURIA: 0
MYALGIAS: 1
DIARRHEA: 1

## 2021-07-30 ASSESSMENT — ACTIVITIES OF DAILY LIVING (ADL): CURRENT_FUNCTION: NO ASSISTANCE NEEDED

## 2021-08-02 ENCOUNTER — OFFICE VISIT (OUTPATIENT)
Dept: FAMILY MEDICINE | Facility: CLINIC | Age: 71
End: 2021-08-02
Payer: COMMERCIAL

## 2021-08-02 VITALS
WEIGHT: 149.04 LBS | DIASTOLIC BLOOD PRESSURE: 84 MMHG | BODY MASS INDEX: 24.83 KG/M2 | HEART RATE: 68 BPM | OXYGEN SATURATION: 98 % | HEIGHT: 65 IN | TEMPERATURE: 98.1 F | SYSTOLIC BLOOD PRESSURE: 132 MMHG

## 2021-08-02 DIAGNOSIS — M25.559 LATERAL PAIN OF HIP: ICD-10-CM

## 2021-08-02 DIAGNOSIS — E03.9 HYPOTHYROIDISM, UNSPECIFIED TYPE: ICD-10-CM

## 2021-08-02 DIAGNOSIS — Z00.00 ENCOUNTER FOR MEDICARE ANNUAL WELLNESS EXAM: Primary | ICD-10-CM

## 2021-08-02 DIAGNOSIS — I10 ESSENTIAL HYPERTENSION: ICD-10-CM

## 2021-08-02 DIAGNOSIS — N83.202 LEFT OVARIAN CYST: ICD-10-CM

## 2021-08-02 DIAGNOSIS — Z79.01 CHRONIC ANTICOAGULATION: ICD-10-CM

## 2021-08-02 DIAGNOSIS — E78.2 MIXED HYPERLIPIDEMIA: ICD-10-CM

## 2021-08-02 DIAGNOSIS — I48.0 PAROXYSMAL ATRIAL FIBRILLATION (H): ICD-10-CM

## 2021-08-02 DIAGNOSIS — M25.562 LEFT KNEE PAIN, UNSPECIFIED CHRONICITY: ICD-10-CM

## 2021-08-02 DIAGNOSIS — D72.9 ABNORMAL WHITE BLOOD CELL (WBC) COUNT: ICD-10-CM

## 2021-08-02 DIAGNOSIS — N95.1 MENOPAUSAL SYNDROME (HOT FLASHES): ICD-10-CM

## 2021-08-02 DIAGNOSIS — L98.9 SKIN LESION: ICD-10-CM

## 2021-08-02 DIAGNOSIS — R80.9 MICROALBUMINURIA: ICD-10-CM

## 2021-08-02 DIAGNOSIS — R15.9 INCONTINENCE OF FECES, UNSPECIFIED FECAL INCONTINENCE TYPE: ICD-10-CM

## 2021-08-02 LAB
BASOPHILS # BLD AUTO: 0 10E3/UL (ref 0–0.2)
BASOPHILS NFR BLD AUTO: 1 %
EOSINOPHIL # BLD AUTO: 0.2 10E3/UL (ref 0–0.7)
EOSINOPHIL NFR BLD AUTO: 3 %
ERYTHROCYTE [DISTWIDTH] IN BLOOD BY AUTOMATED COUNT: 13.8 % (ref 10–15)
HCT VFR BLD AUTO: 39 % (ref 35–47)
HGB BLD-MCNC: 13.1 G/DL (ref 11.7–15.7)
IMM GRANULOCYTES # BLD: 0 10E3/UL
IMM GRANULOCYTES NFR BLD: 0 %
LYMPHOCYTES # BLD AUTO: 1.4 10E3/UL (ref 0.8–5.3)
LYMPHOCYTES NFR BLD AUTO: 29 %
MCH RBC QN AUTO: 31.7 PG (ref 26.5–33)
MCHC RBC AUTO-ENTMCNC: 33.6 G/DL (ref 31.5–36.5)
MCV RBC AUTO: 94 FL (ref 78–100)
MONOCYTES # BLD AUTO: 0.5 10E3/UL (ref 0–1.3)
MONOCYTES NFR BLD AUTO: 10 %
NEUTROPHILS # BLD AUTO: 2.8 10E3/UL (ref 1.6–8.3)
NEUTROPHILS NFR BLD AUTO: 57 %
PLATELET # BLD AUTO: 199 10E3/UL (ref 150–450)
RBC # BLD AUTO: 4.13 10E6/UL (ref 3.8–5.2)
WBC # BLD AUTO: 4.8 10E3/UL (ref 4–11)

## 2021-08-02 PROCEDURE — 84443 ASSAY THYROID STIM HORMONE: CPT | Performed by: FAMILY MEDICINE

## 2021-08-02 PROCEDURE — 36415 COLL VENOUS BLD VENIPUNCTURE: CPT | Performed by: FAMILY MEDICINE

## 2021-08-02 PROCEDURE — 85025 COMPLETE CBC W/AUTO DIFF WBC: CPT | Performed by: FAMILY MEDICINE

## 2021-08-02 PROCEDURE — 99397 PER PM REEVAL EST PAT 65+ YR: CPT | Performed by: FAMILY MEDICINE

## 2021-08-02 PROCEDURE — 99214 OFFICE O/P EST MOD 30 MIN: CPT | Mod: 25 | Performed by: FAMILY MEDICINE

## 2021-08-02 RX ORDER — LISINOPRIL 2.5 MG/1
2.5 TABLET ORAL DAILY
Qty: 90 TABLET | Refills: 1 | Status: SHIPPED | OUTPATIENT
Start: 2021-08-02 | End: 2021-08-12 | Stop reason: ALTCHOICE

## 2021-08-02 RX ORDER — ATORVASTATIN CALCIUM 20 MG/1
20 TABLET, FILM COATED ORAL DAILY
Qty: 90 TABLET | Refills: 1 | Status: SHIPPED | OUTPATIENT
Start: 2021-08-02 | End: 2022-02-04

## 2021-08-02 RX ORDER — METOPROLOL SUCCINATE 25 MG/1
TABLET, EXTENDED RELEASE ORAL
Qty: 90 TABLET | Refills: 1 | Status: SHIPPED | OUTPATIENT
Start: 2021-08-02 | End: 2021-08-12 | Stop reason: ALTCHOICE

## 2021-08-02 RX ORDER — CYCLOSPORINE 0.5 MG/ML
EMULSION OPHTHALMIC
COMMUNITY
Start: 2021-07-03 | End: 2022-09-07

## 2021-08-02 ASSESSMENT — ENCOUNTER SYMPTOMS
HEARTBURN: 0
FEVER: 0
SHORTNESS OF BREATH: 1
WEAKNESS: 0
BREAST MASS: 0
JOINT SWELLING: 0
EYE PAIN: 0
HEADACHES: 1
HEMATOCHEZIA: 1
SORE THROAT: 0
CHILLS: 0
ARTHRALGIAS: 1
CONSTIPATION: 0
PALPITATIONS: 0
DIARRHEA: 1
FREQUENCY: 1
PARESTHESIAS: 0
NAUSEA: 0
DIZZINESS: 0
COUGH: 1
ABDOMINAL PAIN: 0
HEMATURIA: 0
NERVOUS/ANXIOUS: 0
MYALGIAS: 1
DYSURIA: 0

## 2021-08-02 ASSESSMENT — ACTIVITIES OF DAILY LIVING (ADL): CURRENT_FUNCTION: NO ASSISTANCE NEEDED

## 2021-08-02 ASSESSMENT — MIFFLIN-ST. JEOR: SCORE: 1186.92

## 2021-08-02 NOTE — RESULT ENCOUNTER NOTE
Excellent! Please call or sent a [employer] message if you have any questions. Lauryn Allison M.D.

## 2021-08-02 NOTE — PROGRESS NOTES
"SUBJECTIVE:   Nicole Stanley is a 71 year old female who presents for Preventive Visit.     Patient has been advised of split billing requirements and indicates understanding: Yes   Are you in the first 12 months of your Medicare coverage?  No    Healthy Habits:     In general, how would you rate your overall health?  Good    Frequency of exercise:  4-5 days/week    Duration of exercise:  45-60 minutes    Do you usually eat at least 4 servings of fruit and vegetables a day, include whole grains    & fiber and avoid regularly eating high fat or \"junk\" foods?  Yes    Taking medications regularly:  Yes    Medication side effects:  Other    Ability to successfully perform activities of daily living:  No assistance needed    Home Safety:  No safety concerns identified    Hearing Impairment:  Difficulty following a conversation in a noisy restaurant or crowded room, feel that people are mumbling or not speaking clearly, difficulty following dialogue in the theater, difficult to understand a speaker at a public meeting or [employer] service, need to ask people to speak up or repeat themselves, find that men's voices are easier to understand than woman's and difficulty understanding soft or whispered speech    In the past 6 months, have you been bothered by leaking of urine? Yes    In general, how would you rate your overall mental or emotional health?  Good      PHQ-2 Total Score: 0    Additional concerns today:  No      Has a spot on her lower left leg and upper leg and right leg that came up a couple of weeks ago. Not itchy. They are sore. Bleed if she shaves her legs. Was using bacitracin daily after they started,but they did not change.     Made an appt with Dr. Leonardo for a fib.     Care plan says her thyroid level is due.     Mammogram due in august.     Had sciatica, then it shifted and felt like from her left hip and now feels like a band from her knee up to her lateral hip without groin pain.     She has history of " fecal incontinence with rectal sphincter dysfunction and repair years ago. Recently, she is again having fecal incontinence. Will be out for a walk and suddenly feels abdominal cramping and sense of urgency and then will have significant leakage of soft stool. Very distressing. Always goes places close to bathrooms, but does not always make it. Wears diaper, but it does not always contain it. Notes that she typically has a BM in the mornings and that is soft. Does feel like after she has eliminated the second time in the day she feels very good. Denies constipation. Does have an internal hemorrhoid with intermittent bleeding without increase in frequency or amt of bleeding. Had colonoscopy last year.     Do you feel safe in your environment? Yes    Have you ever done Advance Care Planning? (For example, a Health Directive, POLST, or a discussion with a medical provider or your loved ones about your wishes): Yes, advance care planning is on file.    Fall risk  Fallen 2 or more times in the past year?: No  Any fall with injury in the past year?: No    Cognitive Screening   1) Repeat 3 items (Leader, Season, Table)    2) Clock draw: NORMAL  3) 3 item recall: Recalls 3 objects  Results: 3 items recalled: COGNITIVE IMPAIRMENT LESS LIKELY    Mini-CogTM Copyright S Tr. Licensed by the author for use in [facility]; reprinted with permission (london@.[facility]). All rights reserved.      Do you have sleep apnea, excessive snoring or daytime drowsiness?: no    Reviewed and updated as needed this visit by clinical staff  Tobacco  Allergies  Meds              Reviewed and updated as needed this visit by Provider                Social History     Tobacco Use     Smoking status: Never Smoker     Smokeless tobacco: Never Used   Substance Use Topics     Alcohol use: Yes     Alcohol/week: 0.0 standard drinks     Comment: 1-2 glass of wine per day     If you drink alcohol do you typically have >3 drinks per day or >7  drinks per week? Yes       AUDIT - Alcohol Use Disorders Identification Test - Reproduced from the World Health Organization Audit 2001 (Second Edition) 7/30/2021   1.  How often do you have a drink containing alcohol? 4 or more times a week   2.  How many drinks containing alcohol do you have on a typical day when you are drinking? 1 or 2   3.  How often do you have five or more drinks on one occasion? Never   4.  How often during the last year have you found that you were not able to stop drinking once you had started? Never   5.  How often during the last year have you failed to do what was normally expected of you because of drinking? Never   6.  How often during the last year have you needed a first drink in the morning to get yourself going after a heavy drinking session? Never   7.  How often during the last year have you had a feeling of guilt or remorse after drinking? Never   8.  How often during the last year have you been unable to remember what happened the night before because of your drinking? Never   9.  Have you or someone else been injured because of your drinking? No   10. Has a relative, friend, doctor or other health care worker been concerned about your drinking or suggested you cut down? No   TOTAL SCORE 4       Current providers sharing in care for this patient include:    Patient Care Team:  Lauryn Allison MD as PCP - General (Family Practice)  Jacinda Saleem MD as MD (Otolaryngology)  Maile Leonardo MD as MD (Cardiology)  Sheba Song MD as MD (Urology)  Lauryn Allison MD as Assigned PCP  Sheba Song MD as Assigned Surgical Provider  Giovanna Dominguez MD as Assigned OBGYN Provider    The following health maintenance items are reviewed in Epic and correct as of today:  Health Maintenance Due   Topic Date Due     ANNUAL REVIEW OF HM ORDERS  Never done     TSH W/FREE T4 REFLEX  06/25/2021     FALL RISK ASSESSMENT  07/28/2021            Review of Systems  "  Constitutional: Negative for chills and fever.   HENT: Positive for hearing loss. Negative for congestion, ear pain and sore throat.    Eyes: Negative for pain and visual disturbance.   Respiratory: Positive for cough and shortness of breath.    Cardiovascular: Negative for chest pain, palpitations and peripheral edema.   Gastrointestinal: Positive for diarrhea and hematochezia. Negative for abdominal pain, constipation, heartburn and nausea.   Breasts:  Negative for tenderness, breast mass and discharge.   Genitourinary: Positive for frequency and urgency. Negative for dysuria, genital sores, hematuria, pelvic pain, vaginal bleeding and vaginal discharge.   Musculoskeletal: Positive for arthralgias and myalgias. Negative for joint swelling.   Skin: Negative for rash.   Neurological: Positive for headaches. Negative for dizziness, weakness and paresthesias.   Psychiatric/Behavioral: Negative for mood changes. The patient is not nervous/anxious.           OBJECTIVE:   /84 (BP Location: Right arm, Patient Position: Sitting, Cuff Size: Adult Regular)   Pulse 68   Temp 98.1  F (36.7  C) (Tympanic)   Ht 1.651 m (5' 5\")   Wt 67.6 kg (149 lb 0.6 oz)   LMP  (LMP Unknown)   SpO2 98%   BMI 24.80 kg/m   Estimated body mass index is 24.8 kg/m  as calculated from the following:    Height as of this encounter: 1.651 m (5' 5\").    Weight as of this encounter: 67.6 kg (149 lb 0.6 oz).  Physical Exam  GENERAL APPEARANCE: healthy, alert and no distress  EYES: Eyes grossly normal to inspection, PERRL and conjunctivae and sclerae normal  HENT: ear canals and TM's normal, nose and mouth without ulcers or lesions, oropharynx clear and oral mucous membranes moist  NECK: no adenopathy, no asymmetry, masses, or scars and thyroid normal to palpation  RESP: lungs clear to auscultation - no rales, rhonchi or wheezes  CV: regular rate and rhythm, normal S1 S2, no S3 or S4, no murmur, click or rub, no peripheral edema and " peripheral pulses strong  ABDOMEN: soft, nontender, no hepatosplenomegaly, no masses and bowel sounds normal  MS: no musculoskeletal defects are noted and gait is age appropriate without ataxia  SKIN: no suspicious lesions or rashes  NEURO: Normal strength and tone, sensory exam grossly normal, mentation intact and speech normal  PSYCH: mentation appears normal and affect normal/bright    Diagnostic Test Results:  Labs reviewed in Epic    ASSESSMENT / PLAN:       ICD-10-CM    1. Encounter for Medicare annual wellness exam  Z00.00    2. Hypothyroidism, unspecified type  E03.9 TSH WITH FREE T4 REFLEX   3. Mixed hyperlipidemia  E78.2 atorvastatin (LIPITOR) 20 MG tablet   4. Essential hypertension  I10 lisinopril (ZESTRIL) 2.5 MG tablet     metoprolol succinate ER (TOPROL-XL) 25 MG 24 hr tablet   5. Microalbuminuria  R80.9 lisinopril (ZESTRIL) 2.5 MG tablet   6. Paroxysmal atrial fibrillation (H)  I48.0    7. Chronic anticoagulation  Z79.01    8. Left ovarian cyst  N83.202    9. Menopausal syndrome (hot flashes)  N95.1       Wellness visit  She will schedule her mammogram this month  Colonoscopy was completed 11/2020 and repeat is due in 2025  She has received both doses of the Pfizer COVID-19 vaccine.       Other hyperlipidemia  Continue atorvastatin 20 mg daily. Tolerating it well          Paroxysmal atrial fibrillation (H)  Followed by cardiology, cardioverted on 4/19 in the ER. Continues eliquis  5 mg twice daily. She is scheduled with Dr. Leonardo for follow up          Acquired hypothyroidism   stable.  Continues levothyroxine 50 mcg daily  Due for TSH today      Essential hypertension  controlled.  Continues metoprolol 25 mg daily.  continue lisinopril 2.5mg daily       Hot flashes    Ongoing for years. Tried effexor without benefit in the past. Used clonazepam to treat restless legs in the past and found she was able to sleep better despite hot flashes. Used HRT for about 5 years, then stopped due to concern about  "the risks. tried gabapentin without relief      Left ovarian cyst  Due for repeat ultrasound. Monitored yearly. Followed by Dr. Dominguez.    Mixed incontinence, uterovaginal prolapse  Visited with Dr. Song last in 10/2020. Pessary did not work well and she has not been very bothered by her symptoms, so she elected to just monitor and return as needed.     Fecal incontinence with history of rectal sphincter repair  Discussed trial of immodium before she goes on walks   Fiber recommended  Referral given for colorectal surgery associates for further eval and recommendations.      Patient has been advised of split billing requirements and indicates understanding: Yes     COUNSELING:  Reviewed preventive health counseling, as reflected in patient instructions    Estimated body mass index is 24.8 kg/m  as calculated from the following:    Height as of this encounter: 1.651 m (5' 5\").    Weight as of this encounter: 67.6 kg (149 lb 0.6 oz).        She reports that she has never smoked. She has never used smokeless tobacco.      Appropriate preventive services were discussed with this patient, including applicable screening as appropriate for cardiovascular disease, diabetes, osteopenia/osteoporosis, and glaucoma.  As appropriate for age/gender, discussed screening for colorectal cancer, prostate cancer, breast cancer, and cervical cancer. Checklist reviewing preventive services available has been given to the patient.    Reviewed patients plan of care and provided an AVS. The Intermediate Care Plan ( asthma action plan, low back pain action plan, and migraine action plan) for Nicole meets the Care Plan requirement. This Care Plan has been established and reviewed with the Patient.    Counseling Resources:  ATP IV Guidelines  Pooled Cohorts Equation Calculator  Breast Cancer Risk Calculator  Breast Cancer: Medication to Reduce Risk  FRAX Risk Assessment  ICSI Preventive Guidelines  Dietary Guidelines for Americans, " 2010  USDA's MyPlate  ASA Prophylaxis  Lung CA Screening    Lauryn Allison MD, MD  [facility]    Identified Health Risks:

## 2021-08-03 LAB — TSH SERPL DL<=0.005 MIU/L-ACNC: 1.8 MU/L (ref 0.4–4)

## 2021-08-03 NOTE — RESULT ENCOUNTER NOTE
Excellent! Please call or sent a [employer] message if you have any questions. Lauryn Allison M.D.

## 2021-08-08 ASSESSMENT — ENCOUNTER SYMPTOMS
SKIN CHANGES: 0
BOWEL INCONTINENCE: 1
STIFFNESS: 0
SLEEP DISTURBANCES DUE TO BREATHING: 0
POOR WOUND HEALING: 1
BLOOD IN STOOL: 1
ABDOMINAL PAIN: 0
MYALGIAS: 1
HYPERTENSION: 0
LEG PAIN: 1
RECTAL PAIN: 0
MUSCLE CRAMPS: 1
BLOATING: 0
JOINT SWELLING: 0
ARTHRALGIAS: 1
NAIL CHANGES: 0
HEARTBURN: 0
JAUNDICE: 0
EXERCISE INTOLERANCE: 0
BACK PAIN: 0
LIGHT-HEADEDNESS: 0
NAUSEA: 0
MUSCLE WEAKNESS: 0
SYNCOPE: 0
PALPITATIONS: 1
DIARRHEA: 1
CONSTIPATION: 0
VOMITING: 0
ORTHOPNEA: 0
HYPOTENSION: 0
NECK PAIN: 1

## 2021-08-11 NOTE — PROGRESS NOTES
I am delighted to see Nicole Stanley for follow up of paroxysmal atrial fibrillation.     As you know, the patient is a 71 year old  female whom I last saw 4/25/2019.     Afib was initially noted when she presented with URI symptoms and influenza B in 4/2018. She was not symptomatic at that time. [id number]-[id number] score is 3 for hypertension, age > 65, female. Apixaban started. She spontaneously converted to sinus. Recurrent episodes 1/2019 (spontaneous conversion), 4/2019 (cardioverted in ED).    When I saw her 4/25/2019, she was very anxious and had reported left arm pain. I did not think this was cardiac but we did proceed with a stress echo which was normal.     She had no recurrence until April this year. Between April - July, she's had 5 episodes each lasting 5-7 hours, allyson says HRs 120-130s. Symptoms are palpitations along with extreme fatigue. Last episode July 30.    In spring 2021 she was noted to have microalbuminemia due to high BP (-150 consistently) and was started on lisinopril 2.5 every day. Home BP are better 120-130 mmHg.    Past Medical History:  1. Atrial fibrillation. Initial diagnosis 4/2018 in the setting of Influenza B. Spontaneously converted to sinus rhythm. Recurrent AF 4/2019, cardioverted in ED. Recurrent paroxysmal episodes April - July 2021.  2. Hypertension.  3. Hyperlipidemia. She had previously been on simvastatin but had some muscle aches. She is reluctant to add more medications.  4. Hypothyroidism.  5. Cholecystectomy  6. Vein stripping 1988    Allergies:  No Known Allergies    Medications:   Apixaban 5 mg bid  Metoprolol XL 25 mg qd  Synthroid 50 mcg every day  Atorvastatin 20 every day  Lisinopril 2.5 qd    Physical examination  Vitals: 128/84, HR 72  BMI= 24.92 (68 kg)    Constitutional: In general, the patient is a pleasant female in no acute distress.    Targeted cardiovascular exam:  Regular rate and rhythm. Normal S1, S2. No murmur, rub, click, or gallop.    Extremities:Pulses are normal bilaterally throughout. No peripheral edema.  Respiratory: Clear to asculation.        I have personally and independently reviewed the following:  Labs:  2/10/2021: K 4.1, cr 0.85  2021: hgb 13, plt 199K, TSH 1.8    Echo 18 (in AF): normal LV/RV, no valvular abnormalities    Bicycle stress echo 2019 (in sinus): exercised for 6:30 min, 90% MPHR achieved, MVO2 113% predicted, normal BP response, no ischemia    EK19:  bpm  2021: sinus  Today 2021: sinus 68 bpm, normal intervals    Assessment :  1. Recurrent paroxysmal atrial fibrillation, symptomatic. Discussed therapeutic options with her, including increase beta blockers and/or add antiarrhythmic drugs. Since she's had no episodes in a few weeks, we have together decided on proceeding with beta blocker increase first, then add AAD if needed. For simplification of regimen, I propose we substitute carvedilol for metoprolol/lisinopril. HMY2EY2-KKVy is 3 for age 71, HTN, female. She is on appropriate dose of apixaban.  2. Hypertension. Propose switching to carvedilol.    Plan:  1. Stop metoprolol and lisinopril  2. Begin carvedilol 6.25 bid  3. She will monitor home BP and symptoms  4. If BP continues to be high and/or palpitations will plan to increase carvedilol dose  5. If recurrent palpitations will add flecainide, with planned MCOT and daily transmissions for monitoring        I spent a total of 20 minutes face to face with  Nicole Stanley during today's office visit. I have spent an additional 15 minutes today on chart review and documentation.        The patient is to return in 1 month with video visit.  The patient understood the treatment plan as outlined above.  There were no barriers to learning.      Maile Leonardo MD

## 2021-08-12 ENCOUNTER — OFFICE VISIT (OUTPATIENT)
Dept: CARDIOLOGY | Facility: CLINIC | Age: 71
End: 2021-08-12
Attending: INTERNAL MEDICINE
Payer: COMMERCIAL

## 2021-08-12 VITALS
OXYGEN SATURATION: 96 % | SYSTOLIC BLOOD PRESSURE: 128 MMHG | DIASTOLIC BLOOD PRESSURE: 84 MMHG | HEART RATE: 72 BPM | BODY MASS INDEX: 25.16 KG/M2 | HEIGHT: 65 IN | WEIGHT: 151 LBS

## 2021-08-12 DIAGNOSIS — I48.0 PAROXYSMAL ATRIAL FIBRILLATION (H): Primary | ICD-10-CM

## 2021-08-12 DIAGNOSIS — I10 ESSENTIAL HYPERTENSION: ICD-10-CM

## 2021-08-12 PROCEDURE — G0463 HOSPITAL OUTPT CLINIC VISIT: HCPCS | Mod: 25

## 2021-08-12 PROCEDURE — 99214 OFFICE O/P EST MOD 30 MIN: CPT | Performed by: INTERNAL MEDICINE

## 2021-08-12 PROCEDURE — 93005 ELECTROCARDIOGRAM TRACING: CPT

## 2021-08-12 RX ORDER — CARVEDILOL 6.25 MG/1
6.25 TABLET ORAL 2 TIMES DAILY WITH MEALS
Qty: 60 TABLET | Refills: 3 | Status: SHIPPED | OUTPATIENT
Start: 2021-08-12 | End: 2021-09-09

## 2021-08-12 ASSESSMENT — MIFFLIN-ST. JEOR: SCORE: 1200.19

## 2021-08-12 ASSESSMENT — PAIN SCALES - GENERAL: PAINLEVEL: NO PAIN (0)

## 2021-08-12 NOTE — NURSING NOTE
Start Coreg.     Virtual follow up in 1 month.     Anibal Flores, RN   Cardiology Nurse Coordinator

## 2021-08-12 NOTE — LETTER
8/12/2021      RE: Nicole Stanley  [address]  [place] [address]       Dear Colleague,    Thank you for the opportunity to participate in the care of your patient, Nicole Stanley, at the [facility] [place] [facility]. Please see a copy of my visit note below.    I am delighted to see Nicole Stanley for follow up of paroxysmal atrial fibrillation.     As you know, the patient is a 71 year old  female whom I last saw 4/25/2019.     Afib was initially noted when she presented with URI symptoms and influenza B in 4/2018. She was not symptomatic at that time. [id number]-TGEg score is 3 for hypertension, age > 65, female. Apixaban started. She spontaneously converted to sinus. Recurrent episodes 1/2019 (spontaneous conversion), 4/2019 (cardioverted in ED).    When I saw her 4/25/2019, she was very anxious and had reported left arm pain. I did not think this was cardiac but we did proceed with a stress echo which was normal.     She had no recurrence until April this year. Between April - July, she's had 5 episodes each lasting 5-7 hours, applewatch says HRs 120-130s. Symptoms are palpitations along with extreme fatigue. Last episode July 30.    In spring 2021 she was noted to have microalbuminemia due to high BP (-150 consistently) and was started on lisinopril 2.5 every day. Home BP are better 120-130 mmHg.    Past Medical History:  1. Atrial fibrillation. Initial diagnosis 4/2018 in the setting of Influenza B. Spontaneously converted to sinus rhythm. Recurrent AF 4/2019, cardioverted in ED. Recurrent paroxysmal episodes April - July 2021.  2. Hypertension.  3. Hyperlipidemia. She had previously been on simvastatin but had some muscle aches. She is reluctant to add more medications.  4. Hypothyroidism.  5. Cholecystectomy  6. Vein stripping 1988    Allergies:  No Known Allergies    Medications:   Apixaban 5 mg bid  Metoprolol XL 25 mg  qd  Synthroid 50 mcg every day  Atorvastatin 20 every day  Lisinopril 2.5 qd    Physical examination  Vitals: 128/84, HR 72  BMI= 24.92 (68 kg)    Constitutional: In general, the patient is a pleasant female in no acute distress.    Targeted cardiovascular exam:  Regular rate and rhythm. Normal S1, S2. No murmur, rub, click, or gallop.   Extremities:Pulses are normal bilaterally throughout. No peripheral edema.  Respiratory: Clear to asculation.        I have personally and independently reviewed the following:  Labs:  2/10/2021: K 4.1, cr 0.85  2021: hgb 13, plt 199K, TSH 1.8    Echo 18 (in AF): normal LV/RV, no valvular abnormalities    Bicycle stress echo 2019 (in sinus): exercised for 6:30 min, 90% MPHR achieved, MVO2 113% predicted, normal BP response, no ischemia    EK19:  bpm  2021: sinus  Today 2021: sinus 68 bpm, normal intervals    Assessment :  1. Recurrent paroxysmal atrial fibrillation, symptomatic. Discussed therapeutic options with her, including increase beta blockers and/or add antiarrhythmic drugs. Since she's had no episodes in a few weeks, we have together decided on proceeding with beta blocker increase first, then add AAD if needed. For simplification of regimen, I propose we substitute carvedilol for metoprolol/lisinopril. MJO1ZO6-RZXj is 3 for age 71, HTN, female. She is on appropriate dose of apixaban.  2. Hypertension. Propose switching to carvedilol.    Plan:  1. Stop metoprolol and lisinopril  2. Begin carvedilol 6.25 bid  3. She will monitor home BP and symptoms  4. If BP continues to be high and/or palpitations will plan to increase carvedilol dose  5. If recurrent palpitations will add flecainide, with planned MCOT and daily transmissions for monitoring        I spent a total of 20 minutes face to face with  Nicole Stanley during today's office visit. I have spent an additional 15 minutes today on chart review and documentation.        The patient  is to return in 1 month with video visit.  The patient understood the treatment plan as outlined above.  There were no barriers to learning.      Maile Leonardo MD

## 2021-08-12 NOTE — NURSING NOTE
Chief Complaint   Patient presents with     Follow Up     Return d/t recent Afib     Vitals were taken, medications reconciled, and EKG performed.    Laci Pacheco  1:38 PM

## 2021-08-12 NOTE — PATIENT INSTRUCTIONS
"You were seen today in the Cardiovascular Clinic at the [facility].     Cardiology Providers you saw during your visit: Dr. Maile Leonardo    Diagnosis:  Atrial fibrillation, high blood pressure    Results: discussed with patient    Orders:   None    Medication Changes:   Stop metoprolol and lisinopril  Start carvedilol 6.25 mg twice a day    Recommendations:   None    Follow-up:   1 month virtual ok    Please follow up with primary care provider for medication refills         Please feel free to call me with any questions or concerns.       Johanna Burden RN     Questions and [phone].   First press #1 for the [employer] and then press #3 for \"Medical Questions\" to reach us Cardiology Nurses.      On Call Cardiologist for after hours or on weekends: [phone]   option #4 and ask to speak to the on-call Cardiologist.          If you need a medication refill please contact your pharmacy.  Please allow 3 business days for your refill to be completed.    "

## 2021-08-13 LAB
ATRIAL RATE - MUSE: 68 BPM
DIASTOLIC BLOOD PRESSURE - MUSE: NORMAL MMHG
INTERPRETATION ECG - MUSE: NORMAL
P AXIS - MUSE: -2 DEGREES
PR INTERVAL - MUSE: 140 MS
QRS DURATION - MUSE: 84 MS
QT - MUSE: 420 MS
QTC - MUSE: 446 MS
R AXIS - MUSE: -21 DEGREES
SYSTOLIC BLOOD PRESSURE - MUSE: NORMAL MMHG
T AXIS - MUSE: -6 DEGREES
VENTRICULAR RATE- MUSE: 68 BPM

## 2021-08-31 ENCOUNTER — THERAPY VISIT (OUTPATIENT)
Dept: PHYSICAL THERAPY | Facility: CLINIC | Age: 71
End: 2021-08-31
Attending: FAMILY MEDICINE
Payer: COMMERCIAL

## 2021-08-31 DIAGNOSIS — M25.559 LATERAL PAIN OF HIP: Primary | ICD-10-CM

## 2021-08-31 DIAGNOSIS — M25.562 LEFT KNEE PAIN, UNSPECIFIED CHRONICITY: ICD-10-CM

## 2021-08-31 PROCEDURE — 97161 PT EVAL LOW COMPLEX 20 MIN: CPT | Mod: GP | Performed by: PHYSICAL THERAPIST

## 2021-08-31 PROCEDURE — 97110 THERAPEUTIC EXERCISES: CPT | Mod: GP | Performed by: PHYSICAL THERAPIST

## 2021-08-31 ASSESSMENT — ACTIVITIES OF DAILY LIVING (ADL)
PAIN: THE SYMPTOM AFFECTS MY ACTIVITY SLIGHTLY
LIGHT_TO_MODERATE_WORK: SLIGHT DIFFICULTY
SIT WITH YOUR KNEE BENT: ACTIVITY IS MINIMALLY DIFFICULT
STIFFNESS: I HAVE THE SYMPTOM BUT IT DOES NOT AFFECT MY ACTIVITY
HOW_WOULD_YOU_RATE_YOUR_CURRENT_LEVEL_OF_FUNCTION_DURING_YOUR_USUAL_ACTIVITIES_OF_DAILY_LIVING_FROM_0_TO_100_WITH_100_BEING_YOUR_LEVEL_OF_FUNCTION_PRIOR_TO_YOUR_HIP_PROBLEM_AND_0_BEING_THE_INABILITY_TO_PERFORM_ANY_OF_YOUR_USUAL_DAILY_ACTIVITIES?: 90
KNEE_ACTIVITY_OF_DAILY_LIVING_SUM: 55
GO UP STAIRS: ACTIVITY IS SOMEWHAT DIFFICULT
SITTING_FOR_15_MINUTES: SLIGHT DIFFICULTY
STEPPING_UP_AND_DOWN_CURBS: SLIGHT DIFFICULTY
WALKING_APPROXIMATELY_10_MINUTES: NO DIFFICULTY AT ALL
WALKING_UP_STEEP_HILLS: SLIGHT DIFFICULTY
AS_A_RESULT_OF_YOUR_KNEE_INJURY,_HOW_WOULD_YOU_RATE_YOUR_CURRENT_LEVEL_OF_DAILY_ACTIVITY?: ABNORMAL
HOS_ADL_COUNT: 16
LIMPING: THE SYMPTOM AFFECTS MY ACTIVITY SLIGHTLY
WALK: ACTIVITY IS MINIMALLY DIFFICULT
HOW_WOULD_YOU_RATE_THE_OVERALL_FUNCTION_OF_YOUR_KNEE_DURING_YOUR_USUAL_DAILY_ACTIVITIES?: NEARLY NORMAL
DEEP_SQUATTING: SLIGHT DIFFICULTY
RECREATIONAL_ACTIVITIES: SLIGHT DIFFICULTY
RAW_SCORE: 55
SQUAT: ACTIVITY IS MINIMALLY DIFFICULT
GIVING WAY, BUCKLING OR SHIFTING OF KNEE: I DO NOT HAVE THE SYMPTOM
GOING_UP_1_FLIGHT_OF_STAIRS: SLIGHT DIFFICULTY
WEAKNESS: THE SYMPTOM AFFECTS MY ACTIVITY SLIGHTLY
STANDING_FOR_15_MINUTES: SLIGHT DIFFICULTY
KNEEL ON THE FRONT OF YOUR KNEE: ACTIVITY IS SOMEWHAT DIFFICULT
WALKING_INITIALLY: SLIGHT DIFFICULTY
WALKING_15_MINUTES_OR_GREATER: SLIGHT DIFFICULTY
STAND: ACTIVITY IS NOT DIFFICULT
PUTTING_ON_SOCKS_AND_SHOES: SLIGHT DIFFICULTY
HEAVY_WORK: SLIGHT DIFFICULTY
WALKING_DOWN_STEEP_HILLS: NO DIFFICULTY AT ALL
GO DOWN STAIRS: ACTIVITY IS NOT DIFFICULT
KNEE_ACTIVITY_OF_DAILY_LIVING_SCORE: 78.57
HOW_WOULD_YOU_RATE_THE_CURRENT_FUNCTION_OF_YOUR_KNEE_DURING_YOUR_USUAL_DAILY_ACTIVITIES_ON_A_SCALE_FROM_0_TO_100_WITH_100_BEING_YOUR_LEVEL_OF_KNEE_FUNCTION_PRIOR_TO_YOUR_INJURY_AND_0_BEING_THE_INABILITY_TO_PERFORM_ANY_OF_YOUR_USUAL_DAILY_ACTIVITIES?: 90
GETTING_INTO_AND_OUT_OF_AN_AVERAGE_CAR: SLIGHT DIFFICULTY
HOS_ADL_HIGHEST_POTENTIAL_SCORE: 64
GOING_DOWN_1_FLIGHT_OF_STAIRS: NO DIFFICULTY AT ALL
TWISTING/PIVOTING_ON_INVOLVED_LEG: SLIGHT DIFFICULTY
SWELLING: I DO NOT HAVE THE SYMPTOM
HOS_ADL_SCORE(%): 81.25
ROLLING_OVER_IN_BED: NO DIFFICULTY AT ALL
HOS_ADL_ITEM_SCORE_TOTAL: 52
RISE FROM A CHAIR: ACTIVITY IS MINIMALLY DIFFICULT

## 2021-08-31 NOTE — PROGRESS NOTES
Physical Therapy Initial Evaluation  Subjective:  The history is provided by the patient. History limited by: N.A.   Patient Health History  Nicole Stanley being seen for left knee & hip pain.     Problem began: 7/1/2021.   Problem occurred: unknown   Pain is reported as 1/10 on pain scale.  General health as reported by patient is good.  Pertinent medical history includes: heart problems, high blood pressure, osteoarthritis and thyroid problems.   Red flags:  None as reported by patient.     Surgeries include:  Other. Other surgery history details: gall bladder, sphincter repair, ptosis repair, turbinectomy.    Current medications:  Cardiac medication, high blood pressure medication and thyroid medication.    Current occupation is retired.   Primary job tasks include:  Lifting/carrying and pushing/pulling.                  Therapist Generated HPI Evaluation  Problem details: Patient presents with reported minimal pain in her left lower extremity, but that exacerbations of this pain extend from her left hip down to her knee. Patient reports pain is now primarily in a circular band pattern around her left knee joint when present. Overall pain has improved considerably over the past week and patient was unsure if she should even come into PT. .         Type of problem:  Lumbar.    This is a new condition.  Condition occurred with:  Insidious onset.  Where condition occurred: for unknown reasons.  Site of Pain: L knee currently (previously L gluteals and lateral thigh)  Pain is described as aching and is intermittent.  Pain radiates to:  Knee left (Reports band of pain wrapped around her L knee.). Pain timing: Worse after prolonged activity in standing.  Since onset symptoms are rapidly improving (Patient reports pain fluctuates intermittently based on her activity level.).  Associated with: N.A. Symptoms are exacerbated by standing, walking and sitting (Symptoms exacerbated by sitting in car for prolonged period of  time, as well as with climbing up stairs.)  and relieved by rest.  Imaging testing: N.A.  Past treatment: N.A. Improved with treatment: N.A.  Work activity restrictions: Patient is retired.  Barriers include:  None as reported by patient.                        Objective:  Standing Alignment:        Lumbar:  Normal      Knee deviations alignment: Patient demonstrates limitations with hip external rotation with her left lower extremity.            Neurological: She is alert and oriented to person, place, and time.       Ankle/Foot Evaluation  ROM:  AROM is normal.PROM is not assessed.      Strength is not assessed.                 Lumbar/SI Evaluation  ROM:  AROM Lumbar: normal (Painfree Lumbar AROM in all directions)      Lumbar Myotomes:  normal                                                              Hip Evaluation    Hip Strength:    Flexion:   Left: 4+/5   Pain:  Right: 4+/5   Pain:                    Extension:  Left: 4+/5  Pain:Right: 4+/5    Pain:    Abduction:  Left: 4+/5     Pain:Right: 4+/5    Pain:  Adduction:  Left: 4+/5    Pain:Right: 4+/5   Pain:  Internal Rotation:  Left: 4+/5    Pain:Right: 4+/5   Pain:  External Rotation:  Left: 4+/5   Pain:  Right: 4+/5   Pain:            Hip Special Testing:    Left hip positive for the following special tests:  Jordan             Knee Evaluation:  ROM:  AROM: normal                                  General Evaluation:          Lower Extremity Flexibility:    Hip External Rotators:  Decreased flexibility                                                                             ROS    Assessment/Plan:    Patient is a 71 year old female with left side hip complaints.    Patient has the following significant findings with corresponding treatment plan.                Diagnosis 1:  Left hip/knee pain  Pain -  self management, education and home program  Decreased ROM/flexibility - therapeutic exercise and home program  Decreased strength - therapeutic exercise and  therapeutic activities    Therapy Evaluation Codes:   1) History comprised of:   Personal factors that impact the plan of care:      Age.    Comorbidity factors that impact the plan of care are:      Osteoarthritis.     Medications impacting care: None.  2) Examination of Body Systems comprised of:   Body structures and functions that impact the plan of care:      Hip, Knee and Lumbar spine.   Activity limitations that impact the plan of care are:      Sitting, Stairs and Walking.  3) Clinical presentation characteristics are:   Stable/Uncomplicated.  4) Decision-Making    Low complexity using standardized patient assessment instrument and/or measureable assessment of functional outcome.  Cumulative Therapy Evaluation is: Low complexity.    Previous and current functional limitations:  (See Goal Flow Sheet for this information)    Short term and Long term goals: (See Goal Flow Sheet for this information)     Communication ability:  Patient appears to be able to clearly communicate and understand verbal and written communication and follow directions correctly.  Treatment Explanation - The following has been discussed with the patient:   RX ordered/plan of care  Anticipated outcomes  Possible risks and side effects  This patient would benefit from PT intervention to resume normal activities.   Rehab potential is good.    Frequency:  1 every 2 weeks, once daily  Duration:  for 2 weeks  Discharge Plan:  Achieve all LTG.  Independent in home treatment program.  Reach maximal therapeutic benefit.    Please refer to the daily flowsheet for treatment today, total treatment time and time spent performing 1:1 timed codes.

## 2021-09-04 DIAGNOSIS — I48.0 PAROXYSMAL ATRIAL FIBRILLATION (H): ICD-10-CM

## 2021-09-06 DIAGNOSIS — I48.0 PAROXYSMAL ATRIAL FIBRILLATION (H): ICD-10-CM

## 2021-09-07 RX ORDER — APIXABAN 5 MG/1
TABLET, FILM COATED ORAL
Qty: 180 TABLET | Refills: 0 | Status: SHIPPED | OUTPATIENT
Start: 2021-09-07 | End: 2021-10-19

## 2021-09-07 NOTE — TELEPHONE ENCOUNTER
Direct Oral Anticoagulant Agents Passed09/04/2021 03:43 PM   Normal Platelets on file in past 12 months Protocol Details    Medication is active on med list     Patient is 18-79 years of age     Serum creatinine less than or equal to 1.4 on file in past 12 mos     Weight is greater than 60 kg for the past year     No active pregnancy on record     No positive pregnancy test within past 12 months     Recent (6 mo) or future (30 days) visit within the authorizing provider's specialty

## 2021-09-08 RX ORDER — CARVEDILOL 6.25 MG/1
TABLET ORAL
Qty: 180 TABLET | Refills: 1 | OUTPATIENT
Start: 2021-09-08

## 2021-09-14 ENCOUNTER — MYC MEDICAL ADVICE (OUTPATIENT)
Dept: FAMILY MEDICINE | Facility: CLINIC | Age: 71
End: 2021-09-14

## 2021-09-16 NOTE — TELEPHONE ENCOUNTER
Immunization record updated.    Separate encounter created for family member.    Writer responded via [employer].    CATINA Gutierrez, RN  [facility]

## 2021-09-20 NOTE — PROGRESS NOTES
"Electrophysiology Clinic Video Virtual Visit    Nicole Stanley is a 71 year old female who is being evaluated via a billable video visit.      The patient has been notified of following:     \"This video visit will be conducted via a call between you and your physician/provider. We have found that certain health care needs can be provided without the need for an in-person physical exam.  This service lets us provide the care you need with a video conversation.  If a prescription is necessary we can send it directly to your pharmacy.  If lab work is needed we can place an order for that and you can then stop by our lab to have the test done at a later time.    If during the course of the call the physician/provider feels a video visit is not appropriate, you will not be charged for this service.\"     Physician has received verbal consent for a Video Visit from the patient? Yes    Patient would like the video invitation sent by: [employer]    Video Start Time: 6:05 pm    Video Stop Time: 6:20 pm    Mode of Communication:  Video Conference via [employer]    Originating Location (pt. Location): Home    Distant Location (provider location): [facility]    Mode of Communication:  Video Conference via [employer]      HPI:   70 yo for follow up of PAF and HTN. I last saw her 8/12/2021.  She has PAF initially diagnosed 4/2018 in setting of influenza B, not symptomatic at that time. [id number] score is 3 for hypertension, age > 65, female. Apixaban started. Recurrent episodes 1/2019 (spontaneous conversion), 4/2019 (cardioverted in ED). Increasing frequency of AF starting April 2021, between April and July had 5 episodes, 5-7 hours, now with palpitation/fatigue, Apple Watch says AF -130s. Her SBP at home had been 140-150s on metoprolol XL 25 every day. She was found to have microalbuminemia and lisinopril 2.5 every day added by PCP. On both metoprolol and lisinopril SBP still 130s. When I saw her 8/12, I " stopped metoprolol and lisinopril and started carvedilol. Discussed adding flecainide if she continues to have AF.    She started carvedilol 2021, coincidentally that day she had an episode of AF lasting 5 hours. She did proceed to take carvedilol and has sent her BP log - SBP much improved, generally 100-110 mmhg, with only occasional readings of 140mmHg. She has not had any AF since .    PAST MEDICAL HISTORY:  1. Atrial fibrillation. Initial diagnosis 2018 in the setting of Influenza B. Spontaneously converted to sinus rhythm. Recurrent AF 2019, cardioverted in ED. Recurrent paroxysmal episodes April - 2021.  2. Hypertension.  3. Hyperlipidemia. She had previously been on simvastatin but had some muscle aches. She is reluctant to add more medications.  4. Hypothyroidism.  5. Cholecystectomy  6. Vein stripping 1988    CURRENT MEDICATIONS:  Apixaban 5 mg bid  Synthroid 50 mcg every day  Atorvastatin 20 every day  Carvedilol 6.25 bid    ALLERGIES:   No Known Allergies    FAMILY HISTORY:  Family History   Problem Relation Age of Onset     Hypertension Mother              Hyperlipidemia Mother      Cerebrovascular Disease Mother      Thyroid Disease Mother      Family History Negative Father              Alzheimer Disease Father      Cancer Father         sarcoma     Hypertension Father      Family History Negative Maternal Grandmother               Hyperlipidemia Sister      Thyroid Disease Sister      Thyroid Disease Sister        SOCIAL HISTORY:  Social History     Tobacco Use     Smoking status: Never Smoker     Smokeless tobacco: Never Used   Substance Use Topics     Alcohol use: Yes     Alcohol/week: 0.0 standard drinks     Comment: 1-2 glass of wine per day     Drug use: No       ROS:  10 point ROS neg other than the symptoms noted above in the HPI.    I have personally reviewed the following:    LABS: 2021: hgb 13, plt 199K, TSH 1.8    ECHOCARDIOGRAM: 18 (in  AF): normal LV/RV, no valvular abnormalities    Bicycle stress echo 2019 (in sinus): exercised for 6:30 min, 90% MPHR achieved, MVO2 113% predicted, normal BP response, no ischemia    EK19:  bpm  2021: sinus  2021: sinus 68 bpm, normal intervals    Exam:  The rest of a comprehensive physical examination is deferred due to public health emergency video visit restrictions.  CONSITUTIONAL: no acute distress  HEENT: no icterus, no redness or discharge, neck supple  CV: no visible edema of visualized extremities. No JVD.   RESPIRATORY: respirations nonlabored, no cough  NEURO: AA&Ox3, speech fluent/appropriate, motor grossly nonfocal  PSYCH: cooperative, affect appropriate  DERM: no rashes on visualized face/neck/upper extremities    Assessment and Plan:   1. PAF. Increasing episodes since 2021. I discussed option of adding flecainide to carvedilol. She's not had any episodes since  so at this time would like to hold off on AAD. Continue apixaban.  2. HTN. BP much better on carvedilol 6.25 bid. She will continue to log BP readings.      In addition to video time documented above, I spent an additional 20 minutes today on data review and documentation.  I will see patient back as needed - she will notify me with any increase in AF frequency.    I have reviewed the note as documented above.  This accurately captures the substance of my virtual visit with the patient. The patient states understanding and is agreeable with the plan.     Maile Leonardo MD  Cardiology

## 2021-09-21 ENCOUNTER — VIRTUAL VISIT (OUTPATIENT)
Dept: CARDIOLOGY | Facility: CLINIC | Age: 71
End: 2021-09-21
Attending: INTERNAL MEDICINE
Payer: COMMERCIAL

## 2021-09-21 ENCOUNTER — ANCILLARY PROCEDURE (OUTPATIENT)
Dept: ULTRASOUND IMAGING | Facility: CLINIC | Age: 71
End: 2021-09-21
Attending: OBSTETRICS & GYNECOLOGY
Payer: COMMERCIAL

## 2021-09-21 DIAGNOSIS — I48.0 PAROXYSMAL ATRIAL FIBRILLATION (H): ICD-10-CM

## 2021-09-21 DIAGNOSIS — N83.202 LEFT OVARIAN CYST: ICD-10-CM

## 2021-09-21 DIAGNOSIS — I10 ESSENTIAL HYPERTENSION: Primary | ICD-10-CM

## 2021-09-21 PROCEDURE — 99214 OFFICE O/P EST MOD 30 MIN: CPT | Mod: 95 | Performed by: INTERNAL MEDICINE

## 2021-09-21 PROCEDURE — 76830 TRANSVAGINAL US NON-OB: CPT | Performed by: OBSTETRICS & GYNECOLOGY

## 2021-09-21 NOTE — PROGRESS NOTES
Vitals - Patient Reported  Systolic (Patient Reported): 117  Diastolic (Patient Reported): 79  Pain Score: No Pain (0) (No SOB)    Vitals were taken and medications reconciled.     Saleem Swartz CMA  4:25 PM

## 2021-09-21 NOTE — PATIENT INSTRUCTIONS
"You were evaluated today in the Cardiovascular Telemedicine Clinic at the [facility].     Cardiology Provider during your visit: Dr. Maile Leonardo    Diagnosis:  Paroxysmal atrial fibrillation, hypertension    Results: discussed with patient    Orders:   None    Medication Changes:   None    Recommendations:   Continue to follow blood pressure readings    Follow-up:   As needed  Please follow up with primary care provider for medication refills         Please feel free to call me with any questions or concerns.     Johanna Burden RN     Questions and [phone].   First press #1 for the [employer] and then press #3 for \"Medical Questions\" to reach us Cardiology Nurses.      On Call Cardiologist for after hours or on weekends: [phone]   option #4 and ask to speak to the on-call Cardiologist.          If you need a medication refill please contact your pharmacy.  Please allow 3 business days for your refill to be completed.   "

## 2021-09-22 ENCOUNTER — OFFICE VISIT (OUTPATIENT)
Dept: DERMATOLOGY | Facility: CLINIC | Age: 71
End: 2021-09-22
Attending: FAMILY MEDICINE
Payer: COMMERCIAL

## 2021-09-22 VITALS — DIASTOLIC BLOOD PRESSURE: 76 MMHG | OXYGEN SATURATION: 97 % | HEART RATE: 61 BPM | SYSTOLIC BLOOD PRESSURE: 125 MMHG

## 2021-09-22 DIAGNOSIS — D48.5 NEOPLASM OF UNCERTAIN BEHAVIOR OF SKIN: ICD-10-CM

## 2021-09-22 DIAGNOSIS — L82.1 SEBORRHEIC KERATOSES: ICD-10-CM

## 2021-09-22 DIAGNOSIS — L81.0 POST-INFLAMMATORY HYPERPIGMENTATION: Primary | ICD-10-CM

## 2021-09-22 DIAGNOSIS — L98.9 SKIN LESION: ICD-10-CM

## 2021-09-22 PROCEDURE — 99203 OFFICE O/P NEW LOW 30 MIN: CPT | Mod: 25 | Performed by: PHYSICIAN ASSISTANT

## 2021-09-22 PROCEDURE — 88305 TISSUE EXAM BY PATHOLOGIST: CPT | Performed by: DERMATOLOGY

## 2021-09-22 PROCEDURE — 11102 TANGNTL BX SKIN SINGLE LES: CPT | Performed by: PHYSICIAN ASSISTANT

## 2021-09-22 NOTE — PATIENT INSTRUCTIONS
Proper skin care from [facility]:    -Eliminate harsh soaps as they strip the natural oils from the skin, often resulting in dry itchy skin ( i.e. Dial, Zest, [place]ady Spring)  -Use mild soaps such as Cetaphil or Dove Sensitive Skin in the shower. You do not need to use soap on arms, legs, and trunk every time you shower unless visibly soiled.   -Avoid hot or cold showers.  -After showering, lightly dry off and apply moisturizing within 2-3 minutes. This will help trap moisture in the skin.   -Aggressive use of a moisturizer at least 1-2 times a day to the entire body (including -Vanicream, Cetaphil, Aquaphor or Cerave) and moisturize hands after every washing.  -We recommend using moisturizers that come in a tub that needs to be scooped out, not a pump. This has more of an oil base. It will hold moisture in your skin much better than a water base moisturizer. The above recommended are non-pore clogging.      Wear a sunscreen with at least SPF 30 on your face, ears, neck and V of the chest daily. Wear sunscreen on other areas of the body if those areas are exposed to the sun throughout the day. Sunscreens can contain physical and/or chemical blockers. Physical blockers are less likely to clog pores, these include zinc oxide and titanium dioxide. Reapply every two hour and after swimming.     Sunscreen examples: https://www.ewg.org/sunscreen/    UV radiation  UVA radiation remains constant throughout the day and throughout the year. It is a longer wavelength than UVB and therefore penetrates deeper into the skin leading to immediate and delayed tanning, photoaging, and skin cancer. 70-80% of UVA and UVB radiation occurs between the hours of 10am-2pm.  UVB radiation  UVB radiation causes the most harmful effects and is more significant during the summer months. However, snow and ice can reflect UVB radiation leading to skin damage during the winter months as well. UVB radiation is responsible for tanning,  burning, inflammation, delayed erythema (pinkness), pigmentation (brown spots), and skin cancer.     I recommend self monthly full body exams and yearly full body exams with a dermatology provider. If you develop a new or changing lesion please follow up for examination. Most skin cancers are pink and scaly or pink and pearly. However, we do see blue/brown/black skin cancers.  Consider the ABCDEs of melanoma when giving yourself your monthly full body exam ( don't forget the groin, buttocks, feet, toes, etc). A-asymmetry, B-borders, C-color, D-diameter, E-elevation or evolving. If you see any of these changes please follow up in clinic. If you cannot see your back I recommend purchasing a hand held mirror to use with a larger wall mirror.                       Wound Care Instructions     FOR SUPERFICIAL WOUNDS     [facility] or     [facility] [phone]          AFTER 24 HOURS YOU SHOULD REMOVE THE BANDAGE AND BEGIN DAILY DRESSING CHANGES AS FOLLOWS:     1) Remove Dressing.     2) Clean and dry the area with tap water using a Q-tip or sterile gauze pad.     3) Apply Vaseline, Aquaphor, Polysporin ointment or Bacitracin ointment over entire wound.  Do NOT use Neosporin ointment.     4) Cover the wound with a band-aid, or a sterile non-stick gauze pad and micropore paper tape      REPEAT THESE INSTRUCTIONS AT LEAST ONCE A DAY UNTIL THE WOUND HAS COMPLETELY HEALED.    It is an old wives tale that a wound heals better when it is exposed to air and allowed to dry out. The wound will heal faster with a better cosmetic result if it is kept moist with ointment and covered with a bandage.    **Do not let the wound dry out.**      Supplies Needed:      *Cotton tipped applicators (Q-tips)    *Polysporin Ointment or Bacitracin Ointment (NOT NEOSPORIN)    *Band-aids or non-stick gauze pads and micropore paper tape.      PATIENT INFORMATION:    During the healing process you will notice a number of  changes. All wounds develop a small halo of redness surrounding the wound.  This means healing is occurring. Severe itching with extensive redness usually indicates sensitivity to the ointment or bandage tape used to dress the wound.  You should call our office if this develops.      Swelling  and/or discoloration around your surgical site is common, particularly when performed around the eye.    All wounds normally drain.  The larger the wound the more drainage there will be.  After 7-10 days, you will notice the wound beginning to shrink and new skin will begin to grow.  The wound is healed when you can see skin has formed over the entire area.  A healed wound has a healthy, shiny look to the surface and is red to dark pink in color to normalize.  Wounds may take approximately 4-6 weeks to heal.  Larger wounds may take 6-8 weeks.  After the wound is healed you may discontinue dressing changes.    You may experience a sensation of tightness as your wound heals. This is normal and will gradually subside.    Your healed wound may be sensitive to temperature changes. This sensitivity improves with time, but if you re having a lot of discomfort, try to avoid temperature extremes.    Patients frequently experience itching after their wound appears to have healed because of the continue healing under the skin.  Plain Vaseline will help relieve the itching.        POSSIBLE COMPLICATIONS    BLEEDIN. Leave the bandage in place.  2. Use tightly rolled up gauze or a cloth to apply direct pressure over the bandage for 30  minutes.  3. Reapply pressure for an additional 30 minutes if necessary  4. Use additional gauze and tape to maintain pressure once the bleeding has stopped.

## 2021-09-22 NOTE — LETTER
2021         RE: Nicole Stanley  [address] [place] [address]        Dear Colleague,    Thank you for referring your patient, Nicole Stanley, to the [facility]. Please see a copy of my visit note below.    HPI:  Nicole Stanley is a 71 year old female patient here today for spots on LE .  Patient states this has been present for over a month.  Patient reports the following symptoms: grew rapidly . Two have since resolved. Once still remains but is smaller Patient reports the following previous treatments: none.  Patient reports the following modifying factors: none.  Associated symptoms: none.  Patient has no other skin complaints today.  Remainder of the HPI, Meds, PMH, Allergies, FH, and SH was reviewed in chart.    Pertinent Hx:   No personal or family history of skin cancer    Past Medical History:   Diagnosis Date     Arthritis     osteoarth     Heart disease      Hypertension age 49     Influenza B 2018     Spider veins      Thyroid disease age 49       Past Surgical History:   Procedure Laterality Date     CHOLECYSTECTOMY       COLONOSCOPY N/A 2020    Procedure: COLONOSCOPY;  Surgeon: Hugo Troncoso MD;  Location: [facility]     CYSTOSCOPY       ENT SURGERY  2002    turbinectomy & septoplasty     EYE SURGERY      blepharoplasty     GI SURGERY  2006    rectal sphincter     VASCULAR SURGERY  1988    vein stripping        Family History   Problem Relation Age of Onset     Hypertension Mother              Hyperlipidemia Mother      Cerebrovascular Disease Mother      Thyroid Disease Mother      Family History Negative Father              Alzheimer Disease Father      Cancer Father         sarcoma     Hypertension Father      Basal cell carcinoma Father      Family History Negative Maternal Grandmother               Hyperlipidemia Sister      Thyroid Disease Sister      Thyroid Disease Sister        Social History      Socioeconomic History     Marital status:      Spouse name: Not on file     Number of children: Not on file     Years of education: Not on file     Highest education level: Not on file   Occupational History     Not on file   Tobacco Use     Smoking status: Never Smoker     Smokeless tobacco: Never Used   Substance and Sexual Activity     Alcohol use: Yes     Alcohol/week: 0.0 standard drinks     Comment: 1-2 glass of wine per day     Drug use: No     Sexual activity: Yes     Partners: Male     Birth control/protection: None   Other Topics Concern     Parent/sibling w/ CABG, MI or angioplasty before 65F 55M? No   Social History Narrative     Not on file     Social Determinants of Health     Financial Resource Strain:      Difficulty of Paying Living Expenses:    Food Insecurity:      Worried About Running Out of Food in the Last Year:      Ran Out of Food in the Last Year:    Transportation Needs:      Lack of Transportation (Medical):      Lack of Transportation (Non-Medical):    Physical Activity:      Days of Exercise per Week:      Minutes of Exercise per Session:    Stress:      Feeling of Stress :    Social Connections:      Frequency of Communication with Friends and Family:      Frequency of Social Gatherings with Friends and Family:      Attends [employer] Services:      Active Member of Clubs or Organizations:      Attends Club or Organization Meetings:      Marital Status:    Intimate Partner Violence:      Fear of Current or Ex-Partner:      Emotionally Abused:      Physically Abused:      Sexually Abused:        Outpatient Encounter Medications as of 9/22/2021   Medication Sig Dispense Refill     Acetaminophen (TYLENOL PO) Take 500 mg by mouth as needed for mild pain or fever       atorvastatin (LIPITOR) 20 MG tablet Take 1 tablet (20 mg) by mouth daily 90 tablet 1     calcium carbonate (OSCAL 500) 1250 (500 Ca) MG TABS tablet Take 1 tablet by mouth 2 times daily       carvedilol (COREG) 6.25  MG tablet Take 1 tablet (6.25 mg) by mouth 2 times daily (with meals) 60 tablet 0     Cholecalciferol (VITAMIN D3) 3000 units TABS Take 1 tablet by mouth daily       ELIQUIS ANTICOAGULANT 5 MG tablet TAKE 1 TABLET BY MOUTH TWICE A  tablet 0     levothyroxine (SYNTHROID/LEVOTHROID) 50 MCG tablet Take 1 tablet (50 mcg) by mouth daily 90 tablet 1     MAGNESIUM GLYCINATE PO        Probiotic Product (PROBIOTIC COLON SUPPORT PO) Take 1 tablet by mouth daily       RESTASIS 0.05 % ophthalmic emulsion INSTILL 1 DROP INTO BOTH EYES TWICE A DAY       triamcinolone (KENALOG) 0.1 % external cream        zinc sulfate (ZINCATE) 220 (50 Zn) MG capsule Take 220 mg by mouth daily       No facility-administered encounter medications on file as of 9/22/2021.       Review Of Systems:  Skin: spots  Eyes: negative  Ears/Nose/Throat: negative  Respiratory: No shortness of breath, dyspnea on exertion, cough, or hemoptysis  Cardiovascular: negative  Gastrointestinal: negative  Genitourinary: negative  Musculoskeletal: negative  Neurologic: negative  Psychiatric: negative  Hematologic/Lymphatic/Immunologic: negative  Endocrine: negative      Objective:     /76   Pulse 61   LMP  (LMP Unknown)   SpO2 97%   Eyes: Conjunctivae/lids: Normal   ENT: Lips:  Normal  MSK: Normal  Cardiovascular: Peripheral edema none  Pulm: Breathing Normal  Neuro/Psych: Orientation: A/O x 3. Normal; Mood/Affect: Normal, NAD, WDWN  Pt accompanied by: david  Following areas examined: face, neck, forearms, right thigh, left ventral thigh, ventral legs  Byrne skin type:ii   Findings:  Light brown/pink smooth macules on left thigh, right thigh  Pink scaly slightly raised plaque on left ventral leg    Assessment and Plan:     1) PIH, seborrheic keratosis    I discussed the specifics of tumor, prognosis, and genetics of benign lesions.  I explained that treatment of these lesions would be purely cosmetic and not medically neccessary.  I discussed with  patient different removal options including excision, cryotherapy, cautery and /or laser.  Lesion may recur and/or may not completely resolve. May need additional treatment.     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  No complications and routine wound care.  May grow back and will get a scar. Based on lesion type may need to completely remove lesion. Patient will be notified in 7-10 days of results. Wound care directions given.      Signs and Symptoms of non-melanoma skin cancer and ABCDEs. Patient was asked about new or changing moles/lesions on body.   Wear a sunscreen with at least SPF 30 on your face, ears, neck and V of the chest daily. Wear sunscreen on other areas of the body if those areas are exposed to the sun throughout the day. Sunscreens can contain physical and/or chemical blockers. Physical blockers are less likely to clog pores, these include zinc oxide and titanium dioxide. Reapply every two hour and after swimming.Sunscreen examples: https://www.ewg.org/sunscreen/    Proper skin care from [facility]:    -Eliminate harsh soaps as they strip the natural oils from the skin, often resulting in dry itchy skin ( i.e. Dial, Zest, [place] Spring)  -Use mild soaps such as Cetaphil or Dove Sensitive Skin in the shower. You do not need to use soap on arms, legs, and trunk every time you shower unless visibly soiled.   -Avoid hot or cold showers.  -After showering, lightly dry off and apply moisturizing within 2-3 minutes. This will help trap moisture in the skin.   -Aggressive use of a moisturizer at least 1-2 times a day to the entire body (including -Vanicream, Cetaphil, Aquaphor or Cerave) and moisturize hands after every washing.  -We recommend using moisturizers that come in a tub that needs to be scooped out, not a pump. This has more of an oil base. It will hold moisture  in your skin much better than a water base moisturizer. The above recommended are non-pore clogging.         It was a pleasure speaking with Nicole Stanley today.       Follow up in yearly FBE        Again, thank you for allowing me to participate in the care of your patient.        Sincerely,        Giovanna Kothari PA-C

## 2021-09-22 NOTE — PROGRESS NOTES
HPI:  Nicole Stanley is a 71 year old female patient here today for spots on LE .  Patient states this has been present for over a month.  Patient reports the following symptoms: grew rapidly . Two have since resolved. Once still remains but is smaller Patient reports the following previous treatments: none.  Patient reports the following modifying factors: none.  Associated symptoms: none.  Patient has no other skin complaints today.  Remainder of the HPI, Meds, PMH, Allergies, FH, and SH was reviewed in chart.    Pertinent Hx:   No personal or family history of skin cancer    Past Medical History:   Diagnosis Date     Arthritis     osteoarth     Heart disease 2018     Hypertension age 49     Influenza B 2018     Spider veins      Thyroid disease age 49       Past Surgical History:   Procedure Laterality Date     CHOLECYSTECTOMY       COLONOSCOPY N/A 2020    Procedure: COLONOSCOPY;  Surgeon: Hugo Troncoso MD;  Location:  GI     CYSTOSCOPY       ENT SURGERY      turbinectomy & septoplasty     EYE SURGERY      blepharoplasty     GI SURGERY  2006    rectal sphincter     VASCULAR SURGERY  1988    vein stripping        Family History   Problem Relation Age of Onset     Hypertension Mother              Hyperlipidemia Mother      Cerebrovascular Disease Mother      Thyroid Disease Mother      Family History Negative Father              Alzheimer Disease Father      Cancer Father         sarcoma     Hypertension Father      Basal cell carcinoma Father      Family History Negative Maternal Grandmother               Hyperlipidemia Sister      Thyroid Disease Sister      Thyroid Disease Sister        Social History     Socioeconomic History     Marital status:      Spouse name: Not on file     Number of children: Not on file     Years of education: Not on file     Highest education level: Not on file   Occupational History     Not on file   Tobacco Use     Smoking  status: Never Smoker     Smokeless tobacco: Never Used   Substance and Sexual Activity     Alcohol use: Yes     Alcohol/week: 0.0 standard drinks     Comment: 1-2 glass of wine per day     Drug use: No     Sexual activity: Yes     Partners: Male     Birth control/protection: None   Other Topics Concern     Parent/sibling w/ CABG, MI or angioplasty before 65F 55M? No   Social History Narrative     Not on file     Social Determinants of Health     Financial Resource Strain:      Difficulty of Paying Living Expenses:    Food Insecurity:      Worried About Running Out of Food in the Last Year:      Ran Out of Food in the Last Year:    Transportation Needs:      Lack of Transportation (Medical):      Lack of Transportation (Non-Medical):    Physical Activity:      Days of Exercise per Week:      Minutes of Exercise per Session:    Stress:      Feeling of Stress :    Social Connections:      Frequency of Communication with Friends and Family:      Frequency of Social Gatherings with Friends and Family:      Attends [employer] Services:      Active Member of Clubs or Organizations:      Attends Club or Organization Meetings:      Marital Status:    Intimate Partner Violence:      Fear of Current or Ex-Partner:      Emotionally Abused:      Physically Abused:      Sexually Abused:        Outpatient Encounter Medications as of 9/22/2021   Medication Sig Dispense Refill     Acetaminophen (TYLENOL PO) Take 500 mg by mouth as needed for mild pain or fever       atorvastatin (LIPITOR) 20 MG tablet Take 1 tablet (20 mg) by mouth daily 90 tablet 1     calcium carbonate (OSCAL 500) 1250 (500 Ca) MG TABS tablet Take 1 tablet by mouth 2 times daily       carvedilol (COREG) 6.25 MG tablet Take 1 tablet (6.25 mg) by mouth 2 times daily (with meals) 60 tablet 0     Cholecalciferol (VITAMIN D3) 3000 units TABS Take 1 tablet by mouth daily       ELIQUIS ANTICOAGULANT 5 MG tablet TAKE 1 TABLET BY MOUTH TWICE A  tablet 0      levothyroxine (SYNTHROID/LEVOTHROID) 50 MCG tablet Take 1 tablet (50 mcg) by mouth daily 90 tablet 1     MAGNESIUM GLYCINATE PO        Probiotic Product (PROBIOTIC COLON SUPPORT PO) Take 1 tablet by mouth daily       RESTASIS 0.05 % ophthalmic emulsion INSTILL 1 DROP INTO BOTH EYES TWICE A DAY       triamcinolone (KENALOG) 0.1 % external cream        zinc sulfate (ZINCATE) 220 (50 Zn) MG capsule Take 220 mg by mouth daily       No facility-administered encounter medications on file as of 9/22/2021.       Review Of Systems:  Skin: spots  Eyes: negative  Ears/Nose/Throat: negative  Respiratory: No shortness of breath, dyspnea on exertion, cough, or hemoptysis  Cardiovascular: negative  Gastrointestinal: negative  Genitourinary: negative  Musculoskeletal: negative  Neurologic: negative  Psychiatric: negative  Hematologic/Lymphatic/Immunologic: negative  Endocrine: negative      Objective:     /76   Pulse 61   LMP  (LMP Unknown)   SpO2 97%   Eyes: Conjunctivae/lids: Normal   ENT: Lips:  Normal  MSK: Normal  Cardiovascular: Peripheral edema none  Pulm: Breathing Normal  Neuro/Psych: Orientation: A/O x 3. Normal; Mood/Affect: Normal, NAD, WDWN  Pt accompanied by: david  Following areas examined: face, neck, forearms, right thigh, left ventral thigh, ventral legs  Byrne skin type:ii   Findings:  Light brown/pink smooth macules on left thigh, right thigh  Pink scaly slightly raised plaque on left ventral leg    Assessment and Plan:     1) PIH, seborrheic keratosis    I discussed the specifics of tumor, prognosis, and genetics of benign lesions.  I explained that treatment of these lesions would be purely cosmetic and not medically neccessary.  I discussed with patient different removal options including excision, cryotherapy, cautery and /or laser.  Lesion may recur and/or may not completely resolve. May need additional treatment.     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  No complications and routine wound care.  May grow back and will get a scar. Based on lesion type may need to completely remove lesion. Patient will be notified in 7-10 days of results. Wound care directions given.      Signs and Symptoms of non-melanoma skin cancer and ABCDEs. Patient was asked about new or changing moles/lesions on body.   Wear a sunscreen with at least SPF 30 on your face, ears, neck and V of the chest daily. Wear sunscreen on other areas of the body if those areas are exposed to the sun throughout the day. Sunscreens can contain physical and/or chemical blockers. Physical blockers are less likely to clog pores, these include zinc oxide and titanium dioxide. Reapply every two hour and after swimming.Sunscreen examples: https://www.ewg.org/sunscreen/    Proper skin care from [facility]:    -Eliminate harsh soaps as they strip the natural oils from the skin, often resulting in dry itchy skin ( i.e. Dial, Zest, [place] Spring)  -Use mild soaps such as Cetaphil or Dove Sensitive Skin in the shower. You do not need to use soap on arms, legs, and trunk every time you shower unless visibly soiled.   -Avoid hot or cold showers.  -After showering, lightly dry off and apply moisturizing within 2-3 minutes. This will help trap moisture in the skin.   -Aggressive use of a moisturizer at least 1-2 times a day to the entire body (including -Vanicream, Cetaphil, Aquaphor or Cerave) and moisturize hands after every washing.  -We recommend using moisturizers that come in a tub that needs to be scooped out, not a pump. This has more of an oil base. It will hold moisture in your skin much better than a water base moisturizer. The above recommended are non-pore clogging.         It was a pleasure speaking with Nicole Stanley today.       Follow up in yearly FBE

## 2021-09-23 NOTE — NURSING NOTE
No changes made at this appointment. Follow up with Dr. Leonardo as needed.     Anibal Flores, RN   Cardiology Nurse Coordinator

## 2021-09-27 LAB
PATH REPORT.COMMENTS IMP SPEC: NORMAL
PATH REPORT.FINAL DX SPEC: NORMAL
PATH REPORT.GROSS SPEC: NORMAL
PATH REPORT.MICROSCOPIC SPEC OTHER STN: NORMAL
PATH REPORT.RELEVANT HX SPEC: NORMAL

## 2021-10-15 DIAGNOSIS — I48.0 PAROXYSMAL ATRIAL FIBRILLATION (H): Primary | ICD-10-CM

## 2021-10-15 DIAGNOSIS — I48.0 PAROXYSMAL ATRIAL FIBRILLATION (H): ICD-10-CM

## 2021-10-15 DIAGNOSIS — E03.9 ACQUIRED HYPOTHYROIDISM: ICD-10-CM

## 2021-10-19 RX ORDER — LEVOTHYROXINE SODIUM 50 UG/1
TABLET ORAL
Qty: 90 TABLET | Refills: 2 | Status: SHIPPED | OUTPATIENT
Start: 2021-10-19 | End: 2022-04-14

## 2021-10-19 RX ORDER — APIXABAN 5 MG/1
TABLET, FILM COATED ORAL
Qty: 180 TABLET | Refills: 1 | Status: SHIPPED | OUTPATIENT
Start: 2021-10-19 | End: 2022-03-14

## 2021-10-19 NOTE — TELEPHONE ENCOUNTER
Direct Oral Anticoagulant Agents Jakkvb16/15/2021 02:16 PM   Normal Platelets on file in past 12 months Protocol Details    Medication is active on med list     Patient is 18-79 years of age     Serum creatinine less than or equal to 1.4 on file in past 12 mos     Weight is greater than 60 kg for the past year     No active pregnancy on record     No positive pregnancy test within past 12 months     Recent (6 mo) or future (30 days) visit within the authorizing provider's specialty      Thyroid Protocol Asmnxg80/19/2021 10:17 AM   Patient is 12 years or older Protocol Details    Recent (12 mo) or future (30 days) visit within the authorizing provider's specialty     Medication is active on med list     Normal TSH on file in past 12 months     No active pregnancy on record     No positive pregnancy test in past 12 months

## 2021-10-20 RX ORDER — CARVEDILOL 6.25 MG/1
6.25 TABLET ORAL 2 TIMES DAILY WITH MEALS
Qty: 180 TABLET | Refills: 3 | Status: SHIPPED | OUTPATIENT
Start: 2021-10-20 | End: 2022-09-07

## 2021-11-26 ENCOUNTER — LAB (OUTPATIENT)
Dept: LAB | Facility: CLINIC | Age: 71
End: 2021-11-26
Payer: COMMERCIAL

## 2021-11-26 DIAGNOSIS — Z13.220 SCREENING FOR HYPERLIPIDEMIA: Primary | ICD-10-CM

## 2021-11-26 LAB
CHOLEST SERPL-MCNC: 221 MG/DL
FASTING STATUS PATIENT QL REPORTED: YES
HDLC SERPL-MCNC: 98 MG/DL
LDLC SERPL CALC-MCNC: 106 MG/DL
NONHDLC SERPL-MCNC: 123 MG/DL
TRIGL SERPL-MCNC: 87 MG/DL

## 2021-11-26 PROCEDURE — 36415 COLL VENOUS BLD VENIPUNCTURE: CPT

## 2021-11-26 PROCEDURE — 80061 LIPID PANEL: CPT

## 2021-11-30 NOTE — RESULT ENCOUNTER NOTE
The results of your recent lipid (cholesterol) profile were improved.    Here are the results:  Lab Results       Component                Value               Date                       CHOL                     221                 11/26/2021                 CHOL                     227                 11/18/2020            Lab Results       Component                Value               Date                       HDL                      98                  11/26/2021                 HDL                      93                  11/18/2020            Lab Results       Component                Value               Date                       LDL                      106                 11/26/2021                 LDL                      120                 11/18/2020            Lab Results       Component                Value               Date                       TRIG                     87                  11/26/2021                 TRIG                     72                  11/18/2020            No results found for: CHOLHDLRATIO    Desired or goal levels are:  CHOLESTEROL: Desirable is less than 200.   HDL (Good Cholesterol): Desirable is greater than 40 (for men) greater than 50 (for women).  LDL (Bad Cholesterol): Desirable is less than 130 (or less than 100 if you have heart disease or diabetes). Borderline 130-160.  TRIGLYCERIDES: Desirable is less than 150.  Borderline is 150-200.    Keep up the good work!.    As you may know, an elevated cholesterol is one factor that increases your risk for heart disease and stroke. You can improve your cholesterol by controlling the amount and type of fat you eat and by increasing your daily activity level.    Here are some ways to improve your nutrition:  Eat less fat (especially butter, Crisco and other saturated fats)  Buy lean cuts of meat, reduce your portions of red meat or substitute poultry or fish  Use skim milk and low-fat dairy products  Eat no more than 4 egg  yolks per week  Avoid fried or fast foods that are high in fat  Eat more fruits and vegetables      Also consider starting or increasing your aerobic activity. Aerobic activity is the best way to improve HDL (good) cholesterol. If this would be new to you, please talk with me first about what activities are safe for you.      Please feel free to contact us with any questions or if you would like more information.        Lauryn Allison M.D.

## 2021-12-04 ENCOUNTER — TRANSFERRED RECORDS (OUTPATIENT)
Dept: HEALTH INFORMATION MANAGEMENT | Facility: CLINIC | Age: 71
End: 2021-12-04
Payer: COMMERCIAL

## 2022-02-03 DIAGNOSIS — E78.2 MIXED HYPERLIPIDEMIA: ICD-10-CM

## 2022-02-04 RX ORDER — ATORVASTATIN CALCIUM 20 MG/1
TABLET, FILM COATED ORAL
Qty: 90 TABLET | Refills: 0 | Status: SHIPPED | OUTPATIENT
Start: 2022-02-04 | End: 2022-04-14

## 2022-02-04 NOTE — TELEPHONE ENCOUNTER
Prescription approved per [facility] Refill Protocol.  Robyn Myers RN  [facility]

## 2022-02-09 ENCOUNTER — LAB (OUTPATIENT)
Dept: LAB | Facility: CLINIC | Age: 72
End: 2022-02-09
Payer: COMMERCIAL

## 2022-02-09 DIAGNOSIS — I10 ESSENTIAL HYPERTENSION: Primary | ICD-10-CM

## 2022-02-09 LAB
ANION GAP SERPL CALCULATED.3IONS-SCNC: 6 MMOL/L (ref 3–14)
BUN SERPL-MCNC: 18 MG/DL (ref 7–30)
CALCIUM SERPL-MCNC: 9.4 MG/DL (ref 8.5–10.1)
CHLORIDE BLD-SCNC: 108 MMOL/L (ref 94–109)
CO2 SERPL-SCNC: 28 MMOL/L (ref 20–32)
CREAT SERPL-MCNC: 0.87 MG/DL (ref 0.52–1.04)
GFR SERPL CREATININE-BSD FRML MDRD: 70 ML/MIN/1.73M2
GLUCOSE BLD-MCNC: 90 MG/DL (ref 70–99)
POTASSIUM BLD-SCNC: 4 MMOL/L (ref 3.4–5.3)
SODIUM SERPL-SCNC: 142 MMOL/L (ref 133–144)

## 2022-02-09 PROCEDURE — 80048 BASIC METABOLIC PNL TOTAL CA: CPT

## 2022-02-09 PROCEDURE — 36415 COLL VENOUS BLD VENIPUNCTURE: CPT

## 2022-02-10 NOTE — RESULT ENCOUNTER NOTE
Excellent! Please call or send a [facility] message if you have any questions. Lauryn Allison M.D.

## 2022-03-14 ENCOUNTER — TRANSFERRED RECORDS (OUTPATIENT)
Dept: HEALTH INFORMATION MANAGEMENT | Facility: CLINIC | Age: 72
End: 2022-03-14
Payer: COMMERCIAL

## 2022-04-14 ENCOUNTER — VIRTUAL VISIT (OUTPATIENT)
Dept: FAMILY MEDICINE | Facility: CLINIC | Age: 72
End: 2022-04-14
Payer: COMMERCIAL

## 2022-04-14 DIAGNOSIS — I48.0 PAROXYSMAL ATRIAL FIBRILLATION (H): ICD-10-CM

## 2022-04-14 DIAGNOSIS — M62.89 PELVIC FLOOR DYSFUNCTION: ICD-10-CM

## 2022-04-14 DIAGNOSIS — I10 ESSENTIAL HYPERTENSION: ICD-10-CM

## 2022-04-14 DIAGNOSIS — N39.46 MIXED INCONTINENCE: ICD-10-CM

## 2022-04-14 DIAGNOSIS — Z79.01 CHRONIC ANTICOAGULATION: ICD-10-CM

## 2022-04-14 DIAGNOSIS — E78.2 MIXED HYPERLIPIDEMIA: Primary | ICD-10-CM

## 2022-04-14 DIAGNOSIS — R15.9 INCONTINENCE OF FECES, UNSPECIFIED FECAL INCONTINENCE TYPE: ICD-10-CM

## 2022-04-14 DIAGNOSIS — E03.9 ACQUIRED HYPOTHYROIDISM: ICD-10-CM

## 2022-04-14 PROCEDURE — 99214 OFFICE O/P EST MOD 30 MIN: CPT | Mod: 95 | Performed by: FAMILY MEDICINE

## 2022-04-14 RX ORDER — LEVOTHYROXINE SODIUM 50 UG/1
50 TABLET ORAL DAILY
Qty: 90 TABLET | Refills: 3 | Status: SHIPPED | OUTPATIENT
Start: 2022-04-14 | End: 2023-02-22

## 2022-04-14 RX ORDER — ATORVASTATIN CALCIUM 20 MG/1
20 TABLET, FILM COATED ORAL DAILY
Qty: 90 TABLET | Refills: 3 | Status: SHIPPED | OUTPATIENT
Start: 2022-04-14 | End: 2023-02-22
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
## 2022-04-21 ENCOUNTER — TRANSFERRED RECORDS (OUTPATIENT)
Dept: HEALTH INFORMATION MANAGEMENT | Facility: CLINIC | Age: 72
End: 2022-04-21

## 2022-05-13 ENCOUNTER — LAB (OUTPATIENT)
Dept: LAB | Facility: CLINIC | Age: 72
End: 2022-05-13
Payer: COMMERCIAL

## 2022-05-13 DIAGNOSIS — I10 ESSENTIAL HYPERTENSION: ICD-10-CM

## 2022-05-13 LAB
CREAT UR-MCNC: 48 MG/DL
MICROALBUMIN UR-MCNC: <5 MG/L
MICROALBUMIN/CREAT UR: NORMAL MG/G{CREAT}

## 2022-05-13 PROCEDURE — 82043 UR ALBUMIN QUANTITATIVE: CPT

## 2022-05-16 NOTE — RESULT ENCOUNTER NOTE
Excellent! Please call or send a [employer] message if you have any questions. Lauryn Allison M.D.

## 2022-07-08 ENCOUNTER — HOSPITAL ENCOUNTER (OUTPATIENT)
Facility: CLINIC | Age: 72
End: 2022-07-08
Attending: COLON & RECTAL SURGERY | Admitting: COLON & RECTAL SURGERY
Payer: COMMERCIAL

## 2022-07-15 ENCOUNTER — MYC MEDICAL ADVICE (OUTPATIENT)
Dept: OBGYN | Facility: CLINIC | Age: 72
End: 2022-07-15

## 2022-07-15 DIAGNOSIS — N83.202 LEFT OVARIAN CYST: Primary | ICD-10-CM

## 2022-07-15 NOTE — TELEPHONE ENCOUNTER
I reviewed Dr. Dominguez's note from 7/2021 and it does look like she wrote in there that they follow a yearly pelvic ultrasound for this patient.  Feel free to order under Dr. Dominguez's name.  Thanks,  Sangita

## 2022-08-29 ENCOUNTER — ANCILLARY PROCEDURE (OUTPATIENT)
Dept: MAMMOGRAPHY | Facility: CLINIC | Age: 72
End: 2022-08-29
Attending: FAMILY MEDICINE
Payer: COMMERCIAL

## 2022-08-29 DIAGNOSIS — Z12.31 VISIT FOR SCREENING MAMMOGRAM: ICD-10-CM

## 2022-08-29 PROCEDURE — 77063 BREAST TOMOSYNTHESIS BI: CPT | Mod: TC | Performed by: RADIOLOGY

## 2022-08-29 PROCEDURE — 77067 SCR MAMMO BI INCL CAD: CPT | Mod: TC | Performed by: RADIOLOGY

## 2022-08-31 ENCOUNTER — MEDICAL CORRESPONDENCE (OUTPATIENT)
Dept: HEALTH INFORMATION MANAGEMENT | Facility: CLINIC | Age: 72
End: 2022-08-31

## 2022-09-07 ENCOUNTER — OFFICE VISIT (OUTPATIENT)
Dept: FAMILY MEDICINE | Facility: CLINIC | Age: 72
End: 2022-09-07
Payer: COMMERCIAL

## 2022-09-07 VITALS
DIASTOLIC BLOOD PRESSURE: 84 MMHG | SYSTOLIC BLOOD PRESSURE: 115 MMHG | RESPIRATION RATE: 13 BRPM | HEIGHT: 65 IN | HEART RATE: 54 BPM | OXYGEN SATURATION: 97 % | BODY MASS INDEX: 23.16 KG/M2 | TEMPERATURE: 97.3 F | WEIGHT: 139 LBS

## 2022-09-07 DIAGNOSIS — E03.9 ACQUIRED HYPOTHYROIDISM: ICD-10-CM

## 2022-09-07 DIAGNOSIS — E78.2 MIXED HYPERLIPIDEMIA: ICD-10-CM

## 2022-09-07 DIAGNOSIS — N39.46 MIXED INCONTINENCE: ICD-10-CM

## 2022-09-07 DIAGNOSIS — E03.9 HYPOTHYROIDISM, UNSPECIFIED TYPE: ICD-10-CM

## 2022-09-07 DIAGNOSIS — Z01.818 PREOP GENERAL PHYSICAL EXAM: Primary | ICD-10-CM

## 2022-09-07 DIAGNOSIS — I10 ESSENTIAL HYPERTENSION: ICD-10-CM

## 2022-09-07 DIAGNOSIS — R15.9 INCONTINENCE OF FECES, UNSPECIFIED FECAL INCONTINENCE TYPE: ICD-10-CM

## 2022-09-07 DIAGNOSIS — M62.89 PELVIC FLOOR DYSFUNCTION: ICD-10-CM

## 2022-09-07 DIAGNOSIS — I48.0 PAROXYSMAL ATRIAL FIBRILLATION (H): ICD-10-CM

## 2022-09-07 DIAGNOSIS — Z79.01 CHRONIC ANTICOAGULATION: ICD-10-CM

## 2022-09-07 PROCEDURE — 99214 OFFICE O/P EST MOD 30 MIN: CPT | Performed by: FAMILY MEDICINE

## 2022-09-07 RX ORDER — CARVEDILOL 6.25 MG/1
6.25 TABLET ORAL 2 TIMES DAILY WITH MEALS
Qty: 180 TABLET | Refills: 3 | Status: SHIPPED | OUTPATIENT
Start: 2022-09-07 | End: 2023-09-05

## 2022-09-07 ASSESSMENT — ENCOUNTER SYMPTOMS
SORE THROAT: 0
JOINT SWELLING: 0
HEMATOCHEZIA: 0
FEVER: 0
EYE PAIN: 0
BREAST MASS: 0
MYALGIAS: 0
ABDOMINAL PAIN: 0
FREQUENCY: 1
HEMATURIA: 0
NERVOUS/ANXIOUS: 0
DIZZINESS: 0
NAUSEA: 0
DYSURIA: 0
COUGH: 0
HEADACHES: 1
HEARTBURN: 0
PARESTHESIAS: 0
CHILLS: 0
WEAKNESS: 0
CONSTIPATION: 0
PALPITATIONS: 0
ARTHRALGIAS: 1
SHORTNESS OF BREATH: 0
DIARRHEA: 0

## 2022-09-07 ASSESSMENT — ACTIVITIES OF DAILY LIVING (ADL): CURRENT_FUNCTION: NO ASSISTANCE NEEDED

## 2022-09-07 NOTE — PROGRESS NOTES
[facility] [address] [place] [address]  Phone: [phone]  Primary Provider: Manoj Allison  Pre-op Performing Provider: MANOJ ALLISON       PREOPERATIVE EVALUATION:  Today's date: 9/7/2022    Nicole Stanley is a 72 year old female who presents for a preoperative evaluation.    Surgical Information:  Surgery/Procedure: SACRAL NERVE STIMULATOR STAGE 1  Surgery Location:  [facility]   Surgeon: Gloria Padron MD  Surgery Date: 9/16/2022 and 9/30/2022  Time of Surgery: 7:30 am   Where patient plans to recover: At home with family  Fax number for surgical facility: Note does not need to be faxed, will be available electronically in Epic. Please also fax to [phone]    Type of Anesthesia Anticipated: General and to be determined    Assessment & Plan     The proposed surgical procedure is considered LOW risk.    Preop general physical exam    Pelvic floor dysfunction    Incontinence of feces, unspecified fecal incontinence type    Mixed incontinence     Paroxysmal atrial fibrillation (H)  Stable. Continues apixiban and carvedilol. No recent episodes of a fib. Hold eliquis for 2 days prior to surgery.   - carvedilol (COREG) 6.25 MG tablet  Dispense: 180 tablet; Refill: 3  - apixaban ANTICOAGULANT (ELIQUIS ANTICOAGULANT) 5 MG tablet  Dispense: 180 tablet; Refill: 1    Chronic anticoagulation   as above   - CBC with platelets    Essential hypertension  Controlled. No changes today. Labs ordered for future-okay to schedule lab a week before her wellness visit   - Basic metabolic panel  (Ca, Cl, CO2, Creat, Gluc, K, Na, BUN)  - Albumin Random Urine Quantitative with Creat Ratio       Mixed hyperlipidemia   continues atorvastatin   - Lipid panel reflex to direct LDL Fasting    Hypothyroidism, unspecified type  Continues levothyroxine   - TSH with free T4 reflex           Risks and Recommendations:  The patient has the following additional risks and  recommendations for perioperative complications:   - No identified additional risk factors other than previously addressed    Medication Instructions:   - apixaban (Eliquis), edoxaban (Savaysa), rivaroxaban (Xarelto): Bleeding risk is moderate or high for this procedure AND CrCl  (>=) 50 mL/min. HOLD 2 days before surgery.     RECOMMENDATION:  APPROVAL GIVEN to proceed with proposed procedure, without further diagnostic evaluation.                 Subjective     HPI related to upcoming procedure: Nicole Stanley is a 72 year old female who presents today for preop for sacral nerve stimulator.     Preop Questions 9/7/2022   1. Have you ever had a heart attack or stroke? No   2. Have you ever had surgery on your heart or blood vessels, such as a stent placement, a coronary artery bypass, or surgery on an artery in your head, neck, heart, or legs? No   3. Do you have chest pain with activity? No   4. Do you have a history of  heart failure? No   5. Do you currently have a cold, bronchitis or symptoms of other infection? No   6. Do you have a cough, shortness of breath, or wheezing? No   7. Do you or anyone in your family have previous history of blood clots? No   8. Do you or does anyone in your family have a serious bleeding problem such as prolonged bleeding following surgeries or cuts? YES - on eliquis   9. Have you ever had problems with anemia or been told to take iron pills? YES - distant hx   10. Have you had any abnormal blood loss such as black, tarry or bloody stools, or abnormal vaginal bleeding? No   11. Have you ever had a blood transfusion? No   12. Are you willing to have a blood transfusion if it is medically needed before, during, or after your surgery? Yes   13. Have you or any of your relatives ever had problems with anesthesia? No   14. Do you have sleep apnea, excessive snoring or daytime drowsiness? No   15. Do you have any artifical heart valves or other implanted medical devices like a pacemaker,  defibrillator, or continuous glucose monitor? No   16. Do you have artificial joints? No   17. Are you allergic to latex? No       Health Care Directive:  Patient has a Health Care Directive on file      Preoperative Review of :   reviewed - no record of controlled substances prescribed.       Status of Chronic Conditions:  A-FIB - Patient has a longstanding history of chronic A-fib currently on rate control. Current treatment regimen includes Apixaban for stroke prevention and denies significant symptoms of lightheadedness, palpitations or dyspnea.     HYPERLIPIDEMIA - Patient has a long history of significant Hyperlipidemia requiring medication for treatment with recent good control. Patient reports no problems or side effects with the medication.     HYPERTENSION - Patient has longstanding history of HTN , currently denies any symptoms referable to elevated blood pressure.  Patient denies any side effects of medication.     HYPOTHYROIDISM - Patient has a longstanding history of chronic Hypothyroidism. Patient has been doing well, noting no tremor, insomnia, hair loss or changes in skin texture. Continues to take medications as directed, without adverse reactions or side effects. Last TSH   Lab Results   Component Value Date    TSH 1.80 08/02/2021   .        Review of Systems   Constitutional: Negative for chills and fever.   HENT: Positive for hearing loss. Negative for congestion, ear pain and sore throat.    Eyes: Negative for pain and visual disturbance.   Respiratory: Negative for cough and shortness of breath.    Cardiovascular: Negative for chest pain and palpitations.   Gastrointestinal: Negative for abdominal pain, constipation, diarrhea and nausea.   Genitourinary: Positive for frequency and urgency. Negative for dysuria, genital sores, hematuria, pelvic pain, vaginal bleeding and vaginal discharge.   Musculoskeletal: Positive for arthralgias. Negative for joint swelling and myalgias.   Skin:  Negative for rash.   Neurological: Positive for headaches. Negative for dizziness and weakness.   Psychiatric/Behavioral: The patient is not nervous/anxious.      Constitutional, neuro, ENT, endocrine, pulmonary, cardiac, gastrointestinal, genitourinary, musculoskeletal, integument and psychiatric systems are negative, except as otherwise noted.    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     Priority: Medium     Paroxysmal atrial fibrillation (H) 04/25/2018     Priority: Medium     Left ovarian cyst 09/15/2017     Priority: Medium     Uterovaginal prolapse 06/14/2017     Priority: Medium     Mixed incontinence 06/14/2017     Priority: Medium     Pelvic floor dysfunction 06/14/2017     Priority: Medium     Dyspareunia in female 06/14/2017     Priority: Medium     Hypothyroidism 05/05/2017     Priority: Medium     Essential hypertension 01/31/2017     Priority: Medium     Mixed hyperlipidemia 01/31/2017     Priority: Medium     Bunion, left 01/31/2017     Priority: Medium     And right       Osteoarthritis of both hands, unspecified osteoarthritis type 01/31/2017     Priority: Medium      Past Medical History:   Diagnosis Date     Arthritis     osteoarth     Heart disease 2018     Hypertension age 49     Influenza B 4/19/2018     Spider veins      Thyroid disease age 49     Past Surgical History:   Procedure Laterality Date     CHOLECYSTECTOMY  2000     COLONOSCOPY N/A 11/12/2020    Procedure: COLONOSCOPY;  Surgeon: Hugo Troncoso MD;  Location:  GI     CYSTOSCOPY       ENT SURGERY  2002    turbinectomy & septoplasty     EYE SURGERY      blepharoplasty     GI SURGERY  2006    rectal sphincter     VASCULAR SURGERY  1988    vein stripping     Current Outpatient  "Medications   Medication Sig Dispense Refill     Acetaminophen (TYLENOL PO) Take 500 mg by mouth as needed for mild pain or fever       atorvastatin (LIPITOR) 20 MG tablet Take 1 tablet (20 mg) by mouth daily 90 tablet 3     calcium carbonate (OSCAL 500) 1250 (500 Ca) MG TABS tablet Take 1 tablet by mouth 2 times daily       carvedilol (COREG) 6.25 MG tablet Take 1 tablet (6.25 mg) by mouth 2 times daily (with meals) 180 tablet 3     Cholecalciferol (VITAMIN D3) 3000 units TABS Take 1 tablet by mouth daily       ELIQUIS ANTICOAGULANT 5 MG tablet TAKE 1 TABLET BY MOUTH TWICE A  tablet 1     levothyroxine (SYNTHROID/LEVOTHROID) 50 MCG tablet Take 1 tablet (50 mcg) by mouth daily 90 tablet 3     MAGNESIUM GLYCINATE PO        Probiotic Product (PROBIOTIC COLON SUPPORT PO) Take 1 tablet by mouth daily       triamcinolone (KENALOG) 0.1 % external cream        zinc sulfate (ZINCATE) 220 (50 Zn) MG capsule Take 220 mg by mouth daily         No Known Allergies     Social History     Tobacco Use     Smoking status: Never Smoker     Smokeless tobacco: Never Used   Substance Use Topics     Alcohol use: Yes     Comment: 1-2 glass of wine per day        History   Drug Use Unknown         Objective     /84 (BP Location: Right arm, Patient Position: Chair, Cuff Size: Adult Regular)   Pulse 54   Temp 97.3  F (36.3  C) (Temporal)   Resp 13   Ht 1.645 m (5' 4.75\")   Wt 63 kg (139 lb)   LMP  (LMP Unknown)   SpO2 97%   BMI 23.31 kg/m      Physical Exam    GENERAL APPEARANCE: healthy, alert and no distress     EYES: EOMI, PERRL     HENT: ear canals and TM's normal and nose and mouth without ulcers or lesions     NECK: no adenopathy, no asymmetry, masses, or scars and thyroid normal to palpation     RESP: lungs clear to auscultation - no rales, rhonchi or wheezes     CV: regular rates and rhythm, normal S1 S2, no S3 or S4 and no murmur, click or rub     ABDOMEN:  soft, nontender, no HSM or masses and bowel sounds " "normal     MS: extremities normal- no gross deformities noted      SKIN: no suspicious lesions or rashes     NEURO: Normal strength and tone, sensory exam grossly normal, mentation intact and speech normal     PSYCH: mentation appears normal. and affect normal/bright     LYMPHATICS: No cervical adenopathy    Recent Labs   Lab Test 02/09/22  0730 08/02/21  1347 02/10/21  0801   HGB  --  13.1 13.5   PLT  --  199 187     --  139   POTASSIUM 4.0  --  4.1   CR 0.87  --  0.85        Diagnostics:  No labs were ordered during this visit.   No EKG required for low risk surgery (cataract, skin procedure, breast biopsy, etc).    Revised Cardiac Risk Index (RCRI):  The patient has the following serious cardiovascular risks for perioperative complications:   - No serious cardiac risks = 0 points     RCRI Interpretation: 0 points: Class I (very low risk - 0.4% complication rate)           Signed Electronically by: Lauryn Allison MD   Copy of this evaluation report is provided to requesting physician.         Answers for HPI/ROS submitted by the patient on 9/7/2022  In general, how would you rate your overall physical health?: good  Frequency of exercise:: 6-7 days/week  Do you usually eat at least 4 servings of fruit and vegetables a day, include whole grains & fiber, and avoid regularly eating high fat or \"junk\" foods? : Yes  Taking medications regularly:: Yes  Medication side effects:: None  Activities of Daily Living: no assistance needed  Home safety: no safety concerns identified  Hearing Impairment:: difficulty following a conversation in a noisy restaurant or crowded room, feel that people are mumbling or not speaking clearly, difficulty following dialogue in the theater, need to ask people to speak up or repeat themselves, find that men's voices are easier to understand than woman's, difficulty understanding soft or whispered speech  In the past 6 months, have you been bothered by leaking of urine?: Yes  Blood in " stool: No  heartburn: No  peripheral edema: No  mood changes: No  Skin sensation changes: No  tenderness: No  breast mass: No  breast discharge: No  In general, how would you rate your overall mental or emotional health?: good  Additional concerns today:: Yes  Duration of exercise:: Greater than 60 minutes

## 2022-09-07 NOTE — PATIENT INSTRUCTIONS
Preparing for Your Surgery  Getting started  A nurse will call you to review your health history and instructions. They will give you an arrival time based on your scheduled surgery time. Please be ready to share:    Your doctor's clinic name and phone number    Your medical, surgical and anesthesia history    A list of allergies and sensitivities    A list of medicines, including herbal treatments and over-the-counter drugs    Whether the patient has a legal guardian (ask how to send us the papers in advance)  Please tell us if you're pregnant--or if there's any chance you might be pregnant. Some surgeries may injure a fetus (unborn baby), so they require a pregnancy test. Surgeries that are safe for a fetus don't always need a test, and you can choose whether to have one.   If you have a child who's having surgery, please ask for a copy of Preparing for Your Child's Surgery.    Preparing for surgery    Within 30 days of surgery: Have a pre-op exam (sometimes called an H&P, or History and Physical). This can be done at a clinic or pre-operative center.  ? If you're having a , you may not need this exam. Talk to your care team.    At your pre-op exam, talk to your care team about all medicines you take. If you need to stop any medicines before surgery, ask when to start taking them again.  ? We do this for your safety. Many medicines can make you bleed too much during surgery. Some change how well surgery (anesthesia) drugs work.    Call your insurance company to let them know you're having surgery. (If you don't have insurance, call [phone].)    Call your clinic if there's any change in your health. This includes signs of a cold or flu (sore throat, runny nose, cough, rash, fever). It also includes a scrape or scratch near the surgery site.    If you have questions on the day of surgery, call your hospital or surgery center.  COVID testing  You may need to be tested for COVID-19 before having  surgery. If so, we will give you instructions.  Eating and drinking guidelines  For your safety: Unless your surgeon tells you otherwise, follow the guidelines below.    Eat and drink as usual until 8 hours before surgery. After that, no food or milk.    Drink clear liquids until 2 hours before surgery. These are liquids you can see through, like water, Gatorade and Propel Water. You may also have black coffee and tea (no cream or milk).    Nothing by mouth within 2 hours of surgery. This includes gum, candy and breath mints.    If you drink alcohol: Stop drinking it the night before surgery.    If your care team tells you to take medicine on the morning of surgery, it's okay to take it with a sip of water.  Preventing infection    Shower or bathe the night before and morning of your surgery. Follow the instructions your clinic gave you. (If no instructions, use regular soap.)    Don't shave or clip hair near your surgery site. We'll remove the hair if needed.    Don't smoke or vape the morning of surgery. You may chew nicotine gum up to 2 hours before surgery. A nicotine patch is okay.  ? Note: Some surgeries require you to completely quit smoking and nicotine. Check with your surgeon.    Your care team will make every effort to keep you safe from infection. We will:  ? Clean our hands often with soap and water (or an alcohol-based hand rub).  ? Clean the skin at your surgery site with a special soap that kills germs.  ? Give you a special gown to keep you warm. (Cold raises the risk of infection.)  ? Wear special hair covers, masks, gowns and gloves during surgery.  ? Give antibiotic medicine, if prescribed. Not all surgeries need antibiotics.  What to bring on the day of surgery    Photo ID and insurance card    Copy of your health care directive, if you have one    Glasses and hearing aides (bring cases)  ? You can't wear contacts during surgery    Inhaler and eye drops, if you use them (tell us about these when  you arrive)    CPAP machine or breathing device, if you use them    A few personal items, if spending the night    If you have . . .  ? A pacemaker, ICD (cardiac defibrillator) or other implant: Bring the ID card.  ? An implanted stimulator: Bring the remote control.  ? A legal guardian: Bring a copy of the certified (court-stamped) guardianship papers.  Please remove any jewelry, including body piercings. Leave jewelry and other valuables at home.  If you're going home the day of surgery    You must have a responsible adult drive you home. They should stay with you overnight as well.    If you don't have someone to stay with you, and you aren't safe to go home alone, we may keep you overnight. Insurance often won't pay for this.  After surgery  If it's hard to control your pain or you need more pain medicine, please call your surgeon's office.  Questions?   If you have any questions for your care team, list them here: _________________________________________________________________________________________________________________________________________________________________________ ____________________________________ ____________________________________ ____________________________________  For informational purposes only. Not to replace the advice of your health care provider. Copyright   2003, 2019 [facility]. All rights reserved. Clinically reviewed by Ellie Ordaz MD. [employer] [id number] - REV 07/21.

## 2022-09-10 ENCOUNTER — NURSE TRIAGE (OUTPATIENT)
Dept: NURSING | Facility: CLINIC | Age: 72
End: 2022-09-10

## 2022-09-10 NOTE — TELEPHONE ENCOUNTER
"Nurse Triage SBAR    Is this a 2nd Level Triage? NO    Situation: Patient calling with mild Covid sx.  Consent: not needed    Background:  tested positive 5 days ago and pt tested positive today.  Health history of Hypertension, afib.   Pt states she would like to talk to someone about getting paxlovid.     Assessment:   Covid positive today  SX began: overnight last night.   SX:  Runny nose, scratchy throat. Lower back ache, mild headache (but that went away by afternoon) \"mild cold symptoms\" per pt.   Denies SOB, tightness or pressure in chest, no fever, no body aches Denies cough.    VACCINES:  Fully vaccinated and boosted:  Pfizer.   SpO2: 99%   HR: 65    Protocol Recommended Disposition:   Transferred to schedule Covid Consult appt.     Recommendation: Advised patient to make an appointment. Transferred to scheduling. . Reviewed concerning symptoms and when to call back.     Routed to provider as MINE.         Kaity Carey RN [facility] 9/10/2022 11:39 AM                              Reason for Disposition    HIGH RISK for severe COVID complications (e.g., weak immune system, age > 64 years, obesity with BMI > 25, pregnant, chronic lung disease or other chronic medical condition)  (Exception: Already seen by PCP and no new or worsening symptoms.)    Additional Information    Negative: SEVERE difficulty breathing (e.g., struggling for each breath, speaks in single words)    Negative: Difficult to awaken or acting confused (e.g., disoriented, slurred speech)    Negative: Bluish (or gray) lips or face now    Negative: Shock suspected (e.g., cold/pale/clammy skin, too weak to stand, low BP, rapid pulse)    Negative: Sounds like a life-threatening emergency to the triager    Negative: [1] Diagnosed or suspected COVID-19 AND [2] symptoms lasting 3 or more weeks    Negative: [1] COVID-19 exposure AND [2] no symptoms    Negative: COVID-19 vaccine reaction suspected (e.g., fever, headache, " muscle aches) occurring 1 to 3 days after getting vaccine    Negative: COVID-19 vaccine, questions about    Negative: [1] Lives with someone known to have influenza (flu test positive) AND [2] flu-like symptoms (e.g., cough, runny nose, sore throat, SOB; with or without fever)    Negative: [1] Adult with possible COVID-19 symptoms AND [2] triager concerned about severity of symptoms or other causes    Negative: COVID-19 and breastfeeding, questions about    Negative: SEVERE or constant chest pain or pressure  (Exception: Mild central chest pain, present only when coughing.)    Negative: MODERATE difficulty breathing (e.g., speaks in phrases, SOB even at rest, pulse 100-120)    Negative: [1] Headache AND [2] stiff neck (can't touch chin to chest)    Negative: Oxygen level (e.g., pulse oximetry) 90 percent or lower    Negative: Chest pain or pressure    Negative: Patient sounds very sick or weak to the triager    Negative: MILD difficulty breathing (e.g., minimal/no SOB at rest, SOB with walking, pulse <100)    Negative: Fever > 103 F (39.4 C)    Negative: [1] Fever > 101 F (38.3 C) AND [2] age > 60 years    Negative: [1] Fever > 100.0 F (37.8 C) AND [2] bedridden (e.g., nursing home patient, CVA, chronic illness, recovering from surgery)    Protocols used: CORONAVIRUS (COVID-19) DIAGNOSED OR [id number] 1.18.2022

## 2022-09-12 ENCOUNTER — VIRTUAL VISIT (OUTPATIENT)
Dept: FAMILY MEDICINE | Facility: CLINIC | Age: 72
End: 2022-09-12
Payer: COMMERCIAL

## 2022-09-12 DIAGNOSIS — U07.1 INFECTION DUE TO 2019 NOVEL CORONAVIRUS: Primary | ICD-10-CM

## 2022-09-12 PROCEDURE — 99213 OFFICE O/P EST LOW 20 MIN: CPT | Mod: 95 | Performed by: FAMILY MEDICINE

## 2022-09-12 NOTE — PROGRESS NOTES
Lyn is a 72 year old who is being evaluated via a billable video visit.      How would you like to obtain your AVS? MyChart  If the video visit is dropped, the invitation should be resent by: Text to cell phone: [phone]  Will anyone else be joining your video visit? No        Assessment & Plan     Infection due to 2019 novel coronavirus  -Not eligible for Paxlovid as pt on Eliquis.   -Discussed treatment with monoclonal antibody, referral placed  -Pt will reschedule her procedure which was for end of this week  - Covid Monoclonal Antibody Referral      Jerrell Jean DO  [facility]    Subjective   Lyn is a 72 year old, presenting for the following health issues:  No chief complaint on file.      HPI     Experiencing dry, scratchy throat. Sore nostril and dry cough.   Symptoms started 3 days ago. Positive test 2 days ago.   tested positive about 1 week ago.  Has procedure at the end of this week and is planning to reschedule.    COVID-19 Symptom Review  How many days ago did these symptoms start? 3 days    Are any of the following symptoms significant for you?    New or worsening difficulty breathing? No    Worsening cough? Yes, it's a dry cough.     Fever or chills? No    Headache: No    Sore throat: No    Chest pain: No    Diarrhea: No    Body aches? No    What treatments has patient tried? tylenol   Does patient live in a nursing home, group home, or shelter? No  Does patient have a way to get food/medications during quarantined? Yes, I have a friend or family member who can help me. and Yes                    Review of Systems         Objective           Vitals:  No vitals were obtained today due to virtual visit.    Physical Exam   GENERAL:alert and no distress  EYES: Eyes grossly normal to inspection.  No discharge or erythema, or obvious scleral/conjunctival abnormalities.  RESP: No audible wheeze, cough, or visible cyanosis.  No visible retractions or increased  work of breathing.    SKIN: Visible skin clear. No significant rash, abnormal pigmentation or lesions.  NEURO: Cranial nerves grossly intact.  Mentation and speech appropriate for age.  PSYCH: Mentation appears normal, affect normal/bright, judgement and insight intact, normal speech and appearance well-groomed.            Video-Visit Details    Video Start Time: 8:43 AM    Type of service:  Video Visit    Video End Time:8:43 AM    Originating Location (pt. Location): Home    Distant Location (provider location):  [facility]     Platform used for Video Visit: [employer]

## 2022-09-26 ENCOUNTER — LAB (OUTPATIENT)
Dept: LAB | Facility: CLINIC | Age: 72
End: 2022-09-26
Payer: COMMERCIAL

## 2022-09-26 DIAGNOSIS — E78.2 MIXED HYPERLIPIDEMIA: ICD-10-CM

## 2022-09-26 DIAGNOSIS — I10 ESSENTIAL HYPERTENSION: ICD-10-CM

## 2022-09-26 DIAGNOSIS — Z79.01 CHRONIC ANTICOAGULATION: ICD-10-CM

## 2022-09-26 DIAGNOSIS — E03.9 HYPOTHYROIDISM, UNSPECIFIED TYPE: ICD-10-CM

## 2022-09-26 LAB
ERYTHROCYTE [DISTWIDTH] IN BLOOD BY AUTOMATED COUNT: 13.2 % (ref 10–15)
HCT VFR BLD AUTO: 36.7 % (ref 35–47)
HGB BLD-MCNC: 12.3 G/DL (ref 11.7–15.7)
MCH RBC QN AUTO: 31.9 PG (ref 26.5–33)
MCHC RBC AUTO-ENTMCNC: 33.5 G/DL (ref 31.5–36.5)
MCV RBC AUTO: 95 FL (ref 78–100)
PLATELET # BLD AUTO: 193 10E3/UL (ref 150–450)
RBC # BLD AUTO: 3.85 10E6/UL (ref 3.8–5.2)
WBC # BLD AUTO: 5.7 10E3/UL (ref 4–11)

## 2022-09-26 PROCEDURE — 85027 COMPLETE CBC AUTOMATED: CPT

## 2022-09-26 PROCEDURE — 82043 UR ALBUMIN QUANTITATIVE: CPT

## 2022-09-26 PROCEDURE — 80048 BASIC METABOLIC PNL TOTAL CA: CPT

## 2022-09-26 PROCEDURE — 84443 ASSAY THYROID STIM HORMONE: CPT

## 2022-09-26 PROCEDURE — 36415 COLL VENOUS BLD VENIPUNCTURE: CPT

## 2022-09-26 PROCEDURE — 80061 LIPID PANEL: CPT

## 2022-09-27 ENCOUNTER — OFFICE VISIT (OUTPATIENT)
Dept: OBGYN | Facility: CLINIC | Age: 72
End: 2022-09-27
Attending: OBSTETRICS & GYNECOLOGY
Payer: COMMERCIAL

## 2022-09-27 VITALS
SYSTOLIC BLOOD PRESSURE: 110 MMHG | WEIGHT: 136 LBS | OXYGEN SATURATION: 99 % | BODY MASS INDEX: 22.81 KG/M2 | HEART RATE: 60 BPM | DIASTOLIC BLOOD PRESSURE: 78 MMHG

## 2022-09-27 DIAGNOSIS — N83.202 LEFT OVARIAN CYST: Primary | ICD-10-CM

## 2022-09-27 LAB
CREAT UR-MCNC: 66 MG/DL
MICROALBUMIN UR-MCNC: 6 MG/L
MICROALBUMIN/CREAT UR: 9.09 MG/G CR (ref 0–25)

## 2022-09-27 PROCEDURE — 99212 OFFICE O/P EST SF 10 MIN: CPT | Performed by: OBSTETRICS & GYNECOLOGY

## 2022-09-27 NOTE — RESULT ENCOUNTER NOTE
Excellent! Please call or send a [employer] message if you have any questions. Lauryn Allison M.D.

## 2022-09-27 NOTE — PROGRESS NOTES
S; Nicole MICHAEL Stanley is a 72 year old  who has a known small simple left ovarian cyst that we monitor with yearly ultrasound.  She is without complaints, denies pain.  Her only concern is she has worked with Dr. Song for her utero-vaginal prolapse and feels it may be getting worse.  She is still really hesitant to undergo surgery, but may talk with Dr. Song about it.    O: /78   Pulse 60   Wt 61.7 kg (136 lb)   LMP  (LMP Unknown)   SpO2 99%   BMI 22.81 kg/m      Gen: NAD    Us: prelim unchanged from previous    A/P: Small left ovarian cyst, stable   We reviewed the us and since it has been stable for many years, she is wondering if we can stop or spread out the us.  I think it is reasonable to stop and can resume at any time or if symptoms arise.  Questions answered    CHRISTINA TORRES MD

## 2022-09-28 LAB
ANION GAP SERPL CALCULATED.3IONS-SCNC: 9 MMOL/L (ref 3–14)
BUN SERPL-MCNC: 25 MG/DL (ref 7–30)
CALCIUM SERPL-MCNC: 9.1 MG/DL (ref 8.5–10.1)
CHLORIDE BLD-SCNC: 104 MMOL/L (ref 94–109)
CHOLEST SERPL-MCNC: 173 MG/DL
CO2 SERPL-SCNC: 26 MMOL/L (ref 20–32)
CREAT SERPL-MCNC: 0.9 MG/DL (ref 0.52–1.04)
FASTING STATUS PATIENT QL REPORTED: YES
GFR SERPL CREATININE-BSD FRML MDRD: 67 ML/MIN/1.73M2
GLUCOSE BLD-MCNC: 95 MG/DL (ref 70–99)
HDLC SERPL-MCNC: 70 MG/DL
LDLC SERPL CALC-MCNC: 84 MG/DL
NONHDLC SERPL-MCNC: 103 MG/DL
POTASSIUM BLD-SCNC: 3.9 MMOL/L (ref 3.4–5.3)
SODIUM SERPL-SCNC: 139 MMOL/L (ref 133–144)
TRIGL SERPL-MCNC: 94 MG/DL
TSH SERPL DL<=0.005 MIU/L-ACNC: 2.6 MU/L (ref 0.4–4)

## 2022-09-28 NOTE — RESULT ENCOUNTER NOTE
Excellent! Please call or send a [employer] message if you have any questions. Lauryn Allison M.D.

## 2022-11-21 ASSESSMENT — ENCOUNTER SYMPTOMS
NAUSEA: 0
HEADACHES: 1
COUGH: 0
DIARRHEA: 0
JOINT SWELLING: 0
PARESTHESIAS: 0
CONSTIPATION: 0
DYSURIA: 0
MYALGIAS: 0
FEVER: 0
SORE THROAT: 0
NERVOUS/ANXIOUS: 0
SHORTNESS OF BREATH: 0
WEAKNESS: 0
ABDOMINAL PAIN: 0
CHILLS: 0
HEMATURIA: 0
EYE PAIN: 0
BREAST MASS: 0
HEARTBURN: 0
DIZZINESS: 0
HEMATOCHEZIA: 0
ARTHRALGIAS: 1
FREQUENCY: 1
PALPITATIONS: 0

## 2022-11-21 ASSESSMENT — ACTIVITIES OF DAILY LIVING (ADL): CURRENT_FUNCTION: NO ASSISTANCE NEEDED

## 2022-11-28 ENCOUNTER — VIRTUAL VISIT (OUTPATIENT)
Dept: FAMILY MEDICINE | Facility: CLINIC | Age: 72
End: 2022-11-28
Payer: COMMERCIAL

## 2022-11-28 DIAGNOSIS — E03.9 ACQUIRED HYPOTHYROIDISM: ICD-10-CM

## 2022-11-28 DIAGNOSIS — K42.9 UMBILICAL HERNIA WITHOUT OBSTRUCTION AND WITHOUT GANGRENE: ICD-10-CM

## 2022-11-28 DIAGNOSIS — I48.0 PAROXYSMAL ATRIAL FIBRILLATION (H): ICD-10-CM

## 2022-11-28 DIAGNOSIS — E78.2 MIXED HYPERLIPIDEMIA: ICD-10-CM

## 2022-11-28 DIAGNOSIS — I10 ESSENTIAL HYPERTENSION: ICD-10-CM

## 2022-11-28 DIAGNOSIS — N39.46 MIXED INCONTINENCE: ICD-10-CM

## 2022-11-28 DIAGNOSIS — R15.9 INCONTINENCE OF FECES, UNSPECIFIED FECAL INCONTINENCE TYPE: ICD-10-CM

## 2022-11-28 DIAGNOSIS — Z79.01 CHRONIC ANTICOAGULATION: ICD-10-CM

## 2022-11-28 DIAGNOSIS — Z00.00 ENCOUNTER FOR MEDICARE ANNUAL WELLNESS EXAM: Primary | ICD-10-CM

## 2022-11-28 DIAGNOSIS — M62.89 PELVIC FLOOR DYSFUNCTION: ICD-10-CM

## 2022-11-28 PROCEDURE — G0438 PPPS, INITIAL VISIT: HCPCS | Mod: 95 | Performed by: FAMILY MEDICINE

## 2022-11-28 PROCEDURE — 99214 OFFICE O/P EST MOD 30 MIN: CPT | Mod: 25 | Performed by: FAMILY MEDICINE

## 2022-11-28 ASSESSMENT — ENCOUNTER SYMPTOMS
DYSURIA: 0
CONSTIPATION: 0
CHILLS: 0
PALPITATIONS: 0
DIARRHEA: 0
MYALGIAS: 0
EYE PAIN: 0
FEVER: 0
NERVOUS/ANXIOUS: 0
ABDOMINAL PAIN: 0
HEADACHES: 1
BREAST MASS: 0
COUGH: 0
NAUSEA: 0
JOINT SWELLING: 0
SORE THROAT: 0
DIZZINESS: 0
ARTHRALGIAS: 1
SHORTNESS OF BREATH: 0
HEARTBURN: 0
FREQUENCY: 1
PARESTHESIAS: 0
WEAKNESS: 0
HEMATOCHEZIA: 0
HEMATURIA: 0

## 2022-11-28 ASSESSMENT — ACTIVITIES OF DAILY LIVING (ADL): CURRENT_FUNCTION: NO ASSISTANCE NEEDED

## 2022-11-28 NOTE — PROGRESS NOTES
"Lyn is a 72 year old who is being evaluated via a billable video visit.      How would you like to obtain your AVS? MyChart  If the video visit is dropped, the invitation should be resent by: Send to e-mail at: [email or web address]  Will anyone else be joining your video visit? No          Assessment & Plan        Medicare wellness visit  healthy  COVID booster recommended - she is scheduled for 12/6 for bivalent COVID booster.   Mammogram 8/29/22 was normal.   Colonoscopy was completed 11/2020 and repeat is due in 2025    Paroxysmal atrial fibrillation (H)  Stable. Continues apixiban and carvedilol. No recent episodes of a fib.       Chronic anticoagulation   as above   CBC stable        Essential hypertension  Controlled. No changes today.   BMP, urine albumin stable  Continues carvedilol 6.25mg bid     Mixed hyperlipidemia   continues atorvastatin  20mg daily  Reviewed recent normal lipid results today     Pelvic floor dysfunction  Mixed incontinence  Incontinence of feces  Seen by Dr. Song  Had to cancel sacral nerve stimulator placement in 9/22 due to COVID infection. Plans on rescheduling in the spring, although has been feeling better since she went on a diet and has been losing some weight.        Hypothyroidism, unspecified type  Continues levothyroxine 50mcg daily. TSH stable    Umbilical hernia  Starting to bother her, painful on and off and bulging a little more at times.                 No follow-ups on file.    Lauryn Allison MD   [facility]    Felix Nicholson is a 72 year old , presenting for the following health issues:  Annual Visit      Healthy Habits:     In general, how would you rate your overall health?  Good    Frequency of exercise:  6-7 days/week    Duration of exercise:  Greater than 60 minutes    Do you usually eat at least 4 servings of fruit and vegetables a day, include whole grains    & fiber and avoid regularly eating high fat or \"junk\" foods?  Yes    " Taking medications regularly:  Yes    Medication side effects:  None    Ability to successfully perform activities of daily living:  No assistance needed    Home Safety:  No safety concerns identified    Hearing Impairment:  Difficulty following a conversation in a noisy restaurant or crowded room, feel that people are mumbling or not speaking clearly, difficulty following dialogue in the theater, need to ask people to speak up or repeat themselves, find that men's voices are easier to understand than woman's and difficulty understanding soft or whispered speech    In the past 6 months, have you been bothered by leaking of urine? Yes    In general, how would you rate your overall mental or emotional health?  Good      PHQ-2 Total Score: 0    Additional concerns today:  Yes     Plans on getting hearing aids within the next several months.        Review of Systems   Constitutional: Negative for chills and fever.   HENT: Positive for hearing loss. Negative for congestion, ear pain and sore throat.    Eyes: Negative for pain and visual disturbance.   Respiratory: Negative for cough and shortness of breath.    Cardiovascular: Negative for chest pain, palpitations and peripheral edema.   Gastrointestinal: Negative for abdominal pain, constipation, diarrhea, heartburn, hematochezia and nausea.   Breasts:  Negative for tenderness, breast mass and discharge.   Genitourinary: Positive for frequency and urgency. Negative for dysuria, genital sores, hematuria, pelvic pain, vaginal bleeding and vaginal discharge.   Musculoskeletal: Positive for arthralgias. Negative for joint swelling and myalgias.   Skin: Negative for rash.   Neurological: Positive for headaches. Negative for dizziness, weakness and paresthesias.   Psychiatric/Behavioral: Negative for mood changes. The patient is not nervous/anxious.        no concerns about the above issues at this time.       Objective    Vitals - Patient Reported  Weight (Patient Reported):  "59.9 kg (132 lb)  Height (Patient Reported): 167.6 cm (5' 6\")  BMI (Based on Pt Reported Ht/Wt): 21.31        Physical Exam   GENERAL: Healthy, alert and no distress  EYES: Eyes grossly normal to inspection.  No discharge or erythema, or obvious scleral/conjunctival abnormalities.  RESP: No audible wheeze, cough, or visible cyanosis.  No visible retractions or increased work of breathing.    SKIN: Visible skin clear. No significant rash, abnormal pigmentation or lesions.  NEURO: Cranial nerves grossly intact.  Mentation and speech appropriate for age.  PSYCH: Mentation appears normal, affect normal/bright, judgement and insight intact, normal speech and appearance well-groomed.    Lab on 09/26/2022   Component Date Value Ref Range Status     TSH 09/26/2022 2.60  0.40 - 4.00 mU/L Final     Sodium 09/26/2022 139  133 - 144 mmol/L Final     Potassium 09/26/2022 3.9  3.4 - 5.3 mmol/L Final     Chloride 09/26/2022 104  94 - 109 mmol/L Final     Carbon Dioxide (CO2) 09/26/2022 26  20 - 32 mmol/L Final     Anion Gap 09/26/2022 9  3 - 14 mmol/L Final     Urea Nitrogen 09/26/2022 25  7 - 30 mg/dL Final     Creatinine 09/26/2022 0.90  0.52 - 1.04 mg/dL Final     Calcium 09/26/2022 9.1  8.5 - 10.1 mg/dL Final     Glucose 09/26/2022 95  70 - 99 mg/dL Final     GFR Estimate 09/26/2022 67  >60 mL/min/1.73m2 Final    Effective December 21, 2021 eGFRcr in adults is calculated using the 2021 CKD-EPI creatinine equation which includes age and gender (Preston et al., NEJ, DOI: 10.1056/MTPMce4659305)     WBC Count 09/26/2022 5.7  4.0 - 11.0 10e3/uL Final     RBC Count 09/26/2022 3.85  3.80 - 5.20 10e6/uL Final     Hemoglobin 09/26/2022 12.3  11.7 - 15.7 g/dL Final     Hematocrit 09/26/2022 36.7  35.0 - 47.0 % Final     MCV 09/26/2022 95  78 - 100 fL Final     MCH 09/26/2022 31.9  26.5 - 33.0 pg Final     MCHC 09/26/2022 33.5  31.5 - 36.5 g/dL Final     RDW 09/26/2022 13.2  10.0 - 15.0 % Final     Platelet Count 09/26/2022 193  150 - 450 " 10e3/uL Final     Cholesterol 09/26/2022 173  <200 mg/dL Final     Triglycerides 09/26/2022 94  <150 mg/dL Final     Direct Measure HDL 09/26/2022 70  >=50 mg/dL Final     LDL Cholesterol Calculated 09/26/2022 84  <=100 mg/dL Final     Non HDL Cholesterol 09/26/2022 103  <130 mg/dL Final     Patient Fasting > 8hrs? 09/26/2022 Yes   Final     Creatinine Urine mg/dL 09/26/2022 66  mg/dL Final     Albumin Urine mg/L 09/26/2022 6  mg/L Final     Albumin Urine mg/g Cr 09/26/2022 9.09  0.00 - 25.00 mg/g Cr Final                Video-Visit Details    Video Start Time: 5:25 PM    Type of service:  Video Visit    Video End Time:5:40 PM    Originating Location (pt. Location): Home        Distant Location (provider location):  On-site    Platform used for Video Visit: [employer]

## 2022-11-29 NOTE — TELEPHONE ENCOUNTER
REFERRAL INFORMATION:    Referring Provider:  Dr. Lauryn Allison    Referring Clinic:  [facility]     Reason for Visit/Diagnosis: Umbilical hernia        FUTURE VISIT INFORMATION:    Appointment Date: 12/2/2022    Appointment Time: 1:30 PM      NOTES RECORD STATUS  DETAILS   OFFICE NOTE from Referring Provider Internal 11/28/2022 Office visit with Dr. Allison     OFFICE NOTE from Other Specialists N/A    HOSPITAL DISCHARGE SUMMARY/ ED VISITS  N/A    OPERATIVE REPORT N/A    ENDOSCOPY (EGD)  N/A    PERTINENT LABS Internal    PATHOLOGY REPORTS (RELATED) N/A    IMAGING (CT, MRI, US, XR)  Internal CT Abdomen Pelvis: 11/12/19

## 2022-12-01 ASSESSMENT — ENCOUNTER SYMPTOMS
HEARTBURN: 0
ABDOMINAL PAIN: 1
BLOOD IN STOOL: 0
VOMITING: 0
CONSTIPATION: 0
DIARRHEA: 0
BOWEL INCONTINENCE: 0
EYE REDNESS: 0
EYE WATERING: 1
JAUNDICE: 0
EYE IRRITATION: 1
BLOATING: 0
RECTAL PAIN: 0
NAUSEA: 0
EYE PAIN: 0
DOUBLE VISION: 0

## 2022-12-02 ENCOUNTER — PRE VISIT (OUTPATIENT)
Dept: SURGERY | Facility: CLINIC | Age: 72
End: 2022-12-02

## 2022-12-02 ENCOUNTER — OFFICE VISIT (OUTPATIENT)
Dept: SURGERY | Facility: CLINIC | Age: 72
End: 2022-12-02
Payer: COMMERCIAL

## 2022-12-02 VITALS
DIASTOLIC BLOOD PRESSURE: 95 MMHG | HEIGHT: 66 IN | OXYGEN SATURATION: 94 % | SYSTOLIC BLOOD PRESSURE: 150 MMHG | WEIGHT: 137.7 LBS | HEART RATE: 61 BPM | BODY MASS INDEX: 22.13 KG/M2

## 2022-12-02 DIAGNOSIS — K42.9 UMBILICAL HERNIA WITHOUT OBSTRUCTION AND WITHOUT GANGRENE: ICD-10-CM

## 2022-12-02 PROCEDURE — 99203 OFFICE O/P NEW LOW 30 MIN: CPT | Performed by: SURGERY

## 2022-12-02 ASSESSMENT — ENCOUNTER SYMPTOMS
MEMORY LOSS: 0
PARALYSIS: 0
ALTERED TEMPERATURE REGULATION: 0
DISTURBANCES IN COORDINATION: 0
NERVOUS/ANXIOUS: 0
BACK PAIN: 0
LEG SWELLING: 0
JAUNDICE: 0
SEIZURES: 0
EYE PAIN: 0
SINUS CONGESTION: 0
LIGHT-HEADEDNESS: 0
NECK MASS: 0
ARTHRALGIAS: 0
DIARRHEA: 0
EXTREMITY NUMBNESS: 0
POLYDIPSIA: 0
JOINT SWELLING: 0
NAIL CHANGES: 0
SHORTNESS OF BREATH: 0
SYNCOPE: 0
EYE IRRITATION: 1
CLAUDICATION: 0
PALPITATIONS: 0
NUMBNESS: 0
TREMORS: 0
PANIC: 0
WHEEZING: 0
TASTE DISTURBANCE: 0
COUGH: 0
HEMATURIA: 0
DYSPNEA ON EXERTION: 0
WEIGHT LOSS: 0
TROUBLE SWALLOWING: 0
TACHYCARDIA: 0
WEAKNESS: 0
EYE WATERING: 1
COUGH DISTURBING SLEEP: 0
MUSCLE CRAMPS: 0
DOUBLE VISION: 0
LEG PAIN: 0
EYE REDNESS: 0
SINUS PAIN: 0
CONSTIPATION: 0
RECTAL PAIN: 0
BLOATING: 0
SNORES LOUDLY: 0
BRUISES/BLEEDS EASILY: 0
HEMOPTYSIS: 0
DECREASED LIBIDO: 0
FEVER: 0
POLYPHAGIA: 0
HOT FLASHES: 0
POOR WOUND HEALING: 0
VOMITING: 0
FLANK PAIN: 0
RESPIRATORY PAIN: 0
ABDOMINAL PAIN: 1
HYPERTENSION: 0
SORE THROAT: 0
HALLUCINATIONS: 0
NAUSEA: 0
BREAST PAIN: 0
POSTURAL DYSPNEA: 0
DYSURIA: 0
BREAST MASS: 0
ORTHOPNEA: 0
SLEEP DISTURBANCES DUE TO BREATHING: 0
NECK PAIN: 0
HEARTBURN: 0
CHILLS: 0
LOSS OF CONSCIOUSNESS: 0
TINGLING: 0
SMELL DISTURBANCE: 0
STIFFNESS: 0
DEPRESSION: 0
EXERCISE INTOLERANCE: 0
MUSCLE WEAKNESS: 0
SKIN CHANGES: 0
INSOMNIA: 0
DIZZINESS: 0
HEADACHES: 0
SPEECH CHANGE: 0
BOWEL INCONTINENCE: 0
HYPOTENSION: 0
INCREASED ENERGY: 0
SPUTUM PRODUCTION: 0
WEIGHT GAIN: 0
SWOLLEN GLANDS: 0
MYALGIAS: 0
FATIGUE: 0
HOARSE VOICE: 0
DECREASED CONCENTRATION: 0
NIGHT SWEATS: 0
BLOOD IN STOOL: 0
DECREASED APPETITE: 0
DIFFICULTY URINATING: 0

## 2022-12-02 ASSESSMENT — PAIN SCALES - GENERAL: PAINLEVEL: NO PAIN (0)

## 2022-12-02 NOTE — PROGRESS NOTES
New Hernia Consultation Note      Nicole Stanley  [id number]  1950    Requesting Provider: Lauryn Allison    Dear Keegan, Lauryn HOOKS,    I was asked by Lauryn Allison to see this patient for the following problem: Nicole Stanley is a 72 year old female who presents to clinic today for the following health issues     CHIEF COMPLAINT:  Chief Complaint Reviewed With Patient 12/1/2022   I am here today to be seen for: Umbilical Hernia         ASSESSMENT/PLAN:  umbilical  Hernia size is less than 5cm in size.    I offered Ms Stanley surgery, which she wanted to wait on due to travel. I explained I thought that was safe and reasonable, as there was only a v small (but not zero) chance of bowel incarceration. She will contact me likely next year to discuss repair.     Assessment & Plan   Problem List Items Addressed This Visit    None  Visit Diagnoses     Umbilical hernia without obstruction and without gangrene             30 minutes spent on the date of the encounter doing chart review, history and exam, documentation and further activities per the note    HISTORY OF PRESENT ILLNESS:  Location: umbilical- port site from remote lap dino  Severity: Mild     Timing of Hernia Reviewed With Patient 12/1/2022   The onset of my hernia symptoms was: Gradual   My symptoms are: Intermittant (Come and Go)   My symptoms have been: Worsening       Duration Reviewed With Patient 12/1/2022   My symptoms began: 6 weeks ago       Modifying Factor Questions Reviewed With Patient 12/1/2022   My hernia symptoms improve with: resolve on their own   My hernia symptoms worsen with: nothing       Associated Signs and Symptoms Reviewed With Patient 12/1/2022   I have the following complaints/concerns related to my hernia: Pain, Abdominal Bulge or Protusion       [facility] SUR[facility]ERY-HERNIA HISTORY 12/1/2022   My hernia has been repaired with mesh in the past? No       Patient Supplied Answers To HerQLes Assessment Questionnaire  No flowsheet data  found.  _______________________________________________________________________            NUTRITIONAL STATUS:  Lab Results   Component Value Date    ALBUMIN 4.1 02/10/2021       Body mass index is 22.23 kg/m .    Patient is not immunosuppressed.    Patient is not a current smoker.    Past Medical History:   Diagnosis Date     Arthritis     osteoarth     Heart disease 2018     Hypertension age 49     Influenza B 4/19/2018     Spider veins      Thyroid disease age 49       Patient Active Problem List   Diagnosis     Essential hypertension     Mixed hyperlipidemia     Bunion, left     Osteoarthritis of both hands, unspecified osteoarthritis type     Hypothyroidism     Uterovaginal prolapse     Mixed incontinence     Pelvic floor dysfunction     Dyspareunia in female     Left ovarian cyst     Paroxysmal atrial fibrillation (H)     Irritable larynx syndrome     Muscle tension dysphonia     Left knee pain, unspecified chronicity     Lateral pain of hip       Past Surgical History:   Procedure Laterality Date     CHOLECYSTECTOMY  2000     COLONOSCOPY N/A 11/12/2020    Procedure: COLONOSCOPY;  Surgeon: Hugo Troncoso MD;  Location:  GI     CYSTOSCOPY       ENT SURGERY  2002    turbinectomy & septoplasty     EYE SURGERY      blepharoplasty     GI SURGERY  2006    rectal sphincter     VASCULAR SURGERY  1988    vein stripping       MEDICATIONS:  Current Outpatient Medications   Medication     Acetaminophen (TYLENOL PO)     apixaban ANTICOAGULANT (ELIQUIS ANTICOAGULANT) 5 MG tablet     atorvastatin (LIPITOR) 20 MG tablet     calcium carbonate (OSCAL 500) 1250 (500 Ca) MG TABS tablet     carvedilol (COREG) 6.25 MG tablet     Cholecalciferol (VITAMIN D3) 3000 units TABS     levothyroxine (SYNTHROID/LEVOTHROID) 50 MCG tablet     MAGNESIUM GLYCINATE PO     Probiotic Product (PROBIOTIC COLON SUPPORT PO)     zinc sulfate (ZINCATE) 220 (50 Zn) MG capsule     triamcinolone (KENALOG) 0.1 % external cream     No current  facility-administered medications for this visit.       ALLERGIES:  No Known Allergies    Social History     Socioeconomic History     Marital status:      Spouse name: None     Number of children: None     Years of education: None     Highest education level: None   Tobacco Use     Smoking status: Never     Smokeless tobacco: Never   Vaping Use     Vaping Use: Never used   Substance and Sexual Activity     Alcohol use: Yes     Comment: 1-2 glass of wine per day     Drug use: Never     Sexual activity: Yes     Partners: Male     Birth control/protection: Post-menopausal   Other Topics Concern     Parent/sibling w/ CABG, MI or angioplasty before 65F 55M? No       Family History   Problem Relation Age of Onset     Hypertension Mother              Hyperlipidemia Mother      Cerebrovascular Disease Mother      Thyroid Disease Mother      Family History Negative Father              Alzheimer Disease Father      Cancer Father         sarcoma     Hypertension Father      Basal cell carcinoma Father      Family History Negative Maternal Grandmother               Hyperlipidemia Sister      Thyroid Disease Sister      Thyroid Disease Sister      Hyperlipidemia Sister        Review of Systems     Constitutional:  Negative for fever, chills, weight loss, weight gain, fatigue, decreased appetite, night sweats, recent stressors, height gain, height loss, post-operative complications, incisional pain, hallucinations, increased energy, hyperactivity and confused.   HENT:  Negative for ear pain, hearing loss, tinnitus, nosebleeds, trouble swallowing, hoarse voice, mouth sores, sore throat, ear discharge, tooth pain, gum tenderness, taste disturbance, smell disturbance, hearing aid, bleeding gums, dry mouth, sinus pain, sinus congestion and neck mass.    Eyes:  Positive for eye watering, eye dryness and eye irritation. Negative for double vision, pain, redness, eye pain, decreased vision, eye bulging,  flashing lights, spots, floaters, strabismus, tunnel vision and jaundice.   Respiratory:   Negative for cough, hemoptysis, sputum production, shortness of breath, wheezing, sleep disturbances due to breathing, snores loudly, respiratory pain, dyspnea on exertion, cough disturbing sleep and postural dyspnea.    Cardiovascular:  Negative for chest pain, dyspnea on exertion, palpitations, orthopnea, claudication, leg swelling, fingers/toes turn blue, hypertension, hypotension, syncope, history of heart murmur, chest pain on exertion, chest pain at rest, pacemaker, few scattered varicosities, leg pain, sleep disturbances due to breathing, tachycardia, light-headedness, exercise intolerance and edema.   Gastrointestinal:  Positive for abdominal pain. Negative for heartburn, nausea, vomiting, diarrhea, constipation, blood in stool, melena, rectal pain, bloating, bowel incontinence, jaundice, coffee ground emesis and change in stool.   Genitourinary:  Negative for bladder incontinence, dysuria, urgency, hematuria, flank pain, vaginal discharge, difficulty urinating, genital sores, dyspareunia, decreased libido, nocturia, voiding less frequently, arousal difficulty, abnormal vaginal bleeding, excessive menstruation, menstrual changes, hot flashes, vaginal dryness and postmenopausal bleeding.   Musculoskeletal:  Negative for myalgias, back pain, joint swelling, arthralgias, stiffness, muscle cramps, neck pain, bone pain, muscle weakness and fracture.   Skin:  Negative for nail changes, itching, poor wound healing, rash, hair changes, skin changes, acne, warts, poor wound healing, scarring, flaky skin, Raynaud's phenomenon, sensitivity to sunlight and skin thickening.   Neurological:  Negative for dizziness, tingling, tremors, speech change, seizures, loss of consciousness, weakness, light-headedness, numbness, headaches, disturbances in coordination, extremity numbness, memory loss, difficulty walking and paralysis.  "  Endo/Heme:  Negative for anemia, swollen glands and bruises/bleeds easily.   Psychiatric/Behavioral:  Negative for depression, hallucinations, memory loss, decreased concentration, mood swings and panic attacks.    Breast:  Negative for breast discharge, breast mass, breast pain and nipple retraction.   Endocrine:  Negative for altered temperature regulation, polyphagia, polydipsia, unwanted hair growth and change in facial hair.          PHYSICAL EXAM:  Objective    BP (!) 150/95 (BP Location: Left arm, Patient Position: Sitting, Cuff Size: Adult Regular)   Pulse 61   Ht 1.676 m (5' 6\")   Wt 62.5 kg (137 lb 11.2 oz)   LMP  (LMP Unknown)   SpO2 94%   BMI 22.23 kg/m    BP (!) 150/95 (BP Location: Left arm, Patient Position: Sitting, Cuff Size: Adult Regular)   Pulse 61   Ht 1.676 m (5' 6\")   Wt 62.5 kg (137 lb 11.2 oz)   LMP  (LMP Unknown)   SpO2 94%   BMI 22.23 kg/m    Body mass index is 22.23 kg/m .  Physical Exam  Musculoskeletal:         General: No edema.      small reducible port sie hernia        DISCUSSION OF RISKS:  deferred    Sincerely,    Randall Pool MD      "

## 2022-12-02 NOTE — NURSING NOTE
"Chief Complaint   Patient presents with     New Patient     Umbilical hernia        Vitals:    12/02/22 1323   BP: (!) 150/95   BP Location: Left arm   Patient Position: Sitting   Cuff Size: Adult Regular   Pulse: 61   SpO2: 94%   Weight: 62.5 kg (137 lb 11.2 oz)   Height: 1.676 m (5' 6\")       Body mass index is 22.23 kg/m .                          Edie Santacruz, EMT    "

## 2022-12-13 ENCOUNTER — TELEPHONE (OUTPATIENT)
Dept: CARDIOLOGY | Facility: CLINIC | Age: 72
End: 2022-12-13

## 2022-12-13 ENCOUNTER — APPOINTMENT (OUTPATIENT)
Dept: GENERAL RADIOLOGY | Facility: CLINIC | Age: 72
End: 2022-12-13
Attending: INTERNAL MEDICINE
Payer: COMMERCIAL

## 2022-12-13 ENCOUNTER — NURSE TRIAGE (OUTPATIENT)
Dept: CARDIOLOGY | Facility: CLINIC | Age: 72
End: 2022-12-13

## 2022-12-13 ENCOUNTER — HOSPITAL ENCOUNTER (EMERGENCY)
Facility: CLINIC | Age: 72
Discharge: HOME | End: 2022-12-13
Attending: INTERNAL MEDICINE | Admitting: INTERNAL MEDICINE
Payer: COMMERCIAL

## 2022-12-13 VITALS
DIASTOLIC BLOOD PRESSURE: 88 MMHG | WEIGHT: 132 LBS | HEIGHT: 66 IN | BODY MASS INDEX: 21.21 KG/M2 | TEMPERATURE: 98.2 F | SYSTOLIC BLOOD PRESSURE: 120 MMHG | OXYGEN SATURATION: 97 % | RESPIRATION RATE: 14 BRPM | HEART RATE: 64 BPM

## 2022-12-13 DIAGNOSIS — I48.0 PAROXYSMAL ATRIAL FIBRILLATION (H): ICD-10-CM

## 2022-12-13 DIAGNOSIS — Z79.01 CHRONIC ANTICOAGULATION: ICD-10-CM

## 2022-12-13 DIAGNOSIS — I48.0 PAROXYSMAL ATRIAL FIBRILLATION (H): Primary | ICD-10-CM

## 2022-12-13 LAB
ALBUMIN SERPL BCG-MCNC: 4.7 G/DL (ref 3.5–5.2)
ALP SERPL-CCNC: 68 U/L (ref 35–104)
ALT SERPL W P-5'-P-CCNC: 26 U/L (ref 10–35)
ANION GAP SERPL CALCULATED.3IONS-SCNC: 14 MMOL/L (ref 7–15)
AST SERPL W P-5'-P-CCNC: 21 U/L (ref 10–35)
ATRIAL RATE - MUSE: 113 BPM
ATRIAL RATE - MUSE: 63 BPM
BASOPHILS # BLD AUTO: 0 10E3/UL (ref 0–0.2)
BASOPHILS NFR BLD AUTO: 1 %
BILIRUB SERPL-MCNC: 0.6 MG/DL
BUN SERPL-MCNC: 18.1 MG/DL (ref 8–23)
CALCIUM SERPL-MCNC: 9.7 MG/DL (ref 8.8–10.2)
CHLORIDE SERPL-SCNC: 104 MMOL/L (ref 98–107)
CREAT SERPL-MCNC: 0.92 MG/DL (ref 0.51–0.95)
DEPRECATED HCO3 PLAS-SCNC: 25 MMOL/L (ref 22–29)
DIASTOLIC BLOOD PRESSURE - MUSE: NORMAL MMHG
DIASTOLIC BLOOD PRESSURE - MUSE: NORMAL MMHG
EOSINOPHIL # BLD AUTO: 0.2 10E3/UL (ref 0–0.7)
EOSINOPHIL NFR BLD AUTO: 3 %
ERYTHROCYTE [DISTWIDTH] IN BLOOD BY AUTOMATED COUNT: 13.6 % (ref 10–15)
GFR SERPL CREATININE-BSD FRML MDRD: 65 ML/MIN/1.73M2
GLUCOSE SERPL-MCNC: 97 MG/DL (ref 70–99)
HCT VFR BLD AUTO: 43.3 % (ref 35–47)
HGB BLD-MCNC: 14.3 G/DL (ref 11.7–15.7)
IMM GRANULOCYTES # BLD: 0 10E3/UL
IMM GRANULOCYTES NFR BLD: 0 %
INR PPP: 1.09 (ref 0.85–1.15)
INTERPRETATION ECG - MUSE: NORMAL
INTERPRETATION ECG - MUSE: NORMAL
LYMPHOCYTES # BLD AUTO: 2.3 10E3/UL (ref 0.8–5.3)
LYMPHOCYTES NFR BLD AUTO: 40 %
MAGNESIUM SERPL-MCNC: 2.3 MG/DL (ref 1.7–2.3)
MCH RBC QN AUTO: 31.8 PG (ref 26.5–33)
MCHC RBC AUTO-ENTMCNC: 33 G/DL (ref 31.5–36.5)
MCV RBC AUTO: 96 FL (ref 78–100)
MONOCYTES # BLD AUTO: 0.6 10E3/UL (ref 0–1.3)
MONOCYTES NFR BLD AUTO: 11 %
NEUTROPHILS # BLD AUTO: 2.6 10E3/UL (ref 1.6–8.3)
NEUTROPHILS NFR BLD AUTO: 45 %
NRBC # BLD AUTO: 0 10E3/UL
NRBC BLD AUTO-RTO: 0 /100
P AXIS - MUSE: 47 DEGREES
P AXIS - MUSE: NORMAL DEGREES
PHOSPHATE SERPL-MCNC: 3.4 MG/DL (ref 2.5–4.5)
PLATELET # BLD AUTO: 211 10E3/UL (ref 150–450)
POTASSIUM SERPL-SCNC: 4.4 MMOL/L (ref 3.4–5.3)
PR INTERVAL - MUSE: 182 MS
PR INTERVAL - MUSE: NORMAL MS
PROT SERPL-MCNC: 7.5 G/DL (ref 6.4–8.3)
QRS DURATION - MUSE: 78 MS
QRS DURATION - MUSE: 80 MS
QT - MUSE: 348 MS
QT - MUSE: 440 MS
QTC - MUSE: 444 MS
QTC - MUSE: 450 MS
R AXIS - MUSE: -25 DEGREES
R AXIS - MUSE: -36 DEGREES
RBC # BLD AUTO: 4.49 10E6/UL (ref 3.8–5.2)
SODIUM SERPL-SCNC: 143 MMOL/L (ref 136–145)
SYSTOLIC BLOOD PRESSURE - MUSE: NORMAL MMHG
SYSTOLIC BLOOD PRESSURE - MUSE: NORMAL MMHG
T AXIS - MUSE: -18 DEGREES
T AXIS - MUSE: 10 DEGREES
TROPONIN T SERPL HS-MCNC: 10 NG/L
VENTRICULAR RATE- MUSE: 63 BPM
VENTRICULAR RATE- MUSE: 98 BPM
WBC # BLD AUTO: 5.7 10E3/UL (ref 4–11)

## 2022-12-13 PROCEDURE — 99285 EMERGENCY DEPT VISIT HI MDM: CPT | Mod: 25 | Performed by: INTERNAL MEDICINE

## 2022-12-13 PROCEDURE — 84484 ASSAY OF TROPONIN QUANT: CPT | Performed by: INTERNAL MEDICINE

## 2022-12-13 PROCEDURE — 83735 ASSAY OF MAGNESIUM: CPT | Performed by: EMERGENCY MEDICINE

## 2022-12-13 PROCEDURE — 250N000011 HC RX IP 250 OP 636: Performed by: INTERNAL MEDICINE

## 2022-12-13 PROCEDURE — 71046 X-RAY EXAM CHEST 2 VIEWS: CPT | Mod: 26 | Performed by: RADIOLOGY

## 2022-12-13 PROCEDURE — 93010 ELECTROCARDIOGRAM REPORT: CPT | Mod: 59 | Performed by: INTERNAL MEDICINE

## 2022-12-13 PROCEDURE — 92960 CARDIOVERSION ELECTRIC EXT: CPT | Performed by: INTERNAL MEDICINE

## 2022-12-13 PROCEDURE — 71046 X-RAY EXAM CHEST 2 VIEWS: CPT

## 2022-12-13 PROCEDURE — 80053 COMPREHEN METABOLIC PANEL: CPT | Performed by: EMERGENCY MEDICINE

## 2022-12-13 PROCEDURE — 85610 PROTHROMBIN TIME: CPT | Performed by: EMERGENCY MEDICINE

## 2022-12-13 PROCEDURE — 36415 COLL VENOUS BLD VENIPUNCTURE: CPT | Performed by: EMERGENCY MEDICINE

## 2022-12-13 PROCEDURE — 85025 COMPLETE CBC W/AUTO DIFF WBC: CPT | Performed by: EMERGENCY MEDICINE

## 2022-12-13 PROCEDURE — 93005 ELECTROCARDIOGRAM TRACING: CPT | Mod: 59 | Performed by: INTERNAL MEDICINE

## 2022-12-13 PROCEDURE — 84100 ASSAY OF PHOSPHORUS: CPT | Performed by: EMERGENCY MEDICINE

## 2022-12-13 RX ORDER — MAGNESIUM SULFATE HEPTAHYDRATE 40 MG/ML
2 INJECTION, SOLUTION INTRAVENOUS
Status: CANCELLED | OUTPATIENT
Start: 2022-12-13

## 2022-12-13 RX ORDER — POTASSIUM CHLORIDE 1500 MG/1
20 TABLET, EXTENDED RELEASE ORAL
Status: CANCELLED | OUTPATIENT
Start: 2022-12-13

## 2022-12-13 RX ORDER — PROPOFOL 10 MG/ML
100 INJECTION, EMULSION INTRAVENOUS ONCE
Status: COMPLETED | OUTPATIENT
Start: 2022-12-13 | End: 2022-12-13

## 2022-12-13 RX ORDER — LIDOCAINE 40 MG/G
CREAM TOPICAL
Status: CANCELLED | OUTPATIENT
Start: 2022-12-13

## 2022-12-13 RX ORDER — POTASSIUM CHLORIDE 1500 MG/1
40 TABLET, EXTENDED RELEASE ORAL
Status: CANCELLED | OUTPATIENT
Start: 2022-12-13

## 2022-12-13 RX ADMIN — PROPOFOL 60 MG: 10 INJECTION, EMULSION INTRAVENOUS at 18:25

## 2022-12-13 ASSESSMENT — ENCOUNTER SYMPTOMS
COLOR CHANGE: 0
ARTHRALGIAS: 0
ABDOMINAL PAIN: 0
SHORTNESS OF BREATH: 1
NECK STIFFNESS: 0
HEADACHES: 1
CONFUSION: 0
EYE REDNESS: 0
PALPITATIONS: 1
FEVER: 0
DIFFICULTY URINATING: 0

## 2022-12-13 ASSESSMENT — ACTIVITIES OF DAILY LIVING (ADL)
ADLS_ACUITY_SCORE: 35
ADLS_ACUITY_SCORE: 35

## 2022-12-13 NOTE — TELEPHONE ENCOUNTER
"Received a call from the call center to discuss irregular heart beat.   Spoke to Lyn who says she has been in A fib for about 30 hours and is not sure what she should be doing, if anything. She says her heart rate jumps around and has been as high as 111 but can be down in the 60's-70s too. She says her blood pressure has been running low-yesterday 86/55 then 107/90. Today BP 94/64. Current HR per smart watch is 102 bpm, but says 6 minutes ago was 75 bpm. She says she gets some lightheadedness and shortness of breath if she goes up the stairs but is able to function, just not feeling great. She says she feels \"foggy\". She says she is taking eliquis and carvedilol as prescribed.  Advised a message will be sent to [facility] for follow up.  She says she had also sent a my chart message.  1. DESCRIPTION: \"Please describe your heart rate or heartbeat that you are having\" (e.g., fast/slow, regular/irregular, skipped or extra beats, \"palpitations\") irregular  2. ONSET: \"When did it start?\" (Minutes, hours or days) 30 hours ago  3. DURATION: \"How long does it last\" (e.g., seconds, minutes, hours) continuous  4. PATTERN \"Does it come and go, or has it been constant since it started?\" \"Does it get worse with exertion?\" \"Are you feeling it now?\"  5. TAP: \"Using your hand, can you tap out what you are feeling on a chair or table in front of you, so that I can hear?\" (Note: not all patients can do this)   6. HEART RATE: \"Can you tell me your heart rate?\" \"How many beats in 15 seconds?\" (Note: not all patients can do this) see above  7. RECURRENT SYMPTOM: \"Have you ever had this before?\" If Yes, ask: \"When was the last time?\" and \"What happened that time?\"   8. CAUSE: \"What do you think is causing the palpitations?\"  9. CARDIAC HISTORY: \"Do you have any history of heart disease?\" (e.g., heart attack, angina, bypass surgery, angioplasty, arrhythmia) PAF  10. OTHER SYMPTOMS: \"Do you have any other symptoms?\" (e.g., " dizziness, chest pain, sweating, difficulty breathing) lightheadedness and short of breath intermittently      Additional Information    Negative: Heart beating very rapidly (e.g., > 140 / minute) and present now  (Exception: During exercise.)    Protocols used: HEART RATE AND HEARTBEAT [id number]-PM

## 2022-12-13 NOTE — ED PROVIDER NOTES
"    [facility] EMERGENCY DEPARTMENT (Formerly [facility])  12/13/22  History     Chief Complaint   Patient presents with     Palpitations     The history is provided by the patient and medical records.     Nicole Stanley is a 72 year old female with a history notable for proximal atrial fibrillation, HTN, and HLD who presents to the ED for evaluation of palpitations.  Patient estimates first feeling palpitations 36 hours prior to arrival.  She notes feelings of heart racing, headache, mentally feeling \"foggy\" and some blurry vision earlier today.  Patient also notes experiencing shortness of breath with exertion.  Patient notes that she has been hypotensive for the past few days.  She reports recording a systolic blood pressure as low as 86.  Patient is anticoagulated and denies missing doses.  Patient notes last experiencing palpitations 5-6 months ago.  Patient was prescribed metoprolol but has since 6.25 mg to carvedilol.  Patient reports she has previously undergone a cardioversion.    I have reviewed the Medications, Allergies, Past Medical and Surgical History, and Social History in the [facility] system.  PAST MEDICAL HISTORY:   Past Medical History:   Diagnosis Date     Arthritis     osteoarth     Heart disease 2018     Hypertension age 49     Influenza B 4/19/2018     Spider veins      Thyroid disease age 49       PAST SURGICAL HISTORY:   Past Surgical History:   Procedure Laterality Date     CHOLECYSTECTOMY  2000     COLONOSCOPY N/A 11/12/2020    Procedure: COLONOSCOPY;  Surgeon: Hugo Troncoso MD;  Location:  GI     CYSTOSCOPY       ENT SURGERY  2002    turbinectomy & septoplasty     EYE SURGERY      blepharoplasty     GI SURGERY  2006    rectal sphincter     VASCULAR SURGERY  1988    vein stripping       Past medical history, past surgical history, medications, and allergies were reviewed with the patient. Additional pertinent items: None    FAMILY HISTORY:   Family History   Problem Relation Age of " Onset     Hypertension Mother              Hyperlipidemia Mother      Cerebrovascular Disease Mother      Thyroid Disease Mother      Family History Negative Father              Alzheimer Disease Father      Cancer Father         sarcoma     Hypertension Father      Basal cell carcinoma Father      Family History Negative Maternal Grandmother               Hyperlipidemia Sister      Thyroid Disease Sister      Thyroid Disease Sister      Hyperlipidemia Sister        SOCIAL HISTORY:   Social History     Tobacco Use     Smoking status: Never     Smokeless tobacco: Never   Substance Use Topics     Alcohol use: Yes     Comment: 1-2 glass of wine per day     Social history was reviewed with the patient. Additional pertinent items: None      Discharge Medication List as of 2022  7:04 PM      CONTINUE these medications which have NOT CHANGED    Details   Acetaminophen (TYLENOL PO) Take 500 mg by mouth as needed for mild pain or fever, Historical      apixaban ANTICOAGULANT (ELIQUIS ANTICOAGULANT) 5 MG tablet TAKE 1 TABLET BY MOUTH TWICE A DAY, Disp-180 tablet, R-1, E-Prescribe      atorvastatin (LIPITOR) 20 MG tablet Take 1 tablet (20 mg) by mouth daily, Disp-90 tablet, R-3, E-Prescribe      calcium carbonate (OSCAL 500) 1250 (500 Ca) MG TABS tablet Take 1 tablet by mouth 2 times daily, Historical      carvedilol (COREG) 6.25 MG tablet Take 1 tablet (6.25 mg) by mouth 2 times daily (with meals), Disp-180 tablet, R-3, E-Prescribe      Cholecalciferol (VITAMIN D3) 3000 units TABS Take 1 tablet by mouth daily, Historical      levothyroxine (SYNTHROID/LEVOTHROID) 50 MCG tablet Take 1 tablet (50 mcg) by mouth daily, Disp-90 tablet, R-3, E-Prescribe      MAGNESIUM GLYCINATE PO Historical      Probiotic Product (PROBIOTIC COLON SUPPORT PO) Take 1 tablet by mouth daily, Historical      triamcinolone (KENALOG) 0.1 % external cream Historical      zinc sulfate (ZINCATE) 220 (50 Zn) MG capsule Take 220  "mg by mouth daily, Historical              No Known Allergies     Review of Systems   Constitutional: Negative for fever.   HENT: Negative for congestion.    Eyes: Positive for visual disturbance. Negative for redness.   Respiratory: Positive for shortness of breath.    Cardiovascular: Positive for palpitations. Negative for chest pain.   Gastrointestinal: Negative for abdominal pain.   Genitourinary: Negative for difficulty urinating.   Musculoskeletal: Negative for arthralgias and neck stiffness.   Skin: Negative for color change.   Neurological: Positive for headaches.   Psychiatric/Behavioral: Negative for confusion.     A complete review of systems was performed with pertinent positives and negatives noted in the HPI, and all other systems negative.    Physical Exam   BP: (!) 153/102  Pulse: 92  Temp: 98.2  F (36.8  C)  Resp: 18  Height: 167.6 cm (5' 6\")  Weight: 59.9 kg (132 lb)  SpO2: 93 %      Physical Exam  Constitutional:       General: She is not in acute distress.     Appearance: She is not diaphoretic.   HENT:      Head: Atraumatic.      Mouth/Throat:      Mouth: Oropharynx is clear and moist.      Pharynx: No oropharyngeal exudate.   Eyes:      General: No scleral icterus.     Pupils: Pupils are equal, round, and reactive to light.   Cardiovascular:      Rate and Rhythm: Tachycardia present. Rhythm irregular.      Pulses: Intact distal pulses.      Heart sounds: Normal heart sounds. No murmur heard.    No friction rub. No gallop.   Pulmonary:      Effort: Pulmonary effort is normal. No respiratory distress.      Breath sounds: Normal breath sounds. No stridor. No wheezing, rhonchi or rales.   Chest:      Chest wall: No tenderness.   Abdominal:      General: Abdomen is flat. Bowel sounds are normal. There is no distension.      Palpations: Abdomen is soft. There is no mass.      Tenderness: There is no abdominal tenderness. There is no right CVA tenderness, left CVA tenderness, guarding or rebound.      " Hernia: No hernia is present.   Musculoskeletal:         General: No tenderness or edema.      Cervical back: Neck supple.   Skin:     General: Skin is warm.      Findings: No rash.   Neurological:      General: No focal deficit present.      Cranial Nerves: No cranial nerve deficit.         ED Course   3:19 PM  The patient was seen and examined by Dr. Candy Atkins in Room Triage.        [facility]    -Cardioversion External    Date/Time: 12/13/2022 9:40 PM  Performed by: Candy Atkins MD  Authorized by: Candy Atkins MD     Risks, benefits and alternatives discussed.    ED EVALUATION:      I have performed an Emergency Department Evaluation including taking a history and physical examination, this evaluation will be documented in the electronic medical record for this ED encounter.      ASA Class: Class 1- healthy patient    Mallampati: Grade 1- soft palate, uvula, tonsillar pillars, and posterior pharyngeal wall visible    NPO Status: appropriately NPO for procedure    UNIVERSAL PROTOCOL   Site Marked: NA  Prior Images Obtained and Reviewed:  NA  Required items: Required blood products, implants, devices and special equipment available    Patient identity confirmed:  Verbally with patient  Patient was reevaluated immediately before administering moderate or deep sedation or anesthesia  Confirmation Checklist:  Patient's identity using two indicators, relevant allergies, procedure was appropriate and matched the consent or emergent situation and correct equipment/implants were available  Time out: Immediately prior to the procedure a time out was called    Universal Protocol: the Joint Commission Universal Protocol was followed    Preparation: Patient was prepped and draped in usual sterile fashion      SEDATION  Patient Sedated: Yes    Sedation:  Propofol  Vital signs: Vital signs monitored during sedation      PRE-PROCEDURE DETAILS:     Cardioversion basis:   Elective    Rhythm:  Atrial fibrillation    Electrode placement:  Anterior-posterior  Attempt one:     Cardioversion mode:  Synchronous    Waveform:  Biphasic    Shock (Joules):  100    Shock outcome:  Conversion to normal sinus rhythm  Post-procedure details:     Patient status:  Awake      PROCEDURE    Patient Tolerance:  Patient tolerated the procedure well with no immediate complications  Length of time physician/provider present for 1:1 monitoring during sedation: 20              EKG Interpretation:      Interpreted by Candy Atkins MD, MD  Time reviewed: after cardioversion  Symptoms at time of EKG: none   Rhythm: normal sinus   Rate: normal  Axis: normal  Ectopy: none  Conduction: normal  ST Segments/ T Waves: No ST-T wave changes  Q Waves: none  Comparison to prior: converted to NSR    Clinical Impression: normal EKG              Results for orders placed or performed during the hospital encounter of 12/13/22 (from the past 24 hour(s))   EKG 12-lead, tracing only   Result Value Ref Range    Systolic Blood Pressure  mmHg    Diastolic Blood Pressure  mmHg    Ventricular Rate 98 BPM    Atrial Rate 113 BPM    NY Interval  ms    QRS Duration 80 ms     ms    QTc 444 ms    P Axis  degrees    R AXIS -25 degrees    T Axis -18 degrees    Interpretation ECG       Atrial fibrillation  Nonspecific ST abnormality  Abnormal ECG  Unconfirmed report - interpretation of this ECG is computer generated - see medical record for final interpretation  Confirmed by - EMERGENCY ROOM, PHYSICIAN ([id number]),  TRUDI MENDOZA ([id number]) on 12/13/2022 4:22:10 PM     CBC with platelets differential    Narrative    The following orders were created for panel order CBC with platelets differential.  Procedure                               Abnormality         Status                     ---------                               -----------         ------                     CBC with platelets and d...[[id number]]                      Final  result                 Please view results for these tests on the individual orders.   INR   Result Value Ref Range    INR 1.09 0.85 - 1.15   Comprehensive metabolic panel   Result Value Ref Range    Sodium 143 136 - 145 mmol/L    Potassium 4.4 3.4 - 5.3 mmol/L    Chloride 104 98 - 107 mmol/L    Carbon Dioxide (CO2) 25 22 - 29 mmol/L    Anion Gap 14 7 - 15 mmol/L    Urea Nitrogen 18.1 8.0 - 23.0 mg/dL    Creatinine 0.92 0.51 - 0.95 mg/dL    Calcium 9.7 8.8 - 10.2 mg/dL    Glucose 97 70 - 99 mg/dL    Alkaline Phosphatase 68 35 - 104 U/L    AST 21 10 - 35 U/L    ALT 26 10 - 35 U/L    Protein Total 7.5 6.4 - 8.3 g/dL    Albumin 4.7 3.5 - 5.2 g/dL    Bilirubin Total 0.6 <=1.2 mg/dL    GFR Estimate 65 >60 mL/min/1.73m2   Magnesium   Result Value Ref Range    Magnesium 2.3 1.7 - 2.3 mg/dL   Phosphorus   Result Value Ref Range    Phosphorus 3.4 2.5 - 4.5 mg/dL   Troponin T, High Sensitivity   Result Value Ref Range    Troponin T, High Sensitivity 10 <=14 ng/L   CBC with platelets and differential   Result Value Ref Range    WBC Count 5.7 4.0 - 11.0 10e3/uL    RBC Count 4.49 3.80 - 5.20 10e6/uL    Hemoglobin 14.3 11.7 - 15.7 g/dL    Hematocrit 43.3 35.0 - 47.0 %    MCV 96 78 - 100 fL    MCH 31.8 26.5 - 33.0 pg    MCHC 33.0 31.5 - 36.5 g/dL    RDW 13.6 10.0 - 15.0 %    Platelet Count 211 150 - 450 10e3/uL    % Neutrophils 45 %    % Lymphocytes 40 %    % Monocytes 11 %    % Eosinophils 3 %    % Basophils 1 %    % Immature Granulocytes 0 %    NRBCs per 100 WBC 0 <1 /100    Absolute Neutrophils 2.6 1.6 - 8.3 10e3/uL    Absolute Lymphocytes 2.3 0.8 - 5.3 10e3/uL    Absolute Monocytes 0.6 0.0 - 1.3 10e3/uL    Absolute Eosinophils 0.2 0.0 - 0.7 10e3/uL    Absolute Basophils 0.0 0.0 - 0.2 10e3/uL    Absolute Immature Granulocytes 0.0 <=0.4 10e3/uL    Absolute NRBCs 0.0 10e3/uL   XR Chest 2 Views    Narrative    EXAM: XR CHEST 2 VIEWS 12/13/2022 3:43 PM    HISTORY: 72 years Female palp.     COMPARISON: 4/18/2018.    TECHNIQUE:  Upright PA and lateral views of the chest.    FINDINGS:   Midline trachea. Normal cardiac silhouette. Mediastinum is within  normal limits. Distinct pulmonary vasculature. No significant pleural  effusion. No discernible pneumothorax. Normal lung volumes. No focal  airspace opacities. Unremarkable upper abdomen. No acute or suspicious  osseous abnormalities. Costochondral calcinosis. Unremarkable soft  tissues.      Impression    IMPRESSION:   Negative chest.    I have personally reviewed the examination and initial interpretation  and I agree with the findings.    UMER MARTI MD         SYSTEM ID:  [id number]   EKG 12-lead, tracing only   Result Value Ref Range    Systolic Blood Pressure  mmHg    Diastolic Blood Pressure  mmHg    Ventricular Rate 63 BPM    Atrial Rate 63 BPM    AK Interval 182 ms    QRS Duration 78 ms     ms    QTc 450 ms    P Axis 47 degrees    R AXIS -36 degrees    T Axis 10 degrees    Interpretation ECG       Sinus rhythm  Left axis deviation  Septal infarct , age undetermined  Abnormal ECG  Unconfirmed report - interpretation of this ECG is computer generated - see medical record for final interpretation  Confirmed by - EMERGENCY ROOM, PHYSICIAN ([id number]),  TRUDI MENDOZA ([id number]) on 12/13/2022 8:26:56 PM       Medications   propofol (DIPRIVAN) injection 10 mg/mL vial (60 mg Intravenous Given 12/13/22 1825)             Assessments & Plan (with Medical Decision Making)    Recurrent a fib on chronic AC not missing doses for weeks, labs ok without low K or Mag, after the written consent, sedation with propofol and cardioversion at 200 J given with conversion to NSR, able to ambulate without any problems, discharge and follow up with her PMD and Cardiology.         I have reviewed the nursing notes.    I have reviewed the findings, diagnosis, plan and need for follow up with the patient.    Discharge Medication List as of 12/13/2022  7:04 PM          Final diagnoses:   Paroxysmal  atrial fibrillation (H)   Chronic anticoagulation     I, Ben Rowland, am serving as a trained medical scribe to document services personally performed by Candy Atkins MD, based on the provider's statements to me.      ICandy MD, was physically present and have reviewed and verified the accuracy of this note documented by Ben Rowland.     12/13/2022   Formerly [facility] EMERGENCY DEPARTMENT     Candy Atkins MD  12/13/22 4252

## 2022-12-13 NOTE — TELEPHONE ENCOUNTER
Patient called EP scheduling line requesting to be scheduled for a cardioversion today patient was informed that the hospital is unable to accommodate an outpatient cardioversion after 1pm but we would be happy to book her in the next available spot on Thursday the 15th of December.  The nurse Lena Reagan also recommended that if this wasn't agreeable to the patient she could go to the ED.     The patient was not agreeable to wait until Thursday even though she had stated that she didn't feel it was ED worthy and decided that she would indeed go to the ED.     Lena Reagan, RNCC was notified of the patient's decision .    Sabiha Carrera  Periop Electrophysiology   [phone]

## 2022-12-13 NOTE — ED TRIAGE NOTES
Chief complaint: Abdominal pain    No acute events, pain improving.  Afebrile    Afebrile, vital signs stable  Clear to auscultation bilaterally  Scleral icterus  Abdomen soft, nontender nondistended    69-year-old male admitted for Klebsiella and Enterobacter bacteremia currently on broad-spectrum antibiotics awaiting cultures and sensitivities.  He has known pancreatic head mass of unknown etiology of his CA-19-9 is elevated at greater than 4000.  Bilirubin is elevated and ERCP with stent has been performed to alleviate stricture.  Cytology negative at this time.    -Advance diet  -Pain control  -We'll arrange for outpatient endoscopic ultrasound to obtain diagnosis to further management of this area of stricture of the common bile duct.   Pt ambulatory to triage with c/o palpitations. HX Afib. Pt states for the past 30 hours has been in A.fib, is on blood thinners. Pt endorsing dizziness, headache, and SOB. Pt A&Ox4, hypertensive & tachycardic otherwise all other VSS on RA, pain 2/10.      Triage Assessment     Row Name 12/13/22 2751       Triage Assessment (Adult)    Airway WDL WDL       Respiratory WDL    Respiratory WDL X;rhythm/pattern    Rhythm/Pattern, Respiratory shortness of breath       Skin Circulation/Temperature WDL    Skin Circulation/Temperature WDL WDL       Cardiac WDL    Cardiac WDL X;rhythm    Pulse Rate & Regularity tachycardic       Peripheral/Neurovascular WDL    Peripheral Neurovascular WDL WDL       Cognitive/Neuro/Behavioral WDL    Cognitive/Neuro/Behavioral WDL WDL

## 2022-12-14 NOTE — DISCHARGE INSTRUCTIONS
Please make an appointment to follow up with Your Primary Care Provider and Cardiology Clinic (phone: [phone]) as soon as possible even if entirely better.

## 2023-02-09 ENCOUNTER — ANCILLARY PROCEDURE (OUTPATIENT)
Dept: CARDIOLOGY | Facility: CLINIC | Age: 73
End: 2023-02-09
Attending: INTERNAL MEDICINE
Payer: COMMERCIAL

## 2023-02-09 VITALS
HEART RATE: 54 BPM | BODY MASS INDEX: 22.28 KG/M2 | DIASTOLIC BLOOD PRESSURE: 61 MMHG | HEIGHT: 66 IN | SYSTOLIC BLOOD PRESSURE: 156 MMHG | OXYGEN SATURATION: 98 % | WEIGHT: 138.6 LBS

## 2023-02-09 DIAGNOSIS — I48.0 PAROXYSMAL ATRIAL FIBRILLATION (H): Primary | ICD-10-CM

## 2023-02-09 DIAGNOSIS — I48.0 PAROXYSMAL ATRIAL FIBRILLATION (H): ICD-10-CM

## 2023-02-09 DIAGNOSIS — I10 BENIGN ESSENTIAL HYPERTENSION: ICD-10-CM

## 2023-02-09 PROCEDURE — 93272 ECG/REVIEW INTERPRET ONLY: CPT | Performed by: INTERNAL MEDICINE

## 2023-02-09 PROCEDURE — 93005 ELECTROCARDIOGRAM TRACING: CPT

## 2023-02-09 PROCEDURE — G0463 HOSPITAL OUTPT CLINIC VISIT: HCPCS | Mod: 25

## 2023-02-09 PROCEDURE — 93270 REMOTE 30 DAY ECG REV/REPORT: CPT

## 2023-02-09 PROCEDURE — 99215 OFFICE O/P EST HI 40 MIN: CPT | Mod: 25 | Performed by: INTERNAL MEDICINE

## 2023-02-09 PROCEDURE — G0463 HOSPITAL OUTPT CLINIC VISIT: HCPCS | Mod: 25 | Performed by: INTERNAL MEDICINE

## 2023-02-09 RX ORDER — FLECAINIDE ACETATE 50 MG/1
50 TABLET ORAL 2 TIMES DAILY
Qty: 60 TABLET | Refills: 3 | Status: SHIPPED | OUTPATIENT
Start: 2023-02-09 | End: 2023-03-30

## 2023-02-09 ASSESSMENT — PAIN SCALES - GENERAL: PAINLEVEL: NO PAIN (0)

## 2023-02-09 NOTE — PROGRESS NOTES
I am delighted to see Nicole Stanley for follow up of atrial fibrillation. She is here with her husabnd.     The patient is a 72 year old  female with PAF initially diagnosed 4/2018 in setting of influenza B, not symptomatic at that time. [id number] score is 3 for hypertension, age > 65, female. Apixaban started. Recurrent episodes 1/2019 (spontaneous conversion), 4/2019 (cardioverted in ED). Increasing frequency of AF starting April 2021, between April and July had 5 episodes, 5-7 hours, with palpitation/fatigue, Apple Watch says AF -130s. Her SBP at home had been 140-150s on metoprolol XL 25 every day. When I saw her 8/12/2021, I stopped metoprolol and started carvedilol. Discussed adding flecainide if she continues to have AF. She wanted to hold off at that time. I had not seen her back since Sept 2021.     She was in persistent AF mid Dec 2022 with BP in the 80s at home and had dyspnea and fatigue. She went to ED on 12/13/2022 where her BP was 153/102, pulse was 92.  she was cardioverted to sinus.  In January 2023 several recurrent episodes all with spontaneous termination, then persistent from 1/29-2/4/2023.  They were in [place] all January, she knew she was in AF but did not feel as bad as in Dec and did not go to ED.    Home //87.    Past Medical History:  1. Atrial fibrillation. Initial diagnosis 4/2018 in the setting of Influenza B. Spontaneously converted to sinus rhythm. Recurrent AF 4/2019, cardioverted in ED. Recurrent paroxysmal episodes April - July 2021. Cardioverted in ED 12/13/2022.  2. Hypertension.  3. Hyperlipidemia. She had previously been on simvastatin but had some muscle aches. She is reluctant to add more medications.  4. Hypothyroidism.  5. Cholecystectomy  6. Vein stripping 1988    Allergies:  No Known Allergies    Medications:   Apixaban 5 mg bid  Synthroid 50 mcg every day  Atorvastatin 20 every day  Carvedilol 6.25 bid    Physical examination  Vitals: 156/61,  HR 54  BMI= 22 (62 kg)    Constitutional: In general, the patient is a pleasant female in no acute distress.    Targeted cardiovascular exam:  Regular rate and rhythm with ectopy. Normal S1, S2. No murmur, rub, click, or gallop.   Extremities:Pulses are normal bilaterally throughout. No peripheral edema.  Respiratory: Clear to asculation.      I have personally and independently reviewed the following:  Labs:  2022: chol 173 HDL 70, LDL 84, TG 94  2022: cr 0.92, hgb 13, plt 211K, TSH 2.6, cr 0.92    ECHOCARDIOGRAM: 18 (in AF): normal LV/RV, no valvular abnormalities     Bicycle stress echo 2019 (in sinus): exercised for 6:30 min, 90% MPHR achieved, MVO2 113% predicted, normal BP response, no ischemia     EK19:  bpm  2021: sinus  2021: sinus 68 bpm, normal intervals   2022: AF 98 bpm  TODAY 2023: Sinus 59 bpm with PACs and PVCs    Assessment :  Atrial fibrillation, usually paroxysmal. Increasing frequency - new. Occasionally very symptomatic and needed to be cardioverted within 1-2 days. Other episodes not as symptomatic. I again discussed options with her, including AAD and ablations. She would be a good candidate for PVI, but I would try a medication first especially since she has so many trips planned. She is planning on going to [place] between -3/20. Given no h/o ischemic heart disease I suggest adding flecainide 50 bid to carvedilol. Would monitor her with event monitor for the next 2 weeks before she leaves.  [id number] score continues to be 3 for age, female, HTN, continue apixaban.    Hypertension. BP high in office today but I have reviewed her home BP log and they've been stable.    Plan:  Add flecainide 50 bid  Event monitor - she will send transmissions daily.  I will see her back in clinic end of March after she returns from [place]      I spent a total of 20 minutes face to face with  Nicole Stanley during today's office visit. I have spent an  additional 20 minutes today on chart review and documentation.    The patient is to return  as above. The patient understood the treatment plan as outlined above.  There were no barriers to learning.      Maile Leonardo MD

## 2023-02-09 NOTE — PROGRESS NOTES
Per Dr. Leonardo, patient to have MCOT monitor placed.  Diagnosis: paryoxysmal atrial fibrillation   Monitor placed: Yes  Patient Instructed: Yes  Patient verbalized understanding: Yes  Holter # [id number]

## 2023-02-09 NOTE — NURSING NOTE
Chief Complaint   Patient presents with     Follow Up     Return EP          Vitals were taken, medications reconciled and EKG performed.     Jersey Renner, EMT   2:59 PM

## 2023-02-09 NOTE — PATIENT INSTRUCTIONS
You were seen in the Electrophysiology Clinic today by: Dr. Maile Leonardo    Plan:   Medication Changes: add flecainide 50 mg twice a day    Labs/Tests Needed: 30 day event monitor     Follow up Visit: end of March of 2023    Further Instructions: send a tracing from event monitor every day      If you have further questions, please utilize Jini to contact us.     Your Care Team:    EP Cardiology   Telephone Number     Nurse Line  Lena Reagan, RN   Lea Gibson RN   [phone]     For scheduling appointments:   [facility]   [phone]   For procedure scheduling:    Sabiha Carrera     [phone]   For the Device Clinic (Pacemakers, ICDs, Loop Recorders)    During business hours: [phone]  After business hours:   [phone]- select option 4 and ask for job code [id number].       On-call cardiologist for after hours or on weekends:   [phone], option #4, and ask to speak to the on-call cardiologist.     Cardiovascular Clinic:   [address] [place] [address]. [place] [address]      As always, Thank you for trusting us with your health care needs!

## 2023-02-10 LAB
ATRIAL RATE - MUSE: 59 BPM
DIASTOLIC BLOOD PRESSURE - MUSE: NORMAL MMHG
INTERPRETATION ECG - MUSE: NORMAL
P AXIS - MUSE: -28 DEGREES
PR INTERVAL - MUSE: 156 MS
QRS DURATION - MUSE: 86 MS
QT - MUSE: 466 MS
QTC - MUSE: 461 MS
R AXIS - MUSE: -31 DEGREES
SYSTOLIC BLOOD PRESSURE - MUSE: NORMAL MMHG
T AXIS - MUSE: -20 DEGREES
VENTRICULAR RATE- MUSE: 59 BPM

## 2023-02-18 DIAGNOSIS — E78.2 MIXED HYPERLIPIDEMIA: ICD-10-CM

## 2023-02-18 DIAGNOSIS — E03.9 ACQUIRED HYPOTHYROIDISM: ICD-10-CM

## 2023-02-20 ENCOUNTER — TELEPHONE (OUTPATIENT)
Dept: CARDIOLOGY | Facility: CLINIC | Age: 73
End: 2023-02-20
Payer: COMMERCIAL

## 2023-02-20 ENCOUNTER — TRANSFERRED RECORDS (OUTPATIENT)
Dept: HEALTH INFORMATION MANAGEMENT | Facility: CLINIC | Age: 73
End: 2023-02-20

## 2023-02-20 NOTE — TELEPHONE ENCOUNTER
Suraj called to notify of an auto triggered event on this pt on 2/18/23. I will route this to the EP pool for further review along with a screen shot of the first page of the report.     Alexander CENTENO

## 2023-02-22 RX ORDER — ATORVASTATIN CALCIUM 20 MG/1
TABLET, FILM COATED ORAL
Qty: 90 TABLET | Refills: 3 | Status: SHIPPED | OUTPATIENT
Start: 2023-02-22 | End: 2023-11-29

## 2023-02-22 RX ORDER — LEVOTHYROXINE SODIUM 50 UG/1
TABLET ORAL
Qty: 90 TABLET | Refills: 3 | Status: SHIPPED | OUTPATIENT
Start: 2023-02-22 | End: 2023-11-29

## 2023-02-22 NOTE — TELEPHONE ENCOUNTER
Prescription approved per [facility] Refill Protocol.    Annita Herrera, BSN RN  [facility]

## 2023-03-03 DIAGNOSIS — I48.0 PAROXYSMAL ATRIAL FIBRILLATION (H): ICD-10-CM

## 2023-03-07 RX ORDER — FLECAINIDE ACETATE 50 MG/1
TABLET ORAL
Qty: 180 TABLET | Refills: 1 | OUTPATIENT
Start: 2023-03-07

## 2023-03-30 ENCOUNTER — OFFICE VISIT (OUTPATIENT)
Dept: CARDIOLOGY | Facility: CLINIC | Age: 73
End: 2023-03-30
Attending: INTERNAL MEDICINE
Payer: COMMERCIAL

## 2023-03-30 VITALS
HEART RATE: 57 BPM | BODY MASS INDEX: 22.23 KG/M2 | WEIGHT: 138 LBS | DIASTOLIC BLOOD PRESSURE: 94 MMHG | SYSTOLIC BLOOD PRESSURE: 134 MMHG | OXYGEN SATURATION: 98 %

## 2023-03-30 DIAGNOSIS — I48.0 PAROXYSMAL ATRIAL FIBRILLATION (H): ICD-10-CM

## 2023-03-30 DIAGNOSIS — I10 BENIGN ESSENTIAL HYPERTENSION: Primary | ICD-10-CM

## 2023-03-30 PROCEDURE — G0463 HOSPITAL OUTPT CLINIC VISIT: HCPCS | Mod: 25 | Performed by: INTERNAL MEDICINE

## 2023-03-30 PROCEDURE — 93005 ELECTROCARDIOGRAM TRACING: CPT

## 2023-03-30 PROCEDURE — 99214 OFFICE O/P EST MOD 30 MIN: CPT | Performed by: INTERNAL MEDICINE

## 2023-03-30 RX ORDER — FLECAINIDE ACETATE 50 MG/1
50 TABLET ORAL 2 TIMES DAILY
Qty: 180 TABLET | Refills: 3 | Status: SHIPPED | OUTPATIENT
Start: 2023-03-30 | End: 2024-02-29

## 2023-03-30 ASSESSMENT — PAIN SCALES - GENERAL: PAINLEVEL: NO PAIN (0)

## 2023-03-30 NOTE — PROGRESS NOTES
I am delighted to see Nicole RODRIGUEZ Jaden for follow up of atrial fibrillation.     The patient is a 72 year old  female with PAF initially diagnosed 4/2018 in setting of influenza B, not symptomatic at that time. [id number] score is 3 for hypertension, age > 65, female. Apixaban started. Recurrent episodes 1/2019 (spontaneous conversion), 4/2019 (cardioverted in ED). Increasing frequency of AF starting April 2021, between April and July had 5 episodes, 5-7 hours, with palpitation/fatigue, Apple Watch says AF -130s. Her SBP at home had been 140-150s on metoprolol XL 25 every day. When I saw her 8/12/2021, I stopped metoprolol and started carvedilol. Discussed adding flecainide if she continues to have AF. She wanted to hold off at that time.      She was in persistent AF mid Dec 2022 with BP in the 80s at home and had dyspnea and fatigue. She went to ED on 12/13/2022 where her BP was 153/102, pulse was 92.  she was cardioverted to sinus.  In January 2023 several recurrent episodes all with spontaneous termination, then persistent from 1/29-2/4/2023.  They were in [place] all January, she knew she was in AF but did not feel as bad as in Dec and did not go to ED.    I last saw her 2/9/2023. She was agreeable to being antiarrhythmic drug, and I added flecainide 50 bid to her carvedilol. I gave her a 30 day monitor to follow flecainide initiation. She had one episode of AF on 2/1/2023. No bradyarrhythmias.    She just returned from a 3 week trip to [place], no side effects from flecainide, no AF.  She checks her BP regularly at home, usually low. Last night SBP 160s, she has also being experiencing sciatica pain. This morning her SBP was 103.    Past Medical History:  1. Atrial fibrillation. Initial diagnosis 4/2018 in the setting of Influenza B. Spontaneously converted to sinus rhythm. Recurrent AF 4/2019, cardioverted in ED. Recurrent paroxysmal episodes April - July 2021. Cardioverted in ED 12/13/2022.  2.  Hypertension.  3. Hyperlipidemia. She had previously been on simvastatin but had some muscle aches. She is reluctant to add more medications.  4. Hypothyroidism.  5. Cholecystectomy  6. Vein stripping     Allergies:  No Known Allergies    Medications:   Apixaban 5 mg bid  Atorvastatin 20 every day  Carvedilol 6.25 bid  Flecainide 50 bid  Synthroid 50 mcg every day    Physical examination  Vitals: 134/94, HR 57  BMI= 22.33    Constitutional: In general, the patient is a pleasant female in no acute distress.    Targeted cardiovascular exam:  Regular rate and rhythm occ ectopy. Normal S1, S2. No murmur, rub, click, or gallop.   Extremities:Pulses are normal bilaterally throughout. No peripheral edema.  Respiratory: Clear to asculation.      I have personally and independently reviewed the following:  Labs:  2022: chol 173 HDL 70, LDL 84, TG 94  2022: cr 0.92, hgb 13, plt 211K, TSH 2.6, cr 0.92     ECHOCARDIOGRAM: 18 (in AF): normal LV/RV, no valvular abnormalities     Bicycle stress echo 2019 (in sinus): exercised for 6:30 min, 90% MPHR achieved, MVO2 113% predicted, normal BP response, no ischemia     EK19:  bpm  2021: sinus  2021: sinus 68 bpm, normal intervals   2022: AF 98 bpm  2023: Sinus 59 bpm with PACs and PVCs  TODAY 3/30/2023: sinus 56 bpm, normal intervals    30 day MCOT -3/10/2023:   One episode of AF 2023 at 122 bpm, no symptoms; total AF burden <1%  Symptoms of LH correlated to sinus 80s  PVCs 2%; NSVT up to 4 beats, no symptoms    Assessment :  1. Paroxysmal atrial fibrillation, symptomatic. [id number] score is 3 for age, female, HTN. On apixaban. Symptoms appear to be controlled with low dose flecainide added to her carvedilol. Will continue.  2. Hypertension, had been well-controlled until recently. 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 She will continue to log BP readings from home and  follow up with PCP. If BP consistently above 130/90, recommend resumption of lisinopril (I would avoid increasing carvedilol due to baseline bradycardia).        Plan:  Return 6 months for EKG        I spent a total of 20 minutes face to face with  Nicole Stanley during today's office visit. I have spent an additional 10 minutes today on chart review and documentation.        The patient is to return  as above. The patient understood the treatment plan as outlined above.  There were no barriers to learning.      Maile Leonardo MD

## 2023-03-30 NOTE — NURSING NOTE
Chief Complaint   Patient presents with     Follow Up     Return EP- 2 mo f/u for AF, added flec last visit     Vitals were taken and medications reconciled.    ENRIQUE Shields  2:21 PM

## 2023-03-30 NOTE — LETTER
3/30/2023      RE: Nicole Stanley  [address] [place] [address]       Dear Colleague,    Thank you for the opportunity to participate in the care of your patient, Nicole Stanley, at the [facility] [place] [facility]. Please see a copy of my visit note below.    I am delighted to see Nicole Stanley for follow up of atrial fibrillation.     The patient is a 72 year old  female with PAF initially diagnosed 4/2018 in setting of influenza B, not symptomatic at that time. [id number] score is 3 for hypertension, age > 65, female. Apixaban started. Recurrent episodes 1/2019 (spontaneous conversion), 4/2019 (cardioverted in ED). Increasing frequency of AF starting April 2021, between April and July had 5 episodes, 5-7 hours, with palpitation/fatigue, Apple Watch says AF -130s. Her SBP at home had been 140-150s on metoprolol XL 25 every day. When I saw her 8/12/2021, I stopped metoprolol and started carvedilol. Discussed adding flecainide if she continues to have AF. She wanted to hold off at that time.      She was in persistent AF mid Dec 2022 with BP in the 80s at home and had dyspnea and fatigue. She went to ED on 12/13/2022 where her BP was 153/102, pulse was 92.  she was cardioverted to sinus.  In January 2023 several recurrent episodes all with spontaneous termination, then persistent from 1/29-2/4/2023.  They were in [place] all January, she knew she was in AF but did not feel as bad as in Dec and did not go to ED.    I last saw her 2/9/2023. She was agreeable to being antiarrhythmic drug, and I added flecainide 50 bid to her carvedilol. I gave her a 30 day monitor to follow flecainide initiation. She had one episode of AF on 2/1/2023. No bradyarrhythmias.    She just returned from a 3 week trip to [place], no side effects from flecainide, no AF.  She checks her BP regularly at home, usually low. Last night SBP 160s, she has also  being experiencing sciatica pain. This morning her SBP was 103.    Past Medical History:  1. Atrial fibrillation. Initial diagnosis 2018 in the setting of Influenza B. Spontaneously converted to sinus rhythm. Recurrent AF 2019, cardioverted in ED. Recurrent paroxysmal episodes April - 2021. Cardioverted in ED 2022.  2. Hypertension.  3. Hyperlipidemia. She had previously been on simvastatin but had some muscle aches. She is reluctant to add more medications.  4. Hypothyroidism.  5. Cholecystectomy  6. Vein stripping 1988    Allergies:  No Known Allergies    Medications:   Apixaban 5 mg bid  Atorvastatin 20 every day  Carvedilol 6.25 bid  Flecainide 50 bid  Synthroid 50 mcg every day    Physical examination  Vitals: 134/94, HR 57  BMI= 22.33    Constitutional: In general, the patient is a pleasant female in no acute distress.    Targeted cardiovascular exam:  Regular rate and rhythm occ ectopy. Normal S1, S2. No murmur, rub, click, or gallop.   Extremities:Pulses are normal bilaterally throughout. No peripheral edema.  Respiratory: Clear to asculation.      I have personally and independently reviewed the following:  Labs:  2022: chol 173 HDL 70, LDL 84, TG 94  2022: cr 0.92, hgb 13, plt 211K, TSH 2.6, cr 0.92     ECHOCARDIOGRAM: 18 (in AF): normal LV/RV, no valvular abnormalities     Bicycle stress echo 2019 (in sinus): exercised for 6:30 min, 90% MPHR achieved, MVO2 113% predicted, normal BP response, no ischemia     EK19:  bpm  2021: sinus  2021: sinus 68 bpm, normal intervals   2022: AF 98 bpm  2023: Sinus 59 bpm with PACs and PVCs  TODAY 3/30/2023: sinus 56 bpm, normal intervals    30 day MCOT -3/10/2023:   One episode of AF 2023 at 122 bpm, no symptoms; total AF burden <1%  Symptoms of LH correlated to sinus 80s  PVCs 2%; NSVT up to 4 beats, no symptoms    Assessment :  1. Paroxysmal atrial fibrillation, symptomatic. [id number]  score is 3 for age, female, HTN. On apixaban. Symptoms appear to be controlled with low dose flecainide added to her carvedilol. Will continue.  2. Hypertension, had been well-controlled until recently. 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 She will continue to log BP readings from home and follow up with PCP. If BP consistently above 130/90, recommend resumption of lisinopril (I would avoid increasing carvedilol due to baseline bradycardia).      Plan:  Return 6 months for EKG      I spent a total of 20 minutes face to face with  Nicole Stanley during today's office visit. I have spent an additional 10 minutes today on chart review and documentation.      The patient is to return  as above. The patient understood the treatment plan as outlined above.  There were no barriers to learning.      Maile Leonardo MD

## 2023-03-30 NOTE — PATIENT INSTRUCTIONS
You were seen in the Electrophysiology Clinic today by: Dr. Maile Leonardo     Plan:   Medication Changes: none    Labs/Tests Needed: none    Follow up Visit: 6 months        If you have further questions, please utilize [email or web address] to contact us.     Your Care Team:    EP Cardiology   Telephone Number     Nurse Line  Lena Reagan, RN   Lea Gibson, RN   [phone]     For scheduling appointments:   [facility]   [phone]   For procedure scheduling:    Sabiha Carrera     [phone]   For the Device Clinic (Pacemakers, ICDs, Loop Recorders)    During business hours: [phone]  After business hours:   [phone]- select option 4 and ask for job code [id number].       On-call cardiologist for after hours or on weekends:   [phone], option #4, and ask to speak to the on-call cardiologist.     Cardiovascular Clinic:   [address] [place] [address]. [place] [address]      As always, Thank you for trusting us with your health care needs!

## 2023-04-03 LAB
ATRIAL RATE - MUSE: 56 BPM
DIASTOLIC BLOOD PRESSURE - MUSE: NORMAL MMHG
INTERPRETATION ECG - MUSE: NORMAL
P AXIS - MUSE: 40 DEGREES
PR INTERVAL - MUSE: 198 MS
QRS DURATION - MUSE: 92 MS
QT - MUSE: 464 MS
QTC - MUSE: 447 MS
R AXIS - MUSE: -33 DEGREES
SYSTOLIC BLOOD PRESSURE - MUSE: NORMAL MMHG
T AXIS - MUSE: 21 DEGREES
VENTRICULAR RATE- MUSE: 56 BPM

## 2023-04-07 ENCOUNTER — MYC MEDICAL ADVICE (OUTPATIENT)
Dept: CARDIOLOGY | Facility: CLINIC | Age: 73
End: 2023-04-07
Payer: COMMERCIAL

## 2023-04-10 NOTE — TELEPHONE ENCOUNTER
Maile Leonardo MD McDonald, Dana L RN  Phone Number: [phone]     No change on eKG compared with previous. I would not call this incomplete RBBB, it was a computer read.

## 2023-04-20 ENCOUNTER — OFFICE VISIT (OUTPATIENT)
Dept: UROLOGY | Facility: CLINIC | Age: 73
End: 2023-04-20
Payer: COMMERCIAL

## 2023-04-20 VITALS
SYSTOLIC BLOOD PRESSURE: 130 MMHG | BODY MASS INDEX: 21.69 KG/M2 | HEART RATE: 60 BPM | DIASTOLIC BLOOD PRESSURE: 86 MMHG | HEIGHT: 66 IN | WEIGHT: 135 LBS

## 2023-04-20 DIAGNOSIS — N81.4 UTEROVAGINAL PROLAPSE: Primary | ICD-10-CM

## 2023-04-20 DIAGNOSIS — K42.9 UMBILICAL HERNIA WITHOUT OBSTRUCTION AND WITHOUT GANGRENE: ICD-10-CM

## 2023-04-20 DIAGNOSIS — I48.0 PAROXYSMAL ATRIAL FIBRILLATION (H): ICD-10-CM

## 2023-04-20 DIAGNOSIS — N39.46 MIXED INCONTINENCE: ICD-10-CM

## 2023-04-20 PROCEDURE — 51798 US URINE CAPACITY MEASURE: CPT | Performed by: UROLOGY

## 2023-04-20 PROCEDURE — 99213 OFFICE O/P EST LOW 20 MIN: CPT | Mod: 25 | Performed by: UROLOGY

## 2023-04-20 ASSESSMENT — PAIN SCALES - GENERAL: PAINLEVEL: NO PAIN (0)

## 2023-04-20 NOTE — PROGRESS NOTES
"April 20, 2023    Nicole was seen today for follow up.    Diagnoses and all orders for this visit:    Uterovaginal prolapse  -     POST-VOID RESIDUAL BLADDER SCAN    Mixed incontinence    Paroxysmal atrial fibrillation (H)    Umbilical hernia without obstruction and without gangrene       Considering surgery and may want umbilical hernia repair at the same time    Concerned about stopping her anticoagulation    RTC this fall to rediscuss    After she left today on reviewing the records [facility], will need to obtain these    20 minutes were spent today on the day of the encounter in reviewing the EMR, direct patient care, coordination of care and documentation    Sheba Song MD MPH  (she/her/hers)   of Urology  [facility]      Subjective    Here for follow up for her urinary incontinence and prolapse.  Symptoms stable to maybe a bit worse, contemplating surgery.  Last seen virtually 10/29/2020.      /86   Pulse 60   Ht 1.676 m (5' 6\")   Wt 61.2 kg (135 lb)   LMP  (LMP Unknown)   BMI 21.79 kg/m    GENERAL: healthy, alert and no distress  EYES: Eyes grossly normal to inspection, conjunctivae and sclerae normal  HENT: normal cephalic/atraumatic.  External ears, nose and mouth without ulcers or lesions.  RESP: no audible wheeze, cough, or visible cyanosis.  No visible retractions or increased work of breathing.  Able to speak fully in complete sentences.  NEURO: Cranial nerves grossly intact, mentation intact and speech normal  PSYCH: mentation appears normal, affect normal/bright, judgement and insight intact, normal speech and appearance well-groomed  ABD: soft, nontender, nondistended  : Stage III uterovaginal prolapse    CC  Patient Care Team:  Lauryn Allison MD as PCP - General (Family Practice)  Jacinda Saleem MD as MD (Otolaryngology)  Maile Leonardo MD as MD (Cardiology)  Sheba Song MD as MD (Urology)  Lauryn Allison MD as " Assigned PCP  Giovanna Dominguez MD as Assigned OBGYN Provider  Giovanna Kothari PA-C as Physician Assistant (Dermatology)  Lauryn Allison MD as Referring Physician (Family Medicine)  Maile Leonardo MD as Assigned Heart and Vascular Provider  Sheba Song MD as MD (Urology)  Randall Pool MD as Assigned Surgical Provider  SELF, REFERRED

## 2023-04-20 NOTE — NURSING NOTE
"Chief Complaint   Patient presents with     Follow Up     Prolapse        Blood pressure 130/86, pulse 60, height 1.676 m (5' 6\"), weight 61.2 kg (135 lb), not currently breastfeeding. Body mass index is 21.79 kg/m .    Patient Active Problem List   Diagnosis     Essential hypertension     Mixed hyperlipidemia     Bunion, left     Osteoarthritis of both hands, unspecified osteoarthritis type     Hypothyroidism     Uterovaginal prolapse     Mixed incontinence     Pelvic floor dysfunction     Dyspareunia in female     Left ovarian cyst     Paroxysmal atrial fibrillation (H)     Irritable larynx syndrome     Muscle tension dysphonia     Left knee pain, unspecified chronicity     Lateral pain of hip       No Known Allergies    Current Outpatient Medications   Medication Sig Dispense Refill     Acetaminophen (TYLENOL PO) Take 500 mg by mouth as needed for mild pain or fever       apixaban ANTICOAGULANT (ELIQUIS ANTICOAGULANT) 5 MG tablet TAKE 1 TABLET BY MOUTH TWICE A  tablet 1     atorvastatin (LIPITOR) 20 MG tablet TAKE 1 TABLET BY MOUTH EVERY DAY 90 tablet 3     calcium carbonate (OSCAL 500) 1250 (500 Ca) MG TABS tablet Take 1 tablet by mouth 2 times daily       carvedilol (COREG) 6.25 MG tablet Take 1 tablet (6.25 mg) by mouth 2 times daily (with meals) 180 tablet 3     Cholecalciferol (VITAMIN D3) 3000 units TABS Take 1 tablet by mouth daily       flecainide (TAMBOCOR) 50 MG tablet Take 1 tablet (50 mg) by mouth 2 times daily 180 tablet 3     levothyroxine (SYNTHROID/LEVOTHROID) 50 MCG tablet TAKE 1 TABLET BY MOUTH EVERY DAY 90 tablet 3     MAGNESIUM GLYCINATE PO        Probiotic Product (PROBIOTIC COLON SUPPORT PO) Take 1 tablet by mouth daily       triamcinolone (KENALOG) 0.1 % external cream        zinc sulfate (ZINCATE) 220 (50 Zn) MG capsule Take 220 mg by mouth daily         Social History     Tobacco Use     Smoking status: Never     Smokeless tobacco: Never   Vaping Use     Vaping status: Never Used "   Substance Use Topics     Alcohol use: Yes     Comment: 1-2 glass of wine per day     Drug use: Lorna Yarbrough  4/20/2023  9:08 AM

## 2023-04-20 NOTE — LETTER
"4/20/2023       RE: Nicole Stanley  [address] [place] [address]     Dear Colleague,    Thank you for referring your patient, Nicole Stanley, to the [facility] [place] [facility]. Please see a copy of my visit note below.    April 20, 2023    Nicole was seen today for follow up.    Diagnoses and all orders for this visit:    Uterovaginal prolapse  -     POST-VOID RESIDUAL BLADDER SCAN    Mixed incontinence    Paroxysmal atrial fibrillation (H)    Umbilical hernia without obstruction and without gangrene       Considering surgery and may want umbilical hernia repair at the same time    Concerned about stopping her anticoagulation    RTC this fall to rediscuss    After she left today on reviewing the records I see some notes from [facility], will need to obtain these    20 minutes were spent today on the day of the encounter in reviewing the EMR, direct patient care, coordination of care and documentation    Sheba Song MD MPH  (she/her/hers)   of Urology  [facility]      Subjective    Here for follow up for her urinary incontinence and prolapse.  Symptoms stable to maybe a bit worse, contemplating surgery.  Last seen virtually 10/29/2020.      /86   Pulse 60   Ht 1.676 m (5' 6\")   Wt 61.2 kg (135 lb)   LMP  (LMP Unknown)   BMI 21.79 kg/m    GENERAL: healthy, alert and no distress  EYES: Eyes grossly normal to inspection, conjunctivae and sclerae normal  HENT: normal cephalic/atraumatic.  External ears, nose and mouth without ulcers or lesions.  RESP: no audible wheeze, cough, or visible cyanosis.  No visible retractions or increased work of breathing.  Able to speak fully in complete sentences.  NEURO: Cranial nerves grossly intact, mentation intact and speech normal  PSYCH: mentation appears normal, affect normal/bright, judgement and insight intact, normal speech and appearance " well-groomed  ABD: soft, nontender, nondistended  : Stage III uterovaginal prolapse    CC  Patient Care Team:  Lauryn Allison MD as PCP - General (Family Practice)  Jacinda Saleem MD as MD (Otolaryngology)  Maile Leonardo MD as MD (Cardiology)  Sheba Song MD as MD (Urology)  Lauryn Allison MD as Assigned PCP  Giovanna Dominguez MD as Assigned OBGYN Provider  Giovanna Kothari PA-C as Physician Assistant (Dermatology)  Lauryn Allison MD as Referring Physician (Family Medicine)  Maile Leonardo MD as Assigned Heart and Vascular Provider  Sheba Song MD as MD (Urology)  Randall Pool MD as Assigned Surgical Provider  SELF, REFERRED

## 2023-04-27 ENCOUNTER — TELEPHONE (OUTPATIENT)
Dept: UROLOGY | Facility: CLINIC | Age: 73
End: 2023-04-27
Payer: COMMERCIAL

## 2023-04-27 NOTE — TELEPHONE ENCOUNTER
Action 04/27/23 MMT   Action Taken  CSS faxed urgent request to [facility] to send records.

## 2023-05-20 DIAGNOSIS — I48.0 PAROXYSMAL ATRIAL FIBRILLATION (H): ICD-10-CM

## 2023-05-22 RX ORDER — APIXABAN 5 MG/1
TABLET, FILM COATED ORAL
Qty: 180 TABLET | Refills: 1 | Status: SHIPPED | OUTPATIENT
Start: 2023-05-22 | End: 2023-11-08

## 2023-05-22 NOTE — TELEPHONE ENCOUNTER
Routing refill request to provider for review/approval because:  Last weight was 61.2 kg 4/20/23, also it's been just about 6 month's since last video visit/office visit    Last Written Prescription Date:  9/7/22  Last Fill Quantity: 180,  # refills: 1   Last office visit provider:  11/28/22     Requested Prescriptions   Pending Prescriptions Disp Refills     ELIQUIS ANTICOAGULANT 5 MG tablet [Pharmacy Med Name: ELIQUIS 5 MG TABLET] 180 tablet 1     Sig: TAKE 1 TABLET BY MOUTH TWICE A DAY       Direct Oral Anticoagulant Agents Failed - 5/22/2023 11:32 AM        Failed - Weight is greater than 60 kg for the past year     Wt Readings from Last 3 Encounters:   04/20/23 61.2 kg (135 lb)   03/30/23 62.6 kg (138 lb)   02/09/23 62.9 kg (138 lb 9.6 oz)             Passed - Normal Platelets on file in past 12 months     Recent Labs   Lab Test 12/13/22  1526                  Passed - Medication is active on med list        Passed - Patient is 18-79 years of age        Passed - Serum creatinine less than or equal to 1.4 on file in past 12 mos     Recent Labs   Lab Test 12/13/22  1526   CR 0.92       Ok to refill medication if creatinine is low          Passed - No active pregnancy on record        Passed - No positive pregnancy test within past 12 months        Passed - Recent (6 mo) or future (30 days) visit within the authorizing provider's specialty             YUSEF WILKES RN 05/22/23 11:32 AM

## 2023-06-22 DIAGNOSIS — I48.0 PAROXYSMAL ATRIAL FIBRILLATION (H): ICD-10-CM

## 2023-06-26 RX ORDER — CARVEDILOL 6.25 MG/1
TABLET ORAL
Qty: 180 TABLET | Refills: 3 | OUTPATIENT
Start: 2023-06-26

## 2023-06-26 NOTE — TELEPHONE ENCOUNTER
Request is too early.     Disp Refills Start End ROBERTA   carvedilol (COREG) 6.25 MG tablet 180 tablet 3 9/7/2022  No   Sig - Route: Take 1 tablet (6.25 mg) by mouth 2 times daily (with meals) - Oral

## 2023-08-29 ENCOUNTER — TRANSFERRED RECORDS (OUTPATIENT)
Dept: HEALTH INFORMATION MANAGEMENT | Facility: CLINIC | Age: 73
End: 2023-08-29
Payer: COMMERCIAL

## 2023-08-31 ENCOUNTER — OFFICE VISIT (OUTPATIENT)
Dept: UROLOGY | Facility: CLINIC | Age: 73
End: 2023-08-31
Payer: COMMERCIAL

## 2023-08-31 VITALS
HEART RATE: 65 BPM | BODY MASS INDEX: 22.5 KG/M2 | SYSTOLIC BLOOD PRESSURE: 117 MMHG | DIASTOLIC BLOOD PRESSURE: 78 MMHG | HEIGHT: 66 IN | WEIGHT: 140 LBS

## 2023-08-31 DIAGNOSIS — R15.2 INCONTINENCE OF FECES WITH FECAL URGENCY: ICD-10-CM

## 2023-08-31 DIAGNOSIS — I48.0 PAROXYSMAL ATRIAL FIBRILLATION (H): ICD-10-CM

## 2023-08-31 DIAGNOSIS — R15.9 INCONTINENCE OF FECES WITH FECAL URGENCY: ICD-10-CM

## 2023-08-31 DIAGNOSIS — N39.46 MIXED INCONTINENCE: Primary | ICD-10-CM

## 2023-08-31 DIAGNOSIS — K42.9 UMBILICAL HERNIA WITHOUT OBSTRUCTION AND WITHOUT GANGRENE: ICD-10-CM

## 2023-08-31 DIAGNOSIS — N81.4 UTEROVAGINAL PROLAPSE: ICD-10-CM

## 2023-08-31 DIAGNOSIS — N32.81 OAB (OVERACTIVE BLADDER): ICD-10-CM

## 2023-08-31 PROCEDURE — 99214 OFFICE O/P EST MOD 30 MIN: CPT | Performed by: UROLOGY

## 2023-08-31 RX ORDER — CEFAZOLIN SODIUM 2 G/50ML
2 SOLUTION INTRAVENOUS
Status: CANCELLED | OUTPATIENT
Start: 2023-08-31

## 2023-08-31 RX ORDER — CEFAZOLIN SODIUM 2 G/50ML
2 SOLUTION INTRAVENOUS SEE ADMIN INSTRUCTIONS
Status: CANCELLED | OUTPATIENT
Start: 2023-08-31

## 2023-08-31 ASSESSMENT — PAIN SCALES - GENERAL: PAINLEVEL: NO PAIN (0)

## 2023-08-31 NOTE — LETTER
8/31/2023       RE: Nicole Stanley  [address] [place] [address]     Dear Colleague,    Thank you for referring your patient, Nicole Stanley, to the [facility]OLOG[facility] [place] [facility]. Please see a copy of my visit note below.    August 31, 2023    Nicole was seen today for follow up.    Diagnoses and all orders for this visit:    Mixed incontinence  -     Case Request: INSERTION, SACRAL NERVE STIMULATOR, STAGE 1; Standing  -     Case Request: INSERTION, SACRAL NERVE STIMULATOR, STAGE 1  -     Case Request: INSERTION, SACRAL NERVE STIMULATOR, STAGE 2; Standing  -     Case Request: INSERTION, SACRAL NERVE STIMULATOR, STAGE 2    Incontinence of feces with fecal urgency  -     Case Request: INSERTION, SACRAL NERVE STIMULATOR, STAGE 1; Standing  -     Case Request: INSERTION, SACRAL NERVE STIMULATOR, STAGE 1  -     Case Request: INSERTION, SACRAL NERVE STIMULATOR, STAGE 2; Standing  -     Case Request: INSERTION, SACRAL NERVE STIMULATOR, STAGE 2    OAB (overactive bladder)  -     Case Request: INSERTION, SACRAL NERVE STIMULATOR, STAGE 1; Standing  -     Case Request: INSERTION, SACRAL NERVE STIMULATOR, STAGE 1  -     Case Request: INSERTION, SACRAL NERVE STIMULATOR, STAGE 2; Standing  -     Case Request: INSERTION, SACRAL NERVE STIMULATOR, STAGE 2    Uterovaginal prolapse    Paroxysmal atrial fibrillation (H)  -     Case Request: INSERTION, SACRAL NERVE STIMULATOR, STAGE 1; Standing  -     Case Request: INSERTION, SACRAL NERVE STIMULATOR, STAGE 1  -     Case Request: INSERTION, SACRAL NERVE STIMULATOR, STAGE 2; Standing  -     Case Request: INSERTION, SACRAL NERVE STIMULATOR, STAGE 2    Umbilical hernia without obstruction and without gangrene    Other orders  -     Void on call to OR; Standing  -     Forced Air Warming Device; Standing  -     Cleanse skin for procedure; Standing  -     Nozin Nasal  Popswab; Standing  -     Notify  Provider - Anticoagulants and Antiplatelets; Standing  -     Glucose monitor nursing POCT; Standing  -     NPO per Anesthesia Guidelines for Procedure/Surgery Except for: Meds; Standing  -     Apply Pneumatic Compression Device (PCD); Standing  -     Pneumatic Compression Device (PCD) (Equipment); Standing  -     ceFAZolin (ANCEF) intermittent infusion 2 g in 50 mL dextrose PREMIX  -     ceFAZolin (ANCEF) intermittent infusion 2 g in 50 mL dextrose PREMIX  -     Void on call to OR; Standing  -     Forced Air Warming Device; Standing  -     Cleanse skin for procedure; Standing  -     Nozin Nasal  Popswab; Standing  -     Notify Provider - Anticoagulants and Antiplatelets; Standing  -     Glucose monitor nursing POCT; Standing  -     NPO per Anesthesia Guidelines for Procedure/Surgery Except for: Meds; Standing  -     Apply Pneumatic Compression Device (PCD); Standing  -     Pneumatic Compression Device (PCD) (Equipment); Standing  -     ceFAZolin (ANCEF) intermittent infusion 2 g in 50 mL dextrose PREMIX  -     ceFAZolin (ANCEF) intermittent infusion 2 g in 50 mL dextrose PREMIX       At this time for her OAB and fecal urgency/fecal incontinence she is most interested in proceeding with sacral neuromodulation so will arrange.  The different devices were discussed and she has elected to proceed with a medtronic nonrechargeable device    We discussed that the risks to the procedure include but not limited to cardiovascular risks of anesthesia, nerve injury, bleeding, infection, injury to adjacent organs including bowel, bladder, and blood vessels, device not working, device malfunction, need for future procedures including battery replacement.  Patient expressed understanding and agreeable to proceed.    She will need to stop the eliquis prior.  Discussed that her afib and anticoagulation do place her at slightly higher risk for surgical complications    10 minutes were spent today on the day of the encounter  "in reviewing the EMR, direct patient care including surgical counseling, coordination of care and documentation    Sheba Song MD MPH  (she/her/hers)   of Urology  [facility]      Subjective    Here today for follow up.  Still with bothersome OAB and fecal urgency, fecal incontinence.  Was scheduled at one point for sacral neuromodulation but then got sick so has been able to follow up.  Prolapse not bothersome at this time.  She denies any changes in health since last visit    /78   Pulse 65   Ht 1.676 m (5' 6\")   Wt 63.5 kg (140 lb)   LMP  (LMP Unknown)   BMI 22.60 kg/m    GENERAL: healthy, alert and no distress  EYES: Eyes grossly normal to inspection, conjunctivae and sclerae normal  HENT: normal cephalic/atraumatic.  External ears, nose and mouth without ulcers or lesions.  RESP: no audible wheeze, cough, or visible cyanosis.  No visible retractions or increased work of breathing.  Able to speak fully in complete sentences.  NEURO: Cranial nerves grossly intact, mentation intact and speech normal  PSYCH: mentation appears normal, affect normal/bright, judgement and insight intact, normal speech and appearance well-groomed    CC  Patient Care Team:  Lauryn Allison MD as PCP - General (Family Practice)  Jacinda Saleem MD as MD (Otolaryngology)  Maile Leonardo MD as MD (Cardiology)  Sheba Song MD as MD (Urology)  Lauryn Allison MD as Assigned PCP  Giovanna Dominguez MD as Assigned OBGYN Provider  Giovanna Kothari PA-C as Physician Assistant (Dermatology)  Lauryn Allison MD as Referring Physician (Family Medicine)  Maile Leonardo MD as Assigned Heart and Vascular Provider  Sheba Song MD as MD (Urology)  Sheba Song MD as Assigned Surgical Provider    "

## 2023-08-31 NOTE — NURSING NOTE
"Chief Complaint   Patient presents with    Follow Up     Considering surgery for prolapse        Blood pressure 117/78, pulse 65, height 1.676 m (5' 6\"), weight 63.5 kg (140 lb), not currently breastfeeding. Body mass index is 22.6 kg/m .    Patient Active Problem List   Diagnosis    Essential hypertension    Mixed hyperlipidemia    Bunion, left    Osteoarthritis of both hands, unspecified osteoarthritis type    Hypothyroidism    Uterovaginal prolapse    Mixed incontinence    Pelvic floor dysfunction    Dyspareunia in female    Left ovarian cyst    Paroxysmal atrial fibrillation (H)    Irritable larynx syndrome    Muscle tension dysphonia    Left knee pain, unspecified chronicity    Lateral pain of hip       No Known Allergies    Current Outpatient Medications   Medication Sig Dispense Refill    Acetaminophen (TYLENOL PO) Take 500 mg by mouth as needed for mild pain or fever      atorvastatin (LIPITOR) 20 MG tablet TAKE 1 TABLET BY MOUTH EVERY DAY 90 tablet 3    calcium carbonate (OSCAL 500) 1250 (500 Ca) MG TABS tablet Take 1 tablet by mouth 2 times daily      carvedilol (COREG) 6.25 MG tablet Take 1 tablet (6.25 mg) by mouth 2 times daily (with meals) 180 tablet 3    Cholecalciferol (VITAMIN D3) 3000 units TABS Take 1 tablet by mouth daily      ELIQUIS ANTICOAGULANT 5 MG tablet TAKE 1 TABLET BY MOUTH TWICE A  tablet 1    flecainide (TAMBOCOR) 50 MG tablet Take 1 tablet (50 mg) by mouth 2 times daily 180 tablet 3    levothyroxine (SYNTHROID/LEVOTHROID) 50 MCG tablet TAKE 1 TABLET BY MOUTH EVERY DAY 90 tablet 3    MAGNESIUM GLYCINATE PO       Probiotic Product (PROBIOTIC COLON SUPPORT PO) Take 1 tablet by mouth daily      triamcinolone (KENALOG) 0.1 % external cream       zinc sulfate (ZINCATE) 220 (50 Zn) MG capsule Take 220 mg by mouth daily         Social History     Tobacco Use    Smoking status: Never    Smokeless tobacco: Never   Vaping Use    Vaping Use: Never used   Substance Use Topics    Alcohol " use: Yes     Comment: 1-2 glass of wine per day    Drug use: Never       Allison Fitzpatrick  8/31/2023  1:52 PM

## 2023-08-31 NOTE — PROGRESS NOTES
August 31, 2023    Nicole was seen today for follow up.    Diagnoses and all orders for this visit:    Mixed incontinence  -     Case Request: INSERTION, SACRAL NERVE STIMULATOR, STAGE 1; Standing  -     Case Request: INSERTION, SACRAL NERVE STIMULATOR, STAGE 1  -     Case Request: INSERTION, SACRAL NERVE STIMULATOR, STAGE 2; Standing  -     Case Request: INSERTION, SACRAL NERVE STIMULATOR, STAGE 2    Incontinence of feces with fecal urgency  -     Case Request: INSERTION, SACRAL NERVE STIMULATOR, STAGE 1; Standing  -     Case Request: INSERTION, SACRAL NERVE STIMULATOR, STAGE 1  -     Case Request: INSERTION, SACRAL NERVE STIMULATOR, STAGE 2; Standing  -     Case Request: INSERTION, SACRAL NERVE STIMULATOR, STAGE 2    OAB (overactive bladder)  -     Case Request: INSERTION, SACRAL NERVE STIMULATOR, STAGE 1; Standing  -     Case Request: INSERTION, SACRAL NERVE STIMULATOR, STAGE 1  -     Case Request: INSERTION, SACRAL NERVE STIMULATOR, STAGE 2; Standing  -     Case Request: INSERTION, SACRAL NERVE STIMULATOR, STAGE 2    Uterovaginal prolapse    Paroxysmal atrial fibrillation (H)  -     Case Request: INSERTION, SACRAL NERVE STIMULATOR, STAGE 1; Standing  -     Case Request: INSERTION, SACRAL NERVE STIMULATOR, STAGE 1  -     Case Request: INSERTION, SACRAL NERVE STIMULATOR, STAGE 2; Standing  -     Case Request: INSERTION, SACRAL NERVE STIMULATOR, STAGE 2    Umbilical hernia without obstruction and without gangrene    Other orders  -     Void on call to OR; Standing  -     Forced Air Warming Device; Standing  -     Cleanse skin for procedure; Standing  -     Nozin Nasal  Popswab; Standing  -     Notify Provider - Anticoagulants and Antiplatelets; Standing  -     Glucose monitor nursing POCT; Standing  -     NPO per Anesthesia Guidelines for Procedure/Surgery Except for: Meds; Standing  -     Apply Pneumatic Compression Device (PCD); Standing  -     Pneumatic Compression Device (PCD) (Equipment); Standing  -      ceFAZolin (ANCEF) intermittent infusion 2 g in 50 mL dextrose PREMIX  -     ceFAZolin (ANCEF) intermittent infusion 2 g in 50 mL dextrose PREMIX  -     Void on call to OR; Standing  -     Forced Air Warming Device; Standing  -     Cleanse skin for procedure; Standing  -     Nozin Nasal  Popswab; Standing  -     Notify Provider - Anticoagulants and Antiplatelets; Standing  -     Glucose monitor nursing POCT; Standing  -     NPO per Anesthesia Guidelines for Procedure/Surgery Except for: Meds; Standing  -     Apply Pneumatic Compression Device (PCD); Standing  -     Pneumatic Compression Device (PCD) (Equipment); Standing  -     ceFAZolin (ANCEF) intermittent infusion 2 g in 50 mL dextrose PREMIX  -     ceFAZolin (ANCEF) intermittent infusion 2 g in 50 mL dextrose PREMIX       At this time for her OAB and fecal urgency/fecal incontinence she is most interested in proceeding with sacral neuromodulation so will arrange.  The different devices were discussed and she has elected to proceed with a Snapwire nonrechargeable device    We discussed that the risks to the procedure include but not limited to cardiovascular risks of anesthesia, nerve injury, bleeding, infection, injury to adjacent organs including bowel, bladder, and blood vessels, device not working, device malfunction, need for future procedures including battery replacement.  Patient expressed understanding and agreeable to proceed.    She will need to stop the eliquis prior.  Discussed that her afib and anticoagulation do place her at slightly higher risk for surgical complications    10 minutes were spent today on the day of the encounter in reviewing the EMR, direct patient care including surgical counseling, coordination of care and documentation    Sheba Song MD MPH  (she/her/hers)   of Urology  [facility]      Subjective    Here today for follow up.  Still with bothersome OAB and fecal urgency, fecal  "incontinence.  Was scheduled at one point for sacral neuromodulation but then got sick so has been able to follow up.  Prolapse not bothersome at this time.  She denies any changes in health since last visit    /78   Pulse 65   Ht 1.676 m (5' 6\")   Wt 63.5 kg (140 lb)   LMP  (LMP Unknown)   BMI 22.60 kg/m    GENERAL: healthy, alert and no distress  EYES: Eyes grossly normal to inspection, conjunctivae and sclerae normal  HENT: normal cephalic/atraumatic.  External ears, nose and mouth without ulcers or lesions.  RESP: no audible wheeze, cough, or visible cyanosis.  No visible retractions or increased work of breathing.  Able to speak fully in complete sentences.  NEURO: Cranial nerves grossly intact, mentation intact and speech normal  PSYCH: mentation appears normal, affect normal/bright, judgement and insight intact, normal speech and appearance well-groomed    CC  Patient Care Team:  Lauryn Allison MD as PCP - General (Family Practice)  Jacinda Saleem MD as MD (Otolaryngology)  Maile Leonardo MD as MD (Cardiology)  Sheba Song MD as MD (Urology)  Lauryn Allison MD as Assigned PCP  Giovanna Dominguez MD as Assigned OBGYN Provider  Giovanna Kothari PA-C as Physician Assistant (Dermatology)  Lauryn Allison MD as Referring Physician (Family Medicine)  Maile Leonardo MD as Assigned Heart and Vascular Provider  Sheba Song MD as MD (Urology)  Sheba Song MD as Assigned Surgical Provider        "

## 2023-09-05 DIAGNOSIS — I48.0 PAROXYSMAL ATRIAL FIBRILLATION (H): ICD-10-CM

## 2023-09-05 RX ORDER — CARVEDILOL 6.25 MG/1
6.25 TABLET ORAL 2 TIMES DAILY WITH MEALS
Qty: 180 TABLET | Refills: 0 | Status: SHIPPED | OUTPATIENT
Start: 2023-09-05 | End: 2023-11-08

## 2023-09-08 ENCOUNTER — TELEPHONE (OUTPATIENT)
Dept: UROLOGY | Facility: CLINIC | Age: 73
End: 2023-09-08
Payer: COMMERCIAL

## 2023-09-08 PROBLEM — R15.9 INCONTINENCE OF FECES WITH FECAL URGENCY: Status: ACTIVE | Noted: 2023-08-31

## 2023-09-08 PROBLEM — R15.2 INCONTINENCE OF FECES WITH FECAL URGENCY: Status: ACTIVE | Noted: 2023-08-31

## 2023-09-08 PROBLEM — N32.81 OAB (OVERACTIVE BLADDER): Status: ACTIVE | Noted: 2023-08-31

## 2023-09-08 NOTE — TELEPHONE ENCOUNTER
Patient is scheduled for a surgical procedure with Dr. Song      Spoke with: Patient via phone      Date of Surgery/Procedure: Monday November 06, 2023    Date of Surgery/Procedure: Monday November 20, 2023    Location: [facility] OR      Informed patient they will need an adult : Yes     Pre-op: Yes      PAC EVAL: Friday October 27, 2023    Additional imaging/appointments:     Post-op: Tuesday December 19, 2023     Additional comments:      Surgery packet: olive     Patient is aware that surgery time is tentative to change and to expect a call 3-1 business days from Pre Admission Nursing for instructions and arrival time

## 2023-09-11 NOTE — TELEPHONE ENCOUNTER
FUTURE VISIT INFORMATION      SURGERY INFORMATION:  Date: 11/6/23  Location[facility] or  Surgeon:  Sheba Song MD   Anesthesia Type:  MAC  Procedure: INSERTION, SACRAL NERVE STIMULATOR, STAGE 1   Consult: ov 8/31/23    RECORDS REQUESTED FROM:       Primary Care Provider:   Lauryn Allison MD- [facility]     Most recent EKG+ Tracing: 3/30/23    Most recent Cardiac Stress Test: 4/29/19

## 2023-10-05 ENCOUNTER — ANCILLARY ORDERS (OUTPATIENT)
Dept: MAMMOGRAPHY | Facility: CLINIC | Age: 73
End: 2023-10-05

## 2023-10-05 DIAGNOSIS — Z12.31 VISIT FOR SCREENING MAMMOGRAM: Primary | ICD-10-CM

## 2023-10-06 ENCOUNTER — DOCUMENTATION ONLY (OUTPATIENT)
Dept: FAMILY MEDICINE | Facility: CLINIC | Age: 73
End: 2023-10-06
Payer: COMMERCIAL

## 2023-10-06 DIAGNOSIS — E78.2 MIXED HYPERLIPIDEMIA: ICD-10-CM

## 2023-10-06 DIAGNOSIS — E03.9 HYPOTHYROIDISM, UNSPECIFIED TYPE: ICD-10-CM

## 2023-10-06 DIAGNOSIS — Z79.01 CHRONIC ANTICOAGULATION: ICD-10-CM

## 2023-10-06 DIAGNOSIS — I10 ESSENTIAL HYPERTENSION: Primary | ICD-10-CM

## 2023-10-06 NOTE — PROGRESS NOTES
Nicole Stanley has an upcoming lab appointment.        Future Appointments   Date Time Provider Department Center   10/12/2023  8:45 AM [facility]    10/18/2023  1:45 PM E[facility]1 [facility] EA   10/19/2023 11:30 AM Maile Leonardo MD [facility]   10/27/2023  1:00 PM Annie Leahy APRN CNP [facility]   11/29/2023  1:00 PM Lauryn Allison MD [facility]    12/19/2023  2:00 PM Lucina Emery PA-C [facility]       The appointment note says: labs only    There is no Lab order available.      Please review and place future orders as appropriate.  If no Lab order will be placed, please advise patient.      Also for consideration: HMPO    Health Maintenance Due   Topic    ANNUAL REVIEW OF HM ORDERS     LIPID     MICROALBUMIN     TSH W/FREE T4 REFLEX        Thanks,    Lexy Wheeler

## 2023-10-07 ENCOUNTER — MYC MEDICAL ADVICE (OUTPATIENT)
Dept: FAMILY MEDICINE | Facility: CLINIC | Age: 73
End: 2023-10-07
Payer: COMMERCIAL

## 2023-10-12 ENCOUNTER — LAB (OUTPATIENT)
Dept: LAB | Facility: CLINIC | Age: 73
End: 2023-10-12
Payer: COMMERCIAL

## 2023-10-12 DIAGNOSIS — Z79.01 CHRONIC ANTICOAGULATION: ICD-10-CM

## 2023-10-12 DIAGNOSIS — I10 ESSENTIAL HYPERTENSION: ICD-10-CM

## 2023-10-12 DIAGNOSIS — E78.2 MIXED HYPERLIPIDEMIA: ICD-10-CM

## 2023-10-12 DIAGNOSIS — E03.9 HYPOTHYROIDISM, UNSPECIFIED TYPE: ICD-10-CM

## 2023-10-12 LAB
ALBUMIN SERPL BCG-MCNC: 4.3 G/DL (ref 3.5–5.2)
ALP SERPL-CCNC: 60 U/L (ref 35–104)
ALT SERPL W P-5'-P-CCNC: 14 U/L (ref 0–50)
ANION GAP SERPL CALCULATED.3IONS-SCNC: 10 MMOL/L (ref 7–15)
AST SERPL W P-5'-P-CCNC: 17 U/L (ref 0–45)
BILIRUB SERPL-MCNC: 0.5 MG/DL
BUN SERPL-MCNC: 16.5 MG/DL (ref 8–23)
CALCIUM SERPL-MCNC: 9.2 MG/DL (ref 8.8–10.2)
CHLORIDE SERPL-SCNC: 98 MMOL/L (ref 98–107)
CHOLEST SERPL-MCNC: 187 MG/DL
CREAT SERPL-MCNC: 0.84 MG/DL (ref 0.51–0.95)
CREAT UR-MCNC: 27.9 MG/DL
DEPRECATED HCO3 PLAS-SCNC: 27 MMOL/L (ref 22–29)
EGFRCR SERPLBLD CKD-EPI 2021: 73 ML/MIN/1.73M2
ERYTHROCYTE [DISTWIDTH] IN BLOOD BY AUTOMATED COUNT: 14.2 % (ref 10–15)
GLUCOSE SERPL-MCNC: 76 MG/DL (ref 70–99)
HCT VFR BLD AUTO: 38.2 % (ref 35–47)
HDLC SERPL-MCNC: 81 MG/DL
HGB BLD-MCNC: 12.7 G/DL (ref 11.7–15.7)
LDLC SERPL CALC-MCNC: 91 MG/DL
MCH RBC QN AUTO: 31.3 PG (ref 26.5–33)
MCHC RBC AUTO-ENTMCNC: 33.2 G/DL (ref 31.5–36.5)
MCV RBC AUTO: 94 FL (ref 78–100)
MICROALBUMIN UR-MCNC: <12 MG/L
MICROALBUMIN/CREAT UR: NORMAL MG/G{CREAT}
NONHDLC SERPL-MCNC: 106 MG/DL
PLATELET # BLD AUTO: 235 10E3/UL (ref 150–450)
POTASSIUM SERPL-SCNC: 4.3 MMOL/L (ref 3.4–5.3)
PROT SERPL-MCNC: 7.1 G/DL (ref 6.4–8.3)
RBC # BLD AUTO: 4.06 10E6/UL (ref 3.8–5.2)
SODIUM SERPL-SCNC: 135 MMOL/L (ref 135–145)
TRIGL SERPL-MCNC: 73 MG/DL
TSH SERPL DL<=0.005 MIU/L-ACNC: 3.24 UIU/ML (ref 0.3–4.2)
WBC # BLD AUTO: 5 10E3/UL (ref 4–11)

## 2023-10-12 PROCEDURE — 36415 COLL VENOUS BLD VENIPUNCTURE: CPT

## 2023-10-12 PROCEDURE — 85027 COMPLETE CBC AUTOMATED: CPT

## 2023-10-12 PROCEDURE — 80061 LIPID PANEL: CPT

## 2023-10-12 PROCEDURE — 80053 COMPREHEN METABOLIC PANEL: CPT

## 2023-10-12 PROCEDURE — 82570 ASSAY OF URINE CREATININE: CPT

## 2023-10-12 PROCEDURE — 82043 UR ALBUMIN QUANTITATIVE: CPT

## 2023-10-12 PROCEDURE — 84443 ASSAY THYROID STIM HORMONE: CPT

## 2023-10-17 ENCOUNTER — DOCUMENTATION ONLY (OUTPATIENT)
Dept: UROLOGY | Facility: CLINIC | Age: 73
End: 2023-10-17
Payer: COMMERCIAL

## 2023-10-17 NOTE — PROGRESS NOTES
Nicole Stanley has an upcoming lab appointment.        Future Appointments   Date Time Provider Department Center   10/18/2023  1:45 PM E[facility]1 [facility]   10/19/2023 11:30 AM Maile Leonardo MD [facility]   10/19/2023  2:30 PM [facility]    10/27/2023  1:00 PM Annie Leahy APRN [facility]   11/29/2023  1:00 PM Lauryn Allison MD [facility]    12/19/2023  2:00 PM Lucina Emery PA-C [facility]       The appointment note says: urine culture    There is no Lab order available.      Please review and place future orders as appropriate.  If no Lab order will be placed, please advise patient.          Thanks,    Lexy Wheeler

## 2023-10-17 NOTE — RESULT ENCOUNTER NOTE
Excellent! Please call or send a [employer] message if you have any questions. Lauryn Allison M.D.        51% estimated calorie and 51% estimated protein needs met

## 2023-10-18 ENCOUNTER — ANCILLARY PROCEDURE (OUTPATIENT)
Dept: MAMMOGRAPHY | Facility: CLINIC | Age: 73
End: 2023-10-18
Attending: FAMILY MEDICINE
Payer: COMMERCIAL

## 2023-10-18 DIAGNOSIS — Z12.31 VISIT FOR SCREENING MAMMOGRAM: ICD-10-CM

## 2023-10-18 PROCEDURE — 77067 SCR MAMMO BI INCL CAD: CPT | Mod: TC | Performed by: RADIOLOGY

## 2023-10-18 PROCEDURE — 77063 BREAST TOMOSYNTHESIS BI: CPT | Mod: TC | Performed by: RADIOLOGY

## 2023-10-18 NOTE — PROGRESS NOTES
I am delighted to see Nicole Stanley for follow up of atrial fibrillation.     The patient is a 73 year old  female with PAF initially diagnosed 4/2018 in setting of influenza B, not symptomatic at that time. [id number] score is 3 for hypertension, age > 65, female. Apixaban started. Recurrent episodes 1/2019 (spontaneous conversion), 4/2019 (cardioverted in ED). Increasing frequency of AF starting April 2021, between April and July had 5 episodes, 5-7 hours, with palpitation/fatigue, Apple Watch says AF -130s. Her SBP at home had been 140-150s on metoprolol XL 25 every day. Metoprolol was switched to carvedilol in August 2021. She was in persistent AF mid Dec 2022 with BP in the 80s at home and had dyspnea and fatigue. She went to ED on 12/13/2022 where her BP was 153/102, pulse was 92.  she was cardioverted to sinus.  In January 2023 several recurrent episodes all with spontaneous termination, then persistent from 1/29-2/4/2023.  They were in [place] all January, she knew she was in AF but did not feel as bad as in Dec and did not go to ED. In February 2023 I added flecainide 50 bid. She has not had any significant episodes of AF since then.     I last saw her 3/30/2023. She was doing well with flecainide. Her BP was noted to be gradually elevated and was 130s/90s. I encouraged her to continue to monitor, and I suggested adding ARB if consistently elevated.   Since our last visit she has not had any AF. She is concerned because she woke up on 10/9/2023 with headache, BP was 167/100, and remained elevated throughout the day even after headache was gone. This recurred a few days later. I reviewed her BP log with her and her SBP is usually 130-140s, with some readings 150-160 and others 110-120. She has been having decades of bowel and bladder incontinence and is scheduled for a stimulator implant, and asked me about stopping apixaban for the procedure.    Past Medical History:  1. Atrial fibrillation.  Initial diagnosis 2018 in the setting of Influenza B. Spontaneously converted to sinus rhythm. Recurrent AF 2019, cardioverted in ED. Recurrent paroxysmal episodes April - 2021. Cardioverted in ED 2022. Flecainide added 2023.  2. Hypertension.  3. Hyperlipidemia. She had previously been on simvastatin but had some muscle aches. She is reluctant to add more medications.  4. Hypothyroidism.  5. Cholecystectomy  6. Vein stripping 1988  7. Bowel/Bladder incontinence, planning on stimulator implant        Allergies:  No Known Allergies    Medications:   Apixaban 5 mg bid  Atorvastatin 20 every day  Carvedilol 6.25 bid  Flecainide 50 bid  Synthroid 50 mcg every day      Physical examination  Vitals: 137/86, HR 60  BMI= 23    Constitutional: In general, the patient is a pleasant female in no acute distress.    Targeted cardiovascular exam:  Regular rate and rhythm. Normal S1, S2. No murmur, rub, click, or gallop.   Extremities:Pulses are normal bilaterally throughout. No peripheral edema.  Respiratory: Clear to asculation.        I have personally and independently reviewed the following:  Labs:  10/12/2023: chol 187, HDL 81, LDL 91, TG 73, cr 0.84, TSH 3.24, hgb 12.7, plt 253K    ECHOCARDIOGRAM: 18 (in AF): normal LV/RV, no valvular abnormalities     Bicycle stress echo 2019 (in sinus): exercised for 6:30 min, 90% MPHR achieved, MVO2 113% predicted, normal BP response, no ischemia     EK19:  bpm  2021: sinus  2021: sinus 68 bpm, normal intervals   2022: AF 98 bpm  2023: Sinus 59 bpm with PACs and PVCs  3/30/2023: sinus 56 bpm, normal intervals  TODAY 10/19/2023: sinus 56 bpm, normal intervals    30 day MCOT -3/10/2023:   One episode of AF 2023 at 122 bpm, no symptoms; total AF burden <1%  Symptoms of LH correlated to sinus 80s  PVCs 2%; NSVT up to 4 beats, no symptoms    Assessment :  Atrial fibrillation, paroxysmal. No significant events since starting  flecainide, and she is doing well on flecainide 50 bid and carvedilol 6.25 bid. [id number] score is 3 for age, female, HTN, and she is on the appropriate dose of apixaban 5 bid.  Hypertension. Aggressive control is important to prevent AF recurrence. I am reluctant to increase carvedilol due to history of bradycardia. I recommend adding losartan 25 every day and she is agreeable. Will notify her PCP Dr. Allison with whom she has an upcoming appointment.  Stimulator implant. She can hold apixaban as needed for the procedure. No bridging needed.          I spent a total of 20 minutes face to face with  Nicole Stanley during today's office visit. I have spent an additional 15 minutes today on chart review and documentation.      The patient is to return in 6 months. The patient understood the treatment plan as outlined above.  There were no barriers to learning.      Maile Leonardo MD

## 2023-10-19 ENCOUNTER — OFFICE VISIT (OUTPATIENT)
Dept: CARDIOLOGY | Facility: CLINIC | Age: 73
End: 2023-10-19
Attending: INTERNAL MEDICINE
Payer: COMMERCIAL

## 2023-10-19 VITALS
OXYGEN SATURATION: 98 % | WEIGHT: 144.4 LBS | SYSTOLIC BLOOD PRESSURE: 137 MMHG | HEIGHT: 66 IN | HEART RATE: 60 BPM | DIASTOLIC BLOOD PRESSURE: 86 MMHG | BODY MASS INDEX: 23.21 KG/M2

## 2023-10-19 DIAGNOSIS — I48.0 PAROXYSMAL ATRIAL FIBRILLATION (H): Primary | ICD-10-CM

## 2023-10-19 DIAGNOSIS — I10 BENIGN ESSENTIAL HYPERTENSION: ICD-10-CM

## 2023-10-19 PROCEDURE — 93005 ELECTROCARDIOGRAM TRACING: CPT

## 2023-10-19 PROCEDURE — G0463 HOSPITAL OUTPT CLINIC VISIT: HCPCS | Performed by: INTERNAL MEDICINE

## 2023-10-19 PROCEDURE — 99214 OFFICE O/P EST MOD 30 MIN: CPT | Performed by: INTERNAL MEDICINE

## 2023-10-19 PROCEDURE — 93010 ELECTROCARDIOGRAM REPORT: CPT | Performed by: INTERNAL MEDICINE

## 2023-10-19 RX ORDER — LOSARTAN POTASSIUM 25 MG/1
25 TABLET ORAL DAILY
Qty: 60 TABLET | Refills: 3 | Status: SHIPPED | OUTPATIENT
Start: 2023-10-19 | End: 2023-11-29

## 2023-10-19 RX ORDER — LOSARTAN POTASSIUM 50 MG/1
25 TABLET ORAL DAILY
Qty: 60 TABLET | Refills: 3 | Status: SHIPPED | OUTPATIENT
Start: 2023-10-19 | End: 2023-10-19

## 2023-10-19 ASSESSMENT — PAIN SCALES - GENERAL: PAINLEVEL: NO PAIN (0)

## 2023-10-19 NOTE — LETTER
10/19/2023      RE: Nicole Stanley  [address] [place] [address]       Dear Colleague,    Thank you for the opportunity to participate in the care of your patient, Nicole Stanley, at the [facility] [place] at [facility]. Please see a copy of my visit note below.    I am delighted to see Nicole Stanley for follow up of atrial fibrillation.     The patient is a 73 year old  female with PAF initially diagnosed 4/2018 in setting of influenza B, not symptomatic at that time. [id number] score is 3 for hypertension, age > 65, female. Apixaban started. Recurrent episodes 1/2019 (spontaneous conversion), 4/2019 (cardioverted in ED). Increasing frequency of AF starting April 2021, between April and July had 5 episodes, 5-7 hours, with palpitation/fatigue, Apple Watch says AF -130s. Her SBP at home had been 140-150s on metoprolol XL 25 every day. Metoprolol was switched to carvedilol in August 2021. She was in persistent AF mid Dec 2022 with BP in the 80s at home and had dyspnea and fatigue. She went to ED on 12/13/2022 where her BP was 153/102, pulse was 92.  she was cardioverted to sinus.  In January 2023 several recurrent episodes all with spontaneous termination, then persistent from 1/29-2/4/2023.  They were in [place] all January, she knew she was in AF but did not feel as bad as in Dec and did not go to ED. In February 2023 I added flecainide 50 bid. She has not had any significant episodes of AF since then.     I last saw her 3/30/2023. She was doing well with flecainide. Her BP was noted to be gradually elevated and was 130s/90s. I encouraged her to continue to monitor, and I suggested adding ARB if consistently elevated.   Since our last visit she has not had any AF. She is concerned because she woke up on 10/9/2023 with headache, BP was 167/100, and remained elevated throughout the day even after headache was gone. This recurred a few  days later. I reviewed her BP log with her and her SBP is usually 130-140s, with some readings 150-160 and others 110-120. She has been having decades of bowel and bladder incontinence and is scheduled for a stimulator implant, and asked me about stopping apixaban for the procedure.    Past Medical History:  1. Atrial fibrillation. Initial diagnosis 2018 in the setting of Influenza B. Spontaneously converted to sinus rhythm. Recurrent AF 2019, cardioverted in ED. Recurrent paroxysmal episodes April - 2021. Cardioverted in ED 2022. Flecainide added 2023.  2. Hypertension.  3. Hyperlipidemia. She had previously been on simvastatin but had some muscle aches. She is reluctant to add more medications.  4. Hypothyroidism.  5. Cholecystectomy  6. Vein stripping 1988  7. Bowel/Bladder incontinence, planning on stimulator implant        Allergies:  No Known Allergies    Medications:   Apixaban 5 mg bid  Atorvastatin 20 every day  Carvedilol 6.25 bid  Flecainide 50 bid  Synthroid 50 mcg every day      Physical examination  Vitals: 137/86, HR 60  BMI= 23    Constitutional: In general, the patient is a pleasant female in no acute distress.    Targeted cardiovascular exam:  Regular rate and rhythm. Normal S1, S2. No murmur, rub, click, or gallop.   Extremities:Pulses are normal bilaterally throughout. No peripheral edema.  Respiratory: Clear to asculation.        I have personally and independently reviewed the following:  Labs:  10/12/2023: chol 187, HDL 81, LDL 91, TG 73, cr 0.84, TSH 3.24, hgb 12.7, plt 253K    ECHOCARDIOGRAM: 18 (in AF): normal LV/RV, no valvular abnormalities     Bicycle stress echo 2019 (in sinus): exercised for 6:30 min, 90% MPHR achieved, MVO2 113% predicted, normal BP response, no ischemia     EK19:  bpm  2021: sinus  2021: sinus 68 bpm, normal intervals   2022: AF 98 bpm  2023: Sinus 59 bpm with PACs and PVCs  3/30/2023: sinus 56 bpm, normal  intervals  TODAY 10/19/2023: sinus 56 bpm, normal intervals    30 day MCOT 2/9-3/10/2023:   One episode of AF 2/17/2023 at 122 bpm, no symptoms; total AF burden <1%  Symptoms of LH correlated to sinus 80s  PVCs 2%; NSVT up to 4 beats, no symptoms    Assessment :  Atrial fibrillation, paroxysmal. No significant events since starting flecainide, and she is doing well on flecainide 50 bid and carvedilol 6.25 bid. [id number] score is 3 for age, female, HTN, and she is on the appropriate dose of apixaban 5 bid.  Hypertension. Aggressive control is important to prevent AF recurrence. I am reluctant to increase carvedilol due to history of bradycardia. I recommend adding losartan 25 every day and she is agreeable. Will notify her PCP Dr. Allison with whom she has an upcoming appointment.  Stimulator implant. She can hold apixaban as needed for the procedure. No bridging needed.          I spent a total of 20 minutes face to face with  Nicole Stanley during today's office visit. I have spent an additional 15 minutes today on chart review and documentation.      The patient is to return in 6 months. The patient understood the treatment plan as outlined above.  There were no barriers to learning.      Maile Leonardo MD

## 2023-10-19 NOTE — PROGRESS NOTES
"Call returned.  Pt notified that for this procedure she does not need a UC unless symptomatic. Pt denies typical UTI symptoms. Pt notified to do at least one bladder diary before her procedure. Pt stated \"that should be easy enough.\"    RN CC notified.     Margaret Fox CMA  10/19/23  8:46 AM    "

## 2023-10-19 NOTE — NURSING NOTE
Chief Complaint   Patient presents with    Follow Up     6 mo follow up for afib  Meds- coreg, eliquis, flecainide  Last echo 2019          Vitals were taken, medications reconciled and EKG performed.    Thomas Martínez, Facilitator  11:33 AM

## 2023-10-19 NOTE — PROGRESS NOTES
Patient has an appointment for UC today.  I believe an order was supposed to be entered per [facility] Message 10/17/23.      It has not been entered yet.

## 2023-10-19 NOTE — PATIENT INSTRUCTIONS
You were seen in the Electrophysiology Clinic today by: Dr Maile Leonardo    Plan:   Medication Changes: losartan 25 mg once a day    Labs/Tests Needed: none    Follow up Visit: 6 month Dr. Maile Leonardo    Further Instructions: OK to stop Eliquis for stimulation      If you have further questions, please utilize [employer] to contact us.     Your Care Team:    [facility] Cardiology   Telephone Number     Nurse Line  Lena Reagan, RN   Lea Gibson, RN  Anibal Flores RN   [phone]     For scheduling appointments:   [facility]valdo   [phone]   For procedure scheduling:    Sabiha Carrera     [phone]   For the Device Clinic (Pacemakers, ICDs, Loop Recorders)    During business hours: [phone]  After business hours:   [phone]- select option 4 and ask for job code [id number].       On-call cardiologist for after hours or on weekends:   [phone], option #4, and ask to speak to the on-call cardiologist.     Cardiovascular Clinic:   [address] [place] [address]. [place] [address]      As always, Thank you for trusting us with your health care needs!

## 2023-10-20 LAB
ATRIAL RATE - MUSE: 56 BPM
DIASTOLIC BLOOD PRESSURE - MUSE: NORMAL MMHG
INTERPRETATION ECG - MUSE: NORMAL
P AXIS - MUSE: 48 DEGREES
PR INTERVAL - MUSE: 202 MS
QRS DURATION - MUSE: 88 MS
QT - MUSE: 460 MS
QTC - MUSE: 443 MS
R AXIS - MUSE: -34 DEGREES
SYSTOLIC BLOOD PRESSURE - MUSE: NORMAL MMHG
T AXIS - MUSE: -9 DEGREES
VENTRICULAR RATE- MUSE: 56 BPM

## 2023-10-26 RX ORDER — CYCLOSPORINE 0.5 MG/ML
1 EMULSION OPHTHALMIC 2 TIMES DAILY
COMMUNITY

## 2023-10-26 NOTE — PROGRESS NOTES
Preoperative Assessment Center Medication History Note  Medication history completed on October 26, 2023 by this writer prior to patient's PAC appointment. See Epic admission navigator for prior to admission medications. Operating room staff will still need to confirm medications and last dose information on day of surgery.     Medication history interview sources  Patient and CareEverywhere/SureScripts via phone    Changes made to PTA medication list  Added: restasis.   Deleted: none  Changed:     Allergies reviewed with patient and updates made in EHR: yes    -- No recent (within 30 days) course of antibiotics  -- No recent (within 30 days) course of systemic steroids  -- Reports being on blood thinning medications - see other note.    -- Declines being on any other prescription or over-the-counter medications    Prior to Admission medications    Medication Sig Last Dose Taking? Auth Provider Long Term End Date   Acetaminophen (TYLENOL PO) Take 500 mg by mouth every 8 hours as needed for mild pain or fever Taking Yes Reported, Patient     atorvastatin (LIPITOR) 20 MG tablet TAKE 1 TABLET BY MOUTH EVERY DAY  Patient taking differently: Take 20 mg by mouth at bedtime Taking Yes Lauryn Allison MD Yes    calcium carbonate (OSCAL 500) 1250 (500 Ca) MG TABS tablet Take 1 tablet by mouth 2 times daily Taking Yes Reported, Patient     carvedilol (COREG) 6.25 MG tablet Take 1 tablet (6.25 mg) by mouth 2 times daily (with meals) Taking Yes Lauryn Allison MD Yes    Cholecalciferol (VITAMIN D3) 3000 units TABS Take 1 tablet by mouth daily Taking Yes Reported, Patient No    cycloSPORINE (RESTASIS) 0.05 % ophthalmic emulsion Place 1 drop into both eyes 2 times daily Taking Yes Unknown, Entered By History     ELIQUIS ANTICOAGULANT 5 MG tablet TAKE 1 TABLET BY MOUTH TWICE A DAY Taking Yes Lauryn Allison MD     flecainide (TAMBOCOR) 50 MG tablet Take 1 tablet (50 mg) by mouth 2 times daily Taking Yes Maile Leonardo MD Yes     levothyroxine (SYNTHROID/LEVOTHROID) 50 MCG tablet TAKE 1 TABLET BY MOUTH EVERY DAY Taking Yes Lauryn Allison MD Yes    losartan (COZAAR) 25 MG tablet Take 1 tablet (25 mg) by mouth daily  Patient taking differently: Take 25 mg by mouth every evening Taking Yes Maile Leonardo MD Yes    MAGNESIUM GLYCINATE PO Take 2 tablets by mouth every evening OTC unsure of dose. Taking Yes Reported, Patient     Probiotic Product (PROBIOTIC COLON SUPPORT PO) Take 1 tablet by mouth daily Taking Yes Reported, Patient     zinc sulfate (ZINCATE) 220 (50 Zn) MG capsule Take 220 mg by mouth daily Taking Yes Reported, Patient          Medication History Completed By: Shane Peterson [facility] 10/26/2023 10:17 AM

## 2023-10-26 NOTE — PHARMACY - PREOPERATIVE ASSESSMENT CENTER
Anticoagulation Note - Preoperative Assessment Center (PAC) Pharmacist     Patient was interviewed on October 26, 2023 prior to scheduled PAC clinic appointment. The purpose of this note is to document the perioperative anticoagulation plan outlined by the providers caring for Nicole Stanley.     Current Regimen  Anticoagulation Regimen as of October 26, 2023: apixaban (ELIQUIS) 5 mg by mouth twice daily     Indication: afib (no h/o DVT or stroke)  Prescriber:  Dr. Allison, also sees Dr. Leonardo (cardiology)  Expected Duration of therapy: indefinite   Current medications that may interact with this include: none  Note: per Dr. Leonardo OK to hold for upcoming procedures (see note from 10/19/23)  Creatinine   Date Value Ref Range Status   10/12/2023 0.84 0.51 - 0.95 mg/dL Final   02/10/2021 0.85 0.52 - 1.04 mg/dL Final       Perioperative plan  Nicole Stanley is scheduled for INSERTION, SACRAL NERVE STIMULATOR, STAGE 1 on 11/6/23 with Dr. Song and the perioperative anticoagulation plan (coordinated with Dr. Song) is to hold Eliquis x2 days pre op.  Last dose of Eliquis pre op to be on 11/3/23.     Resumption of anticoagulation after procedure will be based on surgery team assessment of bleeding risks and complications.  This plan may require re-assessment and modification by her primary team in the perioperative setting depending on patients clinical situation.        Shane Peterson [facility]  October 26, 2023  10:17 AM

## 2023-10-27 ENCOUNTER — VIRTUAL VISIT (OUTPATIENT)
Dept: SURGERY | Facility: CLINIC | Age: 73
End: 2023-10-27
Payer: COMMERCIAL

## 2023-10-27 ENCOUNTER — PRE VISIT (OUTPATIENT)
Dept: SURGERY | Facility: CLINIC | Age: 73
End: 2023-10-27

## 2023-10-27 ENCOUNTER — ANESTHESIA EVENT (OUTPATIENT)
Dept: SURGERY | Facility: AMBULATORY SURGERY CENTER | Age: 73
End: 2023-10-27
Payer: COMMERCIAL

## 2023-10-27 DIAGNOSIS — R15.9 INCONTINENCE OF FECES WITH FECAL URGENCY: ICD-10-CM

## 2023-10-27 DIAGNOSIS — Z01.818 PREOP EXAMINATION: Primary | ICD-10-CM

## 2023-10-27 DIAGNOSIS — R15.2 INCONTINENCE OF FECES WITH FECAL URGENCY: ICD-10-CM

## 2023-10-27 DIAGNOSIS — N39.46 MIXED INCONTINENCE: ICD-10-CM

## 2023-10-27 DIAGNOSIS — N32.81 OAB (OVERACTIVE BLADDER): ICD-10-CM

## 2023-10-27 PROCEDURE — 99203 OFFICE O/P NEW LOW 30 MIN: CPT | Mod: 95 | Performed by: NURSE PRACTITIONER

## 2023-10-27 ASSESSMENT — ENCOUNTER SYMPTOMS
DYSRHYTHMIAS: 1
ORTHOPNEA: 0

## 2023-10-27 NOTE — H&P
Pre-Operative H & P     CC:  Preoperative exam to assess for increased cardiopulmonary risk while undergoing surgery and anesthesia.    Date of Encounter: 10/27/2023  Primary Care Physician:  Lauryn Allison     Reason for visit:   Encounter Diagnosis   Name Primary?    Preop examination Yes       HPI  Nicole Stanley is a 73 year old female who presents for pre-operative H & P in preparation for  Procedure Information       Case: [id number] Date/Time: 11/06/23 0835    Procedure: INSERTION, SACRAL NERVE STIMULATOR, STAGE 1 ([place])    Anesthesia type: MAC    Diagnosis:       Mixed incontinence [N39.46]      Incontinence of feces with fecal urgency [R15.9, R15.2]      OAB (overactive bladder) [N32.81]      Paroxysmal atrial fibrillation (H) [I48.0]    Pre-op diagnosis:       Mixed incontinence [N39.46]      Incontinence of feces with fecal urgency [R15.9, R15.2]      OAB (overactive bladder) [N32.81]      Paroxysmal atrial fibrillation (H) [I48.0]    Location: [place] [address] / [facility]    Providers: Sheba Song MD        ** with stage 2 to follow on 11/20/23.**    Nicole Stanley is a 73 year old female with atrial fibrillation, hypertension, hyperlipidemia and hypothyroidism that has mixed incontinence, OAB, fecal incontinence and fecal urgency.  She has tried pelvic floor therapy and a pessary, but neither were helpful.  The first pessary actually made her urinary symptoms worse.  She has been following with Dr. Song and the above listed surgery has now been recommended for management.     History is obtained from the patient and chart review    Hx of abnormal bleeding or anti-platelet use: none    Menstrual history: No LMP recorded (lmp unknown). Patient is postmenopausal.:      Past Medical History  Past Medical History:   Diagnosis Date    Arthritis     osteoarth    Heart disease 2018    Hypertension age 49    Influenza B 4/19/2018    Spider veins      Thyroid disease age 49       Past Surgical History  Past Surgical History:   Procedure Laterality Date    CHOLECYSTECTOMY  2000    COLONOSCOPY N/A 11/12/2020    Procedure: COLONOSCOPY;  Surgeon: Hugo Troncoso MD;  Location:  GI    CYSTOSCOPY      ENT SURGERY  2002    turbinectomy & septoplasty    EYE SURGERY      blepharoplasty    GI SURGERY  2006    rectal sphincter    VASCULAR SURGERY  1988    vein stripping       Prior to Admission Medications  Current Outpatient Medications   Medication Sig Dispense Refill    Acetaminophen (TYLENOL PO) Take 500 mg by mouth every 8 hours as needed for mild pain or fever      atorvastatin (LIPITOR) 20 MG tablet TAKE 1 TABLET BY MOUTH EVERY DAY (Patient taking differently: Take 20 mg by mouth at bedtime) 90 tablet 3    calcium carbonate (OSCAL 500) 1250 (500 Ca) MG TABS tablet Take 1 tablet by mouth 2 times daily      carvedilol (COREG) 6.25 MG tablet Take 1 tablet (6.25 mg) by mouth 2 times daily (with meals) 180 tablet 0    Cholecalciferol (VITAMIN D3) 3000 units TABS Take 1 tablet by mouth daily      cycloSPORINE (RESTASIS) 0.05 % ophthalmic emulsion Place 1 drop into both eyes 2 times daily      ELIQUIS ANTICOAGULANT 5 MG tablet TAKE 1 TABLET BY MOUTH TWICE A  tablet 1    flecainide (TAMBOCOR) 50 MG tablet Take 1 tablet (50 mg) by mouth 2 times daily 180 tablet 3    levothyroxine (SYNTHROID/LEVOTHROID) 50 MCG tablet TAKE 1 TABLET BY MOUTH EVERY DAY 90 tablet 3    losartan (COZAAR) 25 MG tablet Take 1 tablet (25 mg) by mouth daily (Patient taking differently: Take 25 mg by mouth every evening) 60 tablet 3    MAGNESIUM GLYCINATE PO Take 2 tablets by mouth every evening OTC unsure of dose.      Probiotic Product (PROBIOTIC COLON SUPPORT PO) Take 1 tablet by mouth daily      zinc sulfate (ZINCATE) 220 (50 Zn) MG capsule Take 220 mg by mouth daily         Allergies  No Known Allergies    Social History  Social History     Socioeconomic History    Marital status:       Spouse name: Not on file    Number of children: Not on file    Years of education: Not on file    Highest education level: Not on file   Occupational History    Occupation: retired   Tobacco Use    Smoking status: Never     Passive exposure: Never    Smokeless tobacco: Never   Vaping Use    Vaping Use: Never used   Substance and Sexual Activity    Alcohol use: Yes     Comment: 1-2 glass of wine per day    Drug use: Never    Sexual activity: Yes     Partners: Male     Birth control/protection: Post-menopausal   Other Topics Concern    Parent/sibling w/ CABG, MI or angioplasty before 65F 55M? No   Social History Narrative    Not on file     Social Determinants of Health     Financial Resource Strain: Not on file   Food Insecurity: Not on file   Transportation Needs: Not on file   Physical Activity: Not on file   Stress: Not on file   Social Connections: Not on file   Interpersonal Safety: Not on file   Housing Stability: Not on file       Family History  Family History   Problem Relation Age of Onset    Hypertension Mother             Hyperlipidemia Mother     Cerebrovascular Disease Mother     Thyroid Disease Mother     Family History Negative Father             Alzheimer Disease Father     Cancer Father         sarcoma    Hypertension Father     Basal cell carcinoma Father     Hyperlipidemia Sister     Thyroid Disease Sister     Thyroid Disease Sister     Hyperlipidemia Sister     Family History Negative Maternal Grandmother              Anesthesia Reaction No family hx of     Thrombosis No family hx of        Review of Systems  The complete review of systems is negative other than noted in the HPI or here.   Anesthesia Evaluation   Pt has had prior anesthetic.     No history of anesthetic complications       ROS/MED HX  ENT/Pulmonary:     (+)     IKE risk factors,  hypertension,                             (-) recent URI   Neurologic:  - neg neurologic ROS     Cardiovascular:      (+) Dyslipidemia hypertension- -   -  - -                        dysrhythmias, a-fib,        Previous cardiac testing   Echo: Date: 2018 Results:  Interpretation Summary  Rhythm is atrial fibrillation. HR ~90 bpm.     Global and regional left ventricular function is normal with an EF of 55-60%.  The right ventricle is normal size. Global right ventricular function is  normal.  No significant valvular abnormalities.  Normal pulmonary artery pressure.  IVC is normal size with preserved respiratory variation. Estimated mean RA  pressure is 3 mmHg.     Previous study not available for comparison    Stress Test:  Date: 2019 Results:  Interpretation Summary  This was a normal stress echocardiogram with no evidence of stress-induced  ischemia. Normal resting LV function with EF of approximately 60-65%; normal  response to exercise with increase to approximately 70-75%. No stress induced  regional wall motion abnormalities. No ECG evidence of ischemia. No  significant valvular disease noted on routine screening color flow Doppler and  pulsed Doppler examination. Normal functional capacity. No resting wallmotion  abnormalities.  The patient did not exhibit any symptoms during exercise.  Normal blood pressure response with stress.  Normal heart rate response to exercise.    ECG Reviewed:  Date: 10/19/23 Results:  Sinus bradycardia   Left axis deviation   Nonspecific ST abnormality   Abnormal ECG     Cath:  Date: Results:   (-) FONTENOT and orthopnea/PND   METS/Exercise Tolerance: >4 METS Comment: Walks 4 miles almost daily for exercise.   Hematologic:  - neg hematologic  ROS     Musculoskeletal: Comment: Intermittent generalized joint pain      GI/Hepatic: Comment: Fecal urgency and incontinence.  Never diagnosed with IBS, but thinks she may have it.       Renal/Genitourinary: Comment: OAB, mixed incontinence      Endo:     (+)          thyroid problem, hypothyroidism,           Psychiatric/Substance Use:  - neg psychiatric ROS      Infectious Disease:  - neg infectious disease ROS     Malignancy:  - neg malignancy ROS     Other:  - neg other ROS    (+)  , H/O Chronic Pain,         Virtual visit -  No vitals were obtained    Physical Exam  Constitutional: Awake, alert, cooperative, no apparent distress, and appears stated age.  Eyes: Pupils equal  HENT: Normocephalic  Respiratory: non labored breathing   Neurologic: Awake, alert, oriented to name, place and time.   Neuropsychiatric: Calm, cooperative. Normal affect.      Prior Labs/Diagnostic Studies   All labs and imaging personally reviewed     EKG/ stress test - if available please see in ROS above     Component      Latest Ref Rng 10/12/2023  8:42 AM   Sodium      135 - 145 mmol/L 135    Potassium      3.4 - 5.3 mmol/L 4.3    Carbon Dioxide (CO2)      22 - 29 mmol/L 27    Anion Gap      7 - 15 mmol/L 10    Urea Nitrogen      8.0 - 23.0 mg/dL 16.5    Creatinine      0.51 - 0.95 mg/dL 0.84    GFR Estimate      >60 mL/min/1.73m2 73    Calcium      8.8 - 10.2 mg/dL 9.2    Chloride      98 - 107 mmol/L 98    Glucose      70 - 99 mg/dL 76    Alkaline Phosphatase      35 - 104 U/L 60    AST      0 - 45 U/L 17    ALT      0 - 50 U/L 14    Protein Total      6.4 - 8.3 g/dL 7.1    Albumin      3.5 - 5.2 g/dL 4.3    Bilirubin Total      <=1.2 mg/dL 0.5    WBC      4.0 - 11.0 10e3/uL 5.0    RBC Count      3.80 - 5.20 10e6/uL 4.06    Hemoglobin      11.7 - 15.7 g/dL 12.7    Hematocrit      35.0 - 47.0 % 38.2    MCV      78 - 100 fL 94    MCH      26.5 - 33.0 pg 31.3    MCHC      31.5 - 36.5 g/dL 33.2    RDW      10.0 - 15.0 % 14.2    Platelet Count      150 - 450 10e3/uL 235            The patient's records and results personally reviewed by this provider.     Outside records reviewed from: Care Everywhere      Assessment    Nicole Stanley is a 73 year old female seen as a PAC referral for risk assessment and optimization for anesthesia.    Plan/Recommendations  Pt will be optimized for the proposed  "procedure.  See below for details on the assessment, risk, and preoperative recommendations    NEUROLOGY  - No history of TIA, CVA or seizure    -Post Op delirium risk factors:  Age    ENT  - No current airway concerns.  Will need to be reassessed day of surgery.  Mallampati: Unable to assess  TM: Unable to assess    CARDIAC  - No history of CAD  - following with Dr. Leonardo for A-fib and hypertension.  Last seen 10/19/23 and okay given to hold Eliquis for surgery x 2 days.  - prior cardiac testing as above.    - METS (Metabolic Equivalents)  Patient performs 4 or more METS exercise without symptoms            Total Score: 0      RCRI-Very low risk: Class 1 0.4% complication rate            Total Score: 0        PULMONARY    IKE Low Risk            Total Score: 2    IKE: Hypertension    IKE: Over 50 ys old      - Denies asthma or inhaler use  - Tobacco History    History   Smoking Status    Never   Smokeless Tobacco    Never       GI  - denies GERD  - fecal urgency and incontinence.  Procedure planned as above.    PONV High Risk  Total Score: 3           1 AN PONV: Pt is Female    1 AN PONV: Patient is not a current smoker    1 AN PONV: Intended Post Op Opioids        /RENAL  - urinary incontinence and OAB.  Procedure planned as above.    ENDOCRINE  {  - BMI: Estimated body mass index is 23.46 kg/m  as calculated from the following:    Height as of 10/19/23: 1.671 m (5' 5.79\").    Weight as of 10/19/23: 65.5 kg (144 lb 6.4 oz).  Healthy Weight (BMI 18.5-24.9)  - No history of Diabetes Mellitus    HEME  VTE Low Risk 0.26%            Total Score: 1    VTE: Greater than 59 yrs old      - last dose of Eliquis 11/3/23.  See pharmD note.      MSK  - has intermittent generalized joint pain secondary to OA.  Consider cautious positioning.         Different anesthesia methods/types have been discussed with the patient, but they are aware that the final plan will be decided by the assigned anesthesia provider on the date of " service.      The patient is optimized for their procedure. AVS with information on surgery time/arrival time, meds and NPO status given by nursing staff. No further diagnostic testing indicated.    Please refer to the physical examination documented by the anesthesiologist in the anesthesia record on the day of surgery.    Video-Visit Details    Type of service:  Video Visit    Provider received verbal consent for a Video Visit from the patient? Yes   Video Start Time: 1256  Video End Time: 1308    Originating Location (pt. Location): Home    Distant Location (provider location):  Off-site  Mode of Communication:  Video Conference via Pulse    On the day of service:     Prep time: 6 minutes  Visit time: 12 minutes  Documentation time: 8 minutes  ------------------------------------------  Total time: 26 minutes      YAAKOV Manzo CNP  Preoperative Assessment Center  [facility]  Clinic and Surgery Center  Phone: [phone]  Fax: [phone]

## 2023-10-27 NOTE — PATIENT INSTRUCTIONS
Preparing for Your Surgery      Name:  Nicole Stanley   MRN:  [id number]   :  1950   Today's Date:  10/27/2023         Arriving for surgery:  Surgery date:  23  Arrival time:  7:05 am  Surgery time: 8:35 am    Restrictions due to COVID 19:    Please maintain social distance.  Masking is optional        parking is available for anyone with mobility limitations or disabilities. (Monday- Friday 7 am- 5 pm)    Please come to:    [facility]  [address] [place] [phone]    Check in on the 5th floor, Ambulatory Surgery Center.    What can I eat or drink?    -  You may eat and drink normally until 8 hours before arrival time  (Until 11:05 pm on 23)  -  You may have clear liquids until 2 hours before arrival time  (Until 5:05 am on 23)    Examples of clear liquids:  Water  Clear broth  Juices (apple, white grape, white cranberry  and cider) without pulp  Noncarbonated, powder based beverages  (lemonade and Chirag-Aid)  Sodas (Sprite, 7-Up, ginger ale and seltzer)  Coffee or tea (without milk or cream)  Gatorade    --No alcohol or cannabis products for at least 24 hours before surgery    Which medicines can I take?    Hold Aspirin for 7 days before surgery.   Hold Multivitamins for 7 days before surgery.  Hold Supplements for 7 days before surgery.  Hold Ibuprofen (Advil, Motrin) for 1 day before surgery--unless otherwise directed by surgeon.  Hold Naproxen (Aleve) for 4 days before surgery.    -  DO NOT take the following medications the day of surgery:  Oscal Vitamin D  Magnesium  Probiotic  Zinc  Eliquis - last dose 11/3/23 pm    -  PLEASE TAKE the following medications per your usual routine   Tylenol if needed  Lipitor  Carvedilol  Eye drops  Flecainide  Levothyroxine  Losartan    How do I prepare myself?  - Please take 2 showers (one the night prior to surgery and one the morning of surgery) using Scrubcare or Hibiclens soap.    Use this soap only from  the neck to your toes.     Leave the soap on your skin for one minute--then rinse thoroughly.      You may use your own shampoo and conditioner; no other hair products.   - Please remove all jewelry and body piercings.  - No lotions, deodorants or fragrance.  - No makeup or fingernail polish.   - Bring your ID and insurance card.    -If you have a Deep Brain Stimulator, a Spinal Cord Stimulator or any implanted Neuro device you must bring the remote to the Surgery Center          ALL PATIENTS ARE REQUIRED TO HAVE A RESPONSIBLE ADULT TO DRIVE AND BE IN ATTENDANCE WITH THEM FOR 24 HOURS FOLLOWING SURGERY       Covid testing policy as of 12/06/2022  Your surgeon will notify and schedule you for a COVID test if one is needed before surgery--please direct any questions or COVID symptoms to your surgeon      Questions or Concerns:    -For questions regarding the day of surgery please contact the Ambulatory Surgery Center at [phone].    -If you have health changes between today and your surgery please contact your surgeon.     For questions after surgery please call your surgeons office.

## 2023-10-27 NOTE — PROGRESS NOTES
Lyn is a 73 year old who is being evaluated via a billable video visit.      How would you like to obtain your AVS? Christa Washington   Lyn is a 73 year old, presenting for the following health issues:  Pre-Op Exam          JEFF Anne LPN

## 2023-11-06 ENCOUNTER — ANCILLARY PROCEDURE (OUTPATIENT)
Dept: RADIOLOGY | Facility: AMBULATORY SURGERY CENTER | Age: 73
End: 2023-11-06
Attending: UROLOGY
Payer: COMMERCIAL

## 2023-11-06 ENCOUNTER — ANESTHESIA (OUTPATIENT)
Dept: SURGERY | Facility: AMBULATORY SURGERY CENTER | Age: 73
End: 2023-11-06
Payer: COMMERCIAL

## 2023-11-06 ENCOUNTER — HOSPITAL ENCOUNTER (OUTPATIENT)
Facility: AMBULATORY SURGERY CENTER | Age: 73
Discharge: HOME | End: 2023-11-06
Attending: UROLOGY
Payer: COMMERCIAL

## 2023-11-06 VITALS
SYSTOLIC BLOOD PRESSURE: 132 MMHG | WEIGHT: 144 LBS | HEIGHT: 65 IN | TEMPERATURE: 97.2 F | RESPIRATION RATE: 16 BRPM | BODY MASS INDEX: 23.99 KG/M2 | HEART RATE: 56 BPM | DIASTOLIC BLOOD PRESSURE: 81 MMHG | OXYGEN SATURATION: 99 %

## 2023-11-06 DIAGNOSIS — N39.46 MIXED INCONTINENCE: ICD-10-CM

## 2023-11-06 DIAGNOSIS — N32.81 OAB (OVERACTIVE BLADDER): Primary | ICD-10-CM

## 2023-11-06 DIAGNOSIS — R15.9 INCONTINENCE OF FECES WITH FECAL URGENCY: ICD-10-CM

## 2023-11-06 DIAGNOSIS — R15.2 INCONTINENCE OF FECES WITH FECAL URGENCY: ICD-10-CM

## 2023-11-06 PROCEDURE — 64561 IMPLANT NEUROELECTRODES: CPT | Mod: LT

## 2023-11-06 PROCEDURE — 64561 IMPLANT NEUROELECTRODES: CPT | Mod: LT | Performed by: UROLOGY

## 2023-11-06 PROCEDURE — C1778 LEAD, NEUROSTIMULATOR: HCPCS | Mod: ZZSP

## 2023-11-06 DEVICE — CABLE EXTENSION 4.32MM 3560022: Type: IMPLANTABLE DEVICE | Site: SACRUM | Status: FUNCTIONAL

## 2023-11-06 DEVICE — LEAD NEUROSTIMULATOR 28CM INTE 978B128: Type: IMPLANTABLE DEVICE | Site: SACRUM | Status: FUNCTIONAL

## 2023-11-06 RX ORDER — CEFAZOLIN SODIUM 2 G/50ML
2 SOLUTION INTRAVENOUS
Status: COMPLETED | OUTPATIENT
Start: 2023-11-06 | End: 2023-11-06

## 2023-11-06 RX ORDER — FENTANYL CITRATE 50 UG/ML
INJECTION, SOLUTION INTRAMUSCULAR; INTRAVENOUS PRN
Status: DISCONTINUED | OUTPATIENT
Start: 2023-11-06 | End: 2023-11-06

## 2023-11-06 RX ORDER — OXYCODONE HYDROCHLORIDE 5 MG/1
5 TABLET ORAL EVERY 6 HOURS PRN
Qty: 6 TABLET | Refills: 0 | Status: SHIPPED | OUTPATIENT
Start: 2023-11-06 | End: 2023-11-09

## 2023-11-06 RX ORDER — PROPOFOL 10 MG/ML
INJECTION, EMULSION INTRAVENOUS PRN
Status: DISCONTINUED | OUTPATIENT
Start: 2023-11-06 | End: 2023-11-06

## 2023-11-06 RX ORDER — SODIUM CHLORIDE, SODIUM LACTATE, POTASSIUM CHLORIDE, CALCIUM CHLORIDE 600; 310; 30; 20 MG/100ML; MG/100ML; MG/100ML; MG/100ML
INJECTION, SOLUTION INTRAVENOUS CONTINUOUS
Status: DISCONTINUED | OUTPATIENT
Start: 2023-11-06 | End: 2023-11-06 | Stop reason: HOSPADM

## 2023-11-06 RX ORDER — LIDOCAINE 40 MG/G
CREAM TOPICAL
Status: DISCONTINUED | OUTPATIENT
Start: 2023-11-06 | End: 2023-11-06 | Stop reason: HOSPADM

## 2023-11-06 RX ORDER — ACETAMINOPHEN 325 MG/1
975 TABLET ORAL ONCE
Status: COMPLETED | OUTPATIENT
Start: 2023-11-06 | End: 2023-11-06

## 2023-11-06 RX ORDER — ONDANSETRON 2 MG/ML
4 INJECTION INTRAMUSCULAR; INTRAVENOUS EVERY 30 MIN PRN
Status: DISCONTINUED | OUTPATIENT
Start: 2023-11-06 | End: 2023-11-07 | Stop reason: HOSPADM

## 2023-11-06 RX ORDER — ONDANSETRON 4 MG/1
4 TABLET, ORALLY DISINTEGRATING ORAL EVERY 30 MIN PRN
Status: DISCONTINUED | OUTPATIENT
Start: 2023-11-06 | End: 2023-11-07 | Stop reason: HOSPADM

## 2023-11-06 RX ORDER — OXYCODONE HYDROCHLORIDE 5 MG/1
10 TABLET ORAL
Status: DISCONTINUED | OUTPATIENT
Start: 2023-11-06 | End: 2023-11-07 | Stop reason: HOSPADM

## 2023-11-06 RX ORDER — OXYCODONE HYDROCHLORIDE 5 MG/1
5 TABLET ORAL
Status: DISCONTINUED | OUTPATIENT
Start: 2023-11-06 | End: 2023-11-07 | Stop reason: HOSPADM

## 2023-11-06 RX ORDER — CEFAZOLIN SODIUM 2 G/50ML
2 SOLUTION INTRAVENOUS SEE ADMIN INSTRUCTIONS
Status: DISCONTINUED | OUTPATIENT
Start: 2023-11-06 | End: 2023-11-06 | Stop reason: HOSPADM

## 2023-11-06 RX ORDER — LIDOCAINE HYDROCHLORIDE 20 MG/ML
INJECTION, SOLUTION INFILTRATION; PERINEURAL PRN
Status: DISCONTINUED | OUTPATIENT
Start: 2023-11-06 | End: 2023-11-06

## 2023-11-06 RX ORDER — BUPIVACAINE HYDROCHLORIDE 2.5 MG/ML
INJECTION, SOLUTION INFILTRATION; PERINEURAL DAILY PRN
Status: DISCONTINUED | OUTPATIENT
Start: 2023-11-06 | End: 2023-11-06 | Stop reason: HOSPADM

## 2023-11-06 RX ORDER — PROPOFOL 10 MG/ML
INJECTION, EMULSION INTRAVENOUS CONTINUOUS PRN
Status: DISCONTINUED | OUTPATIENT
Start: 2023-11-06 | End: 2023-11-06

## 2023-11-06 RX ORDER — DEXMEDETOMIDINE HYDROCHLORIDE 4 UG/ML
INJECTION, SOLUTION INTRAVENOUS PRN
Status: DISCONTINUED | OUTPATIENT
Start: 2023-11-06 | End: 2023-11-06

## 2023-11-06 RX ADMIN — PROPOFOL 10 MG: 10 INJECTION, EMULSION INTRAVENOUS at 09:34

## 2023-11-06 RX ADMIN — ACETAMINOPHEN 975 MG: 325 TABLET ORAL at 07:42

## 2023-11-06 RX ADMIN — DEXMEDETOMIDINE HYDROCHLORIDE 4 MCG: 4 INJECTION, SOLUTION INTRAVENOUS at 09:55

## 2023-11-06 RX ADMIN — PROPOFOL 20 MG: 10 INJECTION, EMULSION INTRAVENOUS at 09:32

## 2023-11-06 RX ADMIN — LIDOCAINE HYDROCHLORIDE 60 MG: 20 INJECTION, SOLUTION INFILTRATION; PERINEURAL at 09:32

## 2023-11-06 RX ADMIN — CEFAZOLIN SODIUM 2 G: 2 SOLUTION INTRAVENOUS at 09:28

## 2023-11-06 RX ADMIN — PROPOFOL 100 MCG/KG/MIN: 10 INJECTION, EMULSION INTRAVENOUS at 09:32

## 2023-11-06 RX ADMIN — SODIUM CHLORIDE, SODIUM LACTATE, POTASSIUM CHLORIDE, CALCIUM CHLORIDE: 600; 310; 30; 20 INJECTION, SOLUTION INTRAVENOUS at 07:44

## 2023-11-06 RX ADMIN — FENTANYL CITRATE 25 MCG: 50 INJECTION, SOLUTION INTRAMUSCULAR; INTRAVENOUS at 09:57

## 2023-11-06 ASSESSMENT — ENCOUNTER SYMPTOMS: DYSRHYTHMIAS: 1

## 2023-11-06 NOTE — ANESTHESIA PREPROCEDURE EVALUATION
Anesthesia Pre-Procedure Evaluation    Patient: Nicole Stanley   MRN: [id number] : 1950        Procedure : Procedure(s):  INSERTION, SACRAL NERVE STIMULATOR, STAGE 1          Past Medical History:   Diagnosis Date    Arthritis     osteoarth    Heart disease 2018    Hypertension age 49    Influenza B 2018    Spider veins     Thyroid disease age 49      Past Surgical History:   Procedure Laterality Date    CHOLECYSTECTOMY      COLONOSCOPY N/A 2020    Procedure: COLONOSCOPY;  Surgeon: Hugo Troncoso MD;  Location:  GI    CYSTOSCOPY      ENT SURGERY      turbinectomy & septoplasty    EYE SURGERY      blepharoplasty    GI SURGERY      rectal sphincter    VASCULAR SURGERY  1988    vein stripping      No Known Allergies   Social History     Tobacco Use    Smoking status: Never     Passive exposure: Never    Smokeless tobacco: Never   Substance Use Topics    Alcohol use: Yes     Comment: 1-2 glass of wine per day      Wt Readings from Last 1 Encounters:   23 65.3 kg (144 lb)        Anesthesia Evaluation   Pt has had prior anesthetic. Type: General and MAC.    No history of anesthetic complications       ROS/MED HX  ENT/Pulmonary:  - neg pulmonary ROS     Neurologic:  - neg neurologic ROS     Cardiovascular:     (+)  hypertension- -   -  - -   Taking blood thinners                     dysrhythmias, a-fib,             METS/Exercise Tolerance: >4 METS    Hematologic:  - neg hematologic  ROS     Musculoskeletal:   (+)  arthritis,             GI/Hepatic:  - neg GI/hepatic ROS     Renal/Genitourinary:  - neg Renal ROS     Endo:     (+)          thyroid problem, hypothyroidism,           Psychiatric/Substance Use:  - neg psychiatric ROS     Infectious Disease:  - neg infectious disease ROS     Malignancy:  - neg malignancy ROS     Other:  - neg other ROS          Physical Exam    Airway        Mallampati: II   TM distance: > 3 FB   Neck ROM: full   Mouth opening: > 3  "cm    Respiratory Devices and Support         Dental       (+) Completely normal teeth      Cardiovascular   cardiovascular exam normal          Pulmonary   pulmonary exam normal                OUTSIDE LABS:  CBC:   Lab Results   Component Value Date    WBC 5.0 10/12/2023    WBC 5.7 12/13/2022    HGB 12.7 10/12/2023    HGB 14.3 12/13/2022    HCT 38.2 10/12/2023    HCT 43.3 12/13/2022     10/12/2023     12/13/2022     BMP:   Lab Results   Component Value Date     10/12/2023     12/13/2022    POTASSIUM 4.3 10/12/2023    POTASSIUM 4.4 12/13/2022    CHLORIDE 98 10/12/2023    CHLORIDE 104 12/13/2022    CO2 27 10/12/2023    CO2 25 12/13/2022    BUN 16.5 10/12/2023    BUN 18.1 12/13/2022    CR 0.84 10/12/2023    CR 0.92 12/13/2022    GLC 76 10/12/2023    GLC 97 12/13/2022     COAGS:   Lab Results   Component Value Date    INR 1.09 12/13/2022     POC: No results found for: \"BGM\", \"HCG\", \"HCGS\"  HEPATIC:   Lab Results   Component Value Date    ALBUMIN 4.3 10/12/2023    PROTTOTAL 7.1 10/12/2023    ALT 14 10/12/2023    AST 17 10/12/2023    ALKPHOS 60 10/12/2023    BILITOTAL 0.5 10/12/2023     OTHER:   Lab Results   Component Value Date    LACT 0.8 04/18/2018    ELISE 9.2 10/12/2023    PHOS 3.4 12/13/2022    MAG 2.3 12/13/2022    TSH 3.24 10/12/2023    CRP <2.9 06/04/2018    SED 9 06/04/2018       Anesthesia Plan    ASA Status:  2    NPO Status:  NPO Appropriate    Anesthesia Type: General.     - Airway: LMA      Maintenance: TIVA.        Consents    Anesthesia Plan(s) and associated risks, benefits, and realistic alternatives discussed. Questions answered and patient/representative(s) expressed understanding.     - Discussed: Risks, Benefits and Alternatives for BOTH SEDATION and the PROCEDURE were discussed     - Discussed with:  Patient            Postoperative Care    Pain management: IV analgesics.   PONV prophylaxis: Ondansetron (or other 5HT-3)     Comments:                Tico Estrella MD  "

## 2023-11-06 NOTE — BRIEF OP NOTE
[facility]    Brief Operative Note    Pre-operative diagnosis: Mixed incontinence [N39.46]  Incontinence of feces with fecal urgency [R15.9, R15.2]  OAB (overactive bladder) [N32.81]  Paroxysmal atrial fibrillation (H) [I48.0]  Post-operative diagnosis Same as pre-operative diagnosis    Procedure: INSERTION, SACRAL NERVE STIMULATOR, STAGE 1, N/A - Sacrum    Surgeon: Surgeon(s) and Role:     * Sheba Song MD - Primary     * Bradley Alberto MD - Resident - Assisting  Anesthesia: MAC   Estimated Blood Loss: Minimal    Drains: None  Specimens: * No specimens in log *  Findings:   Lead placed in L S3 foramen with lead tunneled to L buttock area and temporary lead tunneled to R back .  Complications: None.  Implants: * No implants in log *

## 2023-11-06 NOTE — ANESTHESIA POSTPROCEDURE EVALUATION
Patient: Nicole Stanley    Procedure: Procedure(s):  INSERTION, SACRAL NERVE STIMULATOR, STAGE 1       Anesthesia Type:  MAC    Note:  Disposition: Outpatient   Postop Pain Control: Uneventful            Sign Out: Well controlled pain   PONV: No   Neuro/Psych: Uneventful            Sign Out: Acceptable/Baseline neuro status   Airway/Respiratory: Uneventful            Sign Out: Acceptable/Baseline resp. status   CV/Hemodynamics: Uneventful            Sign Out: Acceptable CV status; No obvious hypovolemia; No obvious fluid overload   Other NRE: NONE   DID A NON-ROUTINE EVENT OCCUR? No           Last vitals:  Vitals Value Taken Time   /81 11/06/23 1115   Temp 36.2  C (97.2  F) 11/06/23 1115   Pulse 56 11/06/23 1115   Resp 16 11/06/23 1115   SpO2 99 % 11/06/23 1115       Electronically Signed By: Tico Estrella MD  November 6, 2023  12:14 PM

## 2023-11-06 NOTE — DISCHARGE INSTRUCTIONS
[facility]  Home Care Following Anesthesia  For 24 hours after surgery:  Get plenty of rest.  A responsible adult must stay with you for at least 24 hours after you leave the surgery center.  Do not drive or use heavy equipment.  If you have weakness or tingling, don't drive or use heavy equipment until this feeling goes away.   Do not drink alcohol.   Avoid strenuous or risky activities.  Ask for help when climbing stairs.  You may feel lightheaded.  IF so, sit for a few minutes before standing.  Have someone help you get up.   If you have nausea (feel sick to your stomach): Drink only clear liquids such as apple juice, ginger ale, broth or 7-Up.  Rest may also help.  Be sure to drink enough fluids.  Move to a regular diet as you feel able.   You may have a slight fever.  Call the doctor if your fever is over 100 F (37.7 C) (taken under the tongue) or lasts longer than 24 hours.  You may have a dry mouth, a sore throat, muscle aches or trouble sleeping. These should go away after 24 hours.  Do not make important or legal decisions.   It is recommended to avoid smoking.               Tips for taking pain medications  To get the best pain relief possible, remember these points:  Take pain medications as directed, before pain becomes severe.  Pain medication can upset your stomach: taking it with food may help.  Constipation is a common side effect of pain medication. Drink plenty of  fluids.  Eat foods high in fiber. Take a stool softener if recommended by your doctor or pharmacist.  Do not drink alcohol, drive or operate machinery while taking pain medications.  Ask about other ways to control pain, such as with heat, ice or relaxation.    Tylenol/Acetaminophen Consumption    If you feel your pain relief is insufficient, you may take Tylenol/Acetaminophen in addition to your narcotic pain medication.   Be careful not to exceed 4,000 mg of Tylenol/Acetaminophen in a 24 hour  period from all sources.  If you are taking extra strength Tylenol/acetaminophen (500 mg), the maximum dose is 8 tablets in 24 hours.  If you are taking regular strength acetaminophen (325 mg), the maximum dose is 12 tablets in 24 hours.  975 mg of tylenol received at 7:42 AM, next available at 1:42 PM-then just follow the package instructions    Call a doctor for any of the following:  Signs of infection (fever, growing tenderness at the surgery site, a large amount of drainage or bleeding, severe pain, foul-smelling drainage, redness, swelling).  It has been over 8 to 10 hours since surgery and you are still not able to urinate (pass water).  Headache for over 24 hours.  Signs of Covid-19 infection (temperature over 100 degrees, shortness of breath, cough, loss of taste/smell, generalized body aches, persistent headache, chills, sore throat, nausea/vomiting/diarrhea)  Your doctor is:       Dr. Sheba Song, Prostate and Urology: [phone]               Or dial [phone] and ask for the resident on call for:  Prostate Urology  For emergency care, call the:  [facility]:  [phone] (TTY for hearing impaired: [phone])

## 2023-11-06 NOTE — ANESTHESIA CARE TRANSFER NOTE
Patient: Nicole Stanley    Procedure: Procedure(s):  INSERTION, SACRAL NERVE STIMULATOR, STAGE 1       Diagnosis: Mixed incontinence [N39.46]  Incontinence of feces with fecal urgency [R15.9, R15.2]  OAB (overactive bladder) [N32.81]  Paroxysmal atrial fibrillation (H) [I48.0]  Diagnosis Additional Information: No value filed.    Anesthesia Type:   MAC     Note:    Oropharynx: oropharynx clear of all foreign objects and spontaneously breathing  Level of Consciousness: awake  Oxygen Supplementation: room air    Independent Airway: airway patency satisfactory and stable  Dentition: dentition unchanged  Vital Signs Stable: post-procedure vital signs reviewed and stable  Report to RN Given: handoff report given  Patient transferred to: Phase II    Handoff Report: Identifed the Patient, Identified the Reponsible Provider, Reviewed the pertinent medical history, Discussed the surgical course, Reviewed Intra-OP anesthesia mangement and issues during anesthesia, Set expectations for post-procedure period and Allowed opportunity for questions and acknowledgement of understanding      Vitals:  Vitals Value Taken Time   /74 11/06/23 1033   Temp 36  C (96.8  F) 11/06/23 1033   Pulse 60 11/06/23 1033   Resp 16 11/06/23 1033   SpO2 97 % 11/06/23 1033       Electronically Signed By: YAAKOV Wilson CRNA  November 6, 2023  10:38 AM

## 2023-11-06 NOTE — OP NOTE
Urology Operative Summary \  November 6, 2023      Pre-operative diagnosis: Overactive bladder   Urinary urgency and frequency   Urge incontinence  Fecal urgency/incontinence   Atrial fibrillation     Post-operative diagnosis: Same   Procedure: Stage I interstim placement   Interpretation of fluoroscopic imaging     Surgeon: Sheba Song MD   Assistant(s): Bradley Alberto MD      Anesthesia: Local anesthesia with MAC sedation       Estimated blood loss: Less than 10 ml     Complications: None   Condition: Patient taken to recovery in stable condition.     Findings: Placed tined lead in the left S3 foramen with biplanar flouroscopic guidance. The interstim  temporary lead connection was placed in the left buttocks and then tunneled over to the R back to be connected to the external battery.      Indications: Nicole Stanley is a 73 year old woman with OAB with urinary urgency and frequency and urge incontinence as well as fecal urgency/incontinence refractory to anti-cholinergics.  She has therefore elected to proceed with Interstim  placement understanding the risks for infection, pain, bleeding, and the need for future procedures and risks of anesthesia. She received IV antibiotics prior to the procedure initiation.  Procedure: The patient was identified correctly, consented and placed in the prone position with care in neural safety in positioning all four extremities. The patient's sacral area was prepped and draped in the usual sterile fashion. Using the fluoroscope in the AP position the medial aspects of the sacral foramena were identified and marked. The fluoroscope was placed in the lateral position and the S3 foramen was identified and marked.    Marcaine with bicarb was injected at the insertion site to anesthetize the skin. Once this was achieved a 3 1/2 inch needle was inserted on the left side until it was passed through the S3 foramen as seen on fluoroscopy to match the previous placement. We then placed a  second needle slightly more medial and superior and passed through the s3 foramen which was better positioned. Next the needle was tested and the patient had a sensory and motor responses at about 0.4. At this point the stilette was removed from the insertion needle and passer was placed. Her incision was widened to allow the dilator through which our permanent electrode was passed until the appropriate position on fluoroscopy. The electrode was tested and found to have good motor and sensory response at low amplitude all below 0.6. The dilator was then removed under fluoroscopy ensuring our electrode was not displaced.   At this point a second incision approximately 4 cm in length was created at the left buttocks. We used marcaine with bicarb at the insertion site were injected to anesthetize the skin. Electrocautery was used to dissect down to the gluteal fascia.  Herein we created a small pouch that would accommodate our Interstim generator. We then tunneled and connected the permanent electrode to the temporary lead connector. The temporary lead wire and the connector were tied using prolene to create a small loop to prevent migration of the connector. The temporary lead was then tunneled to the R back where a small incision was made and the wire was externalized..   We then closed the deep dermis with interrupted vicryl stitches and then reapproximated the skin  with 4-0 monocryl and dermabond at both the connector and temporary wire sites as well as the needle site. A telfa dressing and tegaderm were used to secure the temporary lead in place, which was then hooked to the external battery.    The patient was awoken from anesthesia and returned to the supine position. All instrument and counts were correct at the end of the procedure.    Plan: Nicole Stanley is a 73 year old woman who underwent stage I interstim  placement today She will follow up in two weeks for generator placement or removal    Bradley Alberto  MD    Addendum:    I, Sheba Song, was present and scrubbed for the entire case.  I agree with the note as above with changes made as needed.  I spoke to the patient and her  in PACU after the case    Sheba Song MD MPH  (she/her/hers)   of Urology  [facility]

## 2023-11-08 DIAGNOSIS — I48.0 PAROXYSMAL ATRIAL FIBRILLATION (H): ICD-10-CM

## 2023-11-08 RX ORDER — CARVEDILOL 6.25 MG/1
6.25 TABLET ORAL 2 TIMES DAILY WITH MEALS
Qty: 180 TABLET | Refills: 0 | Status: SHIPPED | OUTPATIENT
Start: 2023-11-08 | End: 2023-11-29

## 2023-11-08 RX ORDER — APIXABAN 5 MG/1
TABLET, FILM COATED ORAL
Qty: 180 TABLET | Refills: 1 | Status: SHIPPED | OUTPATIENT
Start: 2023-11-08 | End: 2023-11-29

## 2023-11-17 ENCOUNTER — ANESTHESIA EVENT (OUTPATIENT)
Dept: SURGERY | Facility: AMBULATORY SURGERY CENTER | Age: 73
End: 2023-11-17
Payer: COMMERCIAL

## 2023-11-20 ENCOUNTER — ANESTHESIA (OUTPATIENT)
Dept: SURGERY | Facility: AMBULATORY SURGERY CENTER | Age: 73
End: 2023-11-20
Payer: COMMERCIAL

## 2023-11-20 ENCOUNTER — HOSPITAL ENCOUNTER (OUTPATIENT)
Facility: AMBULATORY SURGERY CENTER | Age: 73
Discharge: HOME | End: 2023-11-20
Attending: UROLOGY
Payer: COMMERCIAL

## 2023-11-20 VITALS
HEIGHT: 66 IN | HEART RATE: 89 BPM | SYSTOLIC BLOOD PRESSURE: 105 MMHG | WEIGHT: 140 LBS | BODY MASS INDEX: 22.5 KG/M2 | RESPIRATION RATE: 16 BRPM | OXYGEN SATURATION: 97 % | DIASTOLIC BLOOD PRESSURE: 71 MMHG | TEMPERATURE: 98.1 F

## 2023-11-20 PROCEDURE — C1767 GENERATOR, NEURO NON-RECHARG: HCPCS | Mod: ZZSP

## 2023-11-20 PROCEDURE — 64590 INS/RPL PRPH SAC/GSTR NPG/R: CPT | Mod: GC | Performed by: UROLOGY

## 2023-11-20 PROCEDURE — 64590 INS/RPL PRPH SAC/GSTR NPG/R: CPT

## 2023-11-20 DEVICE — NEUROSTIMULATOR MEDT INTERSTIM X RECHARGE-FREE 97800: Type: IMPLANTABLE DEVICE | Status: FUNCTIONAL

## 2023-11-20 RX ORDER — OXYCODONE HYDROCHLORIDE 5 MG/1
5 TABLET ORAL
Status: DISCONTINUED | OUTPATIENT
Start: 2023-11-20 | End: 2023-11-21 | Stop reason: HOSPADM

## 2023-11-20 RX ORDER — ONDANSETRON 4 MG/1
4 TABLET, ORALLY DISINTEGRATING ORAL EVERY 30 MIN PRN
Status: DISCONTINUED | OUTPATIENT
Start: 2023-11-20 | End: 2023-11-20 | Stop reason: HOSPADM

## 2023-11-20 RX ORDER — OXYCODONE HYDROCHLORIDE 5 MG/1
10 TABLET ORAL
Status: DISCONTINUED | OUTPATIENT
Start: 2023-11-20 | End: 2023-11-21 | Stop reason: HOSPADM

## 2023-11-20 RX ORDER — DIAZEPAM 10 MG/2ML
2.5 INJECTION, SOLUTION INTRAMUSCULAR; INTRAVENOUS
Status: DISCONTINUED | OUTPATIENT
Start: 2023-11-20 | End: 2023-11-20 | Stop reason: HOSPADM

## 2023-11-20 RX ORDER — HYDROXYZINE HYDROCHLORIDE 10 MG/1
10 TABLET, FILM COATED ORAL EVERY 6 HOURS PRN
Status: DISCONTINUED | OUTPATIENT
Start: 2023-11-20 | End: 2023-11-20 | Stop reason: HOSPADM

## 2023-11-20 RX ORDER — HYDROMORPHONE HYDROCHLORIDE 1 MG/ML
0.2 INJECTION, SOLUTION INTRAMUSCULAR; INTRAVENOUS; SUBCUTANEOUS EVERY 5 MIN PRN
Status: DISCONTINUED | OUTPATIENT
Start: 2023-11-20 | End: 2023-11-20 | Stop reason: HOSPADM

## 2023-11-20 RX ORDER — DEXAMETHASONE SODIUM PHOSPHATE 10 MG/ML
4 INJECTION, SOLUTION INTRAMUSCULAR; INTRAVENOUS
Status: DISCONTINUED | OUTPATIENT
Start: 2023-11-20 | End: 2023-11-20 | Stop reason: HOSPADM

## 2023-11-20 RX ORDER — FENTANYL CITRATE 50 UG/ML
INJECTION, SOLUTION INTRAMUSCULAR; INTRAVENOUS PRN
Status: DISCONTINUED | OUTPATIENT
Start: 2023-11-20 | End: 2023-11-20

## 2023-11-20 RX ORDER — BUPIVACAINE HYDROCHLORIDE 2.5 MG/ML
INJECTION, SOLUTION INFILTRATION; PERINEURAL PRN
Status: DISCONTINUED | OUTPATIENT
Start: 2023-11-20 | End: 2023-11-20 | Stop reason: HOSPADM

## 2023-11-20 RX ORDER — FENTANYL CITRATE 50 UG/ML
25 INJECTION, SOLUTION INTRAMUSCULAR; INTRAVENOUS
Status: DISCONTINUED | OUTPATIENT
Start: 2023-11-20 | End: 2023-11-21 | Stop reason: HOSPADM

## 2023-11-20 RX ORDER — PROPOFOL 10 MG/ML
INJECTION, EMULSION INTRAVENOUS CONTINUOUS PRN
Status: DISCONTINUED | OUTPATIENT
Start: 2023-11-20 | End: 2023-11-20

## 2023-11-20 RX ORDER — ACETAMINOPHEN 325 MG/1
975 TABLET ORAL ONCE
Status: COMPLETED | OUTPATIENT
Start: 2023-11-20 | End: 2023-11-20

## 2023-11-20 RX ORDER — SODIUM CHLORIDE, SODIUM LACTATE, POTASSIUM CHLORIDE, CALCIUM CHLORIDE 600; 310; 30; 20 MG/100ML; MG/100ML; MG/100ML; MG/100ML
INJECTION, SOLUTION INTRAVENOUS CONTINUOUS
Status: DISCONTINUED | OUTPATIENT
Start: 2023-11-20 | End: 2023-11-20 | Stop reason: HOSPADM

## 2023-11-20 RX ORDER — ONDANSETRON 2 MG/ML
4 INJECTION INTRAMUSCULAR; INTRAVENOUS EVERY 30 MIN PRN
Status: DISCONTINUED | OUTPATIENT
Start: 2023-11-20 | End: 2023-11-21 | Stop reason: HOSPADM

## 2023-11-20 RX ORDER — LABETALOL HYDROCHLORIDE 5 MG/ML
10 INJECTION, SOLUTION INTRAVENOUS
Status: DISCONTINUED | OUTPATIENT
Start: 2023-11-20 | End: 2023-11-20 | Stop reason: HOSPADM

## 2023-11-20 RX ORDER — HALOPERIDOL 5 MG/ML
1 INJECTION INTRAMUSCULAR
Status: DISCONTINUED | OUTPATIENT
Start: 2023-11-20 | End: 2023-11-20 | Stop reason: HOSPADM

## 2023-11-20 RX ORDER — ONDANSETRON 4 MG/1
4 TABLET, ORALLY DISINTEGRATING ORAL EVERY 30 MIN PRN
Status: DISCONTINUED | OUTPATIENT
Start: 2023-11-20 | End: 2023-11-21 | Stop reason: HOSPADM

## 2023-11-20 RX ORDER — ALBUTEROL SULFATE 0.83 MG/ML
2.5 SOLUTION RESPIRATORY (INHALATION) EVERY 4 HOURS PRN
Status: DISCONTINUED | OUTPATIENT
Start: 2023-11-20 | End: 2023-11-20 | Stop reason: HOSPADM

## 2023-11-20 RX ORDER — LIDOCAINE HYDROCHLORIDE 20 MG/ML
INJECTION, SOLUTION INFILTRATION; PERINEURAL PRN
Status: DISCONTINUED | OUTPATIENT
Start: 2023-11-20 | End: 2023-11-20

## 2023-11-20 RX ORDER — MEPERIDINE HYDROCHLORIDE 25 MG/ML
12.5 INJECTION INTRAMUSCULAR; INTRAVENOUS; SUBCUTANEOUS EVERY 5 MIN PRN
Status: DISCONTINUED | OUTPATIENT
Start: 2023-11-20 | End: 2023-11-20 | Stop reason: HOSPADM

## 2023-11-20 RX ORDER — FENTANYL CITRATE 50 UG/ML
50 INJECTION, SOLUTION INTRAMUSCULAR; INTRAVENOUS EVERY 5 MIN PRN
Status: DISCONTINUED | OUTPATIENT
Start: 2023-11-20 | End: 2023-11-20 | Stop reason: HOSPADM

## 2023-11-20 RX ORDER — ONDANSETRON 2 MG/ML
4 INJECTION INTRAMUSCULAR; INTRAVENOUS EVERY 30 MIN PRN
Status: DISCONTINUED | OUTPATIENT
Start: 2023-11-20 | End: 2023-11-20 | Stop reason: HOSPADM

## 2023-11-20 RX ORDER — CEFAZOLIN SODIUM 2 G/50ML
2 SOLUTION INTRAVENOUS
Status: COMPLETED | OUTPATIENT
Start: 2023-11-20 | End: 2023-11-20

## 2023-11-20 RX ORDER — KETOROLAC TROMETHAMINE 30 MG/ML
15 INJECTION, SOLUTION INTRAMUSCULAR; INTRAVENOUS
Status: DISCONTINUED | OUTPATIENT
Start: 2023-11-20 | End: 2023-11-20 | Stop reason: HOSPADM

## 2023-11-20 RX ORDER — FENTANYL CITRATE 50 UG/ML
25 INJECTION, SOLUTION INTRAMUSCULAR; INTRAVENOUS EVERY 5 MIN PRN
Status: DISCONTINUED | OUTPATIENT
Start: 2023-11-20 | End: 2023-11-20 | Stop reason: HOSPADM

## 2023-11-20 RX ORDER — LIDOCAINE 40 MG/G
CREAM TOPICAL
Status: DISCONTINUED | OUTPATIENT
Start: 2023-11-20 | End: 2023-11-20 | Stop reason: HOSPADM

## 2023-11-20 RX ORDER — CEFAZOLIN SODIUM 2 G/50ML
2 SOLUTION INTRAVENOUS SEE ADMIN INSTRUCTIONS
Status: DISCONTINUED | OUTPATIENT
Start: 2023-11-20 | End: 2023-11-20 | Stop reason: HOSPADM

## 2023-11-20 RX ORDER — HYDROMORPHONE HYDROCHLORIDE 1 MG/ML
0.4 INJECTION, SOLUTION INTRAMUSCULAR; INTRAVENOUS; SUBCUTANEOUS EVERY 5 MIN PRN
Status: DISCONTINUED | OUTPATIENT
Start: 2023-11-20 | End: 2023-11-20 | Stop reason: HOSPADM

## 2023-11-20 RX ADMIN — LIDOCAINE HYDROCHLORIDE 80 MG: 20 INJECTION, SOLUTION INFILTRATION; PERINEURAL at 12:05

## 2023-11-20 RX ADMIN — CEFAZOLIN SODIUM 2 G: 2 SOLUTION INTRAVENOUS at 11:59

## 2023-11-20 RX ADMIN — SODIUM CHLORIDE, SODIUM LACTATE, POTASSIUM CHLORIDE, CALCIUM CHLORIDE: 600; 310; 30; 20 INJECTION, SOLUTION INTRAVENOUS at 10:22

## 2023-11-20 RX ADMIN — FENTANYL CITRATE 25 MCG: 50 INJECTION, SOLUTION INTRAMUSCULAR; INTRAVENOUS at 12:18

## 2023-11-20 RX ADMIN — ACETAMINOPHEN 975 MG: 325 TABLET ORAL at 10:22

## 2023-11-20 RX ADMIN — FENTANYL CITRATE 25 MCG: 50 INJECTION, SOLUTION INTRAMUSCULAR; INTRAVENOUS at 12:05

## 2023-11-20 RX ADMIN — PROPOFOL 150 MCG/KG/MIN: 10 INJECTION, EMULSION INTRAVENOUS at 12:06

## 2023-11-20 ASSESSMENT — ENCOUNTER SYMPTOMS: DYSRHYTHMIAS: 1

## 2023-11-20 NOTE — ANESTHESIA POSTPROCEDURE EVALUATION
Patient: Nicole Stanley    Procedure: Procedure(s):  INSERTION, SACRAL NERVE STIMULATOR, STAGE 2       Anesthesia Type:  MAC    Note:  Disposition: Outpatient   Postop Pain Control: Uneventful            Sign Out: Well controlled pain   PONV: No   Neuro/Psych: Uneventful            Sign Out: Acceptable/Baseline neuro status   Airway/Respiratory: Uneventful            Sign Out: Acceptable/Baseline resp. status   CV/Hemodynamics: Uneventful            Sign Out: Acceptable CV status   Other NRE: NONE   DID A NON-ROUTINE EVENT OCCUR? No           Last vitals:  Vitals Value Taken Time   /71 11/20/23 1257   Temp 36.7  C (98.1  F) 11/20/23 1257   Pulse 89 11/20/23 1257   Resp 16 11/20/23 1257   SpO2 97 % 11/20/23 1257       Electronically Signed By: Medardo Meier MD  November 20, 2023  1:51 PM

## 2023-11-20 NOTE — ANESTHESIA CARE TRANSFER NOTE
Patient: Nicole Stanley    Procedure: Procedure(s):  INSERTION, SACRAL NERVE STIMULATOR, STAGE 2       Diagnosis: Mixed incontinence [N39.46]  Incontinence of feces with fecal urgency [R15.9, R15.2]  OAB (overactive bladder) [N32.81]  Paroxysmal atrial fibrillation (H) [I48.0]  Diagnosis Additional Information: No value filed.    Anesthesia Type:   MAC     Note:    Oropharynx: oropharynx clear of all foreign objects and spontaneously breathing  Level of Consciousness: awake  Oxygen Supplementation: room air    Independent Airway: airway patency satisfactory and stable  Dentition: dentition unchanged  Vital Signs Stable: post-procedure vital signs reviewed and stable  Report to RN Given: handoff report given  Patient transferred to: Phase II    Handoff Report: Identifed the Patient, Identified the Reponsible Provider, Reviewed the pertinent medical history, Discussed the surgical course, Reviewed Intra-OP anesthesia mangement and issues during anesthesia, Set expectations for post-procedure period and Allowed opportunity for questions and acknowledgement of understanding      Vitals:  Vitals Value Taken Time   /71    Temp 97.9    Pulse 58    Resp 15    SpO2 95        Electronically Signed By: SIMON BRADEN  November 20, 2023  12:42 PM

## 2023-11-20 NOTE — BRIEF OP NOTE
[facility] [place]    Brief Operative Note    Pre-operative diagnosis: Mixed incontinence [N39.46]  Incontinence of feces with fecal urgency [R15.9, R15.2]  OAB (overactive bladder) [N32.81]  Paroxysmal atrial fibrillation (H) [I48.0]  Post-operative diagnosis Same as pre-operative diagnosis    Procedure: INSERTION, SACRAL NERVE STIMULATOR, STAGE 2, N/A - Sacrum    Surgeon: Surgeon(s) and Role:     * Sheba Song MD - Primary     * Bradley Alberto MD - Resident - Assisting  Anesthesia: MAC   Estimated Blood Loss: Minimal    Drains: None  Specimens: * No specimens in log *  Findings:   Battery internalized, tested with good response .  Complications: None.  Implants:   Implant Name Type Inv. Item Serial No.  Lot No. LRB No. Used Action   NEUROSTIMULATOR MEDT INTERSTIM X RECHARGE-FREE [id number] - [id number] Neurology device NEUROSTIMULATOR MEDT INTERSTIM X RECHARGE-FREE [id number]  [employer]  Right 1 Implanted

## 2023-11-20 NOTE — DISCHARGE INSTRUCTIONS
"[facility]  Home Care Following Anesthesia  For 24 hours after surgery:  Get plenty of rest.  A responsible adult must stay with you for at least 24 hours after you leave the surgery center.  Do not drive or use heavy equipment.  If you have weakness or tingling, don't drive or use heavy equipment until this feeling goes away.   Do not drink alcohol.   Avoid strenuous or risky activities.  Ask for help when climbing stairs.  You may feel lightheaded.  IF so, sit for a few minutes before standing.  Have someone help you get up.   If you have nausea (feel sick to your stomach): Drink only clear liquids such as apple juice, ginger ale, broth or 7-Up.  Rest may also help.  Be sure to drink enough fluids.  Move to a regular diet as you feel able.   You may have a slight fever.  Call the doctor if your fever is over 100 F (37.7 C) (taken under the tongue) or lasts longer than 24 hours.  You may have a dry mouth, a sore throat, muscle aches or trouble sleeping. These should go away after 24 hours.  Do not make important or legal decisions.   It is recommended to avoid smoking.        Today you received a Marcaine or bupivacaine block to numb the nerves near your surgery site.  This is a block using local anesthetic or \"numbing\" medication injected around the nerves to anesthetize or \"numb\" the area supplied by those nerves.  This block is injected into the muscle layer near your surgical site.  The medication may numb the location where you had surgery for 6-18 hours, but may last up to 24 hours.  If your surgical site is an arm or leg you should be careful with your affected limb, since it is possible to injure your limb without being aware of it due to the numbing.  Until full feeling returns, you should guard against bumping or hitting your limb, and avoid extreme hot or cold temperatures on the skin.  As the block wears off, the feeling will return as a tingling or prickly " sensation near your surgical site.  You will experience more discomfort from your incision as the feeling returns.  You may want to take a pain pill (a narcotic or Tylenol if this was prescribed by your surgeon) when you start to experience mild pain before the pain beccomes more severe.  If your pain medications do not control your pain you should notifiy your surgeon.    Tips for taking pain medications  To get the best pain relief possible, remember these points:  Take pain medications as directed, before pain becomes severe.  Pain medication can upset your stomach: taking it with food may help.  Constipation is a common side effect of pain medication. Drink plenty of  fluids.  Eat foods high in fiber. Take a stool softener if recommended by your doctor or pharmacist.  Do not drink alcohol, drive or operate machinery while taking pain medications.  Ask about other ways to control pain, such as with heat, ice or relaxation.    Tylenol/Acetaminophen Consumption    If you feel your pain relief is insufficient, you may take Tylenol/Acetaminophen in addition to your narcotic pain medication.   Be careful not to exceed 4,000 mg of Tylenol/Acetaminophen in a 24 hour period from all sources.  If you are taking extra strength Tylenol/acetaminophen (500 mg), the maximum dose is 8 tablets in 24 hours.  If you are taking regular strength acetaminophen (325 mg), the maximum dose is 12 tablets in 24 hours.    Call a doctor for any of the following:  Signs of infection (fever, growing tenderness at the surgery site, a large amount of drainage or bleeding, severe pain, foul-smelling drainage, redness, swelling).  It has been over 8 to 10 hours since surgery and you are still not able to urinate (pass water).  Headache for over 24 hours.  Numbness, tingling or weakness the day after surgery (if you had spinal anesthesia).  Signs of Covid-19 infection (temperature over 100 degrees, shortness of breath, cough, loss of taste/smell,  generalized body aches, persistent headache, chills, sore throat, nausea/vomiting/diarrhea)  Your doctor is:  Dr. Sheba Song, Prostate and Urology: [phone]                    Or dial [phone] and ask for the resident on call for:  Prostate Urology  For emergency care, call the:  [place] [facility]:  [phone] (TTY for hearing impaired: [phone])

## 2023-11-20 NOTE — OP NOTE
Urology Operative Summary     Pre-operative diagnosis: Overactive bladder  Urinary urgency and frequency  Urge incontinence  Fecal urgency/incontinence  Atrial fibrillation     Post-operative diagnosis: Same   Procedure: Stage  II Interstim placement  Interpretation of fluoroscopic imaging     Surgeon: Sheba Song MD   Assistant(s): Bradley Alberto MD      Anesthesia: Local anesthesia with MAC sedation       Estimated blood loss: Less than 10 ml     Complications: None   Condition: Patient taken to recovery in stable condition.     Findings: Battery internalized and tested with good response.     Indications: Nicole Stanley is a 73 year old woman with urinary urgency and frequency and urge incontinence refractory to anti-cholinergics. She elected to proceed with a PNE which proved to be beneficial and she has therefore elected to proceed with Interstim placement understanding the risks for infection, pain, bleeding, and the need for future procedures and risks of anesthesia. She received IV antibiotics prior to the procedure initiation.    Procedure: The patient was identified correctly, consented and placed in the prone position. The patient's sacral area was prepped and draped in the usual sterile fashion. The previous incision was identified on the L back area where the tined lead was connected with the temporary lead. Marcaine with bicarb was injected at the insertion site to anesthetize the skin. Once this was achieved a 11 blade was used to open the previous incision. A hemostat was used to bluntly dissect the subcutaneous tissue to avoid injury to the wires. Once the connection was established, the connection was pulled up via the previous prolene tie and the screwdriver was used to remove the tined lead from the connector. The connector was cut and then pulled via the externalized portion to remove this piece. The tined lead was then connected to the permanent battery and the subcutaneous space was bluntly  enlarged. The skin incision was also enlarged 1 cm to accommodate the battery. The battery was then inserted into the incision with good lay in the subcutaneous space. The battery was tested with good response and the incision was closed with 3-0 vicryl interrupted suture for the deep dermis and a running 4-0 Monocryl subcuticular closure.    The patient was awoken from anesthesia and returned to the supine position. All instrument and counts were correct at the end of the procedure.  Plan: Nicole Stanley is a 73 year old woman who underwent stage II interstim placement today after Follow up as scheduled   Bradley Alberto MD  November 20, 2023   Addendum:    I, Sheba Song, was present and scrubbed for the entire case.  I agree with the note as above with changes made as needed.     Sheba Song MD MPH  (she/her/hers)   of Urology  [facility]

## 2023-11-20 NOTE — ANESTHESIA PREPROCEDURE EVALUATION
Anesthesia Pre-Procedure Evaluation    Patient: Nicole Stanley   MRN: [id number] : 1950        Procedure : Procedure(s):  INSERTION, SACRAL NERVE STIMULATOR, STAGE 2          Past Medical History:   Diagnosis Date    Arthritis     osteoarth    Heart disease 2018    Hypertension age 49    Influenza B 2018    Spider veins     Thyroid disease age 49      Past Surgical History:   Procedure Laterality Date    CHOLECYSTECTOMY      COLONOSCOPY N/A 2020    Procedure: COLONOSCOPY;  Surgeon: Hugo Troncoso MD;  Location:  GI    CYSTOSCOPY      ENT SURGERY      turbinectomy & septoplasty    EYE SURGERY      blepharoplasty    GI SURGERY  2006    rectal sphincter    IMPLANT STIMULATOR SACRAL NERVE STAGE ONE N/A 2023    Procedure: INSERTION, SACRAL NERVE STIMULATOR, STAGE 1;  Surgeon: Sheba Song MD;  Location: [facility] OR    VASCULAR SURGERY      vein stripping      No Known Allergies   Social History     Tobacco Use    Smoking status: Never     Passive exposure: Never    Smokeless tobacco: Never   Substance Use Topics    Alcohol use: Yes     Comment: 1-2 glass of wine per day      Wt Readings from Last 1 Encounters:   23 63.5 kg (140 lb)        Anesthesia Evaluation            ROS/MED HX  ENT/Pulmonary: Comment: Irritable Larynx Syndrome      Neurologic:       Cardiovascular:     (+)  hypertension- -   -  - -                        dysrhythmias, a-fib,             METS/Exercise Tolerance:     Hematologic:       Musculoskeletal:   (+)  arthritis,             GI/Hepatic:       Renal/Genitourinary:       Endo:     (+)          thyroid problem, hypothyroidism,           Psychiatric/Substance Use:       Infectious Disease:       Malignancy:       Other:            Physical Exam    Airway  airway exam normal      Mallampati: II   TM distance: > 3 FB   Neck ROM: full   Mouth opening: > 3 cm    Respiratory Devices and Support         Dental       (+) Completely normal  "teeth      Cardiovascular          Rhythm and rate: regular and normal     Pulmonary   pulmonary exam normal        breath sounds clear to auscultation           OUTSIDE LABS:  CBC:   Lab Results   Component Value Date    WBC 5.0 10/12/2023    WBC 5.7 12/13/2022    HGB 12.7 10/12/2023    HGB 14.3 12/13/2022    HCT 38.2 10/12/2023    HCT 43.3 12/13/2022     10/12/2023     12/13/2022     BMP:   Lab Results   Component Value Date     10/12/2023     12/13/2022    POTASSIUM 4.3 10/12/2023    POTASSIUM 4.4 12/13/2022    CHLORIDE 98 10/12/2023    CHLORIDE 104 12/13/2022    CO2 27 10/12/2023    CO2 25 12/13/2022    BUN 16.5 10/12/2023    BUN 18.1 12/13/2022    CR 0.84 10/12/2023    CR 0.92 12/13/2022    GLC 76 10/12/2023    GLC 97 12/13/2022     COAGS:   Lab Results   Component Value Date    INR 1.09 12/13/2022     POC: No results found for: \"BGM\", \"HCG\", \"HCGS\"  HEPATIC:   Lab Results   Component Value Date    ALBUMIN 4.3 10/12/2023    PROTTOTAL 7.1 10/12/2023    ALT 14 10/12/2023    AST 17 10/12/2023    ALKPHOS 60 10/12/2023    BILITOTAL 0.5 10/12/2023     OTHER:   Lab Results   Component Value Date    LACT 0.8 04/18/2018    ELISE 9.2 10/12/2023    PHOS 3.4 12/13/2022    MAG 2.3 12/13/2022    TSH 3.24 10/12/2023    CRP <2.9 06/04/2018    SED 9 06/04/2018       Anesthesia Plan    ASA Status:  3    NPO Status:  NPO Appropriate    Anesthesia Type: MAC.     - Reason for MAC: immobility needed, straight local not clinically adequate   Induction: Intravenous.   Maintenance: TIVA.        Consents    Anesthesia Plan(s) and associated risks, benefits, and realistic alternatives discussed. Questions answered and patient/representative(s) expressed understanding.     - Discussed:     - Discussed with:  Patient      - Extended Intubation/Ventilatory Support Discussed: No.      - Patient is DNR/DNI Status: No     Use of blood products discussed: No .     Postoperative Care    Pain management: IV analgesics, " Oral pain medications, Multi-modal analgesia.   PONV prophylaxis: Ondansetron (or other 5HT-3), Background Propofol Infusion     Comments:                Medardo Meier MD

## 2023-11-24 ASSESSMENT — ACTIVITIES OF DAILY LIVING (ADL): CURRENT_FUNCTION: NO ASSISTANCE NEEDED

## 2023-11-24 ASSESSMENT — ENCOUNTER SYMPTOMS
DIZZINESS: 0
WEAKNESS: 0
SHORTNESS OF BREATH: 0
MYALGIAS: 0
PARESTHESIAS: 0
EYE PAIN: 0
CONSTIPATION: 0
HEARTBURN: 0
BREAST MASS: 0
HEADACHES: 0
ABDOMINAL PAIN: 0
SORE THROAT: 0
FREQUENCY: 1
NAUSEA: 0
COUGH: 0
ARTHRALGIAS: 0
PALPITATIONS: 0
HEMATURIA: 0
FEVER: 0
CHILLS: 0
DIARRHEA: 0
JOINT SWELLING: 0
HEMATOCHEZIA: 0
DYSURIA: 0
NERVOUS/ANXIOUS: 0

## 2023-11-29 ENCOUNTER — OFFICE VISIT (OUTPATIENT)
Dept: FAMILY MEDICINE | Facility: CLINIC | Age: 73
End: 2023-11-29
Attending: FAMILY MEDICINE
Payer: COMMERCIAL

## 2023-11-29 VITALS
RESPIRATION RATE: 16 BRPM | DIASTOLIC BLOOD PRESSURE: 87 MMHG | BODY MASS INDEX: 24.21 KG/M2 | HEIGHT: 65 IN | HEART RATE: 63 BPM | WEIGHT: 145.3 LBS | TEMPERATURE: 97.3 F | SYSTOLIC BLOOD PRESSURE: 139 MMHG | OXYGEN SATURATION: 99 %

## 2023-11-29 DIAGNOSIS — Z00.00 ENCOUNTER FOR MEDICARE ANNUAL WELLNESS EXAM: Primary | ICD-10-CM

## 2023-11-29 DIAGNOSIS — E03.9 ACQUIRED HYPOTHYROIDISM: ICD-10-CM

## 2023-11-29 DIAGNOSIS — I48.0 PAROXYSMAL ATRIAL FIBRILLATION (H): ICD-10-CM

## 2023-11-29 DIAGNOSIS — E78.2 MIXED HYPERLIPIDEMIA: ICD-10-CM

## 2023-11-29 DIAGNOSIS — I10 BENIGN ESSENTIAL HYPERTENSION: ICD-10-CM

## 2023-11-29 LAB
ANION GAP SERPL CALCULATED.3IONS-SCNC: 11 MMOL/L (ref 7–15)
BUN SERPL-MCNC: 17.5 MG/DL (ref 8–23)
CALCIUM SERPL-MCNC: 9.3 MG/DL (ref 8.8–10.2)
CHLORIDE SERPL-SCNC: 104 MMOL/L (ref 98–107)
CREAT SERPL-MCNC: 0.82 MG/DL (ref 0.51–0.95)
DEPRECATED HCO3 PLAS-SCNC: 27 MMOL/L (ref 22–29)
EGFRCR SERPLBLD CKD-EPI 2021: 75 ML/MIN/1.73M2
GLUCOSE SERPL-MCNC: 82 MG/DL (ref 70–99)
POTASSIUM SERPL-SCNC: 3.9 MMOL/L (ref 3.4–5.3)
SODIUM SERPL-SCNC: 142 MMOL/L (ref 135–145)

## 2023-11-29 PROCEDURE — 36415 COLL VENOUS BLD VENIPUNCTURE: CPT | Performed by: FAMILY MEDICINE

## 2023-11-29 PROCEDURE — G0439 PPPS, SUBSEQ VISIT: HCPCS | Performed by: FAMILY MEDICINE

## 2023-11-29 PROCEDURE — 99214 OFFICE O/P EST MOD 30 MIN: CPT | Mod: 25 | Performed by: FAMILY MEDICINE

## 2023-11-29 PROCEDURE — 80048 BASIC METABOLIC PNL TOTAL CA: CPT | Performed by: FAMILY MEDICINE

## 2023-11-29 RX ORDER — CARVEDILOL 6.25 MG/1
6.25 TABLET ORAL 2 TIMES DAILY WITH MEALS
Qty: 180 TABLET | Refills: 3 | Status: SHIPPED | OUTPATIENT
Start: 2023-11-29

## 2023-11-29 RX ORDER — ATORVASTATIN CALCIUM 20 MG/1
20 TABLET, FILM COATED ORAL AT BEDTIME
Qty: 90 TABLET | Refills: 3 | Status: SHIPPED | OUTPATIENT
Start: 2023-11-29 | End: 2024-08-27

## 2023-11-29 RX ORDER — LEVOTHYROXINE SODIUM 50 UG/1
50 TABLET ORAL DAILY
Qty: 90 TABLET | Refills: 3 | Status: SHIPPED | OUTPATIENT
Start: 2023-11-29 | End: 2024-08-27

## 2023-11-29 RX ORDER — LOSARTAN POTASSIUM 25 MG/1
25 TABLET ORAL EVERY EVENING
Qty: 90 TABLET | Refills: 3 | Status: SHIPPED | OUTPATIENT
Start: 2023-11-29 | End: 2024-08-27

## 2023-11-29 ASSESSMENT — ENCOUNTER SYMPTOMS
DYSURIA: 0
ABDOMINAL PAIN: 0
PARESTHESIAS: 0
CHILLS: 0
SORE THROAT: 0
NAUSEA: 0
DIZZINESS: 0
SHORTNESS OF BREATH: 0
NERVOUS/ANXIOUS: 0
FEVER: 0
CONSTIPATION: 0
JOINT SWELLING: 0
HEARTBURN: 0
WEAKNESS: 0
HEMATOCHEZIA: 0
EYE PAIN: 0
HEADACHES: 0
ARTHRALGIAS: 0
BREAST MASS: 0
COUGH: 0
MYALGIAS: 0
FREQUENCY: 1
PALPITATIONS: 0
DIARRHEA: 0
HEMATURIA: 0

## 2023-11-29 ASSESSMENT — PAIN SCALES - GENERAL: PAINLEVEL: NO PAIN (0)

## 2023-11-29 ASSESSMENT — ACTIVITIES OF DAILY LIVING (ADL): CURRENT_FUNCTION: NO ASSISTANCE NEEDED

## 2023-11-29 NOTE — PROGRESS NOTES
"SUBJECTIVE:   Lyn is a 73 year old, presenting for the following:  Annual Visit        11/29/2023    12:46 PM   Additional Questions   Roomed by Orin BELLO   Accompanied by self       Are you in the first 12 months of your Medicare coverage?  No    Healthy Habits:     In general, how would you rate your overall health?  Good    Frequency of exercise:  4-5 days/week    Duration of exercise:  45-60 minutes    Do you usually eat at least 4 servings of fruit and vegetables a day, include whole grains    & fiber and avoid regularly eating high fat or \"junk\" foods?  Yes    Ability to successfully perform activities of daily living:  No assistance needed    Home Safety:  No safety concerns identified    Hearing Impairment:  Difficulty following a conversation in a noisy restaurant or crowded room, feel that people are mumbling or not speaking clearly, difficulty following dialogue in the theater, difficult to understand a speaker at a public meeting or [employer] service, find that men's voices are easier to understand than woman's and difficulty understanding soft or whispered speech    In the past 6 months, have you been bothered by leaking of urine? Yes    In general, how would you rate your overall mental or emotional health?  Excellent    Home blood pressures have been improved. Ranging from /64-90, mostly in normal range.     Today's PHQ-2 Score:       11/29/2023    12:33 PM   PHQ-2 ( 1999 [employer])   Q1: Little interest or pleasure in doing things 0   Q2: Feeling down, depressed or hopeless 0   PHQ-2 Score 0   Q1: Little interest or pleasure in doing things Not at all   Q2: Feeling down, depressed or hopeless Not at all   PHQ-2 Score 0           Have you ever done Advance Care Planning? (For example, a Health Directive, POLST, or a discussion with a medical provider or your loved ones about your wishes): Yes, advance care planning is on file.     Fall risk  Fallen 2 or more times in the past year?: No  Any fall " with injury in the past year?: No    Cognitive Screening   1) Repeat 3 items   2) Clock draw:NORMAL  3) 3 item recall: Recalls 3 objects  Results: 3 items recalled: COGNITIVE IMPAIRMENT LESS LIKELY    Mini-CogTM Copyright HORACIO Armando. Licensed by the author for use in [facility]; reprinted with permission (london@[facility]). All rights reserved.  click delete button to remove this line now    Do you have sleep apnea, excessive snoring or daytime drowsiness? : no    Reviewed and updated as needed this visit by clinical staff   Tobacco  Allergies  Meds              Reviewed and updated as needed this visit by Provider                 Social History     Tobacco Use    Smoking status: Never     Passive exposure: Never    Smokeless tobacco: Never   Substance Use Topics    Alcohol use: Yes     Comment: 1-2 glass of wine per day             11/24/2023     2:08 PM   Alcohol Use   Prescreen: >3 drinks/day or >7 drinks/week? No         7/30/2021     8:22 AM   AUDIT - Alcohol Use Disorders Identification Test - Reproduced from the World Health Organization Audit 2001 (Second Edition)   1.  How often do you have a drink containing alcohol? 4 or more times a week   2.  How many drinks containing alcohol do you have on a typical day when you are drinking? 1 or 2   3.  How often do you have five or more drinks on one occasion? Never   4.  How often during the last year have you found that you were not able to stop drinking once you had started? Never   5.  How often during the last year have you failed to do what was normally expected of you because of drinking? Never   6.  How often during the last year have you needed a first drink in the morning to get yourself going after a heavy drinking session? Never   7.  How often during the last year have you had a feeling of guilt or remorse after drinking? Never   8.  How often during the last year have you been unable to remember what happened the night before because of your  drinking? Never   9.  Have you or someone else been injured because of your drinking? No   10. Has a relative, friend, doctor or other health care worker been concerned about your drinking or suggested you cut down? No   TOTAL SCORE 4     Do you have a current opioid prescription? No  Do you use any other controlled substances or medications that are not prescribed by a provider? None          Current providers sharing in care for this patient include   Patient Care Team:  Lauryn Allison MD as PCP - General (Family Practice)  Jacinda Saleem MD as MD (Otolaryngology)  Maile Leonardo MD as MD (Cardiology)  Sheba Song MD as MD (Urology)  Lauryn Allison MD as Assigned PCP  Giovanna Dominguez MD as Assigned OBGYN Provider  Giovanna Kothari PA-C as Physician Assistant (Dermatology)  Lauryn Allison MD as Referring Physician (Family Medicine)  Maile Leonardo MD as Assigned Heart and Vascular Provider  Sheba Song MD as MD (Urology)  Sheba Song MD as Assigned Surgical Provider    The following health maintenance items are reviewed in Epic and correct as of today:  Health Maintenance   Topic Date Due    IPV IMMUNIZATION (2 of 3 - Adult catch-up series) 08/18/2000    ANNUAL REVIEW OF HM ORDERS  09/07/2023    MEDICARE ANNUAL WELLNESS VISIT  11/28/2023    BMP  10/12/2024    LIPID  10/12/2024    MICROALBUMIN  10/12/2024    TSH W/FREE T4 REFLEX  10/12/2024    FALL RISK ASSESSMENT  11/29/2024    MAMMO SCREENING  10/18/2025    COLORECTAL CANCER SCREENING  11/12/2025    DTAP/TDAP/TD IMMUNIZATION (6 - Td or Tdap) 05/21/2028    ADVANCE CARE PLANNING  11/29/2028    DEXA  06/05/2034    HEPATITIS C SCREENING  Completed    PHQ-2 (once per calendar year)  Completed    INFLUENZA VACCINE  Completed    Pneumococcal Vaccine: 65+ Years  Completed    ZOSTER IMMUNIZATION  Completed    RSV VACCINE (Pregnancy & 60+)  Completed    COVID-19 Vaccine  Completed    HPV IMMUNIZATION  Aged Out     "MENINGITIS IMMUNIZATION  Aged Out    RSV MONOCLONAL ANTIBODY  Aged Out     BP Readings from Last 3 Encounters:   11/29/23 139/87   11/20/23 105/71   11/06/23 132/81    Wt Readings from Last 3 Encounters:   11/29/23 65.9 kg (145 lb 4.8 oz)   11/20/23 63.5 kg (140 lb)   11/06/23 65.3 kg (144 lb)                         Review of Systems   Constitutional:  Negative for chills and fever.   HENT:  Positive for hearing loss. Negative for congestion, ear pain and sore throat.    Eyes:  Negative for pain and visual disturbance.   Respiratory:  Negative for cough and shortness of breath.    Cardiovascular:  Negative for chest pain, palpitations and peripheral edema.   Gastrointestinal:  Negative for abdominal pain, constipation, diarrhea, heartburn, hematochezia and nausea.   Breasts:  Negative for tenderness, breast mass and discharge.   Genitourinary:  Positive for frequency. Negative for dysuria, genital sores, hematuria, pelvic pain, urgency, vaginal bleeding and vaginal discharge.   Musculoskeletal:  Negative for arthralgias, joint swelling and myalgias.   Skin:  Negative for rash.   Neurological:  Negative for dizziness, weakness, headaches and paresthesias.   Psychiatric/Behavioral:  Negative for mood changes. The patient is not nervous/anxious.         OBJECTIVE:   /87 (BP Location: Right arm, Patient Position: Sitting, Cuff Size: Adult Regular)   Pulse 63   Temp 97.3  F (36.3  C) (Temporal)   Resp 16   Ht 1.638 m (5' 4.5\")   Wt 65.9 kg (145 lb 4.8 oz)   LMP  (LMP Unknown)   SpO2 99%   BMI 24.56 kg/m   Estimated body mass index is 24.56 kg/m  as calculated from the following:    Height as of this encounter: 1.638 m (5' 4.5\").    Weight as of this encounter: 65.9 kg (145 lb 4.8 oz).  Physical Exam  GENERAL APPEARANCE: healthy, alert and no distress  EYES: Eyes grossly normal to inspection, PERRL and conjunctivae and sclerae normal  HENT: ear canals and TM's normal, nose and mouth without ulcers or " lesions, oropharynx clear and oral mucous membranes moist  NECK: no adenopathy, no asymmetry, masses, or scars and thyroid normal to palpation  RESP: lungs clear to auscultation - no rales, rhonchi or wheezes  CV: regular rate and rhythm, normal S1 S2, no S3 or S4, no murmur, click or rub, no peripheral edema and peripheral pulses strong  ABDOMEN: soft, nontender, no hepatosplenomegaly, no masses and bowel sounds normal  MS: no musculoskeletal defects are noted and gait is age appropriate without ataxia  SKIN: no suspicious lesions or rashes  NEURO: Normal strength and tone, sensory exam grossly normal, mentation intact and speech normal  PSYCH: mentation appears normal and affect normal/bright    Diagnostic Test Results:  Labs reviewed in Epic    ASSESSMENT / PLAN:       ICD-10-CM    1. Encounter for Medicare annual wellness exam  Z00.00       2. Mixed hyperlipidemia  E78.2       3. Acquired hypothyroidism  E03.9       4. Paroxysmal atrial fibrillation (H)  I48.0       5. Benign essential hypertension  I10         Medicare wellness visit  Healthy  She had the new COVID, RSV and influenza vaccine  Mammogram 10/18/23 was normal.   Colonoscopy was completed 11/2020 and repeat is due in 2025     Paroxysmal atrial fibrillation (H)  Stable. Continues apixiban, flecainide and carvedilol. Following with cardiology      Chronic anticoagulation   as above   CBC stable        Essential hypertension  Controlled. No changes today.  labs reviewed today CMP, urine albumin stable  Continues carvedilol 6.25mg bid, losartan 25mg daily (added by Dr. Leonardo this month).      Mixed hyperlipidemia   continues atorvastatin  20mg daily  Reviewed recent normal lipid results today      Pelvic floor dysfunction  Mixed incontinence  Incontinence of feces  Seen by Dr. Song and had recent sacral nerve stimulator placement          Hypothyroidism, unspecified type  Continues levothyroxine 50mcg daily. TSH stable     Umbilical hernia  not bothering  her much. Occ sore.      Decreased hearing  Had hearing tested and is going to get hearing aids.           COUNSELING:  Reviewed preventive health counseling, as reflected in patient instructions        She reports that she has never smoked. She has never been exposed to tobacco smoke. She has never used smokeless tobacco.      Appropriate preventive services were discussed with this patient, including applicable screening as appropriate for fall prevention, nutrition, physical activity, Tobacco-use cessation, weight loss and cognition.  Checklist reviewing preventive services available has been given to the patient.    Reviewed patients plan of care and provided an AVS. The Intermediate Care Plan ( asthma action plan, low back pain action plan, and migraine action plan) for Nicole meets the Care Plan requirement. This Care Plan has been established and reviewed with the Patient.          Lauryn Allison MD  [facility]    Identified Health Risks:  I have reviewed Opioid Use Disorder and Substance Use Disorder risk factors and made any needed referrals.

## 2023-11-30 NOTE — RESULT ENCOUNTER NOTE
Excellent! Please call or send a [employer] message if you have any questions. Lauryn Allison M.D.

## 2023-12-19 ENCOUNTER — OFFICE VISIT (OUTPATIENT)
Dept: UROLOGY | Facility: CLINIC | Age: 73
End: 2023-12-19
Payer: COMMERCIAL

## 2023-12-19 VITALS — HEART RATE: 63 BPM | SYSTOLIC BLOOD PRESSURE: 158 MMHG | DIASTOLIC BLOOD PRESSURE: 93 MMHG | OXYGEN SATURATION: 99 %

## 2023-12-19 DIAGNOSIS — N32.81 OAB (OVERACTIVE BLADDER): ICD-10-CM

## 2023-12-19 DIAGNOSIS — N39.46 MIXED INCONTINENCE: ICD-10-CM

## 2023-12-19 DIAGNOSIS — N32.81 OAB (OVERACTIVE BLADDER): Primary | ICD-10-CM

## 2023-12-19 DIAGNOSIS — R15.2 INCONTINENCE OF FECES WITH FECAL URGENCY: ICD-10-CM

## 2023-12-19 DIAGNOSIS — N39.41 URGE INCONTINENCE: ICD-10-CM

## 2023-12-19 DIAGNOSIS — N81.4 UTEROVAGINAL PROLAPSE: ICD-10-CM

## 2023-12-19 DIAGNOSIS — R15.9 INCONTINENCE OF FECES WITH FECAL URGENCY: ICD-10-CM

## 2023-12-19 DIAGNOSIS — Z48.89 ENCOUNTER FOR POSTOPERATIVE CARE: Primary | ICD-10-CM

## 2023-12-19 DIAGNOSIS — R39.15 URINARY URGENCY: ICD-10-CM

## 2023-12-19 LAB
ALBUMIN UR-MCNC: NEGATIVE MG/DL
APPEARANCE UR: CLEAR
BILIRUB UR QL STRIP: NEGATIVE
COLOR UR AUTO: YELLOW
GLUCOSE UR STRIP-MCNC: NEGATIVE MG/DL
HGB UR QL STRIP: NEGATIVE
KETONES UR STRIP-MCNC: NEGATIVE MG/DL
LEUKOCYTE ESTERASE UR QL STRIP: ABNORMAL
NITRATE UR QL: POSITIVE
PH UR STRIP: 7 [PH] (ref 5–7)
SP GR UR STRIP: 1.01 (ref 1–1.03)
UROBILINOGEN UR STRIP-ACNC: 0.2 E.U./DL

## 2023-12-19 PROCEDURE — 87086 URINE CULTURE/COLONY COUNT: CPT | Performed by: PHYSICIAN ASSISTANT

## 2023-12-19 PROCEDURE — 87186 SC STD MICRODIL/AGAR DIL: CPT | Performed by: PHYSICIAN ASSISTANT

## 2023-12-19 PROCEDURE — 81003 URINALYSIS AUTO W/O SCOPE: CPT | Performed by: PHYSICIAN ASSISTANT

## 2023-12-19 PROCEDURE — 99024 POSTOP FOLLOW-UP VISIT: CPT | Performed by: PHYSICIAN ASSISTANT

## 2023-12-19 NOTE — LETTER
12/19/2023       RE: Nicole Stanley  [address] [place] [address]     Dear Colleague,    Thank you for referring your patient, Nicole Stanley, to the [facility]OLOG[facility] at [facility]. Please see a copy of my visit note below.    December 19, 2023    Post operative visit    Hx of OAB, UUI and FI. S/p SNS stage II interstim by Dr. Song on 11/20/23. She denies any changes in her health since last visit. States she has been upset with the SNS device. States during the trial program 1 worked the best but 2 days prior to her Stage II was switched to program 2 which did not help her. After Stage II surgery, patient was on program 2 and switched to program 3. She then switched to program 4, because program 3 only helped for a week. Is more worried about her FI than her UUI. Has Bms daily; does not take anything for bowels. No recent falls. Does report JAVIER and leakage from sitting to standing, but its the UUI that is most bothersome to her. Patient voices no other concerns at this time.    BP (!) 158/93   Pulse 63   LMP  (LMP Unknown)   SpO2 99%   She is comfortable, in no distress, non-labored breathing. Incision site healing appropriately. No sutures, erythema, bleeding, drainage, pain to palpation, ecchymosis, lesions, rashes noted along incision site.     No impedences. Program 3 and 4 she felt in her right lower buttocks. Program 5 felt along rectum.     A/P: 73 year old F with OAB, UUI, FI  - Placed patient on program 5 with amplitude of 0.9. Educated patient on how to manage controller. Advised patient to adjust amplitude and to stay on current program for 6 weeks.   - Discussed with patient that SNS device is a tool and her stress and what she eats/drinks can affect her bowel and bladder.   - Consider switching back to program 1 as was only on this for about 1 week and noticed the most improvement while on it.   - follow-up in 6 weeks.  - UA  pending.     Lucina Emery PA-C  Urology    CC  Patient Care Team:  Lauryn Allison MD as PCP - General (Family Practice)  Jacinda Saleem MD as MD (Otolaryngology)  Maile Leonardo MD as MD (Cardiology)  Sheba Song MD as MD (Urology)  Lauryn Allison MD as Assigned PCP  Giovanna Dominguez MD as Assigned OBGYN Provider  Giovanna Kothari PA-C as Physician Assistant (Dermatology)  Lauryn Allison MD as Referring Physician (Family Medicine)  Maile Leonardo MD as Assigned Heart and Vascular Provider  Sheba Song MD as MD (Urology)  Sheba Song MD as Assigned Surgical Provider

## 2023-12-19 NOTE — PROGRESS NOTES
December 19, 2023    Post operative visit    Hx of OAB, UUI and FI. S/p SNS stage II interstim by Dr. Song on 11/20/23. She denies any changes in her health since last visit. States she has been upset with the SNS device. States during the trial program 1 worked the best but 2 days prior to her Stage II was switched to program 2 which did not help her. After Stage II surgery, patient was on program 2 and switched to program 3. She then switched to program 4, because program 3 only helped for a week. Is more worried about her FI than her UUI. Has Bms daily; does not take anything for bowels. No recent falls. Does report JAVIER and leakage from sitting to standing, but its the UUI that is most bothersome to her. Patient voices no other concerns at this time.    BP (!) 158/93   Pulse 63   LMP  (LMP Unknown)   SpO2 99%   She is comfortable, in no distress, non-labored breathing. Incision site healing appropriately. No sutures, erythema, bleeding, drainage, pain to palpation, ecchymosis, lesions, rashes noted along incision site.     No impedences. Program 3 and 4 she felt in her right lower buttocks. Program 5 felt along rectum.     A/P: 73 year old F with OAB, UUI, FI  - Placed patient on program 5 with amplitude of 0.9. Educated patient on how to manage controller. Advised patient to adjust amplitude and to stay on current program for 6 weeks.   - Discussed with patient that SNS device is a tool and her stress and what she eats/drinks can affect her bowel and bladder.   - Consider switching back to program 1 as was only on this for about 1 week and noticed the most improvement while on it.   - follow-up in 6 weeks.  - UA pending.     Lucina Emery PAARABELLA  Urology    CC  Patient Care Team:  Lauryn Allison MD as PCP - General (Family Practice)  Jacinda Saleem MD as MD (Otolaryngology)  Maile Leonardo MD as MD (Cardiology)  Sheba Song MD as MD (Urology)  Lauryn Allison MD as Assigned PCP  Alberto  Giovanna DAVIS MD as Assigned OBGYN Provider  Giovanna Kothari PA-C as Physician Assistant (Dermatology)  Lauryn Allison MD as Referring Physician (Family Medicine)  Maile Leonardo MD as Assigned Heart and Vascular Provider  Sheba Song MD as MD (Urology)  Sheba Song MD as Assigned Surgical Provider

## 2023-12-20 DIAGNOSIS — R39.89 SUSPECTED UTI: Primary | ICD-10-CM

## 2023-12-20 LAB — BACTERIA UR CULT: ABNORMAL

## 2023-12-20 RX ORDER — CEPHALEXIN 500 MG/1
500 CAPSULE ORAL 2 TIMES DAILY
Qty: 14 CAPSULE | Refills: 0 | Status: SHIPPED | OUTPATIENT
Start: 2023-12-20 | End: 2024-04-18

## 2024-01-19 NOTE — PROGRESS NOTES
COVID-19 PCR test completed. Patient handout For Patients Who Have Been Tested for Covid-19 (Coronavirus) was given to the patient, which includes test result notification process.     Late entry for 1/8/2024.Called to remind of upcoming appt. For a repeat pap smear with Dr. Sanders to be covered by [employer].  Patient answered and ID x2. Patient's eligibility for the [employer] program was reassess today- per patient her income, household and insurance status has not change, demographic information up to date in Epic. Deborah Dozier RN

## 2024-02-07 ENCOUNTER — OFFICE VISIT (OUTPATIENT)
Dept: UROLOGY | Facility: CLINIC | Age: 74
End: 2024-02-07
Payer: COMMERCIAL

## 2024-02-07 VITALS
OXYGEN SATURATION: 98 % | DIASTOLIC BLOOD PRESSURE: 83 MMHG | BODY MASS INDEX: 23.66 KG/M2 | SYSTOLIC BLOOD PRESSURE: 135 MMHG | HEART RATE: 80 BPM | WEIGHT: 140 LBS

## 2024-02-07 DIAGNOSIS — N39.46 MIXED INCONTINENCE: ICD-10-CM

## 2024-02-07 DIAGNOSIS — R15.2 INCONTINENCE OF FECES WITH FECAL URGENCY: ICD-10-CM

## 2024-02-07 DIAGNOSIS — Z48.89 ENCOUNTER FOR POSTOPERATIVE CARE: Primary | ICD-10-CM

## 2024-02-07 DIAGNOSIS — R15.9 INCONTINENCE OF FECES WITH FECAL URGENCY: ICD-10-CM

## 2024-02-07 DIAGNOSIS — N32.81 OAB (OVERACTIVE BLADDER): ICD-10-CM

## 2024-02-07 DIAGNOSIS — R39.89 SUSPECTED UTI: ICD-10-CM

## 2024-02-07 LAB
ALBUMIN UR-MCNC: NEGATIVE MG/DL
APPEARANCE UR: CLEAR
BILIRUB UR QL STRIP: NEGATIVE
COLOR UR AUTO: YELLOW
GLUCOSE UR STRIP-MCNC: NEGATIVE MG/DL
HGB UR QL STRIP: ABNORMAL
KETONES UR STRIP-MCNC: NEGATIVE MG/DL
LEUKOCYTE ESTERASE UR QL STRIP: NEGATIVE
NITRATE UR QL: POSITIVE
PH UR STRIP: 7 [PH] (ref 5–7)
SP GR UR STRIP: 1.01 (ref 1–1.03)
UROBILINOGEN UR STRIP-ACNC: 0.2 E.U./DL

## 2024-02-07 PROCEDURE — 99212 OFFICE O/P EST SF 10 MIN: CPT | Mod: 25 | Performed by: PHYSICIAN ASSISTANT

## 2024-02-07 PROCEDURE — 81003 URINALYSIS AUTO W/O SCOPE: CPT | Mod: QW | Performed by: PHYSICIAN ASSISTANT

## 2024-02-07 PROCEDURE — 87186 SC STD MICRODIL/AGAR DIL: CPT | Performed by: PHYSICIAN ASSISTANT

## 2024-02-07 PROCEDURE — 87086 URINE CULTURE/COLONY COUNT: CPT | Performed by: PHYSICIAN ASSISTANT

## 2024-02-07 PROCEDURE — 95971 ALYS SMPL SP/PN NPGT W/PRGRM: CPT | Performed by: PHYSICIAN ASSISTANT

## 2024-02-07 NOTE — PROGRESS NOTES
Urology Clinic      Name: Nicole Stanley    MRN: [id number]   YOB: 1950  Accompanied at today's visit by:self                 Chief Complaint:   UUI and FI          History of Present Illness:   February 7, 2024    HISTORY:   We have been following 73 year old Nicole Stanley for hx of OAB, UUI and FI. S/p SNS stage II interstim by Dr. Song on 11/20/23. Last seen on 12/19/23 and was not pleased with device. She was also changing programs often. Advised to stay on program 5 and adjust amplitude as needed for next 6 weeks. Here for followup. Thinks she has a UTI today. Reports increased frequency; denies any other UTI symptoms. Think may have had a little improvement with bladder but no change in bowel symptoms on program 5. Currently on amplitude 3.0. Does have some JAVIER. Patient voices no other concerns at this time.             Allergies:   No Known Allergies         Medications:     Current Outpatient Medications   Medication Sig    Acetaminophen (TYLENOL PO) Take 500 mg by mouth every 8 hours as needed for mild pain or fever    apixaban ANTICOAGULANT (ELIQUIS ANTICOAGULANT) 5 MG tablet Take 1 tablet (5 mg) by mouth 2 times daily    atorvastatin (LIPITOR) 20 MG tablet Take 1 tablet (20 mg) by mouth at bedtime    calcium carbonate (OSCAL 500) 1250 (500 Ca) MG TABS tablet Take 1 tablet by mouth 2 times daily    carvedilol (COREG) 6.25 MG tablet Take 1 tablet (6.25 mg) by mouth 2 times daily (with meals)    Cholecalciferol (VITAMIN D3) 3000 units TABS Take 1 tablet by mouth daily    cycloSPORINE (RESTASIS) 0.05 % ophthalmic emulsion Place 1 drop into both eyes 2 times daily    flecainide (TAMBOCOR) 50 MG tablet Take 1 tablet (50 mg) by mouth 2 times daily    levothyroxine (SYNTHROID/LEVOTHROID) 50 MCG tablet Take 1 tablet (50 mcg) by mouth daily    losartan (COZAAR) 25 MG tablet Take 1 tablet (25 mg) by mouth every evening    MAGNESIUM GLYCINATE PO Take 300 mg by mouth every evening OTC unsure of dose.     Probiotic Product (PROBIOTIC COLON SUPPORT PO) Take 1 tablet by mouth daily    zinc sulfate (ZINCATE) 220 (50 Zn) MG capsule Take 220 mg by mouth daily    cephALEXin (KEFLEX) 500 MG capsule Take 1 capsule (500 mg) by mouth 2 times daily     No current facility-administered medications for this visit.               Past  Surgical History:     Past Surgical History:   Procedure Laterality Date    CHOLECYSTECTOMY  2000    COLONOSCOPY N/A 11/12/2020    Procedure: COLONOSCOPY;  Surgeon: Hugo Troncoso MD;  Location:  GI    CYSTOSCOPY      ENT SURGERY  2002    turbinectomy & septoplasty    EYE SURGERY      blepharoplasty    GI SURGERY  2006    rectal sphincter    IMPLANT STIMULATOR SACRAL NERVE STAGE ONE N/A 11/6/2023    Procedure: INSERTION, SACRAL NERVE STIMULATOR, STAGE 1;  Surgeon: Sheba Song MD;  Location: [facility] [place]    IMPLANT STIMULATOR SACRAL NERVE STAGE TWO N/A 11/20/2023    Procedure: INSERTION, SACRAL NERVE STIMULATOR, STAGE 2;  Surgeon: Sheba Song MD;  Location: [facility] OR    VASCULAR SURGERY  1988    vein stripping             Physical Exam:     Vitals:    02/07/24 1309   BP: 135/83   Pulse: 80   SpO2: 98%   Weight: 63.5 kg (140 lb)     PSYCH: NAD  EYES: EOMI  NEURO: AAO x3    LABS:   Creatinine   Date Value Ref Range Status   11/29/2023 0.82 0.51 - 0.95 mg/dL Final   02/10/2021 0.85 0.52 - 1.04 mg/dL Final            Assessment and Plan:   73 year old is a pleasant female who has UUI and FI    Plan:  -  UA pending. Contact clinic if symptoms change/worsen  -  Switched to program 1 with amplitude 0.9 and feels by rectum. Adjust amplitude prn.   - followup in 6 weeks virtually.  - After discussing the assessment and plan with patient, patient verbalizes understanding and agrees to the above plan. All questions answered.     10 minutes spent on the date of the encounter doing chart review, adjusting interstim, review of test results, interpretation of tests, patient visit and  documentation.      Lucina Emery PA-C  Urology  February 7, 2024      Patient Care Team:  Lauryn Allison MD as PCP - General (Family Practice)  Jacinda Saleem MD as MD (Otolaryngology)  Maiel Leonardo MD as MD (Cardiology)  Sheba Song MD as MD (Urology)  Lauryn Allison MD as Assigned PCP  Giovanna Dominguez MD as Assigned OBGYN Provider  Giovanna Kothari PA-C as Physician Assistant (Dermatology)  Lauryn Allison MD as Referring Physician (Family Medicine)  Maile Leonardo MD as Assigned Heart and Vascular Provider  Sheba Song MD as MD (Urology)  Sheba Song MD as Assigned Surgical Provider

## 2024-02-07 NOTE — LETTER
2/7/2024       RE: Nicole Stanley  [address] [place] [facility] [address]     Dear Colleague,    Thank you for referring your patient, Nicole Stanley, to the [facility]OLOGY [facility] at [facility]. Please see a copy of my visit note below.    Urology Clinic      Name: Nicole Stanley    MRN: [id number]   YOB: 1950  Accompanied at today's visit by:self                 Chief Complaint:   UUI and FI          History of Present Illness:   February 7, 2024    HISTORY:   We have been following 73 year old Nicole Stanley for hx of OAB, UUI and FI. S/p SNS stage II interstim by Dr. Song on 11/20/23. Last seen on 12/19/23 and was not pleased with device. She was also changing programs often. Advised to stay on program 5 and adjust amplitude as needed for next 6 weeks. Here for followup. Thinks she has a UTI today. Reports increased frequency; denies any other UTI symptoms. Think may have had a little improvement with bladder but no change in bowel symptoms on program 5. Currently on amplitude 3.0. Does have some JAVIER. Patient voices no other concerns at this time.             Allergies:   No Known Allergies         Medications:     Current Outpatient Medications   Medication Sig    Acetaminophen (TYLENOL PO) Take 500 mg by mouth every 8 hours as needed for mild pain or fever    apixaban ANTICOAGULANT (ELIQUIS ANTICOAGULANT) 5 MG tablet Take 1 tablet (5 mg) by mouth 2 times daily    atorvastatin (LIPITOR) 20 MG tablet Take 1 tablet (20 mg) by mouth at bedtime    calcium carbonate (OSCAL 500) 1250 (500 Ca) MG TABS tablet Take 1 tablet by mouth 2 times daily    carvedilol (COREG) 6.25 MG tablet Take 1 tablet (6.25 mg) by mouth 2 times daily (with meals)    Cholecalciferol (VITAMIN D3) 3000 units TABS Take 1 tablet by mouth daily    cycloSPORINE (RESTASIS) 0.05 % ophthalmic emulsion Place 1 drop into both eyes 2 times daily    flecainide (TAMBOCOR) 50 MG tablet  Take 1 tablet (50 mg) by mouth 2 times daily    levothyroxine (SYNTHROID/LEVOTHROID) 50 MCG tablet Take 1 tablet (50 mcg) by mouth daily    losartan (COZAAR) 25 MG tablet Take 1 tablet (25 mg) by mouth every evening    MAGNESIUM GLYCINATE PO Take 300 mg by mouth every evening OTC unsure of dose.    Probiotic Product (PROBIOTIC COLON SUPPORT PO) Take 1 tablet by mouth daily    zinc sulfate (ZINCATE) 220 (50 Zn) MG capsule Take 220 mg by mouth daily    cephALEXin (KEFLEX) 500 MG capsule Take 1 capsule (500 mg) by mouth 2 times daily     No current facility-administered medications for this visit.               Past  Surgical History:     Past Surgical History:   Procedure Laterality Date    CHOLECYSTECTOMY  2000    COLONOSCOPY N/A 11/12/2020    Procedure: COLONOSCOPY;  Surgeon: Hugo Troncoso MD;  Location:  GI    CYSTOSCOPY      ENT SURGERY  2002    turbinectomy & septoplasty    EYE SURGERY      blepharoplasty    GI SURGERY  2006    rectal sphincter    IMPLANT STIMULATOR SACRAL NERVE STAGE ONE N/A 11/6/2023    Procedure: INSERTION, SACRAL NERVE STIMULATOR, STAGE 1;  Surgeon: Sheba Song MD;  Location: [facility] [place]    IMPLANT STIMULATOR SACRAL NERVE STAGE TWO N/A 11/20/2023    Procedure: INSERTION, SACRAL NERVE STIMULATOR, STAGE 2;  Surgeon: Sheba Song MD;  Location: [facility] OR    VASCULAR SURGERY  1988    vein stripping             Physical Exam:     Vitals:    02/07/24 1309   BP: 135/83   Pulse: 80   SpO2: 98%   Weight: 63.5 kg (140 lb)     PSYCH: NAD  EYES: EOMI  NEURO: AAO x3    LABS:   Creatinine   Date Value Ref Range Status   11/29/2023 0.82 0.51 - 0.95 mg/dL Final   02/10/2021 0.85 0.52 - 1.04 mg/dL Final            Assessment and Plan:   73 year old is a pleasant female who has UUI and FI    Plan:  -  UA pending. Contact clinic if symptoms change/worsen  -  Switched to program 1 with amplitude 0.9 and feels by rectum. Adjust amplitude prn.   - followup in 6 weeks virtually.  -  After discussing the assessment and plan with patient, patient verbalizes understanding and agrees to the above plan. All questions answered.     10 minutes spent on the date of the encounter doing chart review, adjusting interstim, review of test results, interpretation of tests, patient visit and documentation.      Lucina Emery PA-C  Urology  February 7, 2024      Patient Care Team:  Lauryn Allison MD as PCP - General (Family Practice)  Jacinda Saleem MD as MD (Otolaryngology)  Maile Leonardo MD as MD (Cardiology)  Sheba Song MD as MD (Urology)  Lauryn Allison MD as Assigned PCP  Giovanna Dominguez MD as Assigned OBGYN Provider  Giovanna Kothari PA-C as Physician Assistant (Dermatology)  Lauryn Allison MD as Referring Physician (Family Medicine)  Maile Leonardo MD as Assigned Heart and Vascular Provider  Sheba Song MD as MD (Urology)  Sheba Song MD as Assigned Surgical Provider

## 2024-02-07 NOTE — NURSING NOTE
Chief Complaint   Patient presents with    OAB Overactive Bladder     Patient here for 3 month follow       Patient state she might have UTI  Patient is having frequency and Urgency  UA done UC sent today      UA RESULTS:  Recent Labs   Lab Test 02/07/24  1329 12/19/23  1423 11/07/19  1400   COLOR Yellow   < > Yellow  Yellow   APPEARANCE Clear   < > Slightly Cloudy  Clear   URINEGLC Negative   < > Negative  Neg   URINEBILI Negative   < > Negative  Neg   URINEKETONE Negative   < > Negative  Neg   SG 1.010   < > 1.015  1.015   UBLD Trace*   < > Negative  Trace   URINEPH 7.0   < > 7.0  7.0   PROTEIN Negative   < > Negative  Neg   UROBILINOGEN 0.2   < > 0.2   NITRITE Positive*   < > Positive*  Pos   LEUKEST Negative   < > Negative  Neg   RBCU  --   --  2   WBCU  --   --  1    < > = values in this interval not displayed.             Lidia Duncan, [facility]

## 2024-02-09 DIAGNOSIS — N39.0 URINARY TRACT INFECTION WITHOUT HEMATURIA, SITE UNSPECIFIED: Primary | ICD-10-CM

## 2024-02-09 LAB — BACTERIA UR CULT: ABNORMAL

## 2024-02-09 RX ORDER — SULFAMETHOXAZOLE/TRIMETHOPRIM 800-160 MG
1 TABLET ORAL 2 TIMES DAILY
Qty: 14 TABLET | Refills: 0 | Status: SHIPPED | OUTPATIENT
Start: 2024-02-09 | End: 2024-04-18

## 2024-02-21 ENCOUNTER — TRANSFERRED RECORDS (OUTPATIENT)
Dept: HEALTH INFORMATION MANAGEMENT | Facility: CLINIC | Age: 74
End: 2024-02-21
Payer: COMMERCIAL

## 2024-02-24 DIAGNOSIS — I48.0 PAROXYSMAL ATRIAL FIBRILLATION (H): ICD-10-CM

## 2024-02-29 RX ORDER — FLECAINIDE ACETATE 50 MG/1
50 TABLET ORAL 2 TIMES DAILY
Qty: 180 TABLET | Refills: 3 | Status: SHIPPED | OUTPATIENT
Start: 2024-02-29

## 2024-02-29 NOTE — TELEPHONE ENCOUNTER
FLECAINIDE ACETATE 50 MG TAB     Last Written Prescription Date:  3/30/23  Last Fill Quantity: 180,   # refills: 3  Last Office Visit : 10/19/23  Future Office visit:  6 months/4/18/24    Routing refill request to provider for review/approval because:  Medication needs approval from authorizing provider

## 2024-03-13 DIAGNOSIS — R39.89 SUSPECTED UTI: Primary | ICD-10-CM

## 2024-03-14 ENCOUNTER — LAB (OUTPATIENT)
Dept: LAB | Facility: CLINIC | Age: 74
End: 2024-03-14
Attending: PHYSICIAN ASSISTANT
Payer: COMMERCIAL

## 2024-03-14 DIAGNOSIS — R39.89 SUSPECTED UTI: ICD-10-CM

## 2024-03-14 LAB
ALBUMIN UR-MCNC: NEGATIVE MG/DL
APPEARANCE UR: CLEAR
BILIRUB UR QL STRIP: NEGATIVE
COLOR UR AUTO: YELLOW
GLUCOSE UR STRIP-MCNC: NEGATIVE MG/DL
HGB UR QL STRIP: ABNORMAL
KETONES UR STRIP-MCNC: NEGATIVE MG/DL
LEUKOCYTE ESTERASE UR QL STRIP: NEGATIVE
NITRATE UR QL: NEGATIVE
PH UR STRIP: 6.5 [PH] (ref 5–7)
SP GR UR STRIP: <=1.005 (ref 1–1.03)
UROBILINOGEN UR STRIP-ACNC: 0.2 E.U./DL

## 2024-03-14 PROCEDURE — 81003 URINALYSIS AUTO W/O SCOPE: CPT | Mod: QW

## 2024-03-14 PROCEDURE — 87086 URINE CULTURE/COLONY COUNT: CPT | Performed by: PHYSICIAN ASSISTANT

## 2024-03-15 LAB — BACTERIA UR CULT: NO GROWTH

## 2024-03-21 ENCOUNTER — VIRTUAL VISIT (OUTPATIENT)
Dept: UROLOGY | Facility: CLINIC | Age: 74
End: 2024-03-21
Payer: COMMERCIAL

## 2024-03-21 DIAGNOSIS — R39.15 URINARY URGENCY: ICD-10-CM

## 2024-03-21 DIAGNOSIS — N81.4 UTEROVAGINAL PROLAPSE: ICD-10-CM

## 2024-03-21 DIAGNOSIS — R15.9 INCONTINENCE OF FECES WITH FECAL URGENCY: ICD-10-CM

## 2024-03-21 DIAGNOSIS — N32.81 OAB (OVERACTIVE BLADDER): Primary | ICD-10-CM

## 2024-03-21 DIAGNOSIS — R15.2 INCONTINENCE OF FECES WITH FECAL URGENCY: ICD-10-CM

## 2024-03-21 DIAGNOSIS — I48.0 PAROXYSMAL ATRIAL FIBRILLATION (H): ICD-10-CM

## 2024-03-21 DIAGNOSIS — N39.46 MIXED INCONTINENCE: ICD-10-CM

## 2024-03-21 PROCEDURE — 99214 OFFICE O/P EST MOD 30 MIN: CPT | Mod: 95 | Performed by: PHYSICIAN ASSISTANT

## 2024-03-21 RX ORDER — OXYBUTYNIN CHLORIDE 5 MG/1
5 TABLET, EXTENDED RELEASE ORAL DAILY
Qty: 30 TABLET | Refills: 3 | Status: SHIPPED | OUTPATIENT
Start: 2024-03-21 | End: 2024-04-18 | Stop reason: SINTOL

## 2024-03-21 ASSESSMENT — PAIN SCALES - GENERAL: PAINLEVEL: NO PAIN (0)

## 2024-03-21 NOTE — PROGRESS NOTES
Virtual Visit Details    Type of service:  Video Visit   Video Start Time: 1:47 PM  Video End Time:1:47 PM    Originating Location (pt. Location): Home in [place]    Distant Location (provider location):  On-site  Platform used for Video Visit: [facility]      Urology Clinic    Name: Nicole Stanley    MRN: [id number]   YOB: 1950  Accompanied at today's visit by:self              Assessment and Plan:   73 year old female with OAB, UUI, JAVIER, uterovaginal prolapse, FI.    - Due to patient having recent fall and having issues with interstim, will have patient obtain a radiograph of sacrum.   - Patient states she wants to have sling procedure and prolapse repair surgery that she has discussed with Dr. Song in the past. Will have patient follow-up after has sacrum imaging to discuss prolapse repair surgery, sling procedure and to evaluate SNS device. Plan to have follow-up on a day where I am present so can evaluate device.   - Concerned she is currently over stimulated. Advised to turn off device for 24-72 hours then turn back on to program 3 and slowly adjust amplitude prn.   - will order oxybutynin 5mg daily to see if adding medication with SNS helps.   - Discussed trying to walk around block first before going on long walks to avoid FI episodes. Discussed trying Miralax in the morning to help empty bowels before long walks during the day. Patient states she does not tolerate miralax and her FI during her walks is unpredictable.     After discussing the assessment and plan with patient, patient verbalized understanding and agreed to the above plan. All questions answered.     32 minutes were spent today on the date of the encounter in reviewing the EMR, direct patient care, coordination of care and documentation in addition to ordering imaging, ordering oxybutynin.     Lucina Emery PA-C  March 21, 2024    Patient Care Team:  Lauryn Allison MD as PCP - General (Family Practice)  Jacinda Saleem MD  as MD (Otolaryngology)  Maile Leonardo MD as MD (Cardiology)  Sheba Song MD as MD (Urology)  Lauryn Cardozo MD as Assigned PCP  Giovanna Dominguez MD as Assigned OBGYN Provider  Giovanna Kothari PA-C as Physician Assistant (Dermatology)  Lauryn Cardozo MD as Referring Physician (Family Medicine)  Maile Leonardo MD as Assigned Heart and Vascular Provider  Sheba Song MD as MD (Urology)  Lucina Emery PA-C as Assigned Surgical Provider  LAURYN CARDOZO          Chief Complaint:   FI          History of Present Illness:   March 21, 2024    HISTORY: Nicole Stanley is a 73 year old female is here for follow-up. We have been following her for hx of OAB, UUI, JAVIER, uterovaginal prolapse, FI. S/p SNS stage II interstim by Dr. Song on 11/20/23. Last seen on 12/19/23 and was not pleased with device. She was also changing programs often. Advised to stay on program 5 and adjust amplitude as needed for next 6 weeks. Was then seen on 2/7/24 for follow-up and thought she had improvement in bladder symptoms but not bowel symptoms on program 5. She was switched to program 1 with amplitude 0.9. Here today for follow-up. Stayed on program 1 and increased the amplitude. Reports she had some large episodes on FI while on vacation. Reports FI typically occurs when going for a walk and can't make it to the bathroom in time. Also having seepage of stool at times. Due to her FI episodes, she switched to program 7 and currently at an amplitude of 4.7. only feels the stimulation when at rest and is not painful. Does report falling out of bed about 6 weeks ago. Reports her urinary incontinence is back as well. Of note in 2017 had 80% improvement with oxybutynin 5mg daily. Not currently on OAB medication. Patient states she wants to have sling and prolapse repair done. Patient voices no other concerns at this time.            Past Medical History:     Past Medical History:   Diagnosis Date    Arthritis      osteoarth    Heart disease 2018    Hypertension age 49    Influenza B 2018    Spider veins     Thyroid disease age 49            Past Surgical History:     Past Surgical History:   Procedure Laterality Date    CHOLECYSTECTOMY      COLONOSCOPY N/A 2020    Procedure: COLONOSCOPY;  Surgeon: Hugo Troncoso MD;  Location:  GI    CYSTOSCOPY      ENT SURGERY      turbinectomy & septoplasty    EYE SURGERY      blepharoplasty    GI SURGERY      rectal sphincter    IMPLANT STIMULATOR SACRAL NERVE STAGE ONE N/A 2023    Procedure: INSERTION, SACRAL NERVE STIMULATOR, STAGE 1;  Surgeon: Sheba Song MD;  [place] [facility] [place]    IMPLANT STIMULATOR SACRAL NERVE STAGE TWO N/A 2023    Procedure: INSERTION, SACRAL NERVE STIMULATOR, STAGE 2;  Surgeon: Sheba Song MD;  [place] [facility] [place]    VASCULAR SURGERY      vein stripping            Social History:     Social History     Tobacco Use    Smoking status: Never     Passive exposure: Never    Smokeless tobacco: Never   Substance Use Topics    Alcohol use: Yes     Comment: 1-2 glass of wine per day            Family History:     Family History   Problem Relation Age of Onset    Hypertension Mother             Hyperlipidemia Mother     Cerebrovascular Disease Mother     Thyroid Disease Mother     Family History Negative Father             Alzheimer Disease Father     Cancer Father         sarcoma    Hypertension Father     Basal cell carcinoma Father     Hyperlipidemia Sister     Thyroid Disease Sister     Thyroid Disease Sister     Hyperlipidemia Sister     Family History Negative Maternal Grandmother              Anesthesia Reaction No family hx of     Thrombosis No family hx of               Allergies:   No Known Allergies         Medications:     Current Outpatient Medications   Medication Sig    Acetaminophen (TYLENOL PO) Take 500 mg by mouth every 8 hours as needed for mild pain or fever     apixaban ANTICOAGULANT (ELIQUIS ANTICOAGULANT) 5 MG tablet Take 1 tablet (5 mg) by mouth 2 times daily    atorvastatin (LIPITOR) 20 MG tablet Take 1 tablet (20 mg) by mouth at bedtime    calcium carbonate (OSCAL 500) 1250 (500 Ca) MG TABS tablet Take 1 tablet by mouth 2 times daily    carvedilol (COREG) 6.25 MG tablet Take 1 tablet (6.25 mg) by mouth 2 times daily (with meals)    Cholecalciferol (VITAMIN D3) 3000 units TABS Take 1 tablet by mouth daily    cycloSPORINE (RESTASIS) 0.05 % ophthalmic emulsion Place 1 drop into both eyes 2 times daily    flecainide (TAMBOCOR) 50 MG tablet TAKE 1 TABLET BY MOUTH TWICE A DAY    levothyroxine (SYNTHROID/LEVOTHROID) 50 MCG tablet Take 1 tablet (50 mcg) by mouth daily    losartan (COZAAR) 25 MG tablet Take 1 tablet (25 mg) by mouth every evening    MAGNESIUM GLYCINATE PO Take 300 mg by mouth every evening OTC unsure of dose.    Probiotic Product (PROBIOTIC COLON SUPPORT PO) Take 1 tablet by mouth daily    zinc sulfate (ZINCATE) 220 (50 Zn) MG capsule Take 220 mg by mouth daily    cephALEXin (KEFLEX) 500 MG capsule Take 1 capsule (500 mg) by mouth 2 times daily    sulfamethoxazole-trimethoprim (BACTRIM DS) 800-160 MG tablet Take 1 tablet by mouth 2 times daily     No current facility-administered medications for this visit.             Review of Systems:    ROS: 14 point ROS neg other than the symptoms noted above in the HPI.          Physical Exam:   not currently breastfeeding.  Data Unavailable, There is no height or weight on file to calculate BMI., 0 lbs 0 oz  Gen appearance: Age-appropriate appearing female in NAD.   HEENT:  EOMI, conjunctiva clear/white. Normal ROM of neck for age.   Psych:  alert , In no acute distress.  Neuro:  A&Ox3  Resp:  Normal respiratory effort; not in acute respiratory distress.          Data:    Labs:    Creatinine   Date Value Ref Range Status   11/29/2023 0.82 0.51 - 0.95 mg/dL Final   02/10/2021 0.85 0.52 - 1.04 mg/dL Final

## 2024-03-21 NOTE — PATIENT INSTRUCTIONS
-x-ray: Please call [phone] to schedule this at the [facility] ([place]) or [facility] ([place]).      - start oxybutynin 5mg daily.    - turn device off for 24-72 hours and back on to program 3. Slowly adjust amplitude every couple days if needed.     - follow-up with Dr. Song to review imaging and discuss prolapse repair along with sling surgery.

## 2024-03-21 NOTE — NURSING NOTE
Is the patient currently in the state of [place]? YES    Visit mode:VIDEO    If the visit is dropped, the patient can be reconnected by: VIDEO VISIT: Text to cell phone:   Telephone Information:   Mobile [phone]       Will anyone else be joining the visit? NO  (If patient encounters technical issues they should call [phone]    How would you like to obtain your AVS? MyChart    Are changes needed to the allergy or medication list? No    Reason for visit: RECHECK    Mely CAREY

## 2024-03-21 NOTE — LETTER
3/21/2024       RE: Nicole Stanley  [address] [place] [address]     Dear Colleague,    Thank you for referring your patient, Nicole Stanley, to the [facility] UROLOGY [facility]INIC [facility] at [facility]. Please see a copy of my visit note below.    Virtual Visit Details    Type of service:  Video Visit   Video Start Time: 1:47 PM  Video End Time:1:47 PM    Originating Location (pt. Location): Home in [place]    Distant Location (provider location):  On-site  Platform used for Video Visit: [facility]      Urology Clinic    Name: Nicole Stanley    MRN: [id number]   YOB: 1950  Accompanied at today's visit by:self              Assessment and Plan:   73 year old female with OAB, UUI, JAVIER, uterovaginal prolapse, FI.    - Due to patient having recent fall and having issues with interstim, will have patient obtain a radiograph of sacrum.   - Patient states she wants to have sling procedure and prolapse repair surgery that she has discussed with Dr. Song in the past. Will have patient follow-up after has sacrum imaging to discuss prolapse repair surgery, sling procedure and to evaluate SNS device. Plan to have follow-up on a day where I am present so can evaluate device.   - Concerned she is currently over stimulated. Advised to turn off device for 24-72 hours then turn back on to program 3 and slowly adjust amplitude prn.   - will order oxybutynin 5mg daily to see if adding medication with SNS helps.   - Discussed trying to walk around block first before going on long walks to avoid FI episodes. Discussed trying Miralax in the morning to help empty bowels before long walks during the day. Patient states she does not tolerate miralax and her FI during her walks is unpredictable.     After discussing the assessment and plan with patient, patient verbalized understanding and agreed to the above plan. All questions answered.     32 minutes were spent today on the date of  the encounter in reviewing the EMR, direct patient care, coordination of care and documentation in addition to ordering imaging, ordering oxybutynin.     Lucina Emery PA-C  March 21, 2024    Patient Care Team:  Lauryn Cardozo MD as PCP - General (Family Practice)  Jacinda Saleem MD as MD (Otolaryngology)  Maile Leonardo MD as MD (Cardiology)  Sheba Song MD as MD (Urology)  Lauryn Cardozo MD as Assigned PCP  Giovanna Dominguez MD as Assigned OBGYN Provider  Giovanna Kothari PA-C as Physician Assistant (Dermatology)  Lauryn Cardozo MD as Referring Physician (Family Medicine)  Maile Leonardo MD as Assigned Heart and Vascular Provider  Sheba Song MD as MD (Urology)  Lucina Emery PA-C as Assigned Surgical Provider  LAURYN CARDOZO          Chief Complaint:   FI          History of Present Illness:   March 21, 2024    HISTORY: Nicole Stanley is a 73 year old female is here for follow-up. We have been following her for hx of OAB, UUI, JAVIER, uterovaginal prolapse, FI. S/p SNS stage II interstim by Dr. Song on 11/20/23. Last seen on 12/19/23 and was not pleased with device. She was also changing programs often. Advised to stay on program 5 and adjust amplitude as needed for next 6 weeks. Was then seen on 2/7/24 for follow-up and thought she had improvement in bladder symptoms but not bowel symptoms on program 5. She was switched to program 1 with amplitude 0.9. Here today for follow-up. Stayed on program 1 and increased the amplitude. Reports she had some large episodes on FI while on vacation. Reports FI typically occurs when going for a walk and can't make it to the bathroom in time. Also having seepage of stool at times. Due to her FI episodes, she switched to program 7 and currently at an amplitude of 4.7. only feels the stimulation when at rest and is not painful. Does report falling out of bed about 6 weeks ago. Reports her urinary incontinence is back as well. Of  note in 2017 had 80% improvement with oxybutynin 5mg daily. Not currently on OAB medication. Patient states she wants to have sling and prolapse repair done. Patient voices no other concerns at this time.            Past Medical History:     Past Medical History:   Diagnosis Date    Arthritis     osteoarth    Heart disease 2018    Hypertension age 49    Influenza B 2018    Spider veins     Thyroid disease age 49            Past Surgical History:     Past Surgical History:   Procedure Laterality Date    CHOLECYSTECTOMY      COLONOSCOPY N/A 2020    Procedure: COLONOSCOPY;  Surgeon: Hugo Troncoso MD;  Location:  GI    CYSTOSCOPY      ENT SURGERY      turbinectomy & septoplasty    EYE SURGERY      blepharoplasty    GI SURGERY      rectal sphincter    IMPLANT STIMULATOR SACRAL NERVE STAGE ONE N/A 2023    Procedure: INSERTION, SACRAL NERVE STIMULATOR, STAGE 1;  Surgeon: Sheba Song MD;  Location: [facility] [place]    IMPLANT STIMULATOR SACRAL NERVE STAGE TWO N/A 2023    Procedure: INSERTION, SACRAL NERVE STIMULATOR, STAGE 2;  Surgeon: Sheba Song MD;  Location: [facility] [place]    VASCULAR SURGERY  1988    vein stripping            Social History:     Social History     Tobacco Use    Smoking status: Never     Passive exposure: Never    Smokeless tobacco: Never   Substance Use Topics    Alcohol use: Yes     Comment: 1-2 glass of wine per day            Family History:     Family History   Problem Relation Age of Onset    Hypertension Mother             Hyperlipidemia Mother     Cerebrovascular Disease Mother     Thyroid Disease Mother     Family History Negative Father             Alzheimer Disease Father     Cancer Father         sarcoma    Hypertension Father     Basal cell carcinoma Father     Hyperlipidemia Sister     Thyroid Disease Sister     Thyroid Disease Sister     Hyperlipidemia Sister     Family History Negative Maternal Grandmother               Anesthesia Reaction No family hx of     Thrombosis No family hx of               Allergies:   No Known Allergies         Medications:     Current Outpatient Medications   Medication Sig    Acetaminophen (TYLENOL PO) Take 500 mg by mouth every 8 hours as needed for mild pain or fever    apixaban ANTICOAGULANT (ELIQUIS ANTICOAGULANT) 5 MG tablet Take 1 tablet (5 mg) by mouth 2 times daily    atorvastatin (LIPITOR) 20 MG tablet Take 1 tablet (20 mg) by mouth at bedtime    calcium carbonate (OSCAL 500) 1250 (500 Ca) MG TABS tablet Take 1 tablet by mouth 2 times daily    carvedilol (COREG) 6.25 MG tablet Take 1 tablet (6.25 mg) by mouth 2 times daily (with meals)    Cholecalciferol (VITAMIN D3) 3000 units TABS Take 1 tablet by mouth daily    cycloSPORINE (RESTASIS) 0.05 % ophthalmic emulsion Place 1 drop into both eyes 2 times daily    flecainide (TAMBOCOR) 50 MG tablet TAKE 1 TABLET BY MOUTH TWICE A DAY    levothyroxine (SYNTHROID/LEVOTHROID) 50 MCG tablet Take 1 tablet (50 mcg) by mouth daily    losartan (COZAAR) 25 MG tablet Take 1 tablet (25 mg) by mouth every evening    MAGNESIUM GLYCINATE PO Take 300 mg by mouth every evening OTC unsure of dose.    Probiotic Product (PROBIOTIC COLON SUPPORT PO) Take 1 tablet by mouth daily    zinc sulfate (ZINCATE) 220 (50 Zn) MG capsule Take 220 mg by mouth daily    cephALEXin (KEFLEX) 500 MG capsule Take 1 capsule (500 mg) by mouth 2 times daily    sulfamethoxazole-trimethoprim (BACTRIM DS) 800-160 MG tablet Take 1 tablet by mouth 2 times daily     No current facility-administered medications for this visit.             Review of Systems:    ROS: 14 point ROS neg other than the symptoms noted above in the HPI.          Physical Exam:   not currently breastfeeding.  Data Unavailable, There is no height or weight on file to calculate BMI., 0 lbs 0 oz  Gen appearance: Age-appropriate appearing female in NAD.   HEENT:  EOMI, conjunctiva clear/white. Normal ROM of neck  for age.   Psych:  alert , In no acute distress.  Neuro:  A&Ox3  Resp:  Normal respiratory effort; not in acute respiratory distress.          Data:    Labs:    Creatinine   Date Value Ref Range Status   11/29/2023 0.82 0.51 - 0.95 mg/dL Final   02/10/2021 0.85 0.52 - 1.04 mg/dL Final          SHARONA PEREIRA PA-C

## 2024-03-22 ENCOUNTER — ANCILLARY PROCEDURE (OUTPATIENT)
Dept: GENERAL RADIOLOGY | Facility: CLINIC | Age: 74
End: 2024-03-22
Attending: PHYSICIAN ASSISTANT
Payer: COMMERCIAL

## 2024-03-22 ENCOUNTER — TELEPHONE (OUTPATIENT)
Dept: UROLOGY | Facility: CLINIC | Age: 74
End: 2024-03-22

## 2024-03-22 DIAGNOSIS — N32.81 OAB (OVERACTIVE BLADDER): ICD-10-CM

## 2024-03-22 PROCEDURE — 72220 X-RAY EXAM SACRUM TAILBONE: CPT | Mod: TC | Performed by: RADIOLOGY

## 2024-03-22 NOTE — TELEPHONE ENCOUNTER
----- Message from Lucina Emery PA-C sent at 3/21/2024  1:50 PM CDT -----  Need patient to have x-ray of sacrum prior to following-up with Dr. Song. Patient's SNS not working and had recent fall. Patient also wanting to see Dr. Song to discuss sling procedure and prolapse repair surgery. Please schedule this follow-up on a day that I am here so I can adjust her SNS device at encounter with Dr. Song    blossom

## 2024-04-09 ENCOUNTER — OFFICE VISIT (OUTPATIENT)
Dept: UROLOGY | Facility: CLINIC | Age: 74
End: 2024-04-09
Payer: COMMERCIAL

## 2024-04-09 VITALS
HEART RATE: 58 BPM | HEIGHT: 66 IN | BODY MASS INDEX: 22.82 KG/M2 | WEIGHT: 142 LBS | SYSTOLIC BLOOD PRESSURE: 136 MMHG | OXYGEN SATURATION: 99 % | DIASTOLIC BLOOD PRESSURE: 84 MMHG

## 2024-04-09 DIAGNOSIS — N81.4 UTEROVAGINAL PROLAPSE: ICD-10-CM

## 2024-04-09 DIAGNOSIS — N32.81 OAB (OVERACTIVE BLADDER): ICD-10-CM

## 2024-04-09 DIAGNOSIS — N39.46 MIXED INCONTINENCE: Primary | ICD-10-CM

## 2024-04-09 DIAGNOSIS — R15.9 INCONTINENCE OF FECES, UNSPECIFIED FECAL INCONTINENCE TYPE: ICD-10-CM

## 2024-04-09 PROCEDURE — 99214 OFFICE O/P EST MOD 30 MIN: CPT | Performed by: UROLOGY

## 2024-04-09 ASSESSMENT — PAIN SCALES - GENERAL: PAINLEVEL: NO PAIN (0)

## 2024-04-09 NOTE — LETTER
"4/9/2024       RE: Nicole Stanley  [address]  [place] [address]     Dear Colleague,    Thank you for referring your patient, Nicole Stanley, to the [facility] UROLOGY CLINIC [facility] at [facility]. Please see a copy of my visit note below.    Added programs A-D; currently on program A amplitude of 0.9. continue on current program for 6 weeks and adjust amplitude prn. No impedences noted.     April 9, 2024    Lyn was seen today for mixed incontinence.    Diagnoses and all orders for this visit:    Mixed incontinence    OAB (overactive bladder)    Uterovaginal prolapse    Incontinence of feces, unspecified fecal incontinence type  -     Adult GI  Referral - Consult Only; Future       We had a long discussion about which symptoms are most bothersome and the treatment options for each.  She is very distressed by her urgency incontinence and fecal incontinence which has not been improved as much as she would have hoped with the SNM.  She is minimally bothered by the bulge so unsure if prolapse repair would help.      She describes a \"dumping syndrome\" with certain foods and have advised her to see gastroenterology and consider trial of metamucil wafers to bulk the stools some as well.    Recommend UDS at [facility] with and without prolapse reduced to see if prolapse repair would improve her urinary symptoms    30 minutes were spent today on the day of the encounter in reviewing the EMR, direct patient care, coordination of care and documentation    Sheba Song MD MPH  (she/her/hers)   of Urology  [facility]      Subjective    Here today to discuss if prolapse surgery would help her symptoms. She is most bothered by her urgency incontinence and fecal incontinence.  She is not so bothered by vaginal bulge. She is very tearful about her symptoms. Her  accompanies her today.  She denies any changes in health since last " "visit    /84   Pulse 58   Ht 1.676 m (5' 6\")   Wt 64.4 kg (142 lb)   LMP  (LMP Unknown)   SpO2 99%   BMI 22.92 kg/m    GENERAL: healthy, alert and no distress  EYES: Eyes grossly normal to inspection, conjunctivae and sclerae normal  HENT: normal cephalic/atraumatic.  External ears, nose and mouth without ulcers or lesions.  RESP: no audible wheeze, cough, or visible cyanosis.  No visible retractions or increased work of breathing.  Able to speak fully in complete sentences.  NEURO: Cranial nerves grossly intact, mentation intact and speech normal  PSYCH: mentation appears normal, affect normal/bright, judgement and insight intact, normal speech and appearance well-groomed  : stage 2 uterovaginal prolapse (was stage III prior)    CC  Patient Care Team:  Lauryn Allison MD as PCP - General (Family Practice)  Jacinda Saleem MD as MD (Otolaryngology)  Maile Leonardo MD as MD (Cardiology)  Sheba Song MD as MD (Urology)  Lauryn Allison MD as Assigned PCP  Giovanna Dominguez MD as Assigned OBGYN Provider  Giovanna Kothari PA-C as Physician Assistant (Dermatology)  Lauryn Allison MD as Referring Physician (Family Medicine)  Maile Leonardo MD as Assigned Heart and Vascular Provider  Sheba Song MD as MD (Urology)  Lucina Emery PA-C as Assigned Surgical Provider  Maddy Sandy PA-C as Physician Assistant (Gastroenterology)      "

## 2024-04-09 NOTE — NURSING NOTE
Chief Complaint   Patient presents with    Mixed incontinence     Patient here today to discuss possible Surgery        Patient here today to talk about Urinary problems  Patient patient would like to discuss possible surgery  Patient here in the clinic room with           TARYN Rosenberg

## 2024-04-09 NOTE — PATIENT INSTRUCTIONS
Websites with free information:    [employer] patient website: www.voicesforpfd.org    Total Control Program: www.totalcontrolprogram.com    Try metamucil wafers    See gastroenterology    It was a pleasure meeting with you today.  Thank you for allowing me and my team the privilege of caring for you today.  YOU are the reason we are here, and I truly hope we provided you with the excellent service you deserve.  Please let us know if there is anything else we can do for you so that we can be sure you are leaving completely satisfied with your care experience.

## 2024-04-09 NOTE — PROGRESS NOTES
"April 9, 2024    Lyn was seen today for mixed incontinence.    Diagnoses and all orders for this visit:    Mixed incontinence    OAB (overactive bladder)    Uterovaginal prolapse    Incontinence of feces, unspecified fecal incontinence type  -     Adult GI  Referral - Consult Only; Future       We had a long discussion about which symptoms are most bothersome and the treatment options for each.  She is very distressed by her urgency incontinence and fecal incontinence which has not been improved as much as she would have hoped with the SNM.  She is minimally bothered by the bulge so unsure if prolapse repair would help.      She describes a \"dumping syndrome\" with certain foods and have advised her to see gastroenterology and consider trial of metamucil wafers to bulk the stools some as well.    Recommend UDS at [facility] with and without prolapse reduced to see if prolapse repair would improve her urinary symptoms    30 minutes were spent today on the day of the encounter in reviewing the EMR, direct patient care, coordination of care and documentation    Sheba Song MD MPH  (she/her/hers)   of Urology  [facility]      Subjective    Here today to discuss if prolapse surgery would help her symptoms. She is most bothered by her urgency incontinence and fecal incontinence.  She is not so bothered by vaginal bulge. She is very tearful about her symptoms. Her  accompanies her today.  She denies any changes in health since last visit    /84   Pulse 58   Ht 1.676 m (5' 6\")   Wt 64.4 kg (142 lb)   LMP  (LMP Unknown)   SpO2 99%   BMI 22.92 kg/m    GENERAL: healthy, alert and no distress  EYES: Eyes grossly normal to inspection, conjunctivae and sclerae normal  HENT: normal cephalic/atraumatic.  External ears, nose and mouth without ulcers or lesions.  RESP: no audible wheeze, cough, or visible cyanosis.  No visible retractions or increased work of breathing.  Able to " speak fully in complete sentences.  NEURO: Cranial nerves grossly intact, mentation intact and speech normal  PSYCH: mentation appears normal, affect normal/bright, judgement and insight intact, normal speech and appearance well-groomed  : stage 2 uterovaginal prolapse (was stage III prior)    CC  Patient Care Team:  Lauryn Allison MD as PCP - General (Family Practice)  Jacinda Saleem MD as MD (Otolaryngology)  Maile Leonardo MD as MD (Cardiology)  Sheba Song MD as MD (Urology)  Lauryn Allison MD as Assigned PCP  Giovanna Dominguez MD as Assigned OBGYN Provider  Giovanna Kothari PA-C as Physician Assistant (Dermatology)  Lauryn Allison MD as Referring Physician (Family Medicine)  Maile Leonardo MD as Assigned Heart and Vascular Provider  Sheba Song MD as MD (Urology)  Lucina Emery PA-C as Assigned Surgical Provider  Maddy Sandy PA-C as Physician Assistant (Gastroenterology)

## 2024-04-09 NOTE — PROGRESS NOTES
Added programs A-D; currently on program A amplitude of 0.9. continue on current program for 6 weeks and adjust amplitude prn. No impedences noted.

## 2024-04-17 NOTE — PROGRESS NOTES
I am delighted to see Nicole RODRIGUEZ Jaden for follow up of atrial fibrillation.     The patient is a 73 year old  female with  PAF initially diagnosed 4/2018 in setting of influenza B, not symptomatic at that time. [id number] score is 3 for hypertension, age > 65, female. Apixaban started. Recurrent episodes 1/2019 (spontaneous conversion), 4/2019 (cardioverted in ED). Increasing frequency of AF starting April 2021, between April and July had 5 episodes, 5-7 hours, with palpitation/fatigue, Apple Watch says AF -130s. Her SBP at home had been 140-150s on metoprolol XL 25 every day. Metoprolol was switched to carvedilol in August 2021. She was in persistent AF mid Dec 2022 with BP in the 80s at home and had dyspnea and fatigue. She went to ED on 12/13/2022 where her BP was 153/102, pulse was 92.  she was cardioverted to sinus.  In January 2023 several recurrent episodes all with spontaneous termination, then persistent from 1/29-2/4/2023.  They were in [place] all January, she knew she was in AF but did not feel as bad as in Dec and did not go to ED. In February 2023 I added flecainide 50 bid. She has not had any significant episodes of AF since then.     I last saw in Oct 2023 at which time her BP was consistently high. I added losartan 25 every day because I did not want to increase carvedilol due to baseline komal. She has had no AF in the last year. She's been traveling with her family without any problems. She walks about 4 miles a day with her . The whole family is planning a trip to [place], [place], and [place] in July.    Past Medical History:  1. Atrial fibrillation. Initial diagnosis 4/2018 in the setting of Influenza B. Spontaneously converted to sinus rhythm. Recurrent AF 4/2019, cardioverted in ED. Recurrent paroxysmal episodes April - July 2021. Cardioverted in ED 12/13/2022. Flecainide added 2/2023.  2. Hypertension.  3. Hyperlipidemia. She had previously been on simvastatin but had some  muscle aches. She is reluctant to add more medications.  4. Hypothyroidism.  5. Cholecystectomy  6. Vein stripping 1988  7. Bowel/Bladder incontinence, s/p stimulator implant      Allergies:  No Known Allergies    Medications:   Apixaban 5 mg bid  Atorvastatin 20 every day  Carvedilol 6.25 bid  Flecainide 50 bid  Synthroid 50 mcg abilio day  Losartan 25 qd    Physical examination  Vitals: 119/82, HR 56  BMI= 22 (66 kg)    Constitutional: In general, the patient is a pleasant female in no acute distress.    Targeted cardiovascular exam:  Regular rate and rhythm. Normal S1, S2. No murmur, rub, click, or gallop.   Extremities:Pulses are normal bilaterally throughout. No peripheral edema.  Respiratory: Clear to asculation.      I have personally and independently reviewed the following:  Labs:  10/12/2023: chol 187, HDL 81, LDL 91, TG 73, cr 0.84, TSH 3.24, hgb 12.7, plt 253K     ECHOCARDIOGRAM: 18 (in AF): normal LV/RV, no valvular abnormalities     Bicycle stress echo 2019 (in sinus): exercised for 6:30 min, 90% MPHR achieved, MVO2 113% predicted, normal BP response, no ischemia     EK19:  bpm  2021: sinus  2021: sinus 68 bpm, normal intervals   2022: AF 98 bpm  2023: Sinus 59 bpm with PACs and PVCs  3/30/2023: sinus 56 bpm, normal intervals  10/19/2023: sinus 56 bpm, normal intervals  TODAY 2024: sinus 63 bpm, QRS 96 ms      30 day MCOT -3/10/2023:   One episode of AF 2023 at 122 bpm, no symptoms; total AF burden <1%  Symptoms of LH correlated to sinus 80s  PVCs 2%; NSVT up to 4 beats, no symptoms    Assessment :  Paroxysmal atrial fibrillation, well controlled on flecainide. [id number] score is 3 for age, female, HTN. On apixaban.  Hypertension. Controlled on current regimen.      Plan:  Continue all meds  Return in 6 months for ECG and BMP          I spent a total of 20 minutes face to face with  Nicole M Jaden during today's office visit. I have spent an  additional 15 minutes today on chart review and documentation.        The patient is to return  as above. The patient understood the treatment plan as outlined above.  There were no barriers to learning.      Maile Leonardo MD

## 2024-04-18 ENCOUNTER — OFFICE VISIT (OUTPATIENT)
Dept: CARDIOLOGY | Facility: CLINIC | Age: 74
End: 2024-04-18
Attending: INTERNAL MEDICINE
Payer: COMMERCIAL

## 2024-04-18 VITALS
HEART RATE: 56 BPM | BODY MASS INDEX: 23.52 KG/M2 | SYSTOLIC BLOOD PRESSURE: 119 MMHG | DIASTOLIC BLOOD PRESSURE: 82 MMHG | OXYGEN SATURATION: 99 % | WEIGHT: 145.7 LBS

## 2024-04-18 DIAGNOSIS — I10 PRIMARY HYPERTENSION: ICD-10-CM

## 2024-04-18 DIAGNOSIS — I48.0 PAROXYSMAL ATRIAL FIBRILLATION (H): Primary | ICD-10-CM

## 2024-04-18 PROCEDURE — 99214 OFFICE O/P EST MOD 30 MIN: CPT | Performed by: INTERNAL MEDICINE

## 2024-04-18 PROCEDURE — 93010 ELECTROCARDIOGRAM REPORT: CPT | Mod: GC | Performed by: INTERNAL MEDICINE

## 2024-04-18 PROCEDURE — G0463 HOSPITAL OUTPT CLINIC VISIT: HCPCS | Performed by: INTERNAL MEDICINE

## 2024-04-18 PROCEDURE — 93005 ELECTROCARDIOGRAM TRACING: CPT

## 2024-04-18 ASSESSMENT — PAIN SCALES - GENERAL: PAINLEVEL: NO PAIN (0)

## 2024-04-18 NOTE — NURSING NOTE
Chief Complaint   Patient presents with    Follow Up     6 mo follow up for afib       Vitals were taken, medications reviewed, and EKG performed.    Renetta Moreno, EMT   1:31 PM

## 2024-04-18 NOTE — LETTER
4/18/2024      RE: Nicole Stanley  [address] [facility] [address]       Dear Colleague,    Thank you for the opportunity to participate in the care of your patient, Nicole Stanley, at the [facility] [place] at [facility]. Please see a copy of my visit note below.    I am delighted to see Nicole Stanley for follow up of atrial fibrillation.     The patient is a 73 year old  female with  PAF initially diagnosed 4/2018 in setting of influenza B, not symptomatic at that time. [id number] score is 3 for hypertension, age > 65, female. Apixaban started. Recurrent episodes 1/2019 (spontaneous conversion), 4/2019 (cardioverted in ED). Increasing frequency of AF starting April 2021, between April and July had 5 episodes, 5-7 hours, with palpitation/fatigue, Apple Watch says AF -130s. Her SBP at home had been 140-150s on metoprolol XL 25 every day. Metoprolol was switched to carvedilol in August 2021. She was in persistent AF mid Dec 2022 with BP in the 80s at home and had dyspnea and fatigue. She went to ED on 12/13/2022 where her BP was 153/102, pulse was 92.  she was cardioverted to sinus.  In January 2023 several recurrent episodes all with spontaneous termination, then persistent from 1/29-2/4/2023.  They were in [place] all January, she knew she was in AF but did not feel as bad as in Dec and did not go to ED. In February 2023 I added flecainide 50 bid. She has not had any significant episodes of AF since then.     I last saw in Oct 2023 at which time her BP was consistently high. I added losartan 25 every day because I did not want to increase carvedilol due to baseline komal. She has had no AF in the last year. She's been traveling with her family without any problems. She walks about 4 miles a day with her . The whole family is planning a trip to [place], [place], and [place] in July.    Past Medical History:  1. Atrial fibrillation.  Initial diagnosis 2018 in the setting of Influenza B. Spontaneously converted to sinus rhythm. Recurrent AF 2019, cardioverted in ED. Recurrent paroxysmal episodes April - 2021. Cardioverted in ED 2022. Flecainide added 2023.  2. Hypertension.  3. Hyperlipidemia. She had previously been on simvastatin but had some muscle aches. She is reluctant to add more medications.  4. Hypothyroidism.  5. Cholecystectomy  6. Vein stripping 1988  7. Bowel/Bladder incontinence, s/p stimulator implant      Allergies:  No Known Allergies    Medications:   Apixaban 5 mg bid  Atorvastatin 20 every day  Carvedilol 6.25 bid  Flecainide 50 bid  Synthroid 50 mcg abilio day  Losartan 25 qd    Physical examination  Vitals: 119/82, HR 56  BMI= 22 (66 kg)    Constitutional: In general, the patient is a pleasant female in no acute distress.    Targeted cardiovascular exam:  Regular rate and rhythm. Normal S1, S2. No murmur, rub, click, or gallop.   Extremities:Pulses are normal bilaterally throughout. No peripheral edema.  Respiratory: Clear to asculation.      I have personally and independently reviewed the following:  Labs:  10/12/2023: chol 187, HDL 81, LDL 91, TG 73, cr 0.84, TSH 3.24, hgb 12.7, plt 253K     ECHOCARDIOGRAM: 18 (in AF): normal LV/RV, no valvular abnormalities     Bicycle stress echo 2019 (in sinus): exercised for 6:30 min, 90% MPHR achieved, MVO2 113% predicted, normal BP response, no ischemia     EK19:  bpm  2021: sinus  2021: sinus 68 bpm, normal intervals   2022: AF 98 bpm  2023: Sinus 59 bpm with PACs and PVCs  3/30/2023: sinus 56 bpm, normal intervals  10/19/2023: sinus 56 bpm, normal intervals  TODAY 2024: sinus 63 bpm, QRS 96 ms      30 day MCOT -3/10/2023:   One episode of AF 2023 at 122 bpm, no symptoms; total AF burden <1%  Symptoms of LH correlated to sinus 80s  PVCs 2%; NSVT up to 4 beats, no symptoms    Assessment :  Paroxysmal atrial  fibrillation, well controlled on flecainide. [id number]-[id number] score is 3 for age, female, HTN. On apixaban.  Hypertension. Controlled on current regimen.      Plan:  Continue all meds  Return in 6 months for ECG and BMP          I spent a total of 20 minutes face to face with  Nicole Stanley during today's office visit. I have spent an additional 15 minutes today on chart review and documentation.        The patient is to return  as above. The patient understood the treatment plan as outlined above.  There were no barriers to learning.      Maile Leonardo MD

## 2024-04-18 NOTE — PATIENT INSTRUCTIONS
You were seen in the Electrophysiology Clinic today by: Dr. Maile Leonardo    Plan:   Medication Changes: none    Labs/Tests Needed: none    Follow up Visit: 6 months with labs prior     If you have further questions, please utilize [employer] ([email or web address]) to contact us.     Your Care Team:    EP Cardiology   Telephone Number     Nurse Line  Lena Reagan, RN   Lea Gibson, RN  Anibal Flores RN   [phone]     For scheduling appointments:   Osvaldo   [phone]   For procedure scheduling:    Sabiha Carrera     [phone]   For the Device Clinic (Pacemakers, ICDs, Loop Recorders)    During business hours: [phone]  After business hours:   [phone]- select option 4 and ask for job code [id number].       On-call cardiologist for after hours or on weekends:   [phone], option #4, and ask to speak to the on-call cardiologist.     Cardiovascular Clinic:   [address] [place] [address]. [place] [address]      As always, Thank you for trusting us with your health care needs!

## 2024-04-20 LAB
ATRIAL RATE - MUSE: 63 BPM
DIASTOLIC BLOOD PRESSURE - MUSE: NORMAL MMHG
INTERPRETATION ECG - MUSE: NORMAL
P AXIS - MUSE: 28 DEGREES
PR INTERVAL - MUSE: 208 MS
QRS DURATION - MUSE: 96 MS
QT - MUSE: 452 MS
QTC - MUSE: 462 MS
R AXIS - MUSE: -20 DEGREES
SYSTOLIC BLOOD PRESSURE - MUSE: NORMAL MMHG
T AXIS - MUSE: 35 DEGREES
VENTRICULAR RATE- MUSE: 63 BPM

## 2024-04-26 NOTE — TELEPHONE ENCOUNTER
REFERRAL INFORMATION:  Referring Provider:  Sheba Song MD  Referring Clinic:  [facility]  Reason for Visit/Diagnosis: Incontinence of feces, unspecified fecal incontinence type      FUTURE VISIT INFORMATION:  Appointment Date: 5/10/24  Appointment Time: 11:00     NOTES STATUS DETAILS   OFFICE NOTE from Referring Provider Internal OV 4/9/24 , 8/31/23,  4/20/23, 10/29/20, 11/7/19, Sheba Song    OFFICE NOTE from Other Specialist Internal OV-3/21/24 Olimpia CORDOVA-Urology clinic    OV 12/2/22-Randall Pool MD    OV-4/14/22-Lauryn Allison MD   OPERATIVE REPORT Internal 11/6/23, 11/20/23-Urology procedures-Dr. Song       MEDICATION LIST Internal         COLONOSCOPY Internal 11/12/20-Hugo Hernandez MD  9/19/18-Simón Avila MD   PERTINENT LABS Internal    PATHOLOGY REPORTS (RELATED) Internal 9/19/18-Colonoscopy   IMAGING (CT, MRI, EGD, MRCP, Small Bowel Follow Through/SBT, MR/CT Enterography) Internal CT abdomen 11/12/19

## 2024-05-10 ENCOUNTER — PRE VISIT (OUTPATIENT)
Dept: GASTROENTEROLOGY | Facility: CLINIC | Age: 74
End: 2024-05-10

## 2024-05-20 ENCOUNTER — PRE VISIT (OUTPATIENT)
Dept: UROLOGY | Facility: CLINIC | Age: 74
End: 2024-05-20
Payer: COMMERCIAL

## 2024-05-20 NOTE — TELEPHONE ENCOUNTER
Reason for visit: review UDS     Relevant information: suspected UTI    Records/imaging/labs/orders: records available    Pt called: no need for a call    At Rooming: have pt empty bladder/pvr, Document PVR      Janet Mayfield  5/20/2024  3:35 PM

## 2024-06-06 ENCOUNTER — OFFICE VISIT (OUTPATIENT)
Dept: UROLOGY | Facility: CLINIC | Age: 74
End: 2024-06-06
Payer: COMMERCIAL

## 2024-06-06 ENCOUNTER — ALLIED HEALTH/NURSE VISIT (OUTPATIENT)
Dept: UROLOGY | Facility: CLINIC | Age: 74
End: 2024-06-06
Payer: COMMERCIAL

## 2024-06-06 VITALS — DIASTOLIC BLOOD PRESSURE: 92 MMHG | OXYGEN SATURATION: 95 % | SYSTOLIC BLOOD PRESSURE: 162 MMHG | HEART RATE: 63 BPM

## 2024-06-06 DIAGNOSIS — N39.46 MIXED INCONTINENCE: Primary | ICD-10-CM

## 2024-06-06 DIAGNOSIS — N81.4 UTEROVAGINAL PROLAPSE: ICD-10-CM

## 2024-06-06 DIAGNOSIS — N32.81 OAB (OVERACTIVE BLADDER): ICD-10-CM

## 2024-06-06 DIAGNOSIS — N39.46 MIXED INCONTINENCE: ICD-10-CM

## 2024-06-06 DIAGNOSIS — N39.492 POSTURAL (URINARY) INCONTINENCE: ICD-10-CM

## 2024-06-06 DIAGNOSIS — N32.81 OAB (OVERACTIVE BLADDER): Primary | ICD-10-CM

## 2024-06-06 DIAGNOSIS — R39.15 URINARY URGENCY: ICD-10-CM

## 2024-06-06 LAB
ALBUMIN UR-MCNC: NEGATIVE MG/DL
APPEARANCE UR: CLEAR
BILIRUB UR QL STRIP: NEGATIVE
COLOR UR AUTO: YELLOW
GLUCOSE UR STRIP-MCNC: NEGATIVE MG/DL
HGB UR QL STRIP: NEGATIVE
KETONES UR STRIP-MCNC: NEGATIVE MG/DL
LEUKOCYTE ESTERASE UR QL STRIP: NEGATIVE
NITRATE UR QL: NEGATIVE
PH UR STRIP: 7 [PH] (ref 5–8)
SP GR UR STRIP: 1.01 (ref 1–1.03)
UROBILINOGEN UR STRIP-ACNC: 0.2 E.U./DL

## 2024-06-06 PROCEDURE — 51728 CYSTOMETROGRAM W/VP: CPT | Performed by: PHYSICIAN ASSISTANT

## 2024-06-06 PROCEDURE — 51797 INTRAABDOMINAL PRESSURE TEST: CPT | Performed by: PHYSICIAN ASSISTANT

## 2024-06-06 PROCEDURE — 51741 ELECTRO-UROFLOWMETRY FIRST: CPT | Performed by: PHYSICIAN ASSISTANT

## 2024-06-06 PROCEDURE — 51784 ANAL/URINARY MUSCLE STUDY: CPT | Performed by: PHYSICIAN ASSISTANT

## 2024-06-06 PROCEDURE — 51798 US URINE CAPACITY MEASURE: CPT | Performed by: UROLOGY

## 2024-06-06 PROCEDURE — 81003 URINALYSIS AUTO W/O SCOPE: CPT | Performed by: PATHOLOGY

## 2024-06-06 PROCEDURE — 99214 OFFICE O/P EST MOD 30 MIN: CPT | Mod: 25 | Performed by: UROLOGY

## 2024-06-06 RX ORDER — CEFAZOLIN SODIUM 2 G/50ML
2 SOLUTION INTRAVENOUS
OUTPATIENT
Start: 2024-06-06

## 2024-06-06 RX ORDER — SULFAMETHOXAZOLE/TRIMETHOPRIM 800-160 MG
1 TABLET ORAL ONCE
Status: COMPLETED | OUTPATIENT
Start: 2024-06-06 | End: 2024-06-06

## 2024-06-06 RX ORDER — CEFAZOLIN SODIUM 2 G/50ML
2 SOLUTION INTRAVENOUS SEE ADMIN INSTRUCTIONS
OUTPATIENT
Start: 2024-06-06

## 2024-06-06 RX ADMIN — SULFAMETHOXAZOLE AND TRIMETHOPRIM 1 TABLET: 800; 160 TABLET ORAL at 14:15

## 2024-06-06 ASSESSMENT — PAIN SCALES - GENERAL: PAINLEVEL: NO PAIN (0)

## 2024-06-06 NOTE — PROGRESS NOTES
June 6, 2024    Lyn was seen today for review uds.    Diagnoses and all orders for this visit:    OAB (overactive bladder)  -     POST-VOID RESIDUAL BLADDER SCAN    Urinary urgency  -     POST-VOID RESIDUAL BLADDER SCAN    Mixed incontinence    Postural (urinary) incontinence  -     Case Request: HYSTERECTOMY, SUPRACERVICAL, ROBOT-ASSISTED, LAPAROSCOPIC, BILATERAL SALPINGO-OOPHORECTOMY, SACROCOLPOPEXY, RETROPUBIC MIDURETHRAL SLING, CYSTOSCOPY; Standing  -     Case Request: HYSTERECTOMY, SUPRACERVICAL, ROBOT-ASSISTED, LAPAROSCOPIC, BILATERAL SALPINGO-OOPHORECTOMY, SACROCOLPOPEXY, RETROPUBIC MIDURETHRAL SLING, CYSTOSCOPY    Uterovaginal prolapse  -     Case Request: HYSTERECTOMY, SUPRACERVICAL, ROBOT-ASSISTED, LAPAROSCOPIC, BILATERAL SALPINGO-OOPHORECTOMY, SACROCOLPOPEXY, RETROPUBIC MIDURETHRAL SLING, CYSTOSCOPY; Standing  -     Case Request: HYSTERECTOMY, SUPRACERVICAL, ROBOT-ASSISTED, LAPAROSCOPIC, BILATERAL SALPINGO-OOPHORECTOMY, SACROCOLPOPEXY, RETROPUBIC MIDURETHRAL SLING, CYSTOSCOPY    Other orders  -     Void on call to OR; Standing  -     Remove Hair; Standing  -     Forced Air Warming Device; Standing  -     Nozin Nasal  Popswab; Standing  -     Skin Antisepsis - DAY OF PROCEDURE; Standing  -     Skin Antisepsis - CURRENT INPATIENT; Standing  -     Glucose monitor nursing POCT - Glycemic Management PERIOP; Standing  -     ACUTE Indwelling urinary catheter (Sharma); Standing  -     Straight Catheterization; Standing  -     Discontinue indwelling urinary catheter (Sharma); Standing  -     Notify Provider - Anticoagulants and Antiplatelets; Standing  -     Glucose monitor nursing POCT; Standing  -     NPO per Anesthesia Guidelines for Procedure/Surgery Except for: Meds; Standing  -     Apply Pneumatic Compression Device (PCD); Standing  -     Pneumatic Compression Device (PCD) (Equipment); Standing  -     ceFAZolin (ANCEF) 2 g in dextrose 50 mL intermittent infusion  -     ceFAZolin (ANCEF) 2 g in  dextrose 50 mL intermittent infusion         Attestation:  I agree with the PFSH and ROS as completed by the MS, except for changes made as needed.  The remainder of the encounter was performed by me and scribed by the MS.  The scribed note accurately reflects my personal services and the decisions made by me.    UDS on my interpretation shows that she has some USI, worse with prolapse reduction.  Not fully emptying.  No DO/DOI    Patient not emptying well so will have her ISC 2x a day (1st thing in the AM and right before bed) to see if helps    Discussed that if she wants a durable reconstructive repair that would be a sacrocolpopexy that would entail supracervical hysterectomy, BSO.  We also discussed given USI on UDS that she should consider midurethral sling.  We discussed that the risks to the procedure include but not limited to cardiovascular risks of anesthesia, nerve injury, bleeding, infection, injury to adjacent organs including bowel, bladder, and blood vessels, conversion to open procedure, de anel or worsening stress incontinence or urge incontinence, need for post-operative ferrari catheter, need for further procedures including for mesh extrusion or erosion.  She is at higher risk secondary to the need to stop her eliquis.    She is on the fence about surgery and would not want to do this until the Fall (likely November) because of her travel schedule ([place] in July, [place] in October)    23 minutes were spent today on the day of the encounter in reviewing the EMR including interpretation of the UDS, direct patient care including surgical counseling, coordination of care and documentation    Sheba Song MD MPH  (she/her/hers)   of [facility]    Felix Nicholson is here today following up for mixed urinary incontinence and uterovaginal prolapse. UDS was done today. She reports that her bowel incontinence has resolved with mutamucil wafers. She is bothered  mostly by incontinence, less so by her prolapse. She is most bothered by urge incontinence, but also experiences some stress incontinence.   We discussed at length different options including surgical correction of her prolapse with hysterectomy/BSO and sacrocolpopexy with possible sling placement at the same time. She understands that correction of the prolapse may help her empty her bladder, but will not get rid of her stress incontinence without sling placement. We also discussed that she could try a pessary again, but she is not interested at this time since she felt the pessary was painful when she last tried it. We discussed that she could also tried self cathing once daily to help her empty. She is interested in learning how to do this today and will give it a try. She has several upcoming trips, so she will continue to think through these options, follow up in the next few months for further discussion, and tentatively consider surgery in the late fall (~Nov).    BP (!) 162/92   Pulse 63   LMP  (LMP Unknown)   SpO2 95%   GENERAL: healthy, alert and no distress  EYES: Eyes grossly normal to inspection, conjunctivae and sclerae normal  HENT: normal cephalic/atraumatic.   RESP: Able to speak fully in complete sentences.  NEURO: mentation intact and speech normal  PSYCH: affect normal/bright, normal appearance well-groomed    Labs/imaging/pathology  Urine dip normal today  UDS completed today      Scribe Disclosure:   I, MICHAEL MENSAH, am serving as a scribe; to document services personally performed by Sheba Song MD -based on data collection and the provider's statements to me.     Provider Disclosure:  I agree with above History, Review of Systems, Physical exam and Plan.  I have reviewed the content of the documentation and have edited it as needed. I have personally performed the services documented here and the documentation accurately represents those services and the decisions I have made.       Electronically signed by:  Tatum Renner, Ms4     CC  Patient Care Team:  Lauryn Allison MD as PCP - General (Family Practice)  Jacinda Saleem MD as MD (Otolaryngology)  Maile Leonardo MD as MD (Cardiology)  Sheba Song MD as MD (Urology)  Lauryn Allison MD as Assigned PCP  Giovanna Kothari PA-C as Physician Assistant (Dermatology)  Lauryn Allison MD as Referring Physician (Family Medicine)  Maile Leonardo MD as Assigned Heart and Vascular Provider  Sheba Song MD as MD (Urology)  Maddy Sandy PA-C as Physician Assistant (Gastroenterology)  Sheba Song MD as Assigned Surgical Provider  SELF, REFERRED

## 2024-06-06 NOTE — PROGRESS NOTES
PREPROCEDURE DIAGNOSES:    1. Mixed urinary incontinence  2. Overactive bladder  3. Uterovaginal prolapse    POSTPROCEDURE DIAGNOSES:  *Note: Interstim is turned on throughout entire study.  -With prolapse NOT reduced:  Maximum cystometric capacity 495 mL with intact filling sensations.  Good bladder compliance without detrusor overactivity.  No stress urinary incontinence while seated, but there is a moderate volume leak upon standing up at capacity.  Maximum detrusor contraction during voiding reaches 16 cm H2O. She voids 315 mL with Qmax 13 ml/s, intermittent flow curve, increased EMG activity (suspect technical artifact), incomplete bladder emptying ( mL), and no evidence for bladder outlet obstruction (BOOI -18.2). Note that slightly elevated PVR is consistent with pre-void PVR of 200 mL.   -With prolapse REDUCED:   Maximum cystometric capacity 434 mL with intact filling sensations.   Good bladder compliance without detrusor overactivity.   No stress incontinence while seated, but she does have a large volume leak upon standing up at capacity. After the initial leak with standing, there is additional stress incontinence associated with coughing while standing with ALPP 112 cm H2O.  Maximum detrusor contraction during voiding reaches 21 cm H2O. She voids 334 mL with Qmax 9.0 ml/s, continuous but slightly prolonged flow curve, increased EMG activity (again, suspect technical artifact), improved but still incomplete bladder emptying ( mL), and no evidence for bladder outlet obstruction (BOOI 1.8).    PROCEDURE:    1. Uroflowmetry.  2. Sterile urethral catheterization for measurement of postvoid residual urine volume.  3. Complex filling cystometrogram with measurement of bladder and rectal pressures.  4. Complex voiding cystometrogram with measurement of bladder and rectal pressures.  5. Electromyography of the pelvic floor during urodynamics.    INDICATIONS FOR PROCEDURE:  MsRuss Nicole MICHAEL Stanley is a  pleasant 74 year old female with mixed urinary incontinence, overactive bladder, and uterovaginal prolapse. Baseline urodynamic assessment is requested today by Dr. Song to better characterize Ms. Nicole Stanley's voiding dysfunction.      VOIDING DIARY:  Voids every 1-2 hours during the day, nocturia x 2-3.  Episodes of incontinence: multiple per day - goes through 1-2 pads and 0-2 briefs.  Incontinence associated with: standing up from sitting, walking, strong urge.  Total Volume Intake: 6236-5405 mL; mostly water, some coffee, almond milk, occasional white wine.  Total Volume Output: 0296-1619 mL; average voided volume 312 mL, largest voided volume 600 mL.    DESCRIPTION OF PROCEDURE:  Risks, benefits, and alternatives to urodynamics were discussed with the patient and she wished to proceed.  Urodynamics are planned to better assess the primary etiology for Ms. Stanley's urologic dysfunction.  The patient does not currently take anticholinergic or beta 3 agonist medications for her bladder.  After informed consent was obtained, the patient was taken to the procedure room where uroflowmetry was performed. Findings below.     PRE-STUDY UROFLOWMETRY:  Voided volume: 109 mL.  Maximum flow rate: 13.9 mL/sec.  Average flow rate: 5.3 mL/sec.  Character of the curve: continuous.  Postvoid residual by catheter: 200 mL.  Pretest urine dipstick was negative for leukocytes and nitrites.    Next a 7F double-lumen urodynamics catheter was inserted into the bladder under sterile technique via urethra.  A 7F abdominal manometry catheter was placed in the rectum.  EMG pads were placed on both sides of the anal verge.  The bladder was filled with normal saline at 40 mL/minute and serial pressures were recorded.  With coughing there was an appropriate rise in vesical and abdominal pressures with no change in detrusor pressure, confirming good study catheter placement.    DURING THE FILLING PHASE:  With prolapse NOT reduced:  First  sensation: 117 mL.  First Desire: 193 mL.  Strong Desire: 337 mL.  Maximum Capacity: filled to 354 mL; true custodial 495 mL based on final voided volume + PVR.    Uninhibited detrusor contractions: none.  Compliance: good. PDet=1.6 cmH20 at capacity. Compliance ratio of 221.  Continence: no stress incontinence while seated, but she does have a moderate volume leak upon standing up at capacity.  EMG: concordant during filling.    With prolapse REDUCED:   First sensation: 195 mL.  First Desire: 239 mL.  Strong Desire: 292 mL.  Maximum Capacity: filled to 313 mL; true custodial 434 mL based on final voided volume + PVR.    Uninhibited detrusor contractions: none - mild increase in PDet after she stands to ~ 8 cm H2O.  Compliance: good. PDet low throughout filling.  Continence: no stress incontinence while seated, but she does have a large volume leak upon standing up at capacity. In addition, there is stress incontinence while standing with ALPP 112 cm H2O.  EMG: increased during filling (suspect technical artifact)    DURING THE VOIDING PHASE:  With prolapse NOT reduced:  Maximum detrusor contraction with void: 16 cm of H2O pressure.  Voided volume: 315 mL.  Maximum flow rate: 13 mL/sec.  Average flow rate: 4.9 mL/sec.  Postvoid Residual: 180 mL.  EMG activity: increased (suspect artifact from EMG patches possibly not adhered well).  Character of voiding curve: intermittent.  BOOI: -18.2 (suggesting no obstruction - see key below)  [obstructed (HAYNES index [BOOI] ? 40); equivocal (no definite   obstruction; BOOI 20-40); and no obstruction (BOOI ? 20)]    With prolapse REDUCED:  Maximum detrusor contraction with void: 21 cm of H2O pressure.  Voided volume: 334 mL.  Maximum flow rate: 9.0 mL/sec.  Average flow rate: 4.9 mL/sec.  Postvoid Residual: 100 mL.  EMG activity: increased.  Character of voiding curve: continuous but slightly prolonged.    BOOI: 1.8 (suggesting no obstruction - see key below)  [obstructed (HAYNES index [BOOI] ?  40); equivocal (no definite   obstruction; BOOI 20-40); and no obstruction (BOOI ? 20)]    ASSESSMENT/PLAN:  Ms. Nicole Stanley is a pleasant 74 year old female with uterovaginal prolapse and mixed urinary incontinence who demonstrated the following findings today on urodynamic evaluation:    *Note: Interstim is turned on throughout entire study.  -With prolapse NOT reduced:  Maximum cystometric capacity 495 mL with intact filling sensations.  Good bladder compliance without detrusor overactivity.  No stress urinary incontinence while seated, but there is a moderate volume leak upon standing up at capacity.  Maximum detrusor contraction during voiding reaches 16 cm H2O. She voids 315 mL with Qmax 13 ml/s, intermittent flow curve, increased EMG activity (suspect technical artifact), incomplete bladder emptying ( mL), and no evidence for bladder outlet obstruction (BOOI -18.2). Note that slightly elevated PVR is consistent with pre-void PVR of 200 mL.   -With prolapse REDUCED:   Maximum cystometric capacity 434 mL with intact filling sensations.   Good bladder compliance without detrusor overactivity.   No stress incontinence while seated, but she does have a large volume leak upon standing up at capacity. After the initial leak with standing, there is additional stress incontinence associated with coughing while standing with ALPP 112 cm H2O.  Maximum detrusor contraction during voiding reaches 21 cm H2O. She voids 334 mL with Qmax 9.0 ml/s, continuous but slightly prolonged flow curve, increased EMG activity (again, suspect technical artifact), improved but still incomplete bladder emptying ( mL), and no evidence for bladder outlet obstruction (BOOI 1.8).    The patient will follow up as scheduled with Dr. Song to further discuss today's study results and make plans for how best to proceed.      - A single Bactrim antibiotic was provided for UTI prophylaxis following completion of today's study per  department protocol.  The risk of UTI with VUDS is low at ~2.5-3%.      Thank you for allowing me to participate in the care of Ms. Nicole Stanley and please don't hesitate to contact me with any questions or concerns.      No Sorenson PADailyC  Urology Physician Assistant

## 2024-06-06 NOTE — LETTER
6/6/2024       RE: Nicole Stanley  [address]  [place] [address]     Dear Colleague,    Thank you for referring your patient, Nicole Stanley, to the [facility] UROLOGY [facility] [place] [facility]. Please see a copy of my visit note below.    June 6, 2024    Lyn was seen today for review uds.    Diagnoses and all orders for this visit:    OAB (overactive bladder)  -     POST-VOID RESIDUAL BLADDER SCAN    Urinary urgency  -     POST-VOID RESIDUAL BLADDER SCAN    Mixed incontinence    Postural (urinary) incontinence  -     Case Request: HYSTERECTOMY, SUPRACERVICAL, ROBOT-ASSISTED, LAPAROSCOPIC, BILATERAL SALPINGO-OOPHORECTOMY, SACROCOLPOPEXY, RETROPUBIC MIDURETHRAL SLING, CYSTOSCOPY; Standing  -     Case Request: HYSTERECTOMY, SUPRACERVICAL, ROBOT-ASSISTED, LAPAROSCOPIC, BILATERAL SALPINGO-OOPHORECTOMY, SACROCOLPOPEXY, RETROPUBIC MIDURETHRAL SLING, CYSTOSCOPY    Uterovaginal prolapse  -     Case Request: HYSTERECTOMY, SUPRACERVICAL, ROBOT-ASSISTED, LAPAROSCOPIC, BILATERAL SALPINGO-OOPHORECTOMY, SACROCOLPOPEXY, RETROPUBIC MIDURETHRAL SLING, CYSTOSCOPY; Standing  -     Case Request: HYSTERECTOMY, SUPRACERVICAL, ROBOT-ASSISTED, LAPAROSCOPIC, BILATERAL SALPINGO-OOPHORECTOMY, SACROCOLPOPEXY, RETROPUBIC MIDURETHRAL SLING, CYSTOSCOPY    Other orders  -     Void on call to OR; Standing  -     Remove Hair; Standing  -     Forced Air Warming Device; Standing  -     Nozin Nasal  Popswab; Standing  -     Skin Antisepsis - DAY OF PROCEDURE; Standing  -     Skin Antisepsis - CURRENT INPATIENT; Standing  -     Glucose monitor nursing POCT - Glycemic Management PERIOP; Standing  -     ACUTE Indwelling urinary catheter (Sharma); Standing  -     Straight Catheterization; Standing  -     Discontinue indwelling urinary catheter (Sharma); Standing  -     Notify Provider - Anticoagulants and Antiplatelets; Standing  -     Glucose monitor nursing POCT; Standing  -      NPO per Anesthesia Guidelines for Procedure/Surgery Except for: Meds; Standing  -     Apply Pneumatic Compression Device (PCD); Standing  -     Pneumatic Compression Device (PCD) (Equipment); Standing  -     ceFAZolin (ANCEF) 2 g in dextrose 50 mL intermittent infusion  -     ceFAZolin (ANCEF) 2 g in dextrose 50 mL intermittent infusion         Attestation:  I agree with the PFSH and ROS as completed by the MS, except for changes made as needed.  The remainder of the encounter was performed by me and scribed by the MS.  The scribed note accurately reflects my personal services and the decisions made by me.    UDS on my interpretation shows that she has some USI, worse with prolapse reduction.  Not fully emptying.  No DO/DOI    Patient not emptying well so will have her ISC 2x a day (1st thing in the AM and right before bed) to see if helps    Discussed that if she wants a durable reconstructive repair that would be a sacrocolpopexy that would entail supracervical hysterectomy, BSO.  We also discussed given USI on UDS that she should consider midurethral sling.  We discussed that the risks to the procedure include but not limited to cardiovascular risks of anesthesia, nerve injury, bleeding, infection, injury to adjacent organs including bowel, bladder, and blood vessels, conversion to open procedure, de anel or worsening stress incontinence or urge incontinence, need for post-operative ferrari catheter, need for further procedures including for mesh extrusion or erosion.  She is at higher risk secondary to the need to stop her eliquis.    She is on the fence about surgery and would not want to do this until the Fall (likely November) because of her travel schedule ([place] in July, [place] in October)    23 minutes were spent today on the day of the encounter in reviewing the EMR including interpretation of the UDS, direct patient care including surgical counseling, coordination of care and documentation    Sheba LEONARD  Nohemi BOATENG MPH  (she/her/hers)   of Urology  [facility]    Subjective    Lyn is here today following up for mixed urinary incontinence and uterovaginal prolapse. UDS was done today. She reports that her bowel incontinence has resolved with mutamucil wafers. She is bothered mostly by incontinence, less so by her prolapse. She is most bothered by urge incontinence, but also experiences some stress incontinence.   We discussed at length different options including surgical correction of her prolapse with hysterectomy/BSO and sacrocolpopexy with possible sling placement at the same time. She understands that correction of the prolapse may help her empty her bladder, but will not get rid of her stress incontinence without sling placement. We also discussed that she could try a pessary again, but she is not interested at this time since she felt the pessary was painful when she last tried it. We discussed that she could also tried self cathing once daily to help her empty. She is interested in learning how to do this today and will give it a try. She has several upcoming trips, so she will continue to think through these options, follow up in the next few months for further discussion, and tentatively consider surgery in the late fall (~Nov).    BP (!) 162/92   Pulse 63   LMP  (LMP Unknown)   SpO2 95%   GENERAL: healthy, alert and no distress  EYES: Eyes grossly normal to inspection, conjunctivae and sclerae normal  HENT: normal cephalic/atraumatic.   RESP: Able to speak fully in complete sentences.  NEURO: mentation intact and speech normal  PSYCH: affect normal/bright, normal appearance well-groomed    Labs/imaging/pathology  Urine dip normal today  UDS completed today      Scribe Disclosure:   I, MICHAEL MENSAH, am serving as a scribe; to document services personally performed by Sheba Song MD -based on data collection and the provider's statements to me.     Provider Disclosure:  I  agree with above History, Review of Systems, Physical exam and Plan.  I have reviewed the content of the documentation and have edited it as needed. I have personally performed the services documented here and the documentation accurately represents those services and the decisions I have made.      Electronically signed by:  Tatum Renner, Ms4     CC  Patient Care Team:  Lauryn Allison MD as PCP - General (Family Practice)  Jacinda Saleem MD as MD (Otolaryngology)  Maile Leonardo MD as MD (Cardiology)  Sheba Song MD as MD (Urology)  Lauryn Allison MD as Assigned PCP  Giovanna Kothari PA-C as Physician Assistant (Dermatology)  Lauryn Allison MD as Referring Physician (Family Medicine)  Maile Leonardo MD as Assigned Heart and Vascular Provider  Sheba Song MD as MD (Urology)  Maddy Sandy PA-C as Physician Assistant (Gastroenterology)  Sheba Song MD as Assigned Surgical Provider  SELF, REFERRED       Chief Complaint   Patient presents with    Review UDS     - Reports no other sx's       Blood pressure (!) 162/92, pulse 63, SpO2 95%, not currently breastfeeding. There is no height or weight on file to calculate BMI.    Patient Active Problem List   Diagnosis    Essential hypertension    Mixed hyperlipidemia    Bunion, left    Osteoarthritis of both hands, unspecified osteoarthritis type    Hypothyroidism    Uterovaginal prolapse    Mixed incontinence    Pelvic floor dysfunction    Dyspareunia in female    Left ovarian cyst    Paroxysmal atrial fibrillation (H)    Irritable larynx syndrome    Muscle tension dysphonia    Left knee pain, unspecified chronicity    Lateral pain of hip    Incontinence of feces with fecal urgency    OAB (overactive bladder)       No Known Allergies    Current Outpatient Medications   Medication Sig Dispense Refill    Acetaminophen (TYLENOL PO) Take 500 mg by mouth every 8 hours as needed for mild pain or fever      apixaban ANTICOAGULANT  (ELIQUIS ANTICOAGULANT) 5 MG tablet Take 1 tablet (5 mg) by mouth 2 times daily 180 tablet 3    atorvastatin (LIPITOR) 20 MG tablet Take 1 tablet (20 mg) by mouth at bedtime 90 tablet 3    calcium carbonate (OSCAL 500) 1250 (500 Ca) MG TABS tablet Take 1 tablet by mouth 2 times daily      carvedilol (COREG) 6.25 MG tablet Take 1 tablet (6.25 mg) by mouth 2 times daily (with meals) 180 tablet 3    Cholecalciferol (VITAMIN D3) 3000 units TABS Take 1 tablet by mouth daily      cycloSPORINE (RESTASIS) 0.05 % ophthalmic emulsion Place 1 drop into both eyes 2 times daily      flecainide (TAMBOCOR) 50 MG tablet TAKE 1 TABLET BY MOUTH TWICE A  tablet 3    levothyroxine (SYNTHROID/LEVOTHROID) 50 MCG tablet Take 1 tablet (50 mcg) by mouth daily 90 tablet 3    losartan (COZAAR) 25 MG tablet Take 1 tablet (25 mg) by mouth every evening 90 tablet 3    MAGNESIUM GLYCINATE PO Take 300 mg by mouth every evening OTC unsure of dose.      minoxidil (ROGAINE) 5 % external solution       Probiotic Product (PROBIOTIC COLON SUPPORT PO) Take 1 tablet by mouth daily      UNABLE TO FIND MEDICATIONS: Biosil 10 drops daily, Viviscal Pro      zinc sulfate (ZINCATE) 220 (50 Zn) MG capsule Take 220 mg by mouth daily         Social History     Tobacco Use    Smoking status: Never     Passive exposure: Never    Smokeless tobacco: Never   Vaping Use    Vaping status: Never Used   Substance Use Topics    Alcohol use: Yes     Comment: 1-2 glass of wine per day    Drug use: Never       Micheal Flores  6/6/2024  2:36 PM

## 2024-06-06 NOTE — PATIENT INSTRUCTIONS
Websites with free information:    [employer] patient website: www.voicesforpfd.org    Total Control Program: www.totalcontrolprogram.com    Destiny NÚÑEZ Care Coordinator [phone]    Surgery scheduler [phone]    It was a pleasure meeting with you today.  Thank you for allowing me and my team the privilege of caring for you today.  YOU are the reason we are here, and I truly hope we provided you with the excellent service you deserve.  Please let us know if there is anything else we can do for you so that we can be sure you are leaving completely satisfied with your care experience.

## 2024-06-06 NOTE — PROGRESS NOTES
No chief complaint on file.      not currently breastfeeding. There is no height or weight on file to calculate BMI.    Patient Active Problem List   Diagnosis    Essential hypertension    Mixed hyperlipidemia    Bunion, left    Osteoarthritis of both hands, unspecified osteoarthritis type    Hypothyroidism    Uterovaginal prolapse    Mixed incontinence    Pelvic floor dysfunction    Dyspareunia in female    Left ovarian cyst    Paroxysmal atrial fibrillation (H)    Irritable larynx syndrome    Muscle tension dysphonia    Left knee pain, unspecified chronicity    Lateral pain of hip    Incontinence of feces with fecal urgency    OAB (overactive bladder)       No Known Allergies    Current Outpatient Medications   Medication Sig Dispense Refill    Acetaminophen (TYLENOL PO) Take 500 mg by mouth every 8 hours as needed for mild pain or fever      apixaban ANTICOAGULANT (ELIQUIS ANTICOAGULANT) 5 MG tablet Take 1 tablet (5 mg) by mouth 2 times daily 180 tablet 3    atorvastatin (LIPITOR) 20 MG tablet Take 1 tablet (20 mg) by mouth at bedtime 90 tablet 3    calcium carbonate (OSCAL 500) 1250 (500 Ca) MG TABS tablet Take 1 tablet by mouth 2 times daily      carvedilol (COREG) 6.25 MG tablet Take 1 tablet (6.25 mg) by mouth 2 times daily (with meals) 180 tablet 3    Cholecalciferol (VITAMIN D3) 3000 units TABS Take 1 tablet by mouth daily      cycloSPORINE (RESTASIS) 0.05 % ophthalmic emulsion Place 1 drop into both eyes 2 times daily      flecainide (TAMBOCOR) 50 MG tablet TAKE 1 TABLET BY MOUTH TWICE A  tablet 3    levothyroxine (SYNTHROID/LEVOTHROID) 50 MCG tablet Take 1 tablet (50 mcg) by mouth daily 90 tablet 3    losartan (COZAAR) 25 MG tablet Take 1 tablet (25 mg) by mouth every evening 90 tablet 3    MAGNESIUM GLYCINATE PO Take 300 mg by mouth every evening OTC unsure of dose.      minoxidil (ROGAINE) 5 % external solution       Probiotic Product (PROBIOTIC COLON SUPPORT PO) Take 1 tablet by mouth daily       UNABLE TO FIND MEDICATIONS: Biosil 10 drops daily, Viviscal Pro      zinc sulfate (ZINCATE) 220 (50 Zn) MG capsule Take 220 mg by mouth daily         Social History     Tobacco Use    Smoking status: Never     Passive exposure: Never    Smokeless tobacco: Never   Vaping Use    Vaping status: Never Used   Substance Use Topics    Alcohol use: Yes     Comment: 1-2 glass of wine per day    Drug use: Never       Invasive Procedure Safety Checklist:    Procedure: Urodynamics    Action: Complete sections and checkboxes as appropriate.    Pre-procedure:    1. Patient ID Verified with 2 identifiers (Teri and  or MRN) : YES    2. Procedure and site verified with patient/designee (when able) : YES    3. Accurate consent documentation in medical record : YES    4. H&P (or appropriate assessment) documented in medical record : N/A    H&P must be up to 30 days prior to procedure an updated within 24 hours of Procedure as applicable.     5. Relevant diagnostic and radiology test results appropriately labeled and displayed as applicable : YES    6. Blood products, implants, devices, and/or special equipment available for the procedure as applicable : YES    7. Procedure site(s) marked with provider initials [Exclusions: none] : NO    8. Marking not required. Reason : Yes  Procedure does not require site marking    Time Out:     Time-Out performed immediately prior to starting procedure, including verbal and active participation of all team members addressing: YES    1. Correct patient identity.  2. Confirmed that the correct side and site are marked.  3. An accurate procedure to be done.  4. Agreement on the procedure to be done.  5. Correct patient position.  6. Relevant images and results are properly labeled and appropriately displayed.  7. The need to administer antibiotics or fluids for irrigation purposes during the procedure as applicable.  8. Safety precautions based on patient history or medication use.    During  Procedure: Verification of correct person, site, and procedure occurs any time the responsibility for care of the patient is transferred to another member of the care team.    The following medication was given:     MEDICATION: Bactrim (Trimethoprim / Sulfamethoxazole)  ROUTE: PO  SITE: Medication was given orally  DOSE: 800mg/160mg  LOT #: [id number]  : [employer]  EXPIRATION DATE: 2025/11  NDC#: 8272-0778-86   Was there drug waste? No    Prior to medication administration, verified patient identity using patient's name and date of birth.  Due to medication administration, patient instructed to remain in clinic for 15 minutes  afterwards, and to report any adverse reaction to me immediately.    No Sorenosn PA-C  6/6/2024  3:59 PM

## 2024-06-06 NOTE — PROGRESS NOTES
Chief Complaint   Patient presents with    Review UDS     - Reports no other sx's       Blood pressure (!) 162/92, pulse 63, SpO2 95%, not currently breastfeeding. There is no height or weight on file to calculate BMI.    Patient Active Problem List   Diagnosis    Essential hypertension    Mixed hyperlipidemia    Bunion, left    Osteoarthritis of both hands, unspecified osteoarthritis type    Hypothyroidism    Uterovaginal prolapse    Mixed incontinence    Pelvic floor dysfunction    Dyspareunia in female    Left ovarian cyst    Paroxysmal atrial fibrillation (H)    Irritable larynx syndrome    Muscle tension dysphonia    Left knee pain, unspecified chronicity    Lateral pain of hip    Incontinence of feces with fecal urgency    OAB (overactive bladder)       No Known Allergies    Current Outpatient Medications   Medication Sig Dispense Refill    Acetaminophen (TYLENOL PO) Take 500 mg by mouth every 8 hours as needed for mild pain or fever      apixaban ANTICOAGULANT (ELIQUIS ANTICOAGULANT) 5 MG tablet Take 1 tablet (5 mg) by mouth 2 times daily 180 tablet 3    atorvastatin (LIPITOR) 20 MG tablet Take 1 tablet (20 mg) by mouth at bedtime 90 tablet 3    calcium carbonate (OSCAL 500) 1250 (500 Ca) MG TABS tablet Take 1 tablet by mouth 2 times daily      carvedilol (COREG) 6.25 MG tablet Take 1 tablet (6.25 mg) by mouth 2 times daily (with meals) 180 tablet 3    Cholecalciferol (VITAMIN D3) 3000 units TABS Take 1 tablet by mouth daily      cycloSPORINE (RESTASIS) 0.05 % ophthalmic emulsion Place 1 drop into both eyes 2 times daily      flecainide (TAMBOCOR) 50 MG tablet TAKE 1 TABLET BY MOUTH TWICE A  tablet 3    levothyroxine (SYNTHROID/LEVOTHROID) 50 MCG tablet Take 1 tablet (50 mcg) by mouth daily 90 tablet 3    losartan (COZAAR) 25 MG tablet Take 1 tablet (25 mg) by mouth every evening 90 tablet 3    MAGNESIUM GLYCINATE PO Take 300 mg by mouth every evening OTC unsure of dose.      minoxidil (ROGAINE) 5 %  external solution       Probiotic Product (PROBIOTIC COLON SUPPORT PO) Take 1 tablet by mouth daily      UNABLE TO FIND MEDICATIONS: Biosil 10 drops daily, Viviscal Pro      zinc sulfate (ZINCATE) 220 (50 Zn) MG capsule Take 220 mg by mouth daily         Social History     Tobacco Use    Smoking status: Never     Passive exposure: Never    Smokeless tobacco: Never   Vaping Use    Vaping status: Never Used   Substance Use Topics    Alcohol use: Yes     Comment: 1-2 glass of wine per day    Drug use: Never       Micheal Flores  6/6/2024  2:36 PM

## 2024-06-12 ENCOUNTER — TELEPHONE (OUTPATIENT)
Dept: UROLOGY | Facility: CLINIC | Age: 74
End: 2024-06-12
Payer: COMMERCIAL

## 2024-06-12 ENCOUNTER — HOSPITAL ENCOUNTER (OUTPATIENT)
Facility: CLINIC | Age: 74
End: 2024-06-12
Attending: UROLOGY | Admitting: UROLOGY
Payer: COMMERCIAL

## 2024-06-12 NOTE — TELEPHONE ENCOUNTER
Called patient to schedule surgery with Dr. Song    Spoke with: Lyn    Date of Surgery: 10/28    Approximate arrival time given:  Yes    Location of surgery: [place] [place]     Pre-Op H&P: PAC 10/22    Post-Op Appt Date: Needs 2 week post op with Torie. Nothing available on schedule     Imaging needed:  No    Discussed with patient pre-op RN will call 2-3 days prior to surgery with arrival time and instructions:  Yes    Discussed with patient PAC RN will provide arrival time and instructions for surgery at the time of the appointment: [[place] locations only]: Not Applicable      Packet sent out: Yes 06/12/24  via Mail - [employer] and [employer]      Additional Comments:  NA      All patients questions were answered and was instructed to review surgical packet and call back with any questions or concerns.       Arianne Caballero on 6/12/2024 at 4:12 PM

## 2024-06-14 ENCOUNTER — TELEPHONE (OUTPATIENT)
Dept: UROLOGY | Facility: CLINIC | Age: 74
End: 2024-06-14
Payer: COMMERCIAL

## 2024-06-14 NOTE — TELEPHONE ENCOUNTER
Patient confirmed scheduled appointment:  Date: Nov 14th   Time: 2pm  Visit type: Post op  Provider: Dr. Song   [place] [facility] [place]: N/A  Additional notes: schedule this patient for 11/14/24 for post op - per staff message

## 2024-06-18 ENCOUNTER — TELEPHONE (OUTPATIENT)
Dept: UROLOGY | Facility: CLINIC | Age: 74
End: 2024-06-18
Payer: COMMERCIAL

## 2024-06-18 DIAGNOSIS — N81.4 UTEROVAGINAL PROLAPSE: Primary | ICD-10-CM

## 2024-06-18 DIAGNOSIS — K42.9 UMBILICAL HERNIA WITHOUT OBSTRUCTION AND WITHOUT GANGRENE: ICD-10-CM

## 2024-06-18 NOTE — TELEPHONE ENCOUNTER
----- Message from Arianne Caballero sent at 6/18/2024  8:10 AM CDT -----  Hi Dr. Song,     I spoke with patient. They are understanding and are fine with doing the procedure with just you.   They did ask if they will need a referral for General Surgery then to meet with someone to discuss a different surgery with them.    Arianne  ----- Message -----  From: Sheba Song MD  Sent: 6/17/2024   4:08 PM CDT  To: Arianne Caballero; Destiny Roberts, WILTON    Please inform patient of general surgery recommendations and she can decide what she would like to do    Thanks  ----- Message -----  From: Arianne Caballero  Sent: 6/14/2024   8:14 AM CDT  To: Arianne Caballero; Sheba Song MD; #    Hello,     Patient is scheduled for 10/28.     I reached out to general surgery scheduler, Per Dr. Almeida, they do not feel this patient is a candidate for a combo case. I was advised that Dr. Almeida would be reaching out to you directly to discuss.     Please advise on how you would like to proceed at this time.     Arianne Caballero on 6/14/2024 at 8:14 AM

## 2024-06-20 DIAGNOSIS — R39.89 SUSPECTED UTI: Primary | ICD-10-CM

## 2024-07-23 NOTE — TELEPHONE ENCOUNTER
REFERRAL INFORMATION:  Referring Provider: Dr. Sheba Song  Referring Clinic: [facility] - Urology  Reason for Visit/Diagnosis: Umbilical hernia without obstruction and without gangrene        FUTURE VISIT INFORMATION:  Appointment Date: 8/8/2024  Appointment Time: Noon     NOTES RECORD STATUS  DETAILS   OFFICE NOTE from Referring Provider [facility]:  6/18/24 - Telephone Referral from Dr. Song  6/6/24, 4/9/24 - URO OV with Dr. Song   OFFICE NOTE from Other Specialists Internal [facility]:  3/21/24, 2/7/24 - URO OV with ART Mroales    [facility]:  12/2/22 - GEN SURG OV with Dr. ABHISHEK Donis - [place]:  11/28/22, 9/7/22 - [facility] OV with Dr. Allison   HOSPITAL DISCHARGE SUMMARY/ ED VISITS  N/A    OPERATIVE REPORT N/A    PERTINENT LABS Internal    IMAGING (CT, MRI, US, XR)  Internal [facility]:  12/13/22 - XR Chest  9/27/22 - US Pelvic  11/12/19 - CT Abd/Pelvis

## 2024-07-25 NOTE — TELEPHONE ENCOUNTER
FUTURE VISIT INFORMATION      SURGERY INFORMATION:  Date: 10/28/24  Location: ur or  Surgeon:  Sheba Song MD   Anesthesia Type:  general with block  Procedure: HYSTERECTOMY, SUPRACERVICAL, ROBOT-ASSISTED, LAPAROSCOPIC, BILATERAL SALPINGO-OOPHORECTOMY, SACROCOLPOPEXY, RETROPUBIC MIDURETHRAL SLING, CYSTOSCOPY   Consult: ov 24    RECORDS REQUESTED FROM:       Primary Care Provider: Lauryn Allison MD - [facility]    Pertinent Medical History: hypertension, paroxysmal atrial fibrillation     Most recent EKG+ Tracin24

## 2024-08-08 ENCOUNTER — OFFICE VISIT (OUTPATIENT)
Dept: SURGERY | Facility: CLINIC | Age: 74
End: 2024-08-08
Attending: UROLOGY
Payer: COMMERCIAL

## 2024-08-08 ENCOUNTER — PRE VISIT (OUTPATIENT)
Dept: SURGERY | Facility: CLINIC | Age: 74
End: 2024-08-08

## 2024-08-08 VITALS
HEIGHT: 66 IN | BODY MASS INDEX: 22.14 KG/M2 | WEIGHT: 137.8 LBS | HEART RATE: 65 BPM | DIASTOLIC BLOOD PRESSURE: 86 MMHG | SYSTOLIC BLOOD PRESSURE: 147 MMHG | RESPIRATION RATE: 18 BRPM | OXYGEN SATURATION: 97 %

## 2024-08-08 DIAGNOSIS — N81.4 UTEROVAGINAL PROLAPSE: ICD-10-CM

## 2024-08-08 DIAGNOSIS — K42.9 UMBILICAL HERNIA WITHOUT OBSTRUCTION AND WITHOUT GANGRENE: ICD-10-CM

## 2024-08-08 PROCEDURE — 99213 OFFICE O/P EST LOW 20 MIN: CPT | Performed by: SURGERY

## 2024-08-08 RX ORDER — VALACYCLOVIR HYDROCHLORIDE 500 MG/1
TABLET, FILM COATED ORAL
COMMUNITY
Start: 2024-08-06 | End: 2024-10-17

## 2024-08-08 ASSESSMENT — PAIN SCALES - GENERAL: PAINLEVEL: MODERATE PAIN (4)

## 2024-08-08 NOTE — NURSING NOTE
"Chief Complaint   Patient presents with    Consult       Vitals:    08/08/24 1152   BP: (!) 147/86   BP Location: Left arm   Patient Position: Sitting   Cuff Size: Adult Regular   Pulse: 65   Resp: 18   SpO2: 97%   Weight: 137 lb 12.8 oz   Height: 5' 6\"       Body mass index is 22.24 kg/m .    Gerardo NUNEZ-P    "

## 2024-08-08 NOTE — PROGRESS NOTES
"Lyn is a 73 yo F with history of Afib on Eliquis, OAB, UUI, JAVIER, uterovaginal prolapse, fecal incontinence her for consult of umbilical hernia repair. She denies symptoms of abdominal pain, fever, chills, and nausea. Reports symptoms become noticeable when it \"bulges and rubs against things\". At this time she wishes to have the surgery the same day as her surgery with urology given her fears of holding her anticoagulation. Of note she consulted with Dr. Pool on in 12/2022 for her umbilical hernia as well.      BP (!) 147/86 (BP Location: Left arm, Patient Position: Sitting, Cuff Size: Adult Regular)   Pulse 65   Resp 18   Ht 1.676 m (5' 6\")   Wt 62.5 kg (137 lb 12.8 oz)   LMP  (LMP Unknown)   SpO2 97%   BMI 22.24 kg/m      Physical exam:   General: NAD, alert and oriented   Abdominal: soft, nontender, 2 cm periumbilical fascial defect    Assessment/Plan   Lyn is a 73 yo F with history of Afib on Eliquis, OAB, UUI, JAVIER, uterovaginal prolapse, fecal incontinence her for consult of umbilical hernia repair. CT Abd/Pelvis 11/2019 revealed 1.5 cm umbilical hernia.     We discussed risks, benefits and alternatives to umbilical hernia repair. At this time we will not progress with surgery. Patient is symptomatic and is willing to hold off on surgery.     Seen and discussed with staff, Dr. Pierre.   Albertina Maxwell DO, MS   General Surgery, PGY 1    "

## 2024-08-08 NOTE — LETTER
"8/8/2024       RE: Nicole Stanley  [address] [place] [facility] [address]     Dear Colleague,    Thank you for referring your patient, Nicole Stanley, to the [facility] [place] at [facility]. Please see a copy of my visit note below.    Lyn is a 73 yo F with history of Afib on Eliquis, OAB, UUI, JAVIER, uterovaginal prolapse, fecal incontinence her for consult of umbilical hernia repair. She denies symptoms of abdominal pain, fever, chills, and nausea. Reports symptoms become noticeable when it \"bulges and rubs against things\". At this time she wishes to have the surgery the same day as her surgery with urology given her fears of holding her anticoagulation. Of note she consulted with Dr. Pool on in 12/2022 for her umbilical hernia as well.      BP (!) 147/86 (BP Location: Left arm, Patient Position: Sitting, Cuff Size: Adult Regular)   Pulse 65   Resp 18   Ht 1.676 m (5' 6\")   Wt 62.5 kg (137 lb 12.8 oz)   LMP  (LMP Unknown)   SpO2 97%   BMI 22.24 kg/m      Physical exam:   General: NAD, alert and oriented   Abdominal: soft, nontender, 2 cm periumbilical fascial defect    Assessment/Plan   Lyn is a 73 yo F with history of Afib on Eliquis, OAB, UUI, JAVIER, uterovaginal prolapse, fecal incontinence her for consult of umbilical hernia repair. CT Abd/Pelvis 11/2019 revealed 1.5 cm umbilical hernia.     We discussed risks, benefits and alternatives to umbilical hernia repair. At this time we will not progress with surgery. Patient is symptomatic and is willing to hold off on surgery.     Physician Attestation  I, Ben Pierre, saw and evaluated this patient as part of a shared visit.  I have reviewed and discussed with the advanced practice provider and/or resident their history, physical and plan.    I personally reviewed the vital signs, medications, labs and imaging.    My key history or physical exam findings: no significant " symptoms at umbilical hernia    Key management decisions made by me: no repair necessary.    Ben Pierre MD  Date of Service (when I saw the patient): 8/8/24      Seen and discussed with staff, Dr. Pierre.   Albertina Maxwell DO, MS   General Surgery, PGY 1      Again, thank you for allowing me to participate in the care of your patient.      Sincerely,    Ben Pierre MD

## 2024-08-22 ENCOUNTER — PRE VISIT (OUTPATIENT)
Dept: UROLOGY | Facility: CLINIC | Age: 74
End: 2024-08-22
Payer: COMMERCIAL

## 2024-08-22 NOTE — TELEPHONE ENCOUNTER
Reason for visit: Pre-op Questions    Relevant information: Hysterectomy scheduled for 10/28    Records/imaging/labs/orders: All records available    Pt called: No need for a call    At Rooming: Virtual visit    Viridiana Mayer  8/22/2024  9:36 AM

## 2024-08-26 DIAGNOSIS — E78.2 MIXED HYPERLIPIDEMIA: ICD-10-CM

## 2024-08-26 DIAGNOSIS — I48.0 PAROXYSMAL ATRIAL FIBRILLATION (H): ICD-10-CM

## 2024-08-26 DIAGNOSIS — I10 BENIGN ESSENTIAL HYPERTENSION: ICD-10-CM

## 2024-08-26 DIAGNOSIS — E03.9 ACQUIRED HYPOTHYROIDISM: ICD-10-CM

## 2024-08-27 RX ORDER — APIXABAN 5 MG/1
5 TABLET, FILM COATED ORAL 2 TIMES DAILY
Qty: 180 TABLET | Refills: 0 | Status: SHIPPED | OUTPATIENT
Start: 2024-08-27

## 2024-08-27 RX ORDER — ATORVASTATIN CALCIUM 20 MG/1
20 TABLET, FILM COATED ORAL AT BEDTIME
Qty: 90 TABLET | Refills: 0 | Status: SHIPPED | OUTPATIENT
Start: 2024-08-27

## 2024-08-27 RX ORDER — LEVOTHYROXINE SODIUM 50 UG/1
50 TABLET ORAL DAILY
Qty: 90 TABLET | Refills: 0 | Status: SHIPPED | OUTPATIENT
Start: 2024-08-27

## 2024-08-27 RX ORDER — LOSARTAN POTASSIUM 25 MG/1
25 TABLET ORAL EVERY EVENING
Qty: 90 TABLET | Refills: 0 | Status: SHIPPED | OUTPATIENT
Start: 2024-08-27

## 2024-08-29 ENCOUNTER — VIRTUAL VISIT (OUTPATIENT)
Dept: UROLOGY | Facility: CLINIC | Age: 74
End: 2024-08-29
Payer: COMMERCIAL

## 2024-08-29 DIAGNOSIS — N39.46 MIXED INCONTINENCE: ICD-10-CM

## 2024-08-29 DIAGNOSIS — I48.0 PAROXYSMAL ATRIAL FIBRILLATION (H): ICD-10-CM

## 2024-08-29 DIAGNOSIS — R39.89 SUSPECTED UTI: Primary | ICD-10-CM

## 2024-08-29 DIAGNOSIS — R15.9 INCONTINENCE OF FECES, UNSPECIFIED FECAL INCONTINENCE TYPE: ICD-10-CM

## 2024-08-29 DIAGNOSIS — N39.492 POSTURAL (URINARY) INCONTINENCE: ICD-10-CM

## 2024-08-29 DIAGNOSIS — N81.4 UTEROVAGINAL PROLAPSE: ICD-10-CM

## 2024-08-29 PROCEDURE — 99213 OFFICE O/P EST LOW 20 MIN: CPT | Mod: 95 | Performed by: UROLOGY

## 2024-08-29 NOTE — NURSING NOTE
Current patient location: [address] [place] [address]    Is the patient currently in the state of [place]? YES    Visit mode:VIDEO    If the visit is dropped, the patient can be reconnected by: VIDEO VISIT: Text to cell phone:   Telephone Information:   Mobile [phone]       Will anyone else be joining the visit? NO  (If patient encounters technical issues they should call [phone]    How would you like to obtain your AVS? MyChart    Are changes needed to the allergy or medication list? No, Pt stated no changes to allergies, and Pt stated no med changes    Are refills needed on medications prescribed by this physician? NO    Rooming Documentation:  Not applicable      Reason for visit: CRISTAL CAREY

## 2024-08-29 NOTE — LETTER
8/29/2024       RE: Nicole Stanley  [address]  [place] [address]     Dear Colleague,    Thank you for referring your patient, Nicole Stanley, to the [facility] [place] at [facility]. Please see a copy of my visit note below.    Virtual Visit Details    Type of service:  Video Visit   Video Start Time: 9:46 AM  Video End Time:10:02 AM    Originating Location (pt. Location): Home    Distant Location (provider location):  On-site  Platform used for Video Visit: Leida    August 29, 2024    Lyn was seen today for recheck.    Diagnoses and all orders for this visit:    Suspected UTI  -     Routine UA with microscopic - No culture; Future  -     Urine Culture Aerobic Bacterial; Future    Uterovaginal prolapse    Mixed incontinence    Postural (urinary) incontinence    Incontinence of feces, unspecified fecal incontinence type    Paroxysmal atrial fibrillation (H)       We discussed she can decrease to 1x a day    Ordered UA/UC given question of UTI    She is considering options as she is not sure that she wants to undergo more invasive surgery and would a minimally invasive uterine sparing approach that I do not do so will send her to Dr August for another opinion    We again discussed her prolapse, her OAB and her stress incontinence and how the are related but that treatment for one does not always help another    RTC to discuss after visit with Dr August    16 minutes were spent today on the day of the encounter in reviewing the EMR, direct patient care including ordering urinalysis and urine culture, coordination of care and documentation    Sheba Song MD MPH  (she/her/hers)   of Urology  [facility]    Subjective    Here today for follow up.  Not sure she wants to have surgery this fall.  She might need to have corneal abrasion.  She denies any changes in health since last visit    LMP  (LMP Unknown)    GENERAL: healthy, alert and no distress  EYES: Eyes grossly normal to inspection, conjunctivae and sclerae normal  HENT: normal cephalic/atraumatic.  External ears, nose and mouth without ulcers or lesions.  RESP: no audible wheeze, cough, or visible cyanosis.  No visible retractions or increased work of breathing.  Able to speak fully in complete sentences.  NEURO: Cranial nerves grossly intact, mentation intact and speech normal  PSYCH: mentation appears normal, affect normal/bright, judgement and insight intact, normal speech and appearance well-groomed    CC  Patient Care Team:  Lauryn Allison MD as PCP - General (Family Practice)  Jacinda Saleem MD as MD (Otolaryngology)  Maile Leonardo MD as MD (Cardiology)  Lauryn Allison MD as Assigned PCP  Giovanna Kothari PA-C as Physician Assistant (Dermatology)  Lauryn Allison MD as Referring Physician (Family Medicine)  Maile Leonardo MD as Assigned Heart and Vascular Provider  Kathy Song MD as MD (Urology)  Maddy Sandy PA-C as Physician Assistant (Gastroenterology)  Kathy Song MD as Assigned Surgical Provider  Trinh Porras MD as MD (Cardiovascular Disease)  Ben Pierre MD as Surgeon (Surgery)  KATHY SONG        Again, thank you for allowing me to participate in the care of your patient.      Sincerely,    Kathy Song MD

## 2024-08-29 NOTE — PATIENT INSTRUCTIONS
Decrease catheterizations to one time a night    Please go to the lab and do the lab tests    Websites with free information:    [employer] patient website: www.voicesforpfd.org    Total Control Program: www.totalcontrolprogram.com    Please see Dr August for a second opinion    It was a pleasure meeting with you today.  Thank you for allowing me and my team the privilege of caring for you today.  YOU are the reason we are here, and I truly hope we provided you with the excellent service you deserve.  Please let us know if there is anything else we can do for you so that we can be sure you are leaving completely satisfied with your care experience.

## 2024-08-29 NOTE — PROGRESS NOTES
Virtual Visit Details    Type of service:  Video Visit   Video Start Time: 9:46 AM  Video End Time:10:02 AM    Originating Location (pt. Location): Home    Distant Location (provider location):  On-site  Platform used for Video Visit: Leida    August 29, 2024    Lyn was seen today for recheck.    Diagnoses and all orders for this visit:    Suspected UTI  -     Routine UA with microscopic - No culture; Future  -     Urine Culture Aerobic Bacterial; Future    Uterovaginal prolapse    Mixed incontinence    Postural (urinary) incontinence    Incontinence of feces, unspecified fecal incontinence type    Paroxysmal atrial fibrillation (H)       We discussed she can decrease to 1x a day    Ordered UA/UC given question of UTI    She is considering options as she is not sure that she wants to undergo more invasive surgery and would a minimally invasive uterine sparing approach that I do not do so will send her to Dr August for another opinion    We again discussed her prolapse, her OAB and her stress incontinence and how the are related but that treatment for one does not always help another    RTC to discuss after visit with Dr August    16 minutes were spent today on the day of the encounter in reviewing the EMR, direct patient care including ordering urinalysis and urine culture, coordination of care and documentation    Sheba Song MD MPH  (she/her/hers)   of Urology  [facility]    Subjective    Here today for follow up.  Not sure she wants to have surgery this fall.  She might need to have corneal abrasion.  She denies any changes in health since last visit    LMP  (LMP Unknown)   GENERAL: healthy, alert and no distress  EYES: Eyes grossly normal to inspection, conjunctivae and sclerae normal  HENT: normal cephalic/atraumatic.  External ears, nose and mouth without ulcers or lesions.  RESP: no audible wheeze, cough, or visible cyanosis.  No visible retractions or increased work of  breathing.  Able to speak fully in complete sentences.  NEURO: Cranial nerves grossly intact, mentation intact and speech normal  PSYCH: mentation appears normal, affect normal/bright, judgement and insight intact, normal speech and appearance well-groomed    CC  Patient Care Team:  Lauryn Alliosn MD as PCP - General (Family Practice)  Jacinda Saleem MD as MD (Otolaryngology)  Maile Leonardo MD as MD (Cardiology)  Lauryn Allison MD as Assigned PCP  Giovanna Kothari PA-C as Physician Assistant (Dermatology)  Lauryn Allison MD as Referring Physician (Family Medicine)  Maile Leonardo MD as Assigned Heart and Vascular Provider  Kathy Song MD as MD (Urology)  Maddy Sandy PA-C as Physician Assistant (Gastroenterology)  Kathy Song MD as Assigned Surgical Provider  Trinh Proras MD as MD (Cardiovascular Disease)  Ben Pierre MD as Surgeon (Surgery)  KATHY SONG       128

## 2024-08-30 ENCOUNTER — LAB (OUTPATIENT)
Dept: LAB | Facility: CLINIC | Age: 74
End: 2024-08-30
Payer: COMMERCIAL

## 2024-08-30 DIAGNOSIS — R39.89 SUSPECTED UTI: ICD-10-CM

## 2024-08-30 LAB
ALBUMIN UR-MCNC: NEGATIVE MG/DL
APPEARANCE UR: CLEAR
BACTERIA #/AREA URNS HPF: ABNORMAL /HPF
BILIRUB UR QL STRIP: NEGATIVE
COLOR UR AUTO: YELLOW
GLUCOSE UR STRIP-MCNC: NEGATIVE MG/DL
HGB UR QL STRIP: ABNORMAL
KETONES UR STRIP-MCNC: NEGATIVE MG/DL
LEUKOCYTE ESTERASE UR QL STRIP: NEGATIVE
NITRATE UR QL: NEGATIVE
PH UR STRIP: 7 [PH] (ref 5–7)
RBC #/AREA URNS AUTO: ABNORMAL /HPF
SP GR UR STRIP: 1.01 (ref 1–1.03)
SQUAMOUS #/AREA URNS AUTO: ABNORMAL /LPF
UROBILINOGEN UR STRIP-ACNC: 0.2 E.U./DL
WBC #/AREA URNS AUTO: ABNORMAL /HPF

## 2024-08-30 PROCEDURE — 87186 SC STD MICRODIL/AGAR DIL: CPT

## 2024-08-30 PROCEDURE — 81001 URINALYSIS AUTO W/SCOPE: CPT

## 2024-08-30 PROCEDURE — 87086 URINE CULTURE/COLONY COUNT: CPT

## 2024-09-01 LAB
BACTERIA UR CULT: ABNORMAL
BACTERIA UR CULT: ABNORMAL

## 2024-09-14 NOTE — LETTER
"9/21/2021      RE: Nicole Stanley  [address] [place] [address]       Dear Colleague,    Thank you for the opportunity to participate in the care of your patient, Nicole Stanley, at the [facility] [place] at [facility]. Please see a copy of my visit note below.    Electrophysiology Clinic Video Virtual Visit    Nicole Stanley is a 71 year old female who is being evaluated via a billable video visit.      The patient has been notified of following:     \"This video visit will be conducted via a call between you and your physician/provider. We have found that certain health care needs can be provided without the need for an in-person physical exam.  This service lets us provide the care you need with a video conversation.  If a prescription is necessary we can send it directly to your pharmacy.  If lab work is needed we can place an order for that and you can then stop by our lab to have the test done at a later time.    If during the course of the call the physician/provider feels a video visit is not appropriate, you will not be charged for this service.\"     Physician has received verbal consent for a Video Visit from the patient? Yes    Patient would like the video invitation sent by: TM    Video Start Time: 6:05 pm    Video Stop Time: 6:20 pm    Mode of Communication:  Video Conference via [employer]    Originating Location (pt. Location): Home    Distant Location (provider location): [facility]    Mode of Communication:  Video Conference via [employer]      HPI:   70 yo for follow up of PAF and HTN. I last saw her 8/12/2021.  She has PAF initially diagnosed 4/2018 in setting of influenza B, not symptomatic at that time. [id number] score is 3 for hypertension, age > 65, female. Apixaban started. Recurrent episodes 1/2019 (spontaneous conversion), 4/2019 (cardioverted in ED). Increasing frequency of AF starting April 2021, " Patient recovered well post op. A&Ox4. VSS, 2L nc. Surgical sites CDI. Surgical pain managed. Patient able to drink fluids without Nausea and vomiting. PT belongings in his room, tele back in place. Spouse updated and discussed plan of care. Report called to Christen SWEENEY.     between April and July had 5 episodes, 5-7 hours, now with palpitation/fatigue, Apple Watch says AF -130s. Her SBP at home had been 140-150s on metoprolol XL 25 every day. She was found to have microalbuminemia and lisinopril 2.5 every day added by PCP. On both metoprolol and lisinopril SBP still 130s. When I saw her , I stopped metoprolol and lisinopril and started carvedilol. Discussed adding flecainide if she continues to have AF.    She started carvedilol 2021, coincidentally that day she had an episode of AF lasting 5 hours. She did proceed to take carvedilol and has sent her BP log - SBP much improved, generally 100-110 mmhg, with only occasional readings of 140mmHg. She has not had any AF since .    PAST MEDICAL HISTORY:  1. Atrial fibrillation. Initial diagnosis 2018 in the setting of Influenza B. Spontaneously converted to sinus rhythm. Recurrent AF 2019, cardioverted in ED. Recurrent paroxysmal episodes April - 2021.  2. Hypertension.  3. Hyperlipidemia. She had previously been on simvastatin but had some muscle aches. She is reluctant to add more medications.  4. Hypothyroidism.  5. Cholecystectomy  6. Vein stripping     CURRENT MEDICATIONS:  Apixaban 5 mg bid  Synthroid 50 mcg every day  Atorvastatin 20 every day  Carvedilol 6.25 bid    ALLERGIES:   No Known Allergies    FAMILY HISTORY:  Family History   Problem Relation Age of Onset     Hypertension Mother              Hyperlipidemia Mother      Cerebrovascular Disease Mother      Thyroid Disease Mother      Family History Negative Father              Alzheimer Disease Father      Cancer Father         sarcoma     Hypertension Father      Family History Negative Maternal Grandmother               Hyperlipidemia Sister      Thyroid Disease Sister      Thyroid Disease Sister        SOCIAL HISTORY:  Social History     Tobacco Use     Smoking status: Never Smoker     Smokeless tobacco: Never Used    Substance Use Topics     Alcohol use: Yes     Alcohol/week: 0.0 standard drinks     Comment: 1-2 glass of wine per day     Drug use: No       ROS:  10 point ROS neg other than the symptoms noted above in the HPI.    I have personally reviewed the following:    LABS: 2021: hgb 13, plt 199K, TSH 1.8    ECHOCARDIOGRAM: 18 (in AF): normal LV/RV, no valvular abnormalities    Bicycle stress echo 2019 (in sinus): exercised for 6:30 min, 90% MPHR achieved, MVO2 113% predicted, normal BP response, no ischemia    EK19:  bpm  2021: sinus  2021: sinus 68 bpm, normal intervals    Exam:  The rest of a comprehensive physical examination is deferred due to public [facility] emergency video visit restrictions.  CONSITUTIONAL: no acute distress  HEENT: no icterus, no redness or discharge, neck supple  CV: no visible edema of visualized extremities. No JVD.   RESPIRATORY: respirations nonlabored, no cough  NEURO: AA&Ox3, speech fluent/appropriate, motor grossly nonfocal  PSYCH: cooperative, affect appropriate  DERM: no rashes on visualized face/neck/upper extremities    Assessment and Plan:   1. PAF. Increasing episodes since 2021. I discussed option of adding flecainide to carvedilol. She's not had any episodes since  so at this time would like to hold off on AAD. Continue apixaban.  2. HTN. BP much better on carvedilol 6.25 bid. She will continue to log BP readings.      In addition to video time documented above, I spent an additional 20 minutes today on data review and documentation.  I will see patient back as needed - she will notify me with any increase in AF frequency.    I have reviewed the note as documented above.  This accurately captures the substance of my virtual visit with the patient. The patient states understanding and is agreeable with the plan.     Maile Leonardo MD  Cardiology       Vitals - Patient Reported  Systolic (Patient Reported): 117  Diastolic (Patient  Reported): 79  Pain Score: No Pain (0) (No SOB)    Vitals were taken and medications reconciled.     Saleem Swartz CMA  4:25 PM        Please do not hesitate to contact me if you have any questions/concerns.     Sincerely,     Maile Leonardo MD

## 2024-09-30 DIAGNOSIS — R39.89 SUSPECTED UTI: Primary | ICD-10-CM

## 2024-10-15 ENCOUNTER — TELEPHONE (OUTPATIENT)
Dept: UROLOGY | Facility: CLINIC | Age: 74
End: 2024-10-15
Payer: COMMERCIAL

## 2024-10-15 DIAGNOSIS — E78.2 MIXED HYPERLIPIDEMIA: ICD-10-CM

## 2024-10-15 RX ORDER — ATORVASTATIN CALCIUM 20 MG/1
20 TABLET, FILM COATED ORAL AT BEDTIME
Qty: 90 TABLET | Refills: 0 | OUTPATIENT
Start: 2024-10-15

## 2024-10-15 NOTE — TELEPHONE ENCOUNTER
FUTURE VISIT INFORMATION        SURGERY INFORMATION:  Date: 10/28/24  Location: ur or  Surgeon:  Sheba Song MD   Anesthesia Type:  general with block  Procedure: HYSTERECTOMY, SUPRACERVICAL, ROBOT-ASSISTED, LAPAROSCOPIC, BILATERAL SALPINGO-OOPHORECTOMY, SACROCOLPOPEXY, RETROPUBIC MIDURETHRAL SLING, CYSTOSCOPY   Consult: ov 24     RECORDS REQUESTED FROM:         Primary Care Provider: Lauryn Allison MD - [facility]     Pertinent Medical History: hypertension, paroxysmal atrial fibrillation      Most recent EKG+ Tracin24

## 2024-10-15 NOTE — TELEPHONE ENCOUNTER
Notified the patient she is scheduled for a virtual visit with Dr August to discuss Enplace and prolapse surgery.   Scheduled for 10/21 with Dr August. Moved the PAC visit to 10/24  To give patient a little time to figure out which direction she wants go in. Patient understands she will not keep the same surgery date she has with Dr Song and she would have to be rescheduled if she does surgery with Dr August  Patient verbalized her understanding.    Destiny Roberts, RN, BSN  Care Coordinator Urology  [facility]  Urology [facility]  [phone]

## 2024-10-16 ENCOUNTER — LAB (OUTPATIENT)
Dept: LAB | Facility: CLINIC | Age: 74
End: 2024-10-16
Payer: COMMERCIAL

## 2024-10-16 DIAGNOSIS — I48.0 PAROXYSMAL ATRIAL FIBRILLATION (H): ICD-10-CM

## 2024-10-16 DIAGNOSIS — R39.89 SUSPECTED UTI: ICD-10-CM

## 2024-10-16 LAB
ALBUMIN UR-MCNC: NEGATIVE MG/DL
ANION GAP SERPL CALCULATED.3IONS-SCNC: 11 MMOL/L (ref 7–15)
APPEARANCE UR: CLEAR
BACTERIA #/AREA URNS HPF: ABNORMAL /HPF
BILIRUB UR QL STRIP: NEGATIVE
BUN SERPL-MCNC: 15.1 MG/DL (ref 8–23)
CALCIUM SERPL-MCNC: 9.3 MG/DL (ref 8.8–10.4)
CHLORIDE SERPL-SCNC: 100 MMOL/L (ref 98–107)
COLOR UR AUTO: YELLOW
CREAT SERPL-MCNC: 0.87 MG/DL (ref 0.51–0.95)
EGFRCR SERPLBLD CKD-EPI 2021: 69 ML/MIN/1.73M2
GLUCOSE SERPL-MCNC: 84 MG/DL (ref 70–99)
GLUCOSE UR STRIP-MCNC: NEGATIVE MG/DL
HCO3 SERPL-SCNC: 28 MMOL/L (ref 22–29)
HGB UR QL STRIP: ABNORMAL
KETONES UR STRIP-MCNC: NEGATIVE MG/DL
LEUKOCYTE ESTERASE UR QL STRIP: ABNORMAL
NITRATE UR QL: POSITIVE
PH UR STRIP: 7 [PH] (ref 5–7)
POTASSIUM SERPL-SCNC: 4.2 MMOL/L (ref 3.4–5.3)
RBC #/AREA URNS AUTO: ABNORMAL /HPF
SODIUM SERPL-SCNC: 139 MMOL/L (ref 135–145)
SP GR UR STRIP: 1.01 (ref 1–1.03)
SQUAMOUS #/AREA URNS AUTO: ABNORMAL /LPF
UROBILINOGEN UR STRIP-ACNC: 0.2 E.U./DL
WBC #/AREA URNS AUTO: ABNORMAL /HPF

## 2024-10-16 PROCEDURE — 81001 URINALYSIS AUTO W/SCOPE: CPT

## 2024-10-16 PROCEDURE — 36415 COLL VENOUS BLD VENIPUNCTURE: CPT

## 2024-10-16 PROCEDURE — 80048 BASIC METABOLIC PNL TOTAL CA: CPT

## 2024-10-16 PROCEDURE — 87186 SC STD MICRODIL/AGAR DIL: CPT

## 2024-10-16 PROCEDURE — 87086 URINE CULTURE/COLONY COUNT: CPT

## 2024-10-17 ENCOUNTER — OFFICE VISIT (OUTPATIENT)
Dept: CARDIOLOGY | Facility: CLINIC | Age: 74
End: 2024-10-17
Attending: INTERNAL MEDICINE
Payer: COMMERCIAL

## 2024-10-17 ENCOUNTER — TELEPHONE (OUTPATIENT)
Dept: CARDIOLOGY | Facility: CLINIC | Age: 74
End: 2024-10-17

## 2024-10-17 VITALS
OXYGEN SATURATION: 98 % | HEART RATE: 62 BPM | SYSTOLIC BLOOD PRESSURE: 145 MMHG | BODY MASS INDEX: 22.27 KG/M2 | WEIGHT: 138 LBS | DIASTOLIC BLOOD PRESSURE: 97 MMHG

## 2024-10-17 DIAGNOSIS — I48.0 PAROXYSMAL ATRIAL FIBRILLATION (H): Primary | ICD-10-CM

## 2024-10-17 DIAGNOSIS — I10 PRIMARY HYPERTENSION: ICD-10-CM

## 2024-10-17 PROCEDURE — 99215 OFFICE O/P EST HI 40 MIN: CPT | Performed by: INTERNAL MEDICINE

## 2024-10-17 PROCEDURE — G0463 HOSPITAL OUTPT CLINIC VISIT: HCPCS | Performed by: INTERNAL MEDICINE

## 2024-10-17 PROCEDURE — 93005 ELECTROCARDIOGRAM TRACING: CPT

## 2024-10-17 RX ORDER — PREDNISOLONE ACETATE 10 MG/ML
SUSPENSION/ DROPS OPHTHALMIC
COMMUNITY
Start: 2024-07-22

## 2024-10-17 RX ORDER — ERYTHROMYCIN 5 MG/G
OINTMENT OPHTHALMIC
COMMUNITY
Start: 2024-10-15

## 2024-10-17 ASSESSMENT — PAIN SCALES - GENERAL: PAINLEVEL: NO PAIN (0)

## 2024-10-17 NOTE — PROGRESS NOTES
General Cardiology Clinic-[facility] [place]          HPI: Ms. Nicole Stanley is a 74 year old  female with PMH significant for    1. Atrial fibrillation. Initial diagnosis 4/2018 in the setting of Influenza B. Spontaneously converted to sinus rhythm. Recurrent AF 4/2019, cardioverted in ED. Recurrent paroxysmal episodes April - July 2021. Cardioverted in ED 12/13/2022. Flecainide added 2/2023.  2. Hypertension.  3. Hyperlipidemia. She had previously been on simvastatin but had some muscle aches. She is reluctant to add more medications.  4. Bowel/Bladder incontinence, s/p stimulator implant    Patient is establishing care with me.  Previously seen and followed by Dr. Maile Leonardo since 2019.  She is following up with me annually for paroxysmal atrial fibrillation.  She has not had any A-fib episodes over the last 1 year.  I have reviewed EKG in clinic which shows sinus rhythm.  She is on flecainide 50 mg twice daily and carvedilol 6.25 mg twice daily.  Patient denies chest pain, shortness of breath, dizziness, syncope or lower extremity edema.  She walks regularly.  Once a month she gets  left arm pain that can last for several hours.  She has been concerned about this for a while.  Underwent stress test in 2019 which was normal.  The symptom is still going on.  She tells me she is planning to undergo hysterectomy.  She has not seen her preop yet.    Does not smoke.   Drinks 1 glass of wine every day.  She is aware of alcohol is a trigger of A-fib.    Current medications:  Eliquis 5 mg twice daily  Atorvastatin 20 mg  Carvedilol 6.25 mg twice daily  Flecainide 50 mg twice daily  Losartan 25 mg daily      Medications, personal, family, and social history reviewed with patient and revised.    PAST MEDICAL HISTORY:  Past Medical History:   Diagnosis Date    Arthritis     osteoarth    Heart disease 2018    Hypertension age 49    Influenza B 4/19/2018     Spider veins     Thyroid disease age 49       CURRENT MEDICATIONS:  Current Outpatient Medications   Medication Sig Dispense Refill    Acetaminophen (TYLENOL PO) Take 500 mg by mouth every 8 hours as needed for mild pain or fever      apixaban ANTICOAGULANT (ELIQUIS ANTICOAGULANT) 5 MG tablet TAKE 1 TABLET BY MOUTH TWICE A  tablet 0    atorvastatin (LIPITOR) 20 MG tablet TAKE 1 TABLET BY MOUTH AT BEDTIME 90 tablet 0    calcium carbonate (OSCAL 500) 1250 (500 Ca) MG TABS tablet Take 1 tablet by mouth 2 times daily      carvedilol (COREG) 6.25 MG tablet Take 1 tablet (6.25 mg) by mouth 2 times daily (with meals) 180 tablet 3    Cholecalciferol (VITAMIN D3) 3000 units TABS Take 1 tablet by mouth daily      cycloSPORINE (RESTASIS) 0.05 % ophthalmic emulsion Place 1 drop into both eyes 2 times daily      erythromycin (ROMYCIN) 5 MG/GM ophthalmic ointment       flecainide (TAMBOCOR) 50 MG tablet TAKE 1 TABLET BY MOUTH TWICE A  tablet 3    levothyroxine (SYNTHROID/LEVOTHROID) 50 MCG tablet TAKE 1 TABLET BY MOUTH EVERY DAY 90 tablet 0    losartan (COZAAR) 25 MG tablet TAKE 1 TABLET (25 MG) BY MOUTH EVERY EVENING 90 tablet 0    MAGNESIUM GLYCINATE PO Take 300 mg by mouth every evening OTC unsure of dose.      minoxidil (ROGAINE) 5 % external solution       prednisoLONE acetate (PRED FORTE) 1 % ophthalmic suspension       Probiotic Product (PROBIOTIC COLON SUPPORT PO) Take 1 tablet by mouth daily      zinc sulfate (ZINCATE) 220 (50 Zn) MG capsule Take 220 mg by mouth daily      UNABLE TO FIND MEDICATIONS: Biosil 10 drops daily, Viviscal Pro      valACYclovir (VALTREX) 500 MG tablet          PAST SURGICAL HISTORY:  Past Surgical History:   Procedure Laterality Date    CHOLECYSTECTOMY  2000    COLONOSCOPY N/A 11/12/2020    Procedure: COLONOSCOPY;  Surgeon: Hugo Troncoso MD;  Location: [facility]    CYSTOSCOPY      ENT SURGERY  2002    turbinectomy & septoplasty    EYE SURGERY      blepharoplasty    GI SURGERY   2006    rectal sphincter    IMPLANT STIMULATOR SACRAL NERVE STAGE ONE N/A 2023    Procedure: INSERTION, SACRAL NERVE STIMULATOR, STAGE 1;  Surgeon: Sheba Song MD;  Location: [facility] OR    IMPLANT STIMULATOR SACRAL NERVE STAGE TWO N/A 2023    Procedure: INSERTION, SACRAL NERVE STIMULATOR, STAGE 2;  Surgeon: Sheba Song MD;  Location: [facility] OR    VASCULAR SURGERY  1988    vein stripping       ALLERGIES:   No Known Allergies    FAMILY HISTORY:  Family History   Problem Relation Age of Onset    Hypertension Mother             Hyperlipidemia Mother     Cerebrovascular Disease Mother     Thyroid Disease Mother     Family History Negative Father             Alzheimer Disease Father     Cancer Father         sarcoma    Hypertension Father     Basal cell carcinoma Father     Hyperlipidemia Sister     Thyroid Disease Sister     Thyroid Disease Sister     Hyperlipidemia Sister     Family History Negative Maternal Grandmother              Anesthesia Reaction No family hx of     Thrombosis No family hx of          SOCIAL HISTORY:  Social History     Tobacco Use    Smoking status: Never     Passive exposure: Never    Smokeless tobacco: Never   Vaping Use    Vaping status: Never Used   Substance Use Topics    Alcohol use: Yes     Comment: 1-2 glass of wine per day    Drug use: Never       ROS:   Constitutional: No fever, chills, or sweats. Weight stable.   Cardiovascular: As per HPI.       Exam:  BP (!) 145/97 (BP Location: Left arm)   Pulse 62   Wt 62.6 kg (138 lb)   LMP  (LMP Unknown)   SpO2 98%   BMI 22.27 kg/m    GENERAL APPEARANCE: alert and no distress  HEENT: no icterus, no central cyanosis  LYMPH/NECK: no adenopathy, no asymmetry, JVP not elevated  RESPIRATORY: lungs clear to auscultation - no rales, rhonchi or wheezes, no use of accessory muscles, no retractions, respirations are unlabored, normal respiratory rate  CARDIOVASCULAR: regular rhythm, normal S1, S2,  no S3 or S4 and no murmur, click or rub, precordium quiet with normal PMI.  GI: soft, non tender  EXTREMITIES:  no edema  NEURO: alert, normal speech,and affect  SKIN: no ecchymoses, no rashes     I have reviewed the labs and personally reviewed the imaging below and made my comment in the assessment and plan.    Labs:  CBC RESULTS:   Lab Results   Component Value Date    WBC 5.0 10/12/2023    WBC 3.9 (L) 02/10/2021    RBC 4.06 10/12/2023    RBC 4.21 02/10/2021    HGB 12.7 10/12/2023    HGB 13.5 02/10/2021    HCT 38.2 10/12/2023    HCT 40.5 02/10/2021    MCV 94 10/12/2023    MCV 96 02/10/2021    MCH 31.3 10/12/2023    MCH 32.1 02/10/2021    MCHC 33.2 10/12/2023    MCHC 33.3 02/10/2021    RDW 14.2 10/12/2023    RDW 12.8 02/10/2021     10/12/2023     02/10/2021       BMP RESULTS:  Lab Results   Component Value Date     10/16/2024     02/10/2021    POTASSIUM 4.2 10/16/2024    POTASSIUM 3.9 2022    POTASSIUM 4.1 02/10/2021    CHLORIDE 100 10/16/2024    CHLORIDE 104 2022    CHLORIDE 105 02/10/2021    CO2 28 10/16/2024    CO2 26 2022    CO2 30 02/10/2021    ANIONGAP 11 10/16/2024    ANIONGAP 9 2022    ANIONGAP 4 02/10/2021    GLC 84 10/16/2024    GLC 95 2022    GLC 86 02/10/2021    BUN 15.1 10/16/2024    BUN 25 2022    BUN 13 02/10/2021    CR 0.87 10/16/2024    CR 0.85 02/10/2021    GFRESTIMATED 69 10/16/2024    GFRESTIMATED 68 02/10/2021    GFRESTBLACK 79 02/10/2021    ELISE 9.3 10/16/2024    ELISE 9.8 02/10/2021      ECHOCARDIOGRAM: 18 (in AF): normal LV/RV, no valvular abnormalities     Bicycle stress echo 2019 (in sinus): exercised for 6:30 min, 90% MPHR achieved, MVO2 113% predicted, normal BP response, no ischemia     EK19:  bpm  2021: sinus  2021: sinus 68 bpm, normal intervals   2022: AF 98 bpm  2023: Sinus 59 bpm with PACs and PVCs  3/30/2023: sinus 56 bpm, normal intervals  10/19/2023: sinus 56 bpm, normal  intervals  TODAY 4/18/2024: sinus 63 bpm, QRS 96 ms      30 day MCOT 2/9-3/10/2023:   One episode of AF 2/17/2023 at 122 bpm, no symptoms; total AF burden <1%  Symptoms of LH correlated to sinus 80s  PVCs 2%; NSVT up to 4 beats, no symptoms    Assessment :  Paroxysmal atrial fibrillation, well controlled on flecainide and carvedilol. [id number] score is 3 for age, female, HTN. On apixaban.  Sinus rhythm in clinic today.  Hypertension.  High in clinic.  Normal at home.  Likely whitecoat effect.  Will continue current treatment with losartan 25 and carvedilol.  I told patient that the goal is to keep blood pressure less than 130/80 mmHg.  She monitors her blood pressure regularly at home.  Left arm pain: She has been anxious about this symptom for several years.  Since she is on flecainide I recommended CTA to rule out CAD.  Patient is agreeable to proceed.    No medication changes today  Return to clinic 1 year.    Total time spent today for this visit is 40 minutes including precharting, face-to-face clinic visit, review of labs/imaging and medical documentation.    Trinh BAL MD  [facility] Division of Cardiology  Pager [phone]

## 2024-10-17 NOTE — NURSING NOTE
Chief Complaint   Patient presents with    Follow Up     Dr. Porras: Return EP for 6 mo follow-up d/t Afib       Vitals were take, medications reconciled and EKG performed.    Supa Dawson, EMT    10:45 AM

## 2024-10-17 NOTE — LETTER
10/17/2024      RE: Nicole Stanley  [address]  [place] [address]       Dear Colleague,    Thank you for the opportunity to participate in the care of your patient, Nicole Stanley, at the [facility] [place] at [facility]. Please see a copy of my visit note below.                                                                                     [facility] Cardiology Clinic[facility] [place]          HPI: Ms. Nicole Stanley is a 74 year old  female with PMH significant for    1. Atrial fibrillation. Initial diagnosis 4/2018 in the setting of Influenza B. Spontaneously converted to sinus rhythm. Recurrent AF 4/2019, cardioverted in ED. Recurrent paroxysmal episodes April - July 2021. Cardioverted in ED 12/13/2022. Flecainide added 2/2023.  2. Hypertension.  3. Hyperlipidemia. She had previously been on simvastatin but had some muscle aches. She is reluctant to add more medications.  4. Bowel/Bladder incontinence, s/p stimulator implant    Patient is establishing care with me.  Previously seen and followed by Dr. Maile Leonardo since 2019.  She is following up with me annually for paroxysmal atrial fibrillation.  She has not had any A-fib episodes over the last 1 year.  I have reviewed EKG in clinic which shows sinus rhythm.  She is on flecainide 50 mg twice daily and carvedilol 6.25 mg twice daily.  Patient denies chest pain, shortness of breath, dizziness, syncope or lower extremity edema.  She walks regularly.  Once a month she gets  left arm pain that can last for several hours.  She has been concerned about this for a while.  Underwent stress test in 2019 which was normal.  The symptom is still going on.  She tells me she is planning to undergo hysterectomy.  She has not seen her preop yet.    Does not smoke.   Drinks 1 glass of wine every day.  She is aware of alcohol is a trigger of A-fib.    Current medications:  Eliquis 5 mg twice daily  Atorvastatin 20  mg  Carvedilol 6.25 mg twice daily  Flecainide 50 mg twice daily  Losartan 25 mg daily      Medications, personal, family, and social history reviewed with patient and revised.    PAST MEDICAL HISTORY:  Past Medical History:   Diagnosis Date     Arthritis     osteoarth     Heart disease 2018     Hypertension age 49     Influenza B 4/19/2018     Spider veins      Thyroid disease age 49       CURRENT MEDICATIONS:  Current Outpatient Medications   Medication Sig Dispense Refill     Acetaminophen (TYLENOL PO) Take 500 mg by mouth every 8 hours as needed for mild pain or fever       apixaban ANTICOAGULANT (ELIQUIS ANTICOAGULANT) 5 MG tablet TAKE 1 TABLET BY MOUTH TWICE A  tablet 0     atorvastatin (LIPITOR) 20 MG tablet TAKE 1 TABLET BY MOUTH AT BEDTIME 90 tablet 0     calcium carbonate (OSCAL 500) 1250 (500 Ca) MG TABS tablet Take 1 tablet by mouth 2 times daily       carvedilol (COREG) 6.25 MG tablet Take 1 tablet (6.25 mg) by mouth 2 times daily (with meals) 180 tablet 3     Cholecalciferol (VITAMIN D3) 3000 units TABS Take 1 tablet by mouth daily       cycloSPORINE (RESTASIS) 0.05 % ophthalmic emulsion Place 1 drop into both eyes 2 times daily       erythromycin (ROMYCIN) 5 MG/GM ophthalmic ointment        flecainide (TAMBOCOR) 50 MG tablet TAKE 1 TABLET BY MOUTH TWICE A  tablet 3     levothyroxine (SYNTHROID/LEVOTHROID) 50 MCG tablet TAKE 1 TABLET BY MOUTH EVERY DAY 90 tablet 0     losartan (COZAAR) 25 MG tablet TAKE 1 TABLET (25 MG) BY MOUTH EVERY EVENING 90 tablet 0     MAGNESIUM GLYCINATE PO Take 300 mg by mouth every evening OTC unsure of dose.       minoxidil (ROGAINE) 5 % external solution        prednisoLONE acetate (PRED FORTE) 1 % ophthalmic suspension        Probiotic Product (PROBIOTIC COLON SUPPORT PO) Take 1 tablet by mouth daily       zinc sulfate (ZINCATE) 220 (50 Zn) MG capsule Take 220 mg by mouth daily       UNABLE TO FIND MEDICATIONS: Biosil 10 drops daily, Viviscal Pro        valACYclovir (VALTREX) 500 MG tablet          PAST SURGICAL HISTORY:  Past Surgical History:   Procedure Laterality Date     CHOLECYSTECTOMY       COLONOSCOPY N/A 2020    Procedure: COLONOSCOPY;  Surgeon: Hugo Troncoso MD;  Location:  GI     CYSTOSCOPY       ENT SURGERY      turbinectomy & septoplasty     EYE SURGERY      blepharoplasty     GI SURGERY  2006    rectal sphincter     IMPLANT STIMULATOR SACRAL NERVE STAGE ONE N/A 2023    Procedure: INSERTION, SACRAL NERVE STIMULATOR, STAGE 1;  Surgeon: Sheba Song MD;  Location: [facility] OR     IMPLANT STIMULATOR SACRAL NERVE STAGE TWO N/A 2023    Procedure: INSERTION, SACRAL NERVE STIMULATOR, STAGE 2;  Surgeon: Sheba Song MD;  [place] [facility] [place]     VASCULAR SURGERY      vein stripping       ALLERGIES:   No Known Allergies    FAMILY HISTORY:  Family History   Problem Relation Age of Onset     Hypertension Mother              Hyperlipidemia Mother      Cerebrovascular Disease Mother      Thyroid Disease Mother      Family History Negative Father              Alzheimer Disease Father      Cancer Father         sarcoma     Hypertension Father      Basal cell carcinoma Father      Hyperlipidemia Sister      Thyroid Disease Sister      Thyroid Disease Sister      Hyperlipidemia Sister      Family History Negative Maternal Grandmother               Anesthesia Reaction No family hx of      Thrombosis No family hx of          SOCIAL HISTORY:  Social History     Tobacco Use     Smoking status: Never     Passive exposure: Never     Smokeless tobacco: Never   Vaping Use     Vaping status: Never Used   Substance Use Topics     Alcohol use: Yes     Comment: 1-2 glass of wine per day     Drug use: Never       ROS:   Constitutional: No fever, chills, or sweats. Weight stable.   Cardiovascular: As per HPI.       Exam:  BP (!) 145/97 (BP Location: Left arm)   Pulse 62   Wt 62.6 kg (138 lb)   LMP  (LMP  Unknown)   SpO2 98%   BMI 22.27 kg/m    GENERAL APPEARANCE: alert and no distress  HEENT: no icterus, no central cyanosis  LYMPH/NECK: no adenopathy, no asymmetry, JVP not elevated  RESPIRATORY: lungs clear to auscultation - no rales, rhonchi or wheezes, no use of accessory muscles, no retractions, respirations are unlabored, normal respiratory rate  CARDIOVASCULAR: regular rhythm, normal S1, S2, no S3 or S4 and no murmur, click or rub, precordium quiet with normal PMI.  GI: soft, non tender  EXTREMITIES:  no edema  NEURO: alert, normal speech,and affect  SKIN: no ecchymoses, no rashes     I have reviewed the labs and personally reviewed the imaging below and made my comment in the assessment and plan.    Labs:  CBC RESULTS:   Lab Results   Component Value Date    WBC 5.0 10/12/2023    WBC 3.9 (L) 02/10/2021    RBC 4.06 10/12/2023    RBC 4.21 02/10/2021    HGB 12.7 10/12/2023    HGB 13.5 02/10/2021    HCT 38.2 10/12/2023    HCT 40.5 02/10/2021    MCV 94 10/12/2023    MCV 96 02/10/2021    MCH 31.3 10/12/2023    MCH 32.1 02/10/2021    MCHC 33.2 10/12/2023    MCHC 33.3 02/10/2021    RDW 14.2 10/12/2023    RDW 12.8 02/10/2021     10/12/2023     02/10/2021       BMP RESULTS:  Lab Results   Component Value Date     10/16/2024     02/10/2021    POTASSIUM 4.2 10/16/2024    POTASSIUM 3.9 09/26/2022    POTASSIUM 4.1 02/10/2021    CHLORIDE 100 10/16/2024    CHLORIDE 104 09/26/2022    CHLORIDE 105 02/10/2021    CO2 28 10/16/2024    CO2 26 09/26/2022    CO2 30 02/10/2021    ANIONGAP 11 10/16/2024    ANIONGAP 9 09/26/2022    ANIONGAP 4 02/10/2021    GLC 84 10/16/2024    GLC 95 09/26/2022    GLC 86 02/10/2021    BUN 15.1 10/16/2024    BUN 25 09/26/2022    BUN 13 02/10/2021    CR 0.87 10/16/2024    CR 0.85 02/10/2021    GFRESTIMATED 69 10/16/2024    GFRESTIMATED 68 02/10/2021    GFRESTBLACK 79 02/10/2021    ELISE 9.3 10/16/2024    ELISE 9.8 02/10/2021      ECHOCARDIOGRAM: 4/19/18 (in AF): normal LV/RV, no  valvular abnormalities     Bicycle stress echo 2019 (in sinus): exercised for 6:30 min, 90% MPHR achieved, MVO2 113% predicted, normal BP response, no ischemia     EK19:  bpm  2021: sinus  2021: sinus 68 bpm, normal intervals   2022: AF 98 bpm  2023: Sinus 59 bpm with PACs and PVCs  3/30/2023: sinus 56 bpm, normal intervals  10/19/2023: sinus 56 bpm, normal intervals  TODAY 2024: sinus 63 bpm, QRS 96 ms      30 day MCOT -3/10/2023:   One episode of AF 2023 at 122 bpm, no symptoms; total AF burden <1%  Symptoms of LH correlated to sinus 80s  PVCs 2%; NSVT up to 4 beats, no symptoms    Assessment :  Paroxysmal atrial fibrillation, well controlled on flecainide and carvedilol. [id number] score is 3 for age, female, HTN. On apixaban.  Sinus rhythm in clinic today.  Hypertension.  High in clinic.  Normal at home.  Likely whitecoat effect.  Will continue current treatment with losartan 25 and carvedilol.  I told patient that the goal is to keep blood pressure less than 130/80 mmHg.  She monitors her blood pressure regularly at home.  Left arm pain: She has been anxious about this symptom for several years.  Since she is on flecainide I recommended CTA to rule out CAD.  Patient is agreeable to proceed.    No medication changes today  Return to clinic 1 year.    Total time spent today for this visit is 40 minutes including precharting, face-to-face clinic visit, review of labs/imaging and medical documentation.    Trinh BAL MD  [facility] Division of Cardiology  Pager [phone]       Please do not hesitate to contact me if you have any questions/concerns.     Sincerely,     Trinh Bal MD

## 2024-10-17 NOTE — TELEPHONE ENCOUNTER
10/17/2024 11:51AM Sydnee Hardin  Called patient and offered a sooner CT Angiogram Coronary Artery on this date 11/25/2024 & time 2PM at this location [facility].    10/17 Let pt know Kallie had sooner CT Angiogram Coronary Artery on 11/25, but pt said she would keep 11/27 at [facility]. Told pt she could call back to see if there are cancellations. MJ      Please note there is no guarantee this appointment will be available as it is first come first serve.   Sydnee Hardin 10/17/2024 11:51AM   [facility] [place]   [facility]       Patient: Rock ALEXANDRA Rausch Date: 2018     : 1959 Attending: Clyde Li MD   59 year old male      Chief Complaint / Reason for Admission: Decreased exercise tolerance, dyspnea on exertion & decrease in LVEF 50-->39% with concern for acute allograft rejection.     History of Present Illness: Rock ALEXANDRA Rausch is a 59 year old male with a past medical history of OHTx on 2018 (Dr. Peguero) with initial primary graft dysfunction of unknown etiology requiring ECMO / IABP -->2018; HLA/DSA & all EMBx negative/unremarkable to date. Patient presented to [facility] Clinic on  for routine surveillance; during clinic visit c/o decreased exercise tolerance, dyspnea on exertion and urinary symptoms (referred to urology; has kidney stones with plans for urethral dilatation ). RHC on 18 with mildly elevated left heart filling pressures and near normal allograft function; Bx negative. ECHO on  with reduced LVEF (50-->39% compared to previous on 2018). Patient scheduled for repeat RHC/EMBx  followed by admission for acute allograft rejection concerns.     Patient c/o MACIAS (occasionally at rest), paroxsymal nocturnal dyspnea & URI symptoms (dry cough, clear nasal drainage); also c/o \"gout\" in bilateral ankles - left > right. He denies fever, chills, diaphoresis, chest pain, abdominal distention/bloating, lower extremity edema, dizziness, lightheadedness or palpitations. Endorses compliance with medications and dietary restrictions.    Interval Events / Daily Subjective:  11/15: Bx -, parainfluenza +. Ankle pain relieved overnight. No change in dyspnea. VSS. Labs stable.   : Dyspnea resolved. VSS. BUN elevated; other labs stable. Steroids complete today; MRI pending.   :  No complaints today. Denies chest pain, shortness of breath.    :  No complaints. Denies shortness of breath, chest  pain.    11/19: Acute worsening of dyspnea overnight; unable to sleep; unable to lie flat.   11/20: Improved dyspnea with diuresis however now requiring oxygen (2L). LHC unremarkable. Agrees to thoracentesis    Review of Systems: Pertinent items are listed above: A comprehensive 10+ point review of systems is otherwise negative.    Nutrition: PO: Cardiac diet with 2000 cc/day fluid restriction     Medications/Infusions: Reviewed    Rhythm: Sinus rhythm      Arrythmias: PVCs    Vital Last Value 24 Hour Range   Temperature 99.1 °F (37.3 °C) Temp  Min: 98.4 °F (36.9 °C)  Max: 99.1 °F (37.3 °C)   Pulse 95 Pulse  Min: 86  Max: 98   Respiratory 18 Resp  Min: 17  Max: 23   Blood Pressure 97/58 BP  Min: 94/55  Max: 126/79   Pulse Oximetry 94 % SpO2  Min: 87 %  Max: 98 %     Vital Today Admitted   Weight 99 kg Weight: 99.8 kg   Height N/A Height: 5' 8\" (172.7 cm)   BMI (body mass index) N/A BMI (Calculated): 33.52      Weight over the past 48 Hours:  Patient Vitals for the past 48 hrs:   Weight   11/20/18 0600 99 kg      Intake/Output:  No intake/output data recorded.  I/O last 3 completed shifts:  In: 525 [P.O.:525]  Out: -     Intake/Output Summary (Last 24 hours) at 11/20/2018 0945  Last data filed at 11/20/2018 0600  Gross per 24 hour   Intake 525 ml   Output --   Net 525 ml       Physical Assessment:    General:    Up at bedside; no acute distress this am     Incision:    N/A.    EENT:    PERRL   Oral Mucosa:    Moist   Neck:   Trachea midline, +JVD   Lungs:     Diminished right lower lobe.   Cardiovascular:   RRR. S1, S2 with no murmur. 1+ BLE edema.    Abdomen:     Semi-firm, round, tender around hernia location, mildly-distended, BS + x4.    Pulses:  Extremities:   Normal bilaterally (Radial and DP)    Left ankle with minimal redness; swelling and pain resolved   Skin:   Warm core/periphery. Dry and intact.    Neurologic:   Alert & oriented to person, place & time. No focal deficits.        Laboratory Results:  Lab  Results   Component Value Date    WBC 9.7 11/20/2018    HGB 10.7 (L) 11/20/2018    HCT 35.1 (L) 11/20/2018     11/20/2018    BUN 34 (H) 11/20/2018    CREATININE 1.53 (H) 11/20/2018    SODIUM 140 11/20/2018    POTASSIUM 4.2 11/20/2018    CO2 23 11/20/2018    MG 1.9 11/14/2018    BILIRUBIN 0.7 11/15/2018    INR 1.1 11/14/2018    PTT 28 06/14/2018    LACTA 1.4 06/13/2018    ALKPT 56 11/15/2018    AST 12 11/15/2018    GPT 16 11/15/2018    ALBUMIN 3.1 (L) 11/15/2018    NASREEN 1.14 (L) 06/18/2018    GLUCOSE 130 (H) 11/20/2018    TSH 8.602 (H) 11/14/2018     Tacrolimus Level  Lab Results   Component Value Date    TACRO 8.6 11/19/2018    TACRO 8.9 11/18/2018    TACRO 8.0 11/17/2018     Mycophenolic Level  Lab Results   Component Value Date    MYCOAC 9.5 (H) 11/15/2018       Endomyocardial biopsy:   6/9/2018: No histologic features of acute cellular or antibody-mediated rejection, however insufficient tissue supplied  6/18/2018: 0R/AMR0  6/25/2018: 0R/AMR0  7/3/2018: 0R/AMR0  7/10/2018: 0R/AMR0  7/24/2018: 0R/AMR0  8/7/2018: 0R/AMR0  8/21/2018: 0R/AMR0  9/4/2018: 0R/AMR0  10/3/2018: 0R/AMR0  11/6/2018: 0R/AMR0  11/14/2018: 0R/AMR0    RHC 11/6/2018 (100.2 kg)  BP= 119/81/96 mmHg    HR= 91 bpm  RA= 7 mmHg                  Post biopsy RA 6 mmHg  RV= 32/10 mmHg  PAP= 33/18/23 mmHg  PCWP= 17 mmHg  Eleanor Cardiac output (L/min)/cardiac index (L/min/m2) 4.8/2.3.    C 11/19/2018  Normal coronary arteries in allograft.   Normal left heart hemodynamics     Cardiac MRI 11/16/2018  1.Left ventricle is moderately dilated and has severely decreased left ventricular systolic function. The estimated left ventricular ejection fraction is 29% by Garcia's method.There are regional wall motion abnormalities. The mid to apical lateral and anterior walls are severely hypokinetic and there is concomitant subendocardial late gadolinium  enhancement involving approximately 25-49% of the myocardial wall thickness. There is no evidence of  myocardial edema. Collectively, these findings are consistent with subacute to chronic mid to apical anterior and lateral wall infarction without evidence of edema.  2.Right ventricle is normal size and has normal systolic function.  3.Mild aortic regurgitation. Mild to moderate mitral regurgitation.   4.Moderate loculated pleural effusion with partial collapse of the right lower lobe.    ECHO 11/2/2018  Upper normal left ventricular size and wall thickness.  Moderate global left ventricular systolic dysfunction, with some regional wall motion variability.  Left ventricular ejection fraction, 39 %. Global longitudinal strain -8.8 %.  Enlarged left atrium, consistent with transplant.  Normal size right atrium and right ventricle, with normal RV systolic function.  Structurally normal valves without stenosis.  Mild-moderate MV regurgitation. Trace AV regurgitation. Mild TV and PV regurgitation.  Mild pulmonary hypertension, RVSP 39 mmHg.  Normal size aortic root. Mildly dilated proximal ascending aorta, 3.8 cm.  Compared with 8/07/2018:  Left ventricle appears mildly larger.  Left ventricular function appears lower, with previous LVEF 50% and global longitudinal strain -12%.  MV regurgitation appears mildly greater.      Diagnosis/Plan:   S/P Orthotopic Heart Transplant on 6/8/2018 (Dr. Peguero); Primary Graft Dysfunction of Unknown Etiology S/P ECMO/IABP 6/8-->6/13.   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  Volume: Elevated    Perfusion: Diminished  -Immunosuppression   -Cellcept 1500 mg PO BID   -Tacrolimus 2 mg PO BID (goal level 9-12); am level 11/21 ordered   -Prophylaxis  -ASA & Statin for CAV prophylaxis  -Bactrim for PCP prophylaxis on hold post LHC; resume if renal function remains stable.  -CMV  Status:  Recipient + / Donor -; 3 month Valcyte coarse completed  -Graft Function   -HLA/DSA 11/6 negative; repeat 11/14 also negative    -11/12 ECHO with decreased LVEF and wall motion abnormalities as above  -11/14 EMBx negative    -Cardiac MRI EF 29%. Infarct in the mid lateral and anterior wall.   -Left heart cath 11/19 with normal coronary anatomy   -Chronotropic Incompetence on terbutaline 5 mg PO daily   -Continue IV diuresis; 40 IV lasix BID  -Chronically on  torsemide 10 mg PO daily on MWF & Sat  -Rejection Plan   -IV steroids (1 gram IV solumedrol daily x3) complete; no plans for prednisone taper or further treatment per MD    Acute Upper Respiratory Tract Infection - Parainfluenza Virus   -Droplet Isolation    -Supportive care    Right Pleural Effusion   -Therapeutic & diagnostic thoracentesis pending   -Pulmonology consultation      Chronic Kidney Disease III/IV   -Baseline creatinine ~2; currently below baseline; monitor post LHC contrast (also received contrast for MRI)   -Diuretics as above    -Bactrim on hold as above   -Follows with Dr. Sparks      Anemia of Chronic Disease   -Goal HGB >7     Urinary Obstruction / Renal Calculi     -Follows with urology outpatient; plans for urethral dilatation 11/16 with Dr. Kike Schroeder post-poned    Obstructive Sleep Apnea    -CPAP HS    Type II Diabetes   -Endocrinology follows    Acute / Chronic Gout   -Improved with steroid administration   -Allopurinol     DVT Prophylaxis: Heparin     Bronwyn Toribio APNP  Advanced Heart Failure / Mechanical Circulatory Support / Transplant / Pulmonary HTN   Pager [phone]  On-call physician available [phone]       I have examined this patient, reviewed all pertinent lab and radiology data, and determined the findings detailed above. I agree with the assessment and plan as detailed above.    Cardiovascular (CV) Exam: Regular rate and rhythm (RRR). Clear lungs. 1+ leg edema    Acute combined systolic and diastolic  heart failure (AHF): Volume state is high. Perfusion state is normal. New York Heart Association Classification:   NYHA Class III: Significant HF symptoms/activity intolerance (Only able to do light housework, can walk slowly on level ground), American College of Cardiology/American Heart Association (ACC/AHA) Stage C.  Based on clinical and objective data I will augment medical therapy as follows:  - CMR with results consistent with Primary Graft Dysfunction. Normal coronaries on [facility] yesterday  - CKD Stage 3. improving trend. Avoid nephrotoxic agents. Consulted nephrology  GFR Estimate, Non  (no units)   Date Value   11/20/2018 49    - avoid nephrotoxic agents. D/c bactrim till discharge.   Restarted lasix 40 mg iv q12 for hypervolemic state: LVEDP 23 mmHg    - SOB. Noted to have loculated right pleural effusion and concomitant parainfluenza infection. Consulted pulmonary for further management. Of note patient declining oxygen and to wear his CPAP. Repeated discussion with Dr Barksdale. Patient scheduled for diagnostic and therapeutic right thoracentesis.    AHFT options:  will have to consider for redo heart transplant options in the near future.     Discussed with: RN, Family, Patient, Wife and Consultant    Clyde Li MD

## 2024-10-17 NOTE — PATIENT INSTRUCTIONS
You were seen in the Electrophysiology Clinic today by: Dr. Porras    Plan:   Medication Changes: None.     Labs/Tests Needed: Coronary CT angiogram. Dr. Porras will update you with results via Dynadechart or telephone.     Follow up Visit: with Dr. Porras in 1 year or sooner if needed.       If you have further questions, please utilize Sqord to contact us.     Your Care Team:    EP Cardiology   Telephone Number     Nurse Line  Anibal Flores RN    [phone]     For scheduling appointments:   Osvaldo   [phone]   For procedure scheduling:    Sabiha Carrera     [phone]   For the Device Clinic (Pacemakers, ICDs, Loop Recorders)    During business hours: [phone]  After business hours:   [phone]- select option 4 and ask for job code [id number].       On-call cardiologist for after hours or on weekends:   [phone], option #4, and ask to speak to the on-call cardiologist.     Cardiovascular Clinic:   [address] [place] [address]. [place] [address]      As always, Thank you for trusting us with your health care needs!

## 2024-10-18 DIAGNOSIS — R39.89 SUSPECTED UTI: Primary | ICD-10-CM

## 2024-10-18 RX ORDER — CEPHALEXIN 500 MG/1
500 CAPSULE ORAL 2 TIMES DAILY
Qty: 14 CAPSULE | Refills: 0 | Status: SHIPPED | OUTPATIENT
Start: 2024-10-18

## 2024-10-19 LAB
BACTERIA UR CULT: ABNORMAL
BACTERIA UR CULT: ABNORMAL

## 2024-10-20 LAB
ATRIAL RATE - MUSE: 56 BPM
DIASTOLIC BLOOD PRESSURE - MUSE: NORMAL MMHG
INTERPRETATION ECG - MUSE: NORMAL
P AXIS - MUSE: 32 DEGREES
PR INTERVAL - MUSE: 208 MS
QRS DURATION - MUSE: 90 MS
QT - MUSE: 452 MS
QTC - MUSE: 436 MS
R AXIS - MUSE: -35 DEGREES
SYSTOLIC BLOOD PRESSURE - MUSE: NORMAL MMHG
T AXIS - MUSE: 13 DEGREES
VENTRICULAR RATE- MUSE: 56 BPM

## 2024-10-21 ENCOUNTER — TELEPHONE (OUTPATIENT)
Dept: UROLOGY | Facility: CLINIC | Age: 74
End: 2024-10-21

## 2024-10-21 ENCOUNTER — VIRTUAL VISIT (OUTPATIENT)
Dept: UROLOGY | Facility: CLINIC | Age: 74
End: 2024-10-21
Payer: COMMERCIAL

## 2024-10-21 VITALS — WEIGHT: 135 LBS | HEIGHT: 66 IN | BODY MASS INDEX: 21.69 KG/M2

## 2024-10-21 DIAGNOSIS — N81.11 MIDLINE CYSTOCELE: Primary | ICD-10-CM

## 2024-10-21 DIAGNOSIS — N95.8 GENITOURINARY SYNDROME OF MENOPAUSE: ICD-10-CM

## 2024-10-21 DIAGNOSIS — N36.42 INTRINSIC SPHINCTER DEFICIENCY: ICD-10-CM

## 2024-10-21 DIAGNOSIS — N81.6 RECTOCELE: ICD-10-CM

## 2024-10-21 PROCEDURE — 99214 OFFICE O/P EST MOD 30 MIN: CPT | Mod: 95 | Performed by: UROLOGY

## 2024-10-21 RX ORDER — ACETAMINOPHEN 325 MG/1
975 TABLET ORAL ONCE
OUTPATIENT
Start: 2024-10-21 | End: 2024-10-21

## 2024-10-21 ASSESSMENT — PAIN SCALES - GENERAL: PAINLEVEL: NO PAIN (0)

## 2024-10-21 NOTE — PROGRESS NOTES
Virtual Visit Details    Type of service:  Video Visit   Video Start Time: 1:32 PM  Video End Time:2:05 PM    Originating Location (pt. Location): Home    Distant Location (provider location):  Off-site  Platform used for Video Visit: Leida

## 2024-10-21 NOTE — TELEPHONE ENCOUNTER
Rec'd message that pt was looking to cancel 10/28 surgery with Dr. Song. Writer reached out to patient to confirm the cancellation and offered to reschedule. Pt advised that they were referred to Dr. August by Dr. Song and that they were going to do a different surgery with Dr. August. Writer advised they would cancel 10/28 surgery along with PAC, LABS, and post op. Arianne Caballero on 10/21/2024 at 4:05 PM

## 2024-10-21 NOTE — PROGRESS NOTES
"HPI:  Nicole Stanley is a 74 year old female with pelvic organ prolapse as well as urinary frequency and urgency and stress urinary incontinence.  She also has an interstim in place that since November of 2023 and feels the stimulation in the rectum.  She is at level 2.  She was also have fecal incontinence and starting a fiber wafer and some adjustments it has improved.    Reviewed previous notes from Dr. Song   from 8/29/24    Reviewed UDS from 6/6/24  Maximum cystometric capacity 495 mL with intact filling sensations.  Good bladder compliance without detrusor overactivity.  No stress urinary incontinence while seated, but there is a moderate volume leak upon standing up at capacity.  Maximum detrusor contraction during voiding reaches 16 cm H2O. She voids 315 mL with Qmax 13 ml/s, intermittent flow curve, increased EMG activity (suspect technical artifact), incomplete bladder emptying ( mL), and no evidence for bladder outlet obstruction (BOOI -18.2). Note that slightly elevated PVR is consistent with pre-void PVR of 200 mL.     Reviewed Xray from sacrum on 3/22/24  The lead is in good position, just a little lateral.    Exam:  Ht 1.676 m (5' 6\")   Wt 61.2 kg (135 lb)   LMP  (LMP Unknown)   BMI 21.79 kg/m    GENERAL: alert and no distress  EYES: Eyes grossly normal to inspection.  No discharge or erythema, or obvious scleral/conjunctival abnormalities.  RESP: No audible wheeze, cough, or visible cyanosis.    SKIN: Visible skin clear. No significant rash, abnormal pigmentation or lesions.  NEURO: Cranial nerves grossly intact.  Mentation and speech appropriate for age.  PSYCH: Appropriate affect, tone, and pace of words    Assessment & Plan   73 y/o female with pelvic organ prolapse and urgency and urge incontinence.  She also has some fecal incontinence which is improved.  We had a long discussion about a hysteropexy with [facility] vs. The sacrocolpopexy.  She would really like to have the less invasive " uterine sparing surgery.  We also talked about her leakage with standing from a sitting position and ISD and discussed periurethral bulking and she would like to do that as a first step rather than sling.  -schedule hysteropexy with [facility] and periuethral bulking.  -cancel robotic scp with Dr. Nohemi August MD  [facility]

## 2024-10-21 NOTE — NURSING NOTE
Current patient location: [facility] [place] [address]    Is the patient currently in the state of [place]? YES    Visit mode:VIDEO    If the visit is dropped, the patient can be reconnected by: VIDEO VISIT: Text to cell phone:   Telephone Information:   Mobile [phone]       Will anyone else be joining the visit? NO  (If patient encounters technical issues they should call [phone])    Are changes needed to the allergy or medication list? No    Are refills needed on medications prescribed by this physician? NO    Rooming Documentation:  Questionnaire(s) completed    Reason for visit: Consult    Mely CAREY

## 2024-10-21 NOTE — TELEPHONE ENCOUNTER
Called patient to schedule surgery with Dr. August    Spoke with:  Patient    Date of Surgery: 2/11/25 - patient request    Arrival time Discussed with Patient:  No    [facility] of surgery[facility]     Pre-Op H&P: With PCP Dr. Allison within 30 days of the surgery date.    Post-Op Appts: Patient will need 2 week post-op and 6 week post-op but both providers will be on vacation during those time frames. Will route to clinic.     Discussed with patient pre-op RN will call 2-3 days prior to surgery with arrival time and instructions:  Yes     Packet sent out: Yes  via Mail - Standard [DATE] 10/21/24    Additional Comments:  N/A      All patients questions were answered and patient was instructed to review surgical packet and call back with any questions or concerns.       Nadege Saleh on 10/21/2024 at 2:28 PM

## 2024-10-22 ENCOUNTER — PRE VISIT (OUTPATIENT)
Dept: SURGERY | Facility: CLINIC | Age: 74
End: 2024-10-22

## 2024-10-22 NOTE — TELEPHONE ENCOUNTER
10/22 Patient scheduled.     Arelis jacob Complex   Dermatology, Surgery, Urology  [facility]

## 2024-10-23 ENCOUNTER — PRE VISIT (OUTPATIENT)
Dept: SURGERY | Facility: CLINIC | Age: 74
End: 2024-10-23

## 2024-11-07 DIAGNOSIS — I48.0 PAROXYSMAL ATRIAL FIBRILLATION (H): ICD-10-CM

## 2024-11-07 RX ORDER — CARVEDILOL 6.25 MG/1
6.25 TABLET ORAL 2 TIMES DAILY WITH MEALS
Qty: 180 TABLET | Refills: 0 | Status: SHIPPED | OUTPATIENT
Start: 2024-11-07

## 2024-11-27 ENCOUNTER — HOSPITAL ENCOUNTER (OUTPATIENT)
Dept: CT IMAGING | Facility: CLINIC | Age: 74
Discharge: HOME | End: 2024-11-27
Attending: INTERNAL MEDICINE
Payer: COMMERCIAL

## 2024-11-27 VITALS — DIASTOLIC BLOOD PRESSURE: 74 MMHG | RESPIRATION RATE: 16 BRPM | SYSTOLIC BLOOD PRESSURE: 96 MMHG | HEART RATE: 58 BPM

## 2024-11-27 DIAGNOSIS — I48.0 PAROXYSMAL ATRIAL FIBRILLATION (H): ICD-10-CM

## 2024-11-27 PROCEDURE — 250N000013 HC RX MED GY IP 250 OP 250 PS 637: Performed by: INTERNAL MEDICINE

## 2024-11-27 PROCEDURE — 250N000009 HC RX 250: Performed by: INTERNAL MEDICINE

## 2024-11-27 PROCEDURE — 250N000011 HC RX IP 250 OP 636: Performed by: INTERNAL MEDICINE

## 2024-11-27 PROCEDURE — 75574 CT ANGIO HRT W/3D IMAGE: CPT

## 2024-11-27 RX ORDER — IVABRADINE 5 MG/1
5-15 TABLET, FILM COATED ORAL
Status: DISCONTINUED | OUTPATIENT
Start: 2024-11-27 | End: 2024-11-28 | Stop reason: HOSPADM

## 2024-11-27 RX ORDER — DILTIAZEM HYDROCHLORIDE 5 MG/ML
10-15 INJECTION INTRAVENOUS
Status: DISCONTINUED | OUTPATIENT
Start: 2024-11-27 | End: 2024-11-28 | Stop reason: HOSPADM

## 2024-11-27 RX ORDER — METOPROLOL TARTRATE 1 MG/ML
5-20 INJECTION, SOLUTION INTRAVENOUS
Status: DISCONTINUED | OUTPATIENT
Start: 2024-11-27 | End: 2024-11-28 | Stop reason: HOSPADM

## 2024-11-27 RX ORDER — METOPROLOL TARTRATE 25 MG/1
25-100 TABLET, FILM COATED ORAL
Status: DISCONTINUED | OUTPATIENT
Start: 2024-11-27 | End: 2024-11-28 | Stop reason: HOSPADM

## 2024-11-27 RX ORDER — LIDOCAINE 40 MG/G
CREAM TOPICAL
Status: DISCONTINUED | OUTPATIENT
Start: 2024-11-27 | End: 2024-11-28 | Stop reason: HOSPADM

## 2024-11-27 RX ORDER — IOPAMIDOL 755 MG/ML
110 INJECTION, SOLUTION INTRAVASCULAR ONCE
Status: COMPLETED | OUTPATIENT
Start: 2024-11-27 | End: 2024-11-27

## 2024-11-27 RX ORDER — ONDANSETRON 2 MG/ML
4 INJECTION INTRAMUSCULAR; INTRAVENOUS
Status: DISCONTINUED | OUTPATIENT
Start: 2024-11-27 | End: 2024-11-28 | Stop reason: HOSPADM

## 2024-11-27 RX ORDER — DILTIAZEM HYDROCHLORIDE 120 MG/1
120 TABLET, FILM COATED ORAL
Status: DISCONTINUED | OUTPATIENT
Start: 2024-11-27 | End: 2024-11-28 | Stop reason: HOSPADM

## 2024-11-27 RX ORDER — NITROGLYCERIN 0.4 MG/1
.4-.8 TABLET SUBLINGUAL
Status: DISCONTINUED | OUTPATIENT
Start: 2024-11-27 | End: 2024-11-28 | Stop reason: HOSPADM

## 2024-11-27 RX ADMIN — NITROGLYCERIN 0.8 MG: 0.4 TABLET SUBLINGUAL at 08:16

## 2024-11-27 RX ADMIN — IOPAMIDOL 110 ML: 755 INJECTION, SOLUTION INTRAVENOUS at 08:13

## 2024-11-27 RX ADMIN — METOPROLOL TARTRATE 5 MG: 1 INJECTION, SOLUTION INTRAVENOUS at 08:16

## 2024-11-27 NOTE — LETTER
2024      Lyn Stanley  [address]  [place] [address]        Dear ,    We are writing to inform you of your test results.    Your arteries are clean.  Good news Lyn.     Resulted Orders   CT Angiogram coronary artery    Narrative    Procedure: CTA ANGIOGRAM CORONARY ARTERY   Examination Date: 2024 8:31 AM   Indication: Abnormal ECG (likely ischemia), frequent PVCs   Ordering Provider: MATT BAL  Overall quality of the study: Good.     PROCEDURE: ECG gated multi-slice computed tomography of the heart with  and without intravenous contrast  (Isovue 370, 110 mL at rate of 5.5  mL/sec) was performed on a Siemens Dual Source Flash scanner without  incident. Medical therapy was used to optimize heart rate (metoprolol  5 mg IV). Sublingual Nitrostat 0.8 mg was given prior to scanning.  Coronary artery calcium scoring was performed using the Flash scanner  protocol. The CTA portion was performed in the spiral mode at a heart  rate of 51 bpm with 80 kVp. Images were reconstructed and analyzed on  a [employer] workstation. The scan protocol was optimized to minimize  radiation exposure. The total radiation exposure, including calcium  artery calcium scoring, was calculated to be 267 DLP and 3.7 mSv.        Impression    IMPRESSION:  1.  Normal coronary arteries without detectable atherosclerosis or  stenosis.  2.  Total Agatston score is 0 placing the patient in the lowest  percentile when compared to an age- and gender-matched control group.  3.  Please review the separate Radiology report for incidental  noncardiac findings.    FINDINGS:    CORONARY CALCIUM SCORE    Total Agatston calcium score: 0   Left main: 0  Left anterior descendin  Left circumflex: 0  Right coronary artery: 0   This places the patient in the lowest  percentile when compared to an  age- and gender-matched control group.    CORONARY ANGIOGRAPHY    DOMINANCE: Right dominant system.   Normal coronary origins and  course.    LEFT MAIN:   The LM is a medium caliber vessel.   The left main arises normally from the left coronary cusp and is  widely patent without detectable atherosclerosis or stenosis.     LEFT ANTERIOR DESCENDING:   The LAD is a medium caliber vessel. It has a type III morphology. It  gives rise to three small diagonal branches.   The left anterior descending and its major diagonal branches are  widely patent without detectable atherosclerosis or stenosis.    LEFT CIRCUMFLEX:   The LCx is a small caliber vessel. It gives rise to three small obtuse  marginal branches before ending in the AV groove  The left circumflex and its major branches are widely patent without  detectable atherosclerosis or stenosis.    RIGHT CORONARY ARTERY:   The RCA is a medium caliber vessel.    The right coronary artery and its major branches are widely patent  without any detectable stenosis.    ADDITIONAL FINDINGS:   The proximal ascending aorta is normal in size.   Normal variant pulmonary venous anatomy with a common left pulmonary  vein. All three pulmonary veins drain into the left atrium.    The left atrial appendage is free of thrombus.  There is no left ventricular mass or thrombus.   Normal pericardial thickness. There is no pericardial effusion.  Please review the separate Radiology report for incidental noncardiac  findings.    I have personally reviewed the examination and initial interpretation  and I agree with the findings.    KATYA WISDOM MD         SYSTEM ID:  [id number]       If you have any questions or concerns, please call the clinic at the number listed above.       Sincerely,      Trinh Porras MD

## 2024-11-27 NOTE — LETTER
2024      Lyn MICHAEL Stanley  [address] [place] [address]        Dear ,    We are writing to inform you of your test results.    Lungs look normal.     Resulted Orders   CT Angiogram coronary artery    Narrative    Procedure: CTA ANGIOGRAM CORONARY ARTERY   Examination Date: 2024 8:31 AM   Indication: Abnormal ECG (likely ischemia), frequent PVCs   Ordering Provider: MATT BAL  Overall quality of the study: Good.     PROCEDURE: ECG gated multi-slice computed tomography of the heart with  and without intravenous contrast  (Isovue 370, 110 mL at rate of 5.5  mL/sec) was performed on a Siemens Dual Source Flash scanner without  incident. Medical therapy was used to optimize heart rate (metoprolol  5 mg IV). Sublingual Nitrostat 0.8 mg was given prior to scanning.  Coronary artery calcium scoring was performed using the Flash scanner  protocol. The CTA portion was performed in the spiral mode at a heart  rate of 51 bpm with 80 kVp. Images were reconstructed and analyzed on  a [employer] workstation. The scan protocol was optimized to minimize  radiation exposure. The total radiation exposure, including calcium  artery calcium scoring, was calculated to be 267 DLP and 3.7 mSv.        Impression    IMPRESSION:  1.  Normal coronary arteries without detectable atherosclerosis or  stenosis.  2.  Total Agatston score is 0 placing the patient in the lowest  percentile when compared to an age- and gender-matched control group.  3.  Please review the separate Radiology report for incidental  noncardiac findings.    FINDINGS:    CORONARY CALCIUM SCORE    Total Agatston calcium score: 0   Left main: 0  Left anterior descendin  Left circumflex: 0  Right coronary artery: 0   This places the patient in the lowest  percentile when compared to an  age- and gender-matched control group.    CORONARY ANGIOGRAPHY    DOMINANCE: Right dominant system.   Normal coronary origins and course.    LEFT MAIN:   The LM  is a medium caliber vessel.   The left main arises normally from the left coronary cusp and is  widely patent without detectable atherosclerosis or stenosis.     LEFT ANTERIOR DESCENDING:   The LAD is a medium caliber vessel. It has a type III morphology. It  gives rise to three small diagonal branches.   The left anterior descending and its major diagonal branches are  widely patent without detectable atherosclerosis or stenosis.    LEFT CIRCUMFLEX:   The LCx is a small caliber vessel. It gives rise to three small obtuse  marginal branches before ending in the AV groove  The left circumflex and its major branches are widely patent without  detectable atherosclerosis or stenosis.    RIGHT CORONARY ARTERY:   The RCA is a medium caliber vessel.    The right coronary artery and its major branches are widely patent  without any detectable stenosis.    ADDITIONAL FINDINGS:   The proximal ascending aorta is normal in size.   Normal variant pulmonary venous anatomy with a common left pulmonary  vein. All three pulmonary veins drain into the left atrium.    The left atrial appendage is free of thrombus.  There is no left ventricular mass or thrombus.   Normal pericardial thickness. There is no pericardial effusion.  Please review the separate Radiology report for incidental noncardiac  findings.    I have personally reviewed the examination and initial interpretation  and I agree with the findings.    KATYA WISDOM MD         SYSTEM ID:  [id number]   Radiologist Consult For Cardiology    Narrative    Exam: RADIOLOGIST CONSULT FOR CARDIOLOGY, 11/27/2024 9:48 AM    Indication: Paroxysmal atrial fibrillation (H)    Comparison: None    Technique: Cardiac CT was performed by cardiology. Interpretation of  the noncardiac portions of the study was requested.  Field of view  extending from approximately the neeraj to the upper abdomen.    Findings:  Normal heart size. Normal caliber ascending aorta and main pulmonary  artery.  No significant coronary artery atherosclerotic calcifications.  No thoracic lymphadenopathy. No pericardial or pleural effusion. No  focal consolidation or suspicious pulmonary nodule in the imaged  portions. Imaged portions of the central airway are clear.    Limited evaluation of the upper abdomen.    No aggressive osseous lesion.       Impression    Impression:  1. The visualized lungs are clear.   2. Please see cardiology dictation for detailed findings related to  the heart.    KARLA CHRISTIAN MD         SYSTEM ID:  [id number]       If you have any questions or concerns, please call the clinic at the number listed above.       Sincerely,      Trinh Porras MD

## 2024-11-27 NOTE — PROGRESS NOTES
Pt arrived for Coronary CT angiogram. Test, meds and side effects reviewed with pt. Resting HR 56 bpm. Administered 0.8 mg SL nitro and 5 mg IV metoprolol on CTA table per order. CTA completed. Patient tolerated procedure well and denies symptoms of allergic reaction. Post monitoring completed and VSS. D/C instructions reviewed with pt whom verbalized understanding of need to increase PO fluids today. D/C to gold waiting room accompanied by staff.

## 2024-11-28 SDOH — HEALTH STABILITY: PHYSICAL HEALTH: ON AVERAGE, HOW MANY DAYS PER WEEK DO YOU ENGAGE IN MODERATE TO STRENUOUS EXERCISE (LIKE A BRISK WALK)?: 6 DAYS

## 2024-11-28 SDOH — HEALTH STABILITY: PHYSICAL HEALTH: ON AVERAGE, HOW MANY MINUTES DO YOU ENGAGE IN EXERCISE AT THIS LEVEL?: 60 MIN

## 2024-11-28 ASSESSMENT — SOCIAL DETERMINANTS OF HEALTH (SDOH): HOW OFTEN DO YOU GET TOGETHER WITH FRIENDS OR RELATIVES?: ONCE A WEEK

## 2024-12-03 ENCOUNTER — MYC MEDICAL ADVICE (OUTPATIENT)
Dept: FAMILY MEDICINE | Facility: CLINIC | Age: 74
End: 2024-12-03

## 2024-12-03 ENCOUNTER — APPOINTMENT (OUTPATIENT)
Dept: LAB | Facility: CLINIC | Age: 74
End: 2024-12-03
Payer: COMMERCIAL

## 2024-12-03 ENCOUNTER — OFFICE VISIT (OUTPATIENT)
Dept: FAMILY MEDICINE | Facility: CLINIC | Age: 74
End: 2024-12-03
Payer: COMMERCIAL

## 2024-12-03 VITALS
OXYGEN SATURATION: 98 % | SYSTOLIC BLOOD PRESSURE: 160 MMHG | HEIGHT: 64 IN | DIASTOLIC BLOOD PRESSURE: 94 MMHG | HEART RATE: 74 BPM | BODY MASS INDEX: 23.85 KG/M2 | RESPIRATION RATE: 16 BRPM | TEMPERATURE: 97.2 F | WEIGHT: 139.7 LBS

## 2024-12-03 DIAGNOSIS — N81.11 MIDLINE CYSTOCELE: ICD-10-CM

## 2024-12-03 DIAGNOSIS — R15.9 INCONTINENCE OF FECES WITH FECAL URGENCY: ICD-10-CM

## 2024-12-03 DIAGNOSIS — R39.15 URINARY URGENCY: Primary | ICD-10-CM

## 2024-12-03 DIAGNOSIS — I48.0 PAROXYSMAL ATRIAL FIBRILLATION (H): ICD-10-CM

## 2024-12-03 DIAGNOSIS — R39.15 URINARY URGENCY: ICD-10-CM

## 2024-12-03 DIAGNOSIS — Z12.31 SCREENING MAMMOGRAM, ENCOUNTER FOR: ICD-10-CM

## 2024-12-03 DIAGNOSIS — I10 BENIGN ESSENTIAL HYPERTENSION: ICD-10-CM

## 2024-12-03 DIAGNOSIS — Z00.00 ENCOUNTER FOR MEDICARE ANNUAL WELLNESS EXAM: Primary | ICD-10-CM

## 2024-12-03 DIAGNOSIS — M62.89 PELVIC FLOOR DYSFUNCTION: ICD-10-CM

## 2024-12-03 DIAGNOSIS — N32.81 OAB (OVERACTIVE BLADDER): ICD-10-CM

## 2024-12-03 DIAGNOSIS — N39.46 MIXED INCONTINENCE: ICD-10-CM

## 2024-12-03 DIAGNOSIS — R15.2 INCONTINENCE OF FECES WITH FECAL URGENCY: ICD-10-CM

## 2024-12-03 DIAGNOSIS — E03.9 ACQUIRED HYPOTHYROIDISM: ICD-10-CM

## 2024-12-03 DIAGNOSIS — E78.2 MIXED HYPERLIPIDEMIA: ICD-10-CM

## 2024-12-03 LAB
ALBUMIN UR-MCNC: NEGATIVE MG/DL
APPEARANCE UR: CLEAR
BILIRUB UR QL STRIP: NEGATIVE
CHOLEST SERPL-MCNC: 236 MG/DL
COLOR UR AUTO: YELLOW
CREAT BLD-MCNC: 1 MG/DL (ref 0.5–1)
CREAT UR-MCNC: 27.6 MG/DL
EGFRCR SERPLBLD CKD-EPI 2021: 58 ML/MIN/1.73M2
ERYTHROCYTE [DISTWIDTH] IN BLOOD BY AUTOMATED COUNT: 12.6 % (ref 10–15)
FASTING STATUS PATIENT QL REPORTED: YES
GLUCOSE UR STRIP-MCNC: NEGATIVE MG/DL
HCT VFR BLD AUTO: 40.7 % (ref 35–47)
HDLC SERPL-MCNC: 100 MG/DL
HGB BLD-MCNC: 13.4 G/DL (ref 11.7–15.7)
HGB UR QL STRIP: ABNORMAL
KETONES UR STRIP-MCNC: NEGATIVE MG/DL
LDLC SERPL CALC-MCNC: 118 MG/DL
LEUKOCYTE ESTERASE UR QL STRIP: NEGATIVE
MCH RBC QN AUTO: 32.4 PG (ref 26.5–33)
MCHC RBC AUTO-ENTMCNC: 32.9 G/DL (ref 31.5–36.5)
MCV RBC AUTO: 99 FL (ref 78–100)
MICROALBUMIN UR-MCNC: <12 MG/L
MICROALBUMIN/CREAT UR: NORMAL MG/G{CREAT}
NITRATE UR QL: NEGATIVE
NONHDLC SERPL-MCNC: 136 MG/DL
PH UR STRIP: 6 [PH] (ref 5–7)
PLATELET # BLD AUTO: 187 10E3/UL (ref 150–450)
RBC # BLD AUTO: 4.13 10E6/UL (ref 3.8–5.2)
RBC #/AREA URNS AUTO: NORMAL /HPF
SP GR UR STRIP: 1.01 (ref 1–1.03)
TRIGL SERPL-MCNC: 92 MG/DL
TSH SERPL DL<=0.005 MIU/L-ACNC: 3.68 UIU/ML (ref 0.3–4.2)
UROBILINOGEN UR STRIP-ACNC: 0.2 E.U./DL
WBC # BLD AUTO: 4.3 10E3/UL (ref 4–11)
WBC #/AREA URNS AUTO: NORMAL /HPF

## 2024-12-03 PROCEDURE — 36415 COLL VENOUS BLD VENIPUNCTURE: CPT | Performed by: FAMILY MEDICINE

## 2024-12-03 PROCEDURE — 85027 COMPLETE CBC AUTOMATED: CPT | Performed by: FAMILY MEDICINE

## 2024-12-03 PROCEDURE — 80061 LIPID PANEL: CPT | Performed by: FAMILY MEDICINE

## 2024-12-03 PROCEDURE — 84443 ASSAY THYROID STIM HORMONE: CPT | Performed by: FAMILY MEDICINE

## 2024-12-03 PROCEDURE — 82570 ASSAY OF URINE CREATININE: CPT | Performed by: FAMILY MEDICINE

## 2024-12-03 PROCEDURE — 99214 OFFICE O/P EST MOD 30 MIN: CPT | Mod: 25 | Performed by: FAMILY MEDICINE

## 2024-12-03 PROCEDURE — G0439 PPPS, SUBSEQ VISIT: HCPCS | Performed by: FAMILY MEDICINE

## 2024-12-03 PROCEDURE — 82043 UR ALBUMIN QUANTITATIVE: CPT | Performed by: FAMILY MEDICINE

## 2024-12-03 PROCEDURE — 81001 URINALYSIS AUTO W/SCOPE: CPT | Performed by: FAMILY MEDICINE

## 2024-12-03 RX ORDER — ATORVASTATIN CALCIUM 20 MG/1
20 TABLET, FILM COATED ORAL AT BEDTIME
Qty: 90 TABLET | Refills: 3 | Status: SHIPPED | OUTPATIENT
Start: 2024-12-03

## 2024-12-03 RX ORDER — LOSARTAN POTASSIUM 25 MG/1
25 TABLET ORAL EVERY EVENING
Qty: 90 TABLET | Refills: 3 | Status: SHIPPED | OUTPATIENT
Start: 2024-12-03

## 2024-12-03 RX ORDER — LEVOTHYROXINE SODIUM 50 UG/1
50 TABLET ORAL DAILY
Qty: 90 TABLET | Refills: 3 | Status: SHIPPED | OUTPATIENT
Start: 2024-12-03

## 2024-12-03 ASSESSMENT — PAIN SCALES - GENERAL: PAINLEVEL_OUTOF10: NO PAIN (0)

## 2024-12-03 NOTE — TELEPHONE ENCOUNTER
RN - please call Lyn. We don't recommend evaluating for uti based on smell alone in general because it is not a good indicator of infection and I am not sure if the urine was saved so that a UA could be done. If she is having dysuria or increased frequency or urgency from her baseline, then I would recommend UA (ask lab if they have sample or see if she wants to come back to provide sample). Thanks, Lauryn Allison M.D.

## 2024-12-03 NOTE — PATIENT INSTRUCTIONS
Patient Education   Schedule a mammogram.    Make sure you are getting no more than 1000IU vitamin D daily.   Preventive Care Advice   This is general advice given by our system to help you stay healthy. However, your care team may have specific advice just for you. Please talk to your care team about your preventive care needs.  Nutrition  Eat 5 or more servings of fruits and vegetables each day.  Try wheat bread, brown rice and whole grain pasta (instead of white bread, rice, and pasta).  Get enough calcium and vitamin D. Check the label on foods and aim for 100% of the RDA (recommended daily allowance).  Lifestyle  Exercise at least 150 minutes each week  (30 minutes a day, 5 days a week).  Do muscle strengthening activities 2 days a week. These help control your weight and prevent disease.  No smoking.  Wear sunscreen to prevent skin cancer.  Have a dental exam and cleaning every 6 months.  Yearly exams  See your health care team every year to talk about:  Any changes in your health.  Any medicines your care team has prescribed.  Preventive care, family planning, and ways to prevent chronic diseases.  Shots (vaccines)   HPV shots (up to age 26), if you've never had them before.  Hepatitis B shots (up to age 59), if you've never had them before.  COVID-19 shot: Get this shot when it's due.  Flu shot: Get a flu shot every year.  Tetanus shot: Get a tetanus shot every 10 years.  Pneumococcal, hepatitis A, and RSV shots: Ask your care team if you need these based on your risk.  Shingles shot (for age 50 and up)  General health tests  Diabetes screening:  Starting at age 35, Get screened for diabetes at least every 3 years.  If you are younger than age 35, ask your care team if you should be screened for diabetes.  Cholesterol test: At age 39, start having a cholesterol test every 5 years, or more often if advised.  Bone density scan (DEXA): At age 50, ask your care team if you should have this scan for osteoporosis  (brittle bones).  Hepatitis C: Get tested at least once in your life.  STIs (sexually transmitted infections)  Before age 24: Ask your care team if you should be screened for STIs.  After age 24: Get screened for STIs if you're at risk. You are at risk for STIs (including HIV) if:  You are sexually active with more than one person.  You don't use condoms every time.  You or a partner was diagnosed with a sexually transmitted infection.  If you are at risk for HIV, ask about PrEP medicine to prevent HIV.  Get tested for HIV at least once in your life, whether you are at risk for HIV or not.  Cancer screening tests  Cervical cancer screening: If you have a cervix, begin getting regular cervical cancer screening tests starting at age 21.  Breast cancer scan (mammogram): If you've ever had breasts, begin having regular mammograms starting at age 40. This is a scan to check for breast cancer.  Colon cancer screening: It is important to start screening for colon cancer at age 45.  Have a colonoscopy test every 10 years (or more often if you're at risk) Or, ask your provider about stool tests like a FIT test every year or Cologuard test every 3 years.  To learn more about your testing options, visit:   .  For help making a decision, visit:   https://bit.ly/qh19791.  Prostate cancer screening test: If you have a prostate, ask your care team if a prostate cancer screening test (PSA) at age 55 is right for you.  Lung cancer screening: If you are a current or former smoker ages 50 to 80, ask your care team if ongoing lung cancer screenings are right for you.  For informational purposes only. Not to replace the advice of your health care provider. Copyright   2023 [employer]. All rights reserved. Clinically reviewed by the [facility]. [employer]ype [id number] - REV 01/24.  Hearing Loss: Care Instructions  Overview     Hearing loss is a sudden or slow decrease in how well you hear. It can range  from slight to profound. Permanent hearing loss can occur with aging. It also can happen when you are exposed long-term to loud noise. Examples include listening to loud music, riding motorcycles, or being around other loud machines.  Hearing loss can affect your work and home life. It can make you feel lonely or depressed. You may feel that you have lost your independence. But hearing aids and other devices can help you hear better and feel connected to others.  Follow-up care is a key part of your treatment and safety. Be sure to make and go to all appointments, and call your doctor if you are having problems. It's also a good idea to know your test results and keep a list of the medicines you take.  How can you care for yourself at home?  Avoid loud noises whenever possible. This helps keep your hearing from getting worse.  Always wear hearing protection around loud noises.  Wear a hearing aid as directed.  A professional can help you pick a hearing aid that will work best for you.  You can also get hearing aids over the counter for mild to moderate hearing loss.  Have hearing tests as your doctor suggests. They can show whether your hearing has changed. Your hearing aid may need to be adjusted.  Use other devices as needed. These may include:  Telephone amplifiers and hearing aids that can connect to a television, stereo, radio, or microphone.  Devices that use lights or vibrations. These alert you to the doorbell, a ringing telephone, or a baby monitor.  Television closed-captioning. This shows the words at the bottom of the screen. Most new TVs can do this.  TTY (text telephone). This lets you type messages back and forth on the telephone instead of talking or listening. These devices are also called TDD. When messages are typed on the keyboard, they are sent over the phone line to a receiving TTY. The message is shown on a monitor.  Use text messaging, social media, and email if it is hard for you to  "communicate by telephone.  Try to learn a listening technique called speechreading. It is not lipreading. You pay attention to people's gestures, expressions, posture, and tone of voice. These clues can help you understand what a person is saying. Face the person you are talking to, and have them face you. Make sure the lighting is good. You need to see the other person's face clearly.  Think about counseling if you need help to adjust to your hearing loss.  When should you call for help?  Watch closely for changes in your health, and be sure to contact your doctor if:    You think your hearing is getting worse.     You have new symptoms, such as dizziness or nausea.   Where can you learn more?  Go to https://www.[employer].net/patiented  Enter R798 in the search box to learn more about \"Hearing Loss: Care Instructions.\"  Current as of: September 27, 2023  Content Version: 14.2 2024 [employer].   Care instructions adapted under license by your healthcare professional. If you have questions about a medical condition or this instruction, always ask your healthcare professional. [employer] disclaims any warranty or liability for your use of this information.    Bladder Training: Care Instructions  Your Care Instructions     Bladder training is used to treat urge incontinence and stress incontinence. Urge incontinence means that the need to urinate comes on so fast that you can't get to a toilet in time. Stress incontinence means that you leak urine because of pressure on your bladder. For example, it may happen when you laugh, cough, or lift something heavy.  Bladder training can increase how long you can wait before you have to urinate. It can also help your bladder hold more urine. And it can give you better control over the urge to urinate.  It is important to remember that bladder training takes a few weeks to a few months to make a difference. You may not see results right away, but " don't give up.  Follow-up care is a key part of your treatment and safety. Be sure to make and go to all appointments, and call your doctor if you are having problems. It's also a good idea to know your test results and keep a list of the medicines you take.  How can you care for yourself at home?  Work with your doctor to come up with a bladder training program that is right for you. You may use one or more of the following methods.  Delayed urination  In the beginning, try to keep from urinating for 5 minutes after you first feel the need to go.  While you wait, take deep, slow breaths to relax. Kegel exercises can also help you delay the need to go to the bathroom.  After some practice, when you can easily wait 5 minutes to urinate, try to wait 10 minutes before you urinate.  Slowly increase the waiting period until you are able to control when you have to urinate.  Scheduled urination  Empty your bladder when you first wake up in the morning.  Schedule times throughout the day when you will urinate.  Start by going to the bathroom every hour, even if you don't need to go.  Slowly increase the time between trips to the bathroom.  When you have found a schedule that works well for you, keep doing it.  If you wake up during the night and have to urinate, do it. Apply your schedule to waking hours only.  Kegel exercises  These tighten and strengthen pelvic muscles, which can help you control the flow of urine. (If doing these exercises causes pain, stop doing them and talk with your doctor.) To do Kegel exercises:  Squeeze your muscles as if you were trying not to pass gas. Or squeeze your muscles as if you were stopping the flow of urine. Your belly, legs, and buttocks shouldn't move.  Hold the squeeze for 3 seconds, then relax for 5 to 10 seconds.  Start with 3 seconds, then add 1 second each week until you are able to squeeze for 10 seconds.  Repeat the exercise 10 times a session. Do 3 to 8 sessions a day.  When  "should you call for help?  Watch closely for changes in your health, and be sure to contact your doctor if:    Your incontinence is getting worse.     You do not get better as expected.   Where can you learn more?  Go to https://www.[employer].net/patiented  Enter V684 in the search box to learn more about \"Bladder Training: Care Instructions.\"  Current as of: November 15, 2023  Content Version: 14.2 2024 [facility].   Care instructions adapted under license by your healthcare professional. If you have questions about a medical condition or this instruction, always ask your healthcare professional. [employer] disclaims any warranty or liability for your use of this information.       "

## 2024-12-03 NOTE — RESULT ENCOUNTER NOTE
Excellent! Please call or send a [employer] message if you have any questions. Lauryn Allison M.D.

## 2024-12-03 NOTE — PROGRESS NOTES
Preventive Care Visit  [facility]  Lauryn Allison MD, Family Medicine  Dec 3, 2024      Assessment & Plan       ICD-10-CM    1. Encounter for Medicare annual wellness exam  Z00.00       2. Mixed hyperlipidemia  E78.2 Lipid panel reflex to direct LDL Non-fasting     atorvastatin (LIPITOR) 20 MG tablet      3. Acquired hypothyroidism  E03.9 TSH WITH FREE T4 REFLEX     levothyroxine (SYNTHROID/LEVOTHROID) 50 MCG tablet      4. Benign essential hypertension  I10 Albumin Random Urine Quantitative with Creat Ratio     losartan (COZAAR) 25 MG tablet      5. Paroxysmal atrial fibrillation (H)  I48.0 apixaban ANTICOAGULANT (ELIQUIS ANTICOAGULANT) 5 MG tablet     CBC with platelets      6. Screening mammogram, encounter for  Z12.31 MA Screen Bilateral w/Gregory         Medicare wellness visit  Healthy  Immunizations:  up to date  Mammogram 10/18/23 was normal.    Colonoscopy was completed 11/2020 and repeat is due in 2025  Bone health: last DEXA was 2019 and was normal. Consider repeat after 10 years and earlier as needed. Adequate calcium and vitamin D recommended.   Sees [facility] yearly for skin check.      Paroxysmal atrial fibrillation (H)  Stable. Continues apixiban, flecainide and carvedilol. Following with cardiology. Last cardiology visit notes reviewed today.  Refills as above      Chronic anticoagulation  as above   CBC today     Essential hypertension  Controlled by home measurements. No changes today.  10/24 BMP normal, check urine albumin today  Continues carvedilol 6.25mg bid, losartan 25mg daily       Mixed hyperlipidemia  continues atorvastatin 20mg daily  Check lipids today     Pelvic floor dysfunction  OAB  Mixed incontinence  Incontinence of feces  Followed by Dr. Song and recently seen by Dr. August. Notes reviewed today. She has a procedure planned in February with Dr. August.   Fecal incontinence is much improved. Has a metamucil fiber wafer, one per day - does not make her  gassy like the powder.        Hypothyroidism, unspecified type  Continues levothyroxine 50mcg daily. TSH today     Umbilical hernia  No complaints today.  Previously reported occasionally sore.       Hearing loss, bilateral  Has bilateral hearing aids.               Counseling  Appropriate preventive services were addressed with this patient via screening, questionnaire, or discussion as appropriate for fall prevention, nutrition, physical activity, Tobacco-use cessation, social engagement, weight loss and cognition.  Checklist reviewing preventive services available has been given to the patient.  Reviewed patient's diet, addressing concerns and/or questions.   The patient was provided with written information regarding signs of hearing loss.   Information on urinary incontinence and treatment options given to patient.         Felix Nicholson is a 74 year old, presenting for the following:  Annual Visit        12/3/2024     8:27 AM   Additional Questions   Roomed by Orin pearce   Accompanied by self           HPI  Notes she is due for labs.   Eye problems. Epithelial membrane dystrophy. Disrupted surface became inflammed and she had loss of vision, vision is improving, but best will be about 20/60. Continues to follow with ophtho, cornea specialist  Has dry eyes.   Left hip occasionally hurts, doing well now. Has hard time lying on it sometimes. Going up steps can also trigger it too. Walking is fine now.   Has 67 first cousins. Many of her cousins have hashimoto's. Just wanted to note.   She and Salas did their health directives 7 years ago.   Went to [place], [facility] last summer.   Overall doing pretty well right now.       Health Care Directive  Patient has a Health Care Directive on file  Advance care planning document is on file and is current.      11/28/2024   General Health   How would you rate your overall physical health? Good   Feel stress (tense, anxious, or unable to sleep) Only a little      (!) STRESS  CONCERN      11/28/2024   Nutrition   Diet: Regular (no restrictions)            11/28/2024   Exercise   Days per week of moderate/strenous exercise 6 days   Average minutes spent exercising at this level 60 min            11/28/2024   Social Factors   Frequency of gathering with friends or relatives Once a week   Worry food won't last until get money to buy more No   Food not last or not have enough money for food? No   Do you have housing? (Housing is defined as stable permanent housing and does not include staying ouside in a car, in a tent, in an abandoned building, in an overnight shelter, or couch-surfing.) Yes   Are you worried about losing your housing? No   Lack of transportation? No   Unable to get utilities (heat,electricity)? No            11/28/2024   Fall Risk   Fallen 2 or more times in the past year? No     No    Trouble with walking or balance? No     No        Patient-reported    Multiple values from one day are sorted in reverse-chronological order          11/28/2024   Activities of Daily Living- Home Safety   Needs help with the following daily activites None of the above   Safety concerns in the home None of the above            11/28/2024   Dental   Dentist two times every year? Yes            11/28/2024   Hearing Screening   Hearing concerns? (!) I FEEL THAT PEOPLE ARE MUMBLING OR NOT SPEAKING CLEARLY.    (!) I NEED TO ASK PEOPLE TO SPEAK UP OR REPEAT THEMSELVES.    (!) IT'S HARD TO FOLLOW A CONVERSATION IN A NOISY RESTAURANT OR CROWDED ROOM.    (!) TROUBLE UNDESTANDING A SPEAKER IN A PUBLIC MEETING OR Confucian[employer] SERVICE.    (!) TROUBLE FOLLOWING DIALOGUE IN THE THEATHER.    (!) TROUBLE UNDERSTANDING SOFT OR WHISPERED SPEECH.       Multiple values from one day are sorted in reverse-chronological order         11/28/2024   Driving Risk Screening   Patient/family members have concerns about driving No            11/28/2024   General Alertness/Fatigue Screening   Have you been more tired than  usual lately? No            11/28/2024   Urinary Incontinence Screening   Bothered by leaking urine in past 6 months Yes            11/28/2024   TB Screening   Were you born outside of the [place]? No            Today's PHQ-2 Score:       12/3/2024     8:19 AM   PHQ-2 ( 1999 Pf[employer])   Q1: Little interest or pleasure in doing things 0    Q2: Feeling down, depressed or hopeless 0    PHQ-2 Score 0    Q1: Little interest or pleasure in doing things Not at all   Q2: Feeling down, depressed or hopeless Not at all   PHQ-2 Score 0       Patient-reported           11/28/2024   Substance Use   Alcohol more than 3/day or more than 7/wk No   Do you have a current opioid prescription? No   How severe/bad is pain from 1 to 10? 2/10   Do you use any other substances recreationally? No        Social History     Tobacco Use    Smoking status: Never     Passive exposure: Never    Smokeless tobacco: Never   Vaping Use    Vaping status: Never Used   Substance Use Topics    Alcohol use: Yes     Comment: 1-2 glass of wine per day    Drug use: Never           10/18/2023   LAST FHS-7 RESULTS   1st degree relative breast or ovarian cancer No   Any relative bilateral breast cancer No   Any male have breast cancer No   Any ONE woman have BOTH breast AND ovarian cancer No   Any woman with breast cancer before 50yrs No   2 or more relatives with breast AND/OR ovarian cancer No   2 or more relatives with breast AND/OR bowel cancer Yes           Mammogram Screening - After age 74- determine frequency with patient based on health status, life expectancy and patient goals    ASCVD Risk   The ASCVD Risk score (Laura GAMBOA, et al., 2019) failed to calculate for the following reasons:    Risk score cannot be calculated because patient has a medical history suggesting prior/existing ASCVD          Reviewed and updated as needed this visit by Provider                    Past Medical History:   Diagnosis Date    Arthritis     osteoarth    Heart disease  2018    Hypertension age 49    Influenza B 4/19/2018    Spider veins     Thyroid disease age 49     Past Surgical History:   Procedure Laterality Date    CHOLECYSTECTOMY  2000    COLONOSCOPY N/A 11/12/2020    Procedure: COLONOSCOPY;  Surgeon: Hugo Troncoso MD;  Location: [facility]    CYSTOSCOPY      ENT SURGERY  2002    turbinectomy & septoplasty    EYE SURGERY      blepharoplasty    GI SURGERY  2006    rectal sphincter    IMPLANT STIMULATOR SACRAL NERVE STAGE ONE N/A 11/6/2023    Procedure: INSERTION, SACRAL NERVE STIMULATOR, STAGE 1;  Surgeon: Sheba Song MD;  Location: [facility] OR    IMPLANT STIMULATOR SACRAL NERVE STAGE TWO N/A 11/20/2023    Procedure: INSERTION, SACRAL NERVE STIMULATOR, STAGE 2;  Surgeon: Sheba Song MD;  Location: [facility] OR    VASCULAR SURGERY  1988    vein stripping     Current providers sharing in care for this patient include:  Patient Care Team:  Lauryn Allison MD as PCP - General (Family Practice)  Jacinda Saleem MD as MD (Otolaryngology)  Maile Leonardo MD as MD (Cardiology)  Lauryn Allison MD as Assigned PCP  Giovanna Kothari PA-C as Physician Assistant (Dermatology)  Lauryn Allison MD as Referring Physician (Family Medicine)  Maile Leonardo MD as Assigned Heart and Vascular Provider  Sheba Song MD as MD (Urology)  Maddy Sandy PA-C as Physician Assistant (Gastroenterology)  Sheba Song MD as Assigned Surgical Provider  Trinh Porras MD as MD (Cardiovascular Disease)  Ben Pierre MD as Surgeon (Surgery)    The following health maintenance items are reviewed in Epic and correct as of today:  Health Maintenance   Topic Date Due    ANNUAL REVIEW OF HM ORDERS  09/07/2023    LIPID  10/12/2024    MICROALBUMIN  10/12/2024    TSH W/FREE T4 REFLEX  10/12/2024    BMP  10/16/2025    COLORECTAL CANCER SCREENING  11/12/2025    MEDICARE ANNUAL WELLNESS VISIT  12/03/2025    FALL RISK ASSESSMENT  12/03/2025    GLUCOSE  10/16/2027  "   DTAP/TDAP/TD IMMUNIZATION (6 - Td or Tdap) 05/21/2028    ADVANCE CARE PLANNING  12/03/2029    DEXA  06/05/2034    HEPATITIS C SCREENING  Completed    PHQ-2 (once per calendar year)  Completed    INFLUENZA VACCINE  Completed    Pneumococcal Vaccine: 65+ Years  Completed    ZOSTER IMMUNIZATION  Completed    RSV VACCINE  Completed    COVID-19 Vaccine  Completed    HPV IMMUNIZATION  Aged Out    MENINGITIS IMMUNIZATION  Aged Out    RSV MONOCLONAL ANTIBODY  Aged Out    MAMMO SCREENING  Discontinued          Objective    Exam  BP (!) 160/94 (BP Location: Right arm, Patient Position: Sitting, Cuff Size: Adult Regular)   Pulse 74   Temp 97.2  F (36.2  C) (Temporal)   Resp 16   Ht 1.628 m (5' 4.1\")   Wt 63.4 kg (139 lb 11.2 oz)   LMP  (LMP Unknown)   SpO2 98%   BMI 23.90 kg/m     Estimated body mass index is 23.9 kg/m  as calculated from the following:    Height as of this encounter: 1.628 m (5' 4.1\").    Weight as of this encounter: 63.4 kg (139 lb 11.2 oz).    Physical Exam  GENERAL: alert and no distress  EYES: Eyes grossly normal to inspection, PERRL and conjunctivae and sclerae normal  HENT: ear canals and TM's normal, nose and mouth without ulcers or lesions  NECK: no adenopathy, no asymmetry, masses, or scars  RESP: lungs clear to auscultation - no rales, rhonchi or wheezes  CV: regular rate and rhythm, normal S1 S2, no S3 or S4, no murmur, click or rub, no peripheral edema  ABDOMEN: soft, nontender, no hepatosplenomegaly, no masses and bowel sounds normal  MS: no gross musculoskeletal defects noted, no edema  SKIN: no suspicious lesions or rashes  NEURO: Normal strength and tone, mentation intact and speech normal  PSYCH: mentation appears normal, affect normal/bright        12/3/2024   Mini Cog   Clock Draw Score 2 Normal   3 Item Recall 3 objects recalled   Mini Cog Total Score 5                 Signed Electronically by: Lauryn Allison MD, MD    "

## 2024-12-03 NOTE — TELEPHONE ENCOUNTER
Dr. Allison-Please review and advise.  If okay to proceed with UA, patient will come back to submit clean catch sample.    Writer called patient and relayed message per Dr. Allison.    Patient verbalized understanding and stated she does not have dysuria and does not know if she has increased urgency nor frequency.  Previous UTIs did not result in dysuria but when getting up to go to restroom, had urinary incontinence and this has happened a few times within the past week.    Informed patient writer will update Dr. Allison and patient will be notified of Dr. Allison's recommendations/next step.    Thank you!  JUDY Guzman BSN, RN-[facility]

## 2024-12-03 NOTE — TELEPHONE ENCOUNTER
Writer called patient and informed her of UA ordered by Dr. Allison.  Offered to schedule lab only appt for today at 1530, 1600 or 1630 (open slots).  Patient requested lab appt at 1630.  Visit date, time and location confirmed with patient.      ISABEL GutierrezN, RN-[facility]

## 2024-12-04 ENCOUNTER — PATIENT OUTREACH (OUTPATIENT)
Dept: CARE COORDINATION | Facility: CLINIC | Age: 74
End: 2024-12-04
Payer: COMMERCIAL

## 2024-12-05 NOTE — RESULT ENCOUNTER NOTE
"The results of your recent lipid (cholesterol) profile were abnormal.    Here are the results:  Lab Results       Component                Value               Date                       CHOL                     236                 12/03/2024                 CHOL                     227                 11/18/2020            Lab Results       Component                Value               Date                       HDL                      100                 12/03/2024                 HDL                      93                  11/18/2020            Lab Results       Component                Value               Date                       LDL                      118                 12/03/2024                 LDL                      120                 11/18/2020            Lab Results       Component                Value               Date                       TRIG                     92                  12/03/2024                 TRIG                     72                  11/18/2020            No results found for: \"CHOLHDLRATIO\"    Desired or goal levels are:  CHOLESTEROL: Desirable is less than 200.   HDL (Good Cholesterol): Desirable is greater than 40 (for men) greater than 50 (for women).  LDL (Bad Cholesterol): Desirable is less than 130 (or less than 100 if you have heart disease or diabetes). Borderline 130-160.  TRIGLYCERIDES: Desirable is less than 150.  Borderline is 150-200.      To help lower your LDL (bad cholesterol) you could start adding ground flax seed to your diet. The recommended dose is 2 heaping tablespoonfuls daily, you may want to start with 1 tablespoonful and increase to 2 heaping tablespoonfuls. You can mix or add ground flax seed to hot cereals, yogurt, applesauce, cottage cheese or any sauce mixture that is your portion. Ground flax seed can be found in the baking aisle near the flour.      As you may know, an elevated cholesterol is one factor that increases your risk for heart " disease and stroke. You can improve your cholesterol by controlling the amount and type of fat you eat and by increasing your daily activity level.    Here are some ways to improve your nutrition:  Eat less fat (especially butter, Crisco and other saturated fats)  Buy lean cuts of meat, reduce your portions of red meat or substitute poultry or fish  Use skim milk and low-fat dairy products  Eat no more than 4 egg yolks per week  Avoid fried or fast foods that are high in fat  Eat more fruits and vegetables      Also consider starting or increasing your aerobic activity. Aerobic activity is the best way to improve HDL (good) cholesterol. If this would be new to you, please talk with me first about what activities are safe for you.      Other lab results:    Your urine test did not show evidence of infection.    Your urine protein test was normal.    Your thyroid test was normal.    Your blood counts were normal.      Please feel free to contact us with any questions or if you would like more information.      Lauryn Allison M.D.

## 2024-12-12 ENCOUNTER — ANCILLARY PROCEDURE (OUTPATIENT)
Dept: MAMMOGRAPHY | Facility: CLINIC | Age: 74
End: 2024-12-12
Attending: FAMILY MEDICINE
Payer: COMMERCIAL

## 2024-12-12 DIAGNOSIS — Z12.31 SCREENING MAMMOGRAM, ENCOUNTER FOR: ICD-10-CM

## 2024-12-16 ENCOUNTER — TRANSFERRED RECORDS (OUTPATIENT)
Dept: HEALTH INFORMATION MANAGEMENT | Facility: CLINIC | Age: 74
End: 2024-12-16
Payer: COMMERCIAL

## 2024-12-23 ENCOUNTER — DOCUMENTATION ONLY (OUTPATIENT)
Dept: OTHER | Facility: CLINIC | Age: 74
End: 2024-12-23
Payer: COMMERCIAL

## 2025-01-09 ENCOUNTER — MYC MEDICAL ADVICE (OUTPATIENT)
Dept: UROLOGY | Facility: CLINIC | Age: 75
End: 2025-01-09
Payer: COMMERCIAL

## 2025-02-02 DIAGNOSIS — I48.0 PAROXYSMAL ATRIAL FIBRILLATION (H): ICD-10-CM

## 2025-02-03 RX ORDER — CARVEDILOL 6.25 MG/1
TABLET ORAL
Qty: 180 TABLET | Refills: 0 | Status: SHIPPED | OUTPATIENT
Start: 2025-02-03

## 2025-02-05 ENCOUNTER — OFFICE VISIT (OUTPATIENT)
Dept: FAMILY MEDICINE | Facility: CLINIC | Age: 75
End: 2025-02-05
Payer: COMMERCIAL

## 2025-02-05 VITALS
SYSTOLIC BLOOD PRESSURE: 153 MMHG | WEIGHT: 143.8 LBS | HEART RATE: 58 BPM | OXYGEN SATURATION: 99 % | RESPIRATION RATE: 18 BRPM | DIASTOLIC BLOOD PRESSURE: 92 MMHG | BODY MASS INDEX: 23.96 KG/M2 | TEMPERATURE: 97.1 F | HEIGHT: 65 IN

## 2025-02-05 DIAGNOSIS — E03.9 ACQUIRED HYPOTHYROIDISM: ICD-10-CM

## 2025-02-05 DIAGNOSIS — R39.89 SUSPECTED UTI: Primary | ICD-10-CM

## 2025-02-05 DIAGNOSIS — R39.89 SUSPECTED UTI: ICD-10-CM

## 2025-02-05 DIAGNOSIS — N81.6 RECTOCELE: ICD-10-CM

## 2025-02-05 DIAGNOSIS — I48.0 PAROXYSMAL ATRIAL FIBRILLATION (H): ICD-10-CM

## 2025-02-05 DIAGNOSIS — N36.42 INTRINSIC SPHINCTER DEFICIENCY: ICD-10-CM

## 2025-02-05 DIAGNOSIS — I10 BENIGN ESSENTIAL HYPERTENSION: ICD-10-CM

## 2025-02-05 DIAGNOSIS — E78.2 MIXED HYPERLIPIDEMIA: ICD-10-CM

## 2025-02-05 DIAGNOSIS — Z01.818 PREOP GENERAL PHYSICAL EXAM: Primary | ICD-10-CM

## 2025-02-05 DIAGNOSIS — N81.11 MIDLINE CYSTOCELE: ICD-10-CM

## 2025-02-05 LAB
ANION GAP SERPL CALCULATED.3IONS-SCNC: 12 MMOL/L (ref 7–15)
BUN SERPL-MCNC: 15.4 MG/DL (ref 8–23)
CALCIUM SERPL-MCNC: 9.3 MG/DL (ref 8.8–10.4)
CHLORIDE SERPL-SCNC: 103 MMOL/L (ref 98–107)
CREAT SERPL-MCNC: 0.82 MG/DL (ref 0.51–0.95)
EGFRCR SERPLBLD CKD-EPI 2021: 74 ML/MIN/1.73M2
GLUCOSE SERPL-MCNC: 92 MG/DL (ref 70–99)
HCO3 SERPL-SCNC: 26 MMOL/L (ref 22–29)
HGB BLD-MCNC: 13.3 G/DL (ref 11.7–15.7)
POTASSIUM SERPL-SCNC: 4.3 MMOL/L (ref 3.4–5.3)
SODIUM SERPL-SCNC: 141 MMOL/L (ref 135–145)

## 2025-02-05 PROCEDURE — 85018 HEMOGLOBIN: CPT | Performed by: FAMILY MEDICINE

## 2025-02-05 PROCEDURE — G2211 COMPLEX E/M VISIT ADD ON: HCPCS | Performed by: FAMILY MEDICINE

## 2025-02-05 PROCEDURE — 36415 COLL VENOUS BLD VENIPUNCTURE: CPT | Performed by: FAMILY MEDICINE

## 2025-02-05 PROCEDURE — 99214 OFFICE O/P EST MOD 30 MIN: CPT | Performed by: FAMILY MEDICINE

## 2025-02-05 PROCEDURE — 80048 BASIC METABOLIC PNL TOTAL CA: CPT | Performed by: FAMILY MEDICINE

## 2025-02-05 ASSESSMENT — PAIN SCALES - GENERAL: PAINLEVEL_OUTOF10: NO PAIN (0)

## 2025-02-05 NOTE — PATIENT INSTRUCTIONS
How to Take Your Medication Before Surgery  Preoperative Medication Instructions            Patient Education   Please call your cardiologist for instructions regarding your flecainide.   I will let you know about the apixaban (or if cardiology makes a recommendation that is fine too).   For herbal medications and supplements, do not take them for 14 days before surgery.   Do not take the losartan on the day of surgery.   Do not use topical medications on the day of surgery.   Preparing for Your Surgery  For Adults  Getting started  In most cases, a nurse will call to review your health history and instructions. They will give you an arrival time based on your scheduled surgery time. Please be ready to share:  Your doctor's clinic name and phone number  Your medical, surgical, and anesthesia history  A list of allergies and sensitivities  A list of medicines, including herbal treatments and over-the-counter drugs  Whether the patient has a legal guardian (ask how to send us the papers in advance)  Note: You may not receive a call if you were seen at our PAC (Preoperative Assessment Center).  Please tell us if you're pregnant--or if there's any chance you might be pregnant. Some surgeries may injure a fetus (unborn baby), so they require a pregnancy test. Surgeries that are safe for a fetus don't always need a test, and you can choose whether to have one.   Preparing for surgery  Within 10 to 30 days of surgery: Have a pre-op exam (sometimes called an H&P, or History and Physical). This can be done at a clinic or pre-operative center.  If you're having a , you may not need this exam. Talk to your care team.  At your pre-op exam, talk to your care team about all medicines you take. (This includes CBD oil and any drugs, such as THC, marijuana, and other forms of cannabis.) If you need to stop any medicine before surgery, ask when to start taking it again.  This is for your safety. Many medicines and drugs can  make you bleed too much during surgery. Some change how well surgery (anesthesia) drugs work.  Call your insurance company to let them know you're having surgery. (If you don't have insurance, call [phone].)  Call your clinic if there's any change in your health. This includes a scrape or scratch near the surgery site, or any signs of a cold (sore throat, runny nose, cough, rash, fever).  Eating and drinking guidelines  For your safety: Unless your surgeon tells you otherwise, follow the guidelines below.  Eat and drink as normal until 8 hours before you arrive for surgery. After that, no food or milk. You can spit out gum when you arrive.  Drink clear liquids until 2 hours before you arrive. These are liquids you can see through, like water, Gatorade, and Propel Water. They also include plain black coffee and tea (no cream or milk).  No alcohol for 24 hours before you arrive. The night before surgery, stop any drinks that contain THC.  If your care team tells you to take medicine on the morning of surgery, it's okay to take it with a sip of water. No other medicines or drugs are allowed (including CBD oil)--follow your care team's instructions.  If you have questions the day of surgery, call your hospital or surgery center.   Preventing infection  Shower or bathe the night before and the morning of surgery. Follow the instructions your clinic gave you. (If no instructions, use regular soap.)  Don't shave or clip hair near your surgery site. We'll remove the hair if needed.  Don't smoke or vape the morning of surgery. No chewing tobacco for 6 hours before you arrive. A nicotine patch is okay. You may spit out nicotine gum when you arrive.  For some surgeries, the surgeon will tell you to fully quit smoking and nicotine.  We will make every effort to keep you safe from infection. We will:  Clean our hands often with soap and water (or an alcohol-based hand rub).  Clean the skin at your surgery site with a  special soap that kills germs.  Give you a special gown to keep you warm. (Cold raises the risk of infection.)  Wear hair covers, masks, gowns, and gloves during surgery.  Give antibiotic medicine, if prescribed. Not all surgeries need this medicine.  What to bring on the day of surgery  Photo ID and insurance card  Copy of your health care directive, if you have one  Glasses and hearing aids (bring cases)  You can't wear contacts during surgery  Inhaler and eye drops, if you use them (tell us about these when you arrive)  CPAP machine or breathing device, if you use them  A few personal items, if spending the night  If you have . . .  A pacemaker, ICD (cardiac defibrillator), or other implant: Bring the ID card.  An implanted stimulator: Bring the remote control.  A legal guardian: Bring a copy of the certified (court-stamped) guardianship papers.  Please remove any jewelry, including body piercings. Leave jewelry and other valuables at home.  If you're going home the day of surgery  You must have a responsible adult drive you home. They should stay with you overnight as well.  If you don't have someone to stay with you, and you aren't safe to go home alone, we may keep you overnight. Insurance often won't pay for this.  After surgery  If it's hard to control your pain or you need more pain medicine, please call your surgeon's office.  Questions?   If you have any questions for your care team, list them here:   ____________________________________________________________________________________________________________________________________________________________________________________________________________________________________________________________  For informational purposes only. Not to replace the advice of your health care provider. Copyright   2003, 2019 [facility]. All rights reserved. Clinically reviewed by Brian Pennington MD. [employer] [id number] - REV 08/24.

## 2025-02-05 NOTE — PROGRESS NOTES
Preoperative Evaluation  [facility]  [address]  [address]  [place] [address]  Phone: [phone]  Fax: [phone]  Primary Provider: Lauryn Allison MD   Pre-op Performing Provider: Lauryn Allison MD   Feb 5, 2025 2/1/2025   Surgical Information   What procedure is being done? Colporrhaphy with enplace hysteropexy and cystoscopy   Facility or Hospital where procedure/surgery will be performed: [facility]   Who is doing the procedure / surgery? Jose August   Date of surgery / procedure: 02/11/2025   Time of surgery / procedure: Unknown   Where do you plan to recover after surgery? at home with family     Fax number for surgical facility: Note does not need to be faxed, will be available electronically in Epic.    Assessment & Plan     The proposed surgical procedure is considered INTERMEDIATE risk.    Preop general physical exam  Midline cystocele  Rectocele  Intrinsic sphincter deficiency  Procedure planned as above   UC ordered by Dr. August completed today     Paroxysmal atrial fibrillation (H)  Stable.   Message sent to Dr. Porras regarding periop instructions for medications. Per Dr. Porras, she should hold apixaban for 3 days prior to her procedure and continue flecainide and carvedilol.   Continues apixiban, flecainide and carvedilol.       Chronic anticoagulation  as above   CBC normal 12/3/24   Hgb today      Essential hypertension  There is a significant component of white coat hypertension.   Controlled by home measurements- she reports very good at home. No changes today.     10/24 BMP normal. Will recheck BMP today   Continues carvedilol 6.25mg bid, losartan 25mg daily co-managed with cardiology   Hold losartan the day of surgery.      Mixed hyperlipidemia  continues atorvastatin 20mg daily      Hypothyroidism, unspecified type  Continues levothyroxine 50mcg daily. TSH stable     Umbilical hernia  No complaints today.  Previously reported  occasionally sore.       Hearing loss, bilateral  Has bilateral hearing aids.               Risks and Recommendations  The patient has the following additional risks and recommendations for perioperative complications:  Cardiovascular:   - followed by cardiology for paroxysmal A fib, HTN. stable    Antiplatelet or Anticoagulation Medication Instructions   - apixaban (Eliquis), edoxaban (Savaysa), rivaroxaban (Xarelto): Bleeding risk is moderate or high for this procedure AND CrCl  (>=) 50 mL/min. DO NOT TAKE 3 days before surgery per cardiology      Additional Medication Instructions   - Herbal medications and vitamins: DO NOT TAKE 14 days prior to surgery.   - ACE/ARB/ARNI (lisinopril, enalapril, losartan, valsartan, olmesartan, sacubritril/valsartan) : DO NOT TAKE on day of surgery (minimum 11 hours for general anesthesia).   - Beta Blockers (atenolol, metoprolol, propranolol) : Continue taking on the day of surgery.   - Statins (atorvastatin, simvastatin, pravastatin) : Continue taking on the day of surgery.    - -will contact cardiology for instructions for flecainide   - Topicals: DO NOT TAKE day of surgery.    Recommendation  Approval given to proceed with proposed procedure, without further diagnostic evaluation.    Felix Nicholson is a 74 year old, presenting for the following:  Pre-Op Exam          2/5/2025    11:06 AM   Additional Questions   Roomed by Orin pearce   Accompanied by self     HPI related to upcoming procedure: Nicole Stanley is a 74 year old female who presents today for preop for colporrhaphy with enplace hysteropexy and cystoscopy for treatment of cystocele, rectocele, intrinsic sphincter deficiency.         2/1/2025   Pre-Op Questionnaire   Have you ever had a heart attack or stroke? No   Have you ever had surgery on your heart or blood vessels, such as a stent placement, a coronary artery bypass, or surgery on an artery in your head, neck, heart, or legs? No   Do you have chest pain with  activity? No   Do you have a history of heart failure? No   Do you currently have a cold, bronchitis or symptoms of other infection? No   Do you have a cough, shortness of breath, or wheezing? No   Do you or anyone in your family have previous history of blood clots? No   Do you or does anyone in your family have a serious bleeding problem such as prolonged bleeding following surgeries or cuts? (!) YES -dad had bleeding tendency   Have you ever had problems with anemia or been told to take iron pills? No   Have you had any abnormal blood loss such as black, tarry or bloody stools, or abnormal vaginal bleeding? No   Have you ever had a blood transfusion? No   Are you willing to have a blood transfusion if it is medically needed before, during, or after your surgery? Yes   Have you or any of your relatives ever had problems with anesthesia? No   Do you have sleep apnea, excessive snoring or daytime drowsiness? (!) YES - to clarify, she says she is tired frequently because she wakes up multiple times per night to urinate and  snores No sleep apnea or snoring.    Do you have a CPAP machine?  NO     Do you have any artifical heart valves or other implanted medical devices like a pacemaker, defibrillator, or continuous glucose monitor? No   Do you have artificial joints? No   Are you allergic to latex? No     Health Care Directive  Patient has a Health Care Directive on file      Preoperative Review of    reviewed - no record of controlled substances prescribed.          Patient Active Problem List    Diagnosis Date Noted    Midline cystocele 10/21/2024     Priority: Medium    Rectocele 10/21/2024     Priority: Medium    Intrinsic sphincter deficiency 10/21/2024     Priority: Medium    Incontinence of feces with fecal urgency 08/31/2023     Priority: Medium    OAB (overactive bladder) 08/31/2023     Priority: Medium    Left knee pain, unspecified chronicity 08/31/2021     Priority: Medium    Lateral pain of  hip 08/31/2021     Priority: Medium    Irritable larynx syndrome 06/04/2018     Priority: Medium    Muscle tension dysphonia 06/04/2018     Priority: Medium    Paroxysmal atrial fibrillation (H) 04/25/2018     Priority: Medium    Left ovarian cyst 09/15/2017     Priority: Medium    Uterovaginal prolapse 06/14/2017     Priority: Medium    Mixed incontinence 06/14/2017     Priority: Medium    Pelvic floor dysfunction 06/14/2017     Priority: Medium    Dyspareunia in female 06/14/2017     Priority: Medium    Hypothyroidism 05/05/2017     Priority: Medium    Essential hypertension 01/31/2017     Priority: Medium    Mixed hyperlipidemia 01/31/2017     Priority: Medium    Bunion, left 01/31/2017     Priority: Medium     And right      Osteoarthritis of both hands, unspecified osteoarthritis type 01/31/2017     Priority: Medium      Past Medical History:   Diagnosis Date    Arthritis     osteoarth    Heart disease 2018    Hypertension age 49    Influenza B 4/19/2018    Spider veins     Thyroid disease age 49     Past Surgical History:   Procedure Laterality Date    CHOLECYSTECTOMY  2000    COLONOSCOPY N/A 11/12/2020    Procedure: COLONOSCOPY;  Surgeon: Hugo Troncoso MD;  Location:  GI    CYSTOSCOPY      ENT SURGERY  2002    turbinectomy & septoplasty    EYE SURGERY      blepharoplasty    GI SURGERY  2006    rectal sphincter    IMPLANT STIMULATOR SACRAL NERVE STAGE ONE N/A 11/6/2023    Procedure: INSERTION, SACRAL NERVE STIMULATOR, STAGE 1;  Surgeon: Sheba Song MD;  [place] [facility] [place]    IMPLANT STIMULATOR SACRAL NERVE STAGE TWO N/A 11/20/2023    Procedure: INSERTION, SACRAL NERVE STIMULATOR, STAGE 2;  Surgeon: Sheba Song MD;  [place] [facility] [place]    VASCULAR SURGERY  1988    vein stripping     Current Outpatient Medications   Medication Sig Dispense Refill    Acetaminophen (TYLENOL PO) Take 500 mg by mouth every 8 hours as needed for mild pain or fever      apixaban ANTICOAGULANT (ELIQUIS  "ANTICOAGULANT) 5 MG tablet Take 1 tablet (5 mg) by mouth 2 times daily. 180 tablet 3    atorvastatin (LIPITOR) 20 MG tablet Take 1 tablet (20 mg) by mouth at bedtime. 90 tablet 3    calcium carbonate (OSCAL 500) 1250 (500 Ca) MG TABS tablet Take 1 tablet by mouth 2 times daily      carvedilol (COREG) 6.25 MG tablet TAKE 1 TABLET BY MOUTH TWICE A DAY WITH FOOD 180 tablet 0    cycloSPORINE (RESTASIS) 0.05 % ophthalmic emulsion Place 1 drop into both eyes 2 times daily      erythromycin (ROMYCIN) 5 MG/GM ophthalmic ointment       flecainide (TAMBOCOR) 50 MG tablet TAKE 1 TABLET BY MOUTH TWICE A  tablet 3    levothyroxine (SYNTHROID/LEVOTHROID) 50 MCG tablet Take 1 tablet (50 mcg) by mouth daily. 90 tablet 3    losartan (COZAAR) 25 MG tablet Take 1 tablet (25 mg) by mouth every evening. 90 tablet 3    MAGNESIUM GLYCINATE PO Take 300 mg by mouth every evening OTC unsure of dose.      minoxidil (ROGAINE) 5 % external solution       Probiotic Product (PROBIOTIC COLON SUPPORT PO) Take 1 tablet by mouth daily      zinc sulfate (ZINCATE) 220 (50 Zn) MG capsule Take 220 mg by mouth daily      Cholecalciferol (VITAMIN D3) 3000 units TABS Take 1 tablet by mouth daily         No Known Allergies     Social History     Tobacco Use    Smoking status: Never     Passive exposure: Never    Smokeless tobacco: Never   Substance Use Topics    Alcohol use: Yes     Comment: 1-2 glass of wine per day      History   Drug Use Unknown             Review of Systems  Constitutional, neuro, ENT, endocrine, pulmonary, cardiac, gastrointestinal, genitourinary, musculoskeletal, integument and psychiatric systems are negative, except as otherwise noted.    Objective    BP (!) 152/94 (BP Location: Right arm, Patient Position: Sitting, Cuff Size: Adult Regular)   Pulse 52   Resp 18   Ht 1.641 m (5' 4.6\")   Wt 65.2 kg (143 lb 12.8 oz)   LMP  (LMP Unknown)   SpO2 98%   BMI 24.23 kg/m     Estimated body mass index is 24.23 kg/m  as calculated " "from the following:    Height as of this encounter: 1.641 m (5' 4.6\").    Weight as of this encounter: 65.2 kg (143 lb 12.8 oz).  Physical Exam  GENERAL: alert and no distress  EYES: Eyes grossly normal to inspection, PERRL and conjunctivae and sclerae normal  HENT: ear canals and TM's normal, nose and mouth without ulcers or lesions  NECK: no adenopathy, no asymmetry, masses, or scars  RESP: lungs clear to auscultation - no rales, rhonchi or wheezes  CV: regular rate and rhythm, normal S1 S2, no S3 or S4, no murmur, click or rub, no peripheral edema  ABDOMEN: soft, nontender, no hepatosplenomegaly, no masses and bowel sounds normal  MS: no gross musculoskeletal defects noted, no edema  SKIN: no suspicious lesions or rashes  NEURO: Normal strength and tone, mentation intact and speech normal  PSYCH: mentation appears normal, affect normal/bright    Recent Labs   Lab Test 12/03/24  0923 11/27/24  0801 10/16/24  0845   HGB 13.4  --   --      --   --    NA  --   --  139   POTASSIUM  --   --  4.2   CR  --  1.0 0.87        Diagnostics  Labs pending at this time.  Results will be reviewed when available.   No EKG this visit, completed on 10/17/24  CTA showed normal coronary arteries with total Agatston score 0 on 11/27/24    Revised Cardiac Risk Index (RCRI)  The patient has the following serious cardiovascular risks for perioperative complications:   - No serious cardiac risks = 0 points     RCRI Interpretation: 0 points: Class I (very low risk - 0.4% complication rate)         Signed Electronically by: Lauryn Allison MD   A copy of this evaluation report is provided to the requesting physician.        "

## 2025-02-05 NOTE — Clinical Note
Mayur Porras,   Can you provide your recommendations for Lyn's medications (apixaban, flecainde, carvedilol) in the perioperative period? Her surgery is scheduled on 2/11/25. I also asked her to check in with you.   -Lauryn

## 2025-02-06 LAB — BACTERIA UR CULT: NORMAL

## 2025-02-06 NOTE — RESULT ENCOUNTER NOTE
Excellent! Please call or send a Medialive message if you have any questions. Lauryn Allison M.D.

## 2025-02-09 ENCOUNTER — ANESTHESIA EVENT (OUTPATIENT)
Dept: SURGERY | Facility: AMBULATORY SURGERY CENTER | Age: 75
End: 2025-02-09
Payer: COMMERCIAL

## 2025-02-10 ENCOUNTER — TELEPHONE (OUTPATIENT)
Dept: CARDIOLOGY | Facility: CLINIC | Age: 75
End: 2025-02-10
Payer: COMMERCIAL

## 2025-02-11 ENCOUNTER — HOSPITAL ENCOUNTER (OUTPATIENT)
Facility: AMBULATORY SURGERY CENTER | Age: 75
Discharge: HOME | End: 2025-02-11
Attending: UROLOGY
Payer: COMMERCIAL

## 2025-02-11 ENCOUNTER — ANESTHESIA (OUTPATIENT)
Dept: SURGERY | Facility: AMBULATORY SURGERY CENTER | Age: 75
End: 2025-02-11
Payer: COMMERCIAL

## 2025-02-11 VITALS
HEIGHT: 65 IN | OXYGEN SATURATION: 97 % | BODY MASS INDEX: 23.32 KG/M2 | RESPIRATION RATE: 16 BRPM | WEIGHT: 140 LBS | DIASTOLIC BLOOD PRESSURE: 81 MMHG | HEART RATE: 61 BPM | TEMPERATURE: 97.1 F | SYSTOLIC BLOOD PRESSURE: 129 MMHG

## 2025-02-11 DIAGNOSIS — N30.80 CYSTITIS CYSTICA: Primary | ICD-10-CM

## 2025-02-11 DIAGNOSIS — N81.11 MIDLINE CYSTOCELE: ICD-10-CM

## 2025-02-11 DIAGNOSIS — N81.6 RECTOCELE: ICD-10-CM

## 2025-02-11 DEVICE — BULKAMID URETHRAL BULKING SYSTEM CHARGE BY SYRINGE 50050: Type: IMPLANTABLE DEVICE | Site: PERITONEUM | Status: FUNCTIONAL

## 2025-02-11 RX ORDER — ONDANSETRON 2 MG/ML
INJECTION INTRAMUSCULAR; INTRAVENOUS PRN
Status: DISCONTINUED | OUTPATIENT
Start: 2025-02-11 | End: 2025-02-11

## 2025-02-11 RX ORDER — ACETAMINOPHEN 325 MG/1
650 TABLET ORAL
Status: DISCONTINUED | OUTPATIENT
Start: 2025-02-11 | End: 2025-02-12 | Stop reason: HOSPADM

## 2025-02-11 RX ORDER — FENTANYL CITRATE 50 UG/ML
25 INJECTION, SOLUTION INTRAMUSCULAR; INTRAVENOUS EVERY 5 MIN PRN
Status: DISCONTINUED | OUTPATIENT
Start: 2025-02-11 | End: 2025-02-11 | Stop reason: HOSPADM

## 2025-02-11 RX ORDER — PROPOFOL 10 MG/ML
INJECTION, EMULSION INTRAVENOUS PRN
Status: DISCONTINUED | OUTPATIENT
Start: 2025-02-11 | End: 2025-02-11

## 2025-02-11 RX ORDER — ONDANSETRON 4 MG/1
4 TABLET, ORALLY DISINTEGRATING ORAL EVERY 30 MIN PRN
Status: DISCONTINUED | OUTPATIENT
Start: 2025-02-11 | End: 2025-02-12 | Stop reason: HOSPADM

## 2025-02-11 RX ORDER — ACETAMINOPHEN 325 MG/1
975 TABLET ORAL ONCE
Status: DISCONTINUED | OUTPATIENT
Start: 2025-02-11 | End: 2025-02-11 | Stop reason: HOSPADM

## 2025-02-11 RX ORDER — FENTANYL CITRATE 50 UG/ML
50 INJECTION, SOLUTION INTRAMUSCULAR; INTRAVENOUS EVERY 5 MIN PRN
Status: DISCONTINUED | OUTPATIENT
Start: 2025-02-11 | End: 2025-02-11 | Stop reason: HOSPADM

## 2025-02-11 RX ORDER — SODIUM CHLORIDE, SODIUM LACTATE, POTASSIUM CHLORIDE, CALCIUM CHLORIDE 600; 310; 30; 20 MG/100ML; MG/100ML; MG/100ML; MG/100ML
INJECTION, SOLUTION INTRAVENOUS CONTINUOUS
Status: DISCONTINUED | OUTPATIENT
Start: 2025-02-11 | End: 2025-02-11 | Stop reason: HOSPADM

## 2025-02-11 RX ORDER — SULFAMETHOXAZOLE AND TRIMETHOPRIM 400; 80 MG/1; MG/1
1 TABLET ORAL 2 TIMES DAILY
Qty: 60 TABLET | Refills: 3 | Status: SHIPPED | OUTPATIENT
Start: 2025-02-11 | End: 2025-06-11

## 2025-02-11 RX ORDER — FENTANYL CITRATE 50 UG/ML
INJECTION, SOLUTION INTRAMUSCULAR; INTRAVENOUS PRN
Status: DISCONTINUED | OUTPATIENT
Start: 2025-02-11 | End: 2025-02-11

## 2025-02-11 RX ORDER — CEFAZOLIN SODIUM 2 G/50ML
2 SOLUTION INTRAVENOUS SEE ADMIN INSTRUCTIONS
Status: DISCONTINUED | OUTPATIENT
Start: 2025-02-11 | End: 2025-02-11 | Stop reason: HOSPADM

## 2025-02-11 RX ORDER — EPHEDRINE SULFATE 50 MG/ML
INJECTION, SOLUTION INTRAMUSCULAR; INTRAVENOUS; SUBCUTANEOUS PRN
Status: DISCONTINUED | OUTPATIENT
Start: 2025-02-11 | End: 2025-02-11

## 2025-02-11 RX ORDER — ONDANSETRON 2 MG/ML
4 INJECTION INTRAMUSCULAR; INTRAVENOUS EVERY 30 MIN PRN
Status: DISCONTINUED | OUTPATIENT
Start: 2025-02-11 | End: 2025-02-12 | Stop reason: HOSPADM

## 2025-02-11 RX ORDER — OXYCODONE HYDROCHLORIDE 5 MG/1
5 TABLET ORAL
Status: DISCONTINUED | OUTPATIENT
Start: 2025-02-11 | End: 2025-02-12 | Stop reason: HOSPADM

## 2025-02-11 RX ORDER — LIDOCAINE HYDROCHLORIDE 20 MG/ML
INJECTION, SOLUTION INFILTRATION; PERINEURAL PRN
Status: DISCONTINUED | OUTPATIENT
Start: 2025-02-11 | End: 2025-02-11

## 2025-02-11 RX ORDER — ACETAMINOPHEN 325 MG/1
975 TABLET ORAL ONCE
Status: COMPLETED | OUTPATIENT
Start: 2025-02-11 | End: 2025-02-11

## 2025-02-11 RX ORDER — HYDROMORPHONE HYDROCHLORIDE 1 MG/ML
0.2 INJECTION, SOLUTION INTRAMUSCULAR; INTRAVENOUS; SUBCUTANEOUS EVERY 5 MIN PRN
Status: DISCONTINUED | OUTPATIENT
Start: 2025-02-11 | End: 2025-02-11 | Stop reason: HOSPADM

## 2025-02-11 RX ORDER — ACETAMINOPHEN 650 MG/1
650 SUPPOSITORY RECTAL ONCE
Status: COMPLETED | OUTPATIENT
Start: 2025-02-11 | End: 2025-02-11

## 2025-02-11 RX ORDER — KETOROLAC TROMETHAMINE 30 MG/ML
INJECTION, SOLUTION INTRAMUSCULAR; INTRAVENOUS PRN
Status: DISCONTINUED | OUTPATIENT
Start: 2025-02-11 | End: 2025-02-11

## 2025-02-11 RX ORDER — HYDROMORPHONE HYDROCHLORIDE 1 MG/ML
0.4 INJECTION, SOLUTION INTRAMUSCULAR; INTRAVENOUS; SUBCUTANEOUS EVERY 5 MIN PRN
Status: DISCONTINUED | OUTPATIENT
Start: 2025-02-11 | End: 2025-02-11 | Stop reason: HOSPADM

## 2025-02-11 RX ORDER — ONDANSETRON 4 MG/1
4 TABLET, ORALLY DISINTEGRATING ORAL EVERY 30 MIN PRN
Status: DISCONTINUED | OUTPATIENT
Start: 2025-02-11 | End: 2025-02-11 | Stop reason: HOSPADM

## 2025-02-11 RX ORDER — DEXAMETHASONE SODIUM PHOSPHATE 10 MG/ML
4 INJECTION, SOLUTION INTRAMUSCULAR; INTRAVENOUS
Status: DISCONTINUED | OUTPATIENT
Start: 2025-02-11 | End: 2025-02-11 | Stop reason: HOSPADM

## 2025-02-11 RX ORDER — NALOXONE HYDROCHLORIDE 0.4 MG/ML
0.1 INJECTION, SOLUTION INTRAMUSCULAR; INTRAVENOUS; SUBCUTANEOUS
Status: DISCONTINUED | OUTPATIENT
Start: 2025-02-11 | End: 2025-02-12 | Stop reason: HOSPADM

## 2025-02-11 RX ORDER — NALOXONE HYDROCHLORIDE 0.4 MG/ML
0.1 INJECTION, SOLUTION INTRAMUSCULAR; INTRAVENOUS; SUBCUTANEOUS
Status: DISCONTINUED | OUTPATIENT
Start: 2025-02-11 | End: 2025-02-11 | Stop reason: HOSPADM

## 2025-02-11 RX ORDER — HYDROXYZINE HYDROCHLORIDE 10 MG/1
10 TABLET, FILM COATED ORAL
Status: DISCONTINUED | OUTPATIENT
Start: 2025-02-11 | End: 2025-02-12 | Stop reason: HOSPADM

## 2025-02-11 RX ORDER — ONDANSETRON 2 MG/ML
4 INJECTION INTRAMUSCULAR; INTRAVENOUS EVERY 30 MIN PRN
Status: DISCONTINUED | OUTPATIENT
Start: 2025-02-11 | End: 2025-02-11 | Stop reason: HOSPADM

## 2025-02-11 RX ORDER — DEXAMETHASONE SODIUM PHOSPHATE 4 MG/ML
INJECTION, SOLUTION INTRA-ARTICULAR; INTRALESIONAL; INTRAMUSCULAR; INTRAVENOUS; SOFT TISSUE PRN
Status: DISCONTINUED | OUTPATIENT
Start: 2025-02-11 | End: 2025-02-11

## 2025-02-11 RX ORDER — DEXAMETHASONE SODIUM PHOSPHATE 10 MG/ML
4 INJECTION, SOLUTION INTRAMUSCULAR; INTRAVENOUS
Status: DISCONTINUED | OUTPATIENT
Start: 2025-02-11 | End: 2025-02-12 | Stop reason: HOSPADM

## 2025-02-11 RX ORDER — PROPOFOL 10 MG/ML
INJECTION, EMULSION INTRAVENOUS CONTINUOUS PRN
Status: DISCONTINUED | OUTPATIENT
Start: 2025-02-11 | End: 2025-02-11

## 2025-02-11 RX ORDER — LABETALOL HYDROCHLORIDE 5 MG/ML
10 INJECTION, SOLUTION INTRAVENOUS
Status: DISCONTINUED | OUTPATIENT
Start: 2025-02-11 | End: 2025-02-11 | Stop reason: HOSPADM

## 2025-02-11 RX ORDER — BUPIVACAINE HYDROCHLORIDE 2.5 MG/ML
INJECTION, SOLUTION INFILTRATION; PERINEURAL PRN
Status: DISCONTINUED | OUTPATIENT
Start: 2025-02-11 | End: 2025-02-11 | Stop reason: HOSPADM

## 2025-02-11 RX ORDER — OXYCODONE HYDROCHLORIDE 5 MG/1
10 TABLET ORAL
Status: DISCONTINUED | OUTPATIENT
Start: 2025-02-11 | End: 2025-02-12 | Stop reason: HOSPADM

## 2025-02-11 RX ORDER — LIDOCAINE 40 MG/G
CREAM TOPICAL
Status: DISCONTINUED | OUTPATIENT
Start: 2025-02-11 | End: 2025-02-11 | Stop reason: HOSPADM

## 2025-02-11 RX ORDER — OXYCODONE HYDROCHLORIDE 5 MG/1
5 TABLET ORAL EVERY 6 HOURS PRN
Qty: 10 TABLET | Refills: 0 | Status: SHIPPED | OUTPATIENT
Start: 2025-02-11 | End: 2025-02-14

## 2025-02-11 RX ORDER — METHOCARBAMOL 750 MG/1
750 TABLET, FILM COATED ORAL
Status: DISCONTINUED | OUTPATIENT
Start: 2025-02-11 | End: 2025-02-12 | Stop reason: HOSPADM

## 2025-02-11 RX ORDER — ONDANSETRON 4 MG/1
4 TABLET, ORALLY DISINTEGRATING ORAL
Status: DISCONTINUED | OUTPATIENT
Start: 2025-02-11 | End: 2025-02-12 | Stop reason: HOSPADM

## 2025-02-11 RX ORDER — CEFAZOLIN SODIUM 2 G/50ML
2 SOLUTION INTRAVENOUS
Status: COMPLETED | OUTPATIENT
Start: 2025-02-11 | End: 2025-02-11

## 2025-02-11 RX ADMIN — ACETAMINOPHEN 975 MG: 325 TABLET ORAL at 07:07

## 2025-02-11 RX ADMIN — Medication 100 MCG: at 09:00

## 2025-02-11 RX ADMIN — LIDOCAINE HYDROCHLORIDE 60 MG: 20 INJECTION, SOLUTION INFILTRATION; PERINEURAL at 08:08

## 2025-02-11 RX ADMIN — FENTANYL CITRATE 50 MCG: 50 INJECTION, SOLUTION INTRAMUSCULAR; INTRAVENOUS at 08:18

## 2025-02-11 RX ADMIN — EPHEDRINE SULFATE 5 MG: 50 INJECTION, SOLUTION INTRAMUSCULAR; INTRAVENOUS; SUBCUTANEOUS at 09:00

## 2025-02-11 RX ADMIN — EPHEDRINE SULFATE 5 MG: 50 INJECTION, SOLUTION INTRAMUSCULAR; INTRAVENOUS; SUBCUTANEOUS at 08:19

## 2025-02-11 RX ADMIN — PROPOFOL 30 MG: 10 INJECTION, EMULSION INTRAVENOUS at 08:15

## 2025-02-11 RX ADMIN — SODIUM CHLORIDE, SODIUM LACTATE, POTASSIUM CHLORIDE, CALCIUM CHLORIDE: 600; 310; 30; 20 INJECTION, SOLUTION INTRAVENOUS at 07:07

## 2025-02-11 RX ADMIN — PROPOFOL 200 MCG/KG/MIN: 10 INJECTION, EMULSION INTRAVENOUS at 08:10

## 2025-02-11 RX ADMIN — EPHEDRINE SULFATE 5 MG: 50 INJECTION, SOLUTION INTRAMUSCULAR; INTRAVENOUS; SUBCUTANEOUS at 08:33

## 2025-02-11 RX ADMIN — Medication 0.5 MG: at 08:25

## 2025-02-11 RX ADMIN — KETOROLAC TROMETHAMINE 15 MG: 30 INJECTION, SOLUTION INTRAMUSCULAR; INTRAVENOUS at 09:31

## 2025-02-11 RX ADMIN — EPHEDRINE SULFATE 5 MG: 50 INJECTION, SOLUTION INTRAMUSCULAR; INTRAVENOUS; SUBCUTANEOUS at 08:26

## 2025-02-11 RX ADMIN — CEFAZOLIN SODIUM 2 G: 2 SOLUTION INTRAVENOUS at 08:10

## 2025-02-11 RX ADMIN — ONDANSETRON 4 MG: 2 INJECTION INTRAMUSCULAR; INTRAVENOUS at 09:31

## 2025-02-11 RX ADMIN — PROPOFOL 150 MG: 10 INJECTION, EMULSION INTRAVENOUS at 08:08

## 2025-02-11 RX ADMIN — PROPOFOL 30 MG: 10 INJECTION, EMULSION INTRAVENOUS at 08:18

## 2025-02-11 RX ADMIN — FENTANYL CITRATE 50 MCG: 50 INJECTION, SOLUTION INTRAMUSCULAR; INTRAVENOUS at 08:08

## 2025-02-11 RX ADMIN — DEXAMETHASONE SODIUM PHOSPHATE 4 MG: 4 INJECTION, SOLUTION INTRA-ARTICULAR; INTRALESIONAL; INTRAMUSCULAR; INTRAVENOUS; SOFT TISSUE at 08:08

## 2025-02-11 ASSESSMENT — ENCOUNTER SYMPTOMS: DYSRHYTHMIAS: 1

## 2025-02-11 NOTE — ANESTHESIA POSTPROCEDURE EVALUATION
Patient: Nicole Stanley    Procedure: Procedure(s):  COLPORRHAPHY, ANTERIOR, WITH ENPLACE HYSTEROPEXY AND CYSTOSCOPY (Support the cervix with non-absorbably sutures onto pelvic structures and look in the bladder)  AND PERIURETHRAL BULKING (inject filler into the walls of the urethra)       Anesthesia Type:  General    Note:  Disposition: Outpatient   Postop Pain Control: Uneventful            Sign Out: Well controlled pain   PONV: No   Neuro/Psych: Uneventful            Sign Out: Acceptable/Baseline neuro status   Airway/Respiratory: Uneventful            Sign Out: Acceptable/Baseline resp. status   CV/Hemodynamics: Uneventful            Sign Out: Acceptable CV status; No obvious hypovolemia; No obvious fluid overload   Other NRE: NONE   DID A NON-ROUTINE EVENT OCCUR? No       Last vitals:  Vitals Value Taken Time   /73 02/11/25 1001   Temp 36.2  C (97.1  F) 02/11/25 1000   Pulse 58 02/11/25 1001   Resp 16 02/11/25 1000   SpO2 95 % 02/11/25 1002   Vitals shown include unfiled device data.    Electronically Signed By: Leatha Morse MD  February 11, 2025  11:04 AM

## 2025-02-11 NOTE — ANESTHESIA CARE TRANSFER NOTE
Patient: Nicole Stanley    Procedure: Procedure(s):  COLPORRHAPHY, ANTERIOR, WITH ENPLACE HYSTEROPEXY AND CYSTOSCOPY (Support the cervix with non-absorbably sutures onto pelvic structures and look in the bladder)  AND PERIURETHRAL BULKING (inject filler into the walls of the urethra)       Diagnosis: Midline cystocele [N81.11]  Rectocele [N81.6]  Intrinsic sphincter deficiency [N36.42]  Diagnosis Additional Information: No value filed.    Anesthesia Type:   General     Note:    Oropharynx: oropharynx clear of all foreign objects and spontaneously breathing  Level of Consciousness: drowsy  Oxygen Supplementation: face mask  Level of Supplemental Oxygen (L/min / FiO2): 6  Independent Airway: airway patency satisfactory and stable  Dentition: dentition unchanged  Vital Signs Stable: post-procedure vital signs reviewed and stable  Report to RN Given: handoff report given  Patient transferred to: PACU  Comments: Resps easy and reg. Report to PACU RN   Handoff Report: Identifed the Patient, Identified the Reponsible Provider, Reviewed the pertinent medical history, Discussed the surgical course, Reviewed Intra-OP anesthesia mangement and issues during anesthesia, Set expectations for post-procedure period and Allowed opportunity for questions and acknowledgement of understanding      Vitals:  Vitals Value Taken Time   /71 02/11/25 0941   Temp 36.2  C (97.1  F) 02/11/25 0941   Pulse 57 02/11/25 0943   Resp 11 02/11/25 0943   SpO2 99 % 02/11/25 0943   Vitals shown include unfiled device data.    Electronically Signed By: YAAKOV Lee CRNA  February 11, 2025  9:44 AM

## 2025-02-11 NOTE — BRIEF OP NOTE
[facility] [place]    Brief Operative Note    Pre-operative diagnosis: Midline cystocele [N81.11]  Rectocele [N81.6]  Intrinsic sphincter deficiency [N36.42]  Post-operative diagnosis Same as pre-operative diagnosis    Procedure: COLPORRHAPHY, ANTERIOR, WITH ENPLACE HYSTEROPEXY AND CYSTOSCOPY (Support the cervix with non-absorbably sutures onto pelvic structures and look in the bladder), N/A - Perineum  AND PERIURETHRAL BULKING (inject filler into the walls of the urethra), N/A - Urethra    Surgeon: Surgeons and Role:     * Kodak August MD - Primary     * Liss Tavera MD - Resident - Assisting  Anesthesia: General   Estimated Blood Loss: 5 mL    Drains: None  Specimens: * No specimens in log *  Findings:   None.  Complications: None.  Implants:   Implant Name Type Inv. Item Serial No.  Lot No. LRB No. Used Action   BULKAMID URETHRAL BULKING SYSTEM CHARGE BY SYRINGE 32130 - [id number] Other BULKAMID URETHRAL BULKING SYSTEM CHARGE BY SYRINGE 40876  [employer] [id number] N/A 2 Implanted   Enplace     [id number] N/A 1 Implanted

## 2025-02-11 NOTE — ANESTHESIA PREPROCEDURE EVALUATION
Anesthesia Pre-Procedure Evaluation    Patient: Nicole Stanley   MRN: [id number] : 1950        Procedure : Procedure(s):  COLPORRHAPHY, ANTERIOR, WITH ENPLACE HYSTEROPEXY AND CYSTOSCOPY (Support the cervix with non-absorbably sutures onto pelvic structures and look in the bladder)  AND PERIURETHRAL BULKING (inject filler into the walls of the urethra)          Past Medical History:   Diagnosis Date     Arthritis     osteoarth     Heart disease 2018     Hypertension age 49     Influenza B 2018     Spider veins      Thyroid disease age 49      Past Surgical History:   Procedure Laterality Date     CHOLECYSTECTOMY       COLONOSCOPY N/A 2020    Procedure: COLONOSCOPY;  Surgeon: Hugo Troncoso MD;  Location:  GI     CYSTOSCOPY       ENT SURGERY      turbinectomy & septoplasty     EYE SURGERY      blepharoplasty     GI SURGERY      rectal sphincter     IMPLANT STIMULATOR SACRAL NERVE STAGE ONE N/A 2023    Procedure: INSERTION, SACRAL NERVE STIMULATOR, STAGE 1;  Surgeon: Sheba Song MD;  Location: [facility] OR     IMPLANT STIMULATOR SACRAL NERVE STAGE TWO N/A 2023    Procedure: INSERTION, SACRAL NERVE STIMULATOR, STAGE 2;  Surgeon: Sheba Song MD;  [place]: [facility] [place]     VASCULAR SURGERY  1988    vein stripping      No Known Allergies   Social History     Tobacco Use     Smoking status: Never     Passive exposure: Never     Smokeless tobacco: Never   Substance Use Topics     Alcohol use: Yes     Comment: 1-2 glass of wine per day      Wt Readings from Last 1 Encounters:   25 63.5 kg (140 lb)        Anesthesia Evaluation   Pt has had prior anesthetic. Type: General and MAC.    No history of anesthetic complications       ROS/MED HX  ENT/Pulmonary:       Neurologic:  - neg neurologic ROS     Cardiovascular:     (+) Dyslipidemia hypertension- -   -  - -                        dysrhythmias, a-fib,             METS/Exercise Tolerance: 4 - Raking leaves,  "gardening    Hematologic:  - neg hematologic  ROS     Musculoskeletal:  - neg musculoskeletal ROS     GI/Hepatic:  - neg GI/hepatic ROS     Renal/Genitourinary: Comment: UI, s/p SNS  Pelvic floor prolapse      Endo:     (+)          thyroid problem, hypothyroidism,           Psychiatric/Substance Use:  - neg psychiatric ROS     Infectious Disease:  - neg infectious disease ROS     Malignancy:   (+) Malignancy, History of Skin.Skin CA status post Surgery.      Other:          Physical Exam    Airway        Mallampati: I   TM distance: > 3 FB   Neck ROM: full   Mouth opening: > 3 cm    Respiratory Devices and Support         Dental       (+) Minor Abnormalities - some fillings, tiny chips      Cardiovascular   cardiovascular exam normal       Rhythm and rate: regular and normal     Pulmonary   pulmonary exam normal        breath sounds clear to auscultation       OUTSIDE LABS:  CBC:   Lab Results   Component Value Date    WBC 4.3 12/03/2024    WBC 5.0 10/12/2023    HGB 13.3 02/05/2025    HGB 13.4 12/03/2024    HCT 40.7 12/03/2024    HCT 38.2 10/12/2023     12/03/2024     10/12/2023     BMP:   Lab Results   Component Value Date     02/05/2025     10/16/2024    POTASSIUM 4.3 02/05/2025    POTASSIUM 4.2 10/16/2024    CHLORIDE 103 02/05/2025    CHLORIDE 100 10/16/2024    CO2 26 02/05/2025    CO2 28 10/16/2024    BUN 15.4 02/05/2025    BUN 15.1 10/16/2024    CR 0.82 02/05/2025    CR 1.0 11/27/2024    GLC 92 02/05/2025    GLC 84 10/16/2024     COAGS:   Lab Results   Component Value Date    INR 1.09 12/13/2022     POC: No results found for: \"BGM\", \"HCG\", \"HCGS\"  HEPATIC:   Lab Results   Component Value Date    ALBUMIN 4.3 10/12/2023    PROTTOTAL 7.1 10/12/2023    ALT 14 10/12/2023    AST 17 10/12/2023    ALKPHOS 60 10/12/2023    BILITOTAL 0.5 10/12/2023     OTHER:   Lab Results   Component Value Date    LACT 0.8 04/18/2018    ELISE 9.3 02/05/2025    PHOS 3.4 12/13/2022    MAG 2.3 12/13/2022    TSH " 3.68 12/03/2024    CRP <2.9 06/04/2018    SED 9 06/04/2018       Anesthesia Plan    ASA Status:  3    NPO Status:  NPO Appropriate    Anesthesia Type: General.     - Airway: LMA   Induction: Intravenous, Propofol.   Maintenance: TIVA.        Consents    Anesthesia Plan(s) and associated risks, benefits, and realistic alternatives discussed. Questions answered and patient/representative(s) expressed understanding.     - Discussed:     - Discussed with:  Patient      - Extended Intubation/Ventilatory Support Discussed: No.      - Patient is DNR/DNI Status: No     Use of blood products discussed: No .     Postoperative Care    Pain management: Multi-modal analgesia.   PONV prophylaxis: Ondansetron (or other 5HT-3), Background Propofol Infusion, Dexamethasone or Solumedrol     Comments:             Leatha Morse MD    I have reviewed the pertinent notes and labs in the chart from the past 30 days and (re)examined the patient.  Any updates or changes from those notes are reflected in this note.    Clinically Significant Risk Factors Present on Admission                # Drug Induced Coagulation Defect: home medication list includes an anticoagulant medication    # Hypertension: Noted on problem list

## 2025-02-11 NOTE — ANESTHESIA PROCEDURE NOTES
Airway       Patient location during procedure: OR  Staff -        CRNA: Renetta Taylor APRN CRNA       Performed By: CRNA  Consent for Airway        Urgency: elective  Indications and Patient Condition       Indications for airway management: romeo-procedural       Induction type:intravenous       Mask difficulty assessment: 1 - vent by mask    Final Airway Details       Final airway type: supraglottic airway    Supraglottic Airway Details        Type: LMA       Brand: LMA Unique       LMA size: 4    Post intubation assessment        Placement verified by: capnometry, equal breath sounds and chest rise        Number of attempts at approach: 1       Secured with: tape       Ease of procedure: easy       Dentition: Intact and Unchanged

## 2025-02-11 NOTE — DISCHARGE INSTRUCTIONS
Activity  - No strenuous exercise for 6 weeks.  - No lifting, pushing, pulling more than 10 pounds for 6 weeks.   - Do not strain with bowel movements.  - Do not drive until you can press the brake pedal quickly and fully without pain.   - Do not operate a motor vehicle while taking narcotic pain medications.     Diet  You may resume your regular post-procedure diet  Many patients do not regain their full appetite for several weeks after surgery  Take it slow, eating small meals frequently  If you notice you are passing less gas or feeling bloated, stop eating solid foods and stick to liquids for the next several hours until you begin to pass gas again.  You are not required to have a bowel movement prior to leaving the hospital. Some patients take several days for bowel movements to return due to anesthesia and pain medications.     Incisions  - You may shower and get incisions wet starting 48 hrs after surgery.  - Do not scrub incisions or submerge wounds for 2 weeks or until seen in follow-up.   - Remove wound dressing 48 hours after surgery.   - Leave incision open to air. Cover with gauze only if needed for comfort or to protect clothing from drainage.   - The stitches do not need to be removed, they will dissolve on their own.    Medications  - Bactrim 400-80 x 3 months for cystitis cystica  - Transition from narcotic pain medications to tylenol (acetaminophen) as you are able.  Wean yourself off all pain medications as you are able.  - Some pain medications contain both tylenol (acetaminophen) and a narcotic (Norco, vicodin, percocet), do not take more than 4,000mg of Tylenol (acetaminophen) from all sources in any 24 hour period.  - Narcotics can make you constipated.  Take over the counter fiber (metamucil or benefiber) and stool softeners (miralax, docusate or senna) while taking narcotic pain medications, but stop if you develop diarrhea.  - No driving or operating machinery while taking narcotic  "pain medications     Follow-Up:  - Follow up with Dr. August on 02/24/2025.  - Call or return sooner than your regularly scheduled visit if you develop any of the following: fever (greater than 101.5), uncontrolled pain, uncontrolled nausea or vomiting, or inability to urinate.    Phone numbers:   - Monday through Friday 8am to 4:30pm: Call [phone] with questions, requests for medication refills, or to schedule or confirm an appointment.  - Nights or weekends: call the after hours emergency pager - [phone] and tell the  \"I would like to page the Urology Resident on call.\" Please note, due to prescribing laws, resident physicians are unable to prescribe narcotics after-hours. If you feel as though you will need a refill of a narcotic pain medication, you will need to call the clinic during business hours OR seek emergency care.  - For emergencies, call 911      [facility], [place] - Same-Day Surgery   Adult Discharge Orders & Instructions     For 24 hours after surgery:    Get plenty of rest.  A responsible adult must stay with you for at least 24 hours after you leave the hospital.   Do not drive or use heavy equipment.  If you have weakness or tingling, don't drive or use heavy equipment until this feeling goes away.  Do not drink alcohol for a minimum of 24 hours after surgery.    Avoid strenuous or risky activities.  Ask for help when climbing stairs.   You may feel lightheaded.  IF so, sit for a few minutes before standing.  Have someone help you get up.     You may have a slight fever. Call the doctor if your fever is over 100 F (37.7 C) (taken under the tongue) or lasts longer than 24 hours.  You may have a dry mouth, a sore throat, muscle aches or trouble sleeping.  These should go away after 24 hours.  Do not make important or legal decisions.     Diet  - If you have nausea (feel sick to your stomach): Drink only clear liquids such as apple juice, ginger ale, " broth or 7-Up.  Rest may also help.  Be sure to drink enough fluids. Move to a regular diet as you feel able.    Activity  - No strenuous exercise for 6 weeks.  - No lifting, pushing, pulling more than 10 pounds for 6 weeks.   - Do not strain with bowel movements.  - Do not drive until you can press the brake pedal quickly and fully without pain.   - Do not operate a motor vehicle while taking narcotic pain medications.   - You may notice more blood in the urine with increasing physical activity. This is normal and is not a cause for concern unless the urine becomes dark maroon in color, contains clots larger than a quarter, or if you cannot urinate.     Medications  - Transition from narcotic pain medications to tylenol (acetaminophen) as you are able.  Wean yourself off all pain medications as you are able.  - Some pain medications contain both tylenol (acetaminophen) and a narcotic (Norco, vicodin, percocet), do not take more than 4,000mg of Tylenol (acetaminophen) from all sources in any 24 hour period.  - Narcotics can make you constipated.  Take over the counter fiber (metamucil or benefiber) and stool softeners (miralax, docusate or senna) while taking narcotic pain medications, but stop if you develop diarrhea.  - No driving or operating machinery while taking narcotic pain medications     Follow-Up:  - Follow-up with your surgeon as scheduled   - Call or return sooner than your regularly scheduled visit if you develop any of the following: fever (greater than 101.5), uncontrolled pain, uncontrolled nausea or vomiting, as well as increased redness, swelling, or drainage from your wound.     What to expect:    Many individuals who undergo the Bulkamid procedure do not experience any long-lasting complications. However, there are several potential Bulkamid procedure side effects associated with the surgery.    Inability to urinate--This is usually a temporary complication associated with Bulkamid. A urinary  "catheter may be necessary to help drain urine from your bladder.  Painful urination--You may experience discomfort or a burning sensation when urinating.  Infection--You might develop a urinary tract infection. During future surgeries, you may be more susceptible to infection.  Skin discoloration--The injection site might get red or slightly discolored.  Pain--You can feel pain at the injection site.  Worsening incontinence--The Bulkamid procedure is not always successful in treating incontinence. A small percentage of patients may even experience an increase in urinary leakage.  Urge incontinence--You might feel a strong desire to urinate regardless of whether or not your bladder is full.    Phone numbers:   - Monday through Friday 8am to 4:30pm: Call [phone] with questions, requests for medication refills, or to schedule or confirm an appointment.  - Nights or weekends: call the after hours emergency pager - [phone] and tell the  \"I would like to page the Urology Resident on call.\" Please note, due to prescribing laws, resident physicians are unable to prescribe narcotics after-hours. If you feel as though you will need a refill of a narcotic pain medication, you will need to call the clinic during business hours OR seek emergency care.  - For emergencies, call 911                 [facility]  Home Care Following Anesthesia  For 24 hours after surgery:  Get plenty of rest.  A responsible adult must stay with you for at least 24 hours after you leave the surgery center.  Do not drive or use heavy equipment.  If you have weakness or tingling, don't drive or use heavy equipment until this feeling goes away.   Do not drink alcohol.   Avoid strenuous or risky activities.  Ask for help when climbing stairs.  You may feel lightheaded.  IF so, sit for a few minutes before standing.  Have someone help you get up.   If you have nausea (feel sick to your stomach): Drink " only clear liquids such as apple juice, ginger ale, broth or 7-Up.  Rest may also help.  Be sure to drink enough fluids.  Move to a regular diet as you feel able.   You may have a slight fever.  Call the doctor if your fever is over 100 F (37.7 C) (taken under the tongue) or lasts longer than 24 hours.  You may have a dry mouth, a sore throat, muscle aches or trouble sleeping. These should go away after 24 hours.  Do not make important or legal decisions.   It is recommended to avoid smoking.               Tips for taking pain medications  To get the best pain relief possible, remember these points:  Take pain medications as directed, before pain becomes severe.  Pain medication can upset your stomach: taking it with food may help.  Constipation is a common side effect of pain medication. Drink plenty of  fluids.  Eat foods high in fiber. Take a stool softener if recommended by your doctor or pharmacist.  Do not drink alcohol, drive or operate machinery while taking pain medications.  Ask about other ways to control pain, such as with heat, ice or relaxation.    Tylenol/Acetaminophen Consumption    If you feel your pain relief is insufficient, you may take Tylenol/Acetaminophen in addition to your narcotic pain medication.   Be careful not to exceed 4,000 mg of Tylenol/Acetaminophen in a 24 hour period from all sources.  If you are taking extra strength Tylenol/acetaminophen (500 mg), the maximum dose is 8 tablets in 24 hours.  If you are taking regular strength acetaminophen (325 mg), the maximum dose is 12 tablets in 24 hours.    Call a doctor for any of the following:  Signs of infection (fever, growing tenderness at the surgery site, a large amount of drainage or bleeding, severe pain, foul-smelling drainage, redness, swelling).  It has been over 8 to 10 hours since surgery and you are still not able to urinate (pass water).  Headache for over 24 hours.  Numbness, tingling or weakness the day after surgery (if  you had spinal anesthesia).  Signs of Covid-19 infection (temperature over 100 degrees, shortness of breath, cough, loss of taste/smell, generalized body aches, persistent headache, chills, sore throat, nausea/vomiting/diarrhea)    Your doctor is:    Dr. Kodak August, Prostate and Urology: [phone]               After hours and weekends call the hospital @ [phone] and ask for the resident on call for:  Prostate Urology  For emergency care, call the:  [facility]:  [phone] (TTY for hearing impaired: [phone])    Tylenol 975 mg given at 7:07 am.   Ok to take more after 1:00 pm today.    Toradol 15 mg given at  9:30 am.  Do not take any NSAIDs for 4 hours.  OK after 1:30 pm today.  (Ibuprofen, Advil, Motrin, Naproxen, Aleve).

## 2025-02-24 ENCOUNTER — TELEPHONE (OUTPATIENT)
Dept: UROLOGY | Facility: CLINIC | Age: 75
End: 2025-02-24

## 2025-02-24 ENCOUNTER — OFFICE VISIT (OUTPATIENT)
Dept: UROLOGY | Facility: CLINIC | Age: 75
End: 2025-02-24
Payer: COMMERCIAL

## 2025-02-24 DIAGNOSIS — R39.15 URGENCY OF URINATION: ICD-10-CM

## 2025-02-24 DIAGNOSIS — N95.8 GENITOURINARY SYNDROME OF MENOPAUSE: ICD-10-CM

## 2025-02-24 DIAGNOSIS — N81.6 RECTOCELE: ICD-10-CM

## 2025-02-24 DIAGNOSIS — N39.41 URGE INCONTINENCE: Primary | ICD-10-CM

## 2025-02-24 DIAGNOSIS — N81.11 MIDLINE CYSTOCELE: ICD-10-CM

## 2025-02-24 RX ORDER — ESTRADIOL 0.1 MG/G
2 CREAM VAGINAL
Qty: 42.5 G | Refills: 11 | Status: SHIPPED | OUTPATIENT
Start: 2025-02-24

## 2025-02-24 NOTE — TELEPHONE ENCOUNTER
Prior Authorization Not Processed    Medication: GEMTESA 75 MG PO TABS  Insurance Company: [employer] - Phone [phone] Fax [phone]  Expected CoPay: $    Pharmacy Filling the Rx: [id number] - [place], [place] - [address]  Pharmacy Notified:   Patient Notified:     This was submitted via EPIC EPA and is currently under review

## 2025-02-24 NOTE — PATIENT INSTRUCTIONS
-try increasing setting.  -add vibegron to help with symptoms  -use estrogen cream  -f/u in 2 month virtually for symptoms check

## 2025-02-24 NOTE — TELEPHONE ENCOUNTER
Prior Authorization Retail Medication Request    Medication/Dose: vibegron (GEMTESA) 75 MG TABS tablet  Diagnosis and ICD code (if different than what is on RX):    New/renewal/insurance change PA/secondary ins. PA:  Previously Tried and Failed:    Rationale:      Insurance   Primary:   Insurance ID:      Secondary (if applicable):  Insurance ID:      Pharmacy Information (if different than what is on RX)  Name:    Phone:   Fax:    Clinic Information  Preferred routing pool for dept communication: P UROLOGY ADULT [facility]

## 2025-02-24 NOTE — PROGRESS NOTES
Reason for visit:  f/u on hysteropexy and periurethral bulking from 2/11/25    Clinical data: Nicole Stanley is a 74 year old female with pelvic organ prolapse as well as urinary frequency and urgency and stress urinary incontinence.  She also has an interstim in place that since November of 2023 and feels the stimulation in the rectum.  She is at level 1.75.  She was also have fecal incontinence and starting a fiber wafer and some adjustments it has improved.  She has some urinary urgency and urge incontinence.    On exam:  Well supported  No stress incontinence      Reviewed UDS from 6/6/24  Maximum cystometric capacity 495 mL with intact filling sensations.  Good bladder compliance without detrusor overactivity.  No stress urinary incontinence while seated, but there is a moderate volume leak upon standing up at capacity.  Maximum detrusor contraction during voiding reaches 16 cm H2O. She voids 315 mL with Qmax 13 ml/s, intermittent flow curve, increased EMG activity (suspect technical artifact), incomplete bladder emptying ( mL), and no evidence for bladder outlet obstruction (BOOI -18.2). Note that slightly elevated PVR is consistent with pre-void PVR of 200 mL.       Assessment & Plan   73 y/o female with pelvic organ prolapse and urgency and urge incontinence. She underwent hysteropexy with enplace and periuethral bulking and is doing well, however she continues to have urinary urgency and some urge incontinence.  -try increasing setting.  -add vibegron to help with symptoms  -use estrogen cream  -f/u in 2 month virtually for symptoms check    Kodak August MD  [facility]

## 2025-02-24 NOTE — NURSING NOTE
Nicole Stanley's goals for this visit include:   Chief Complaint   Patient presents with    Post op     COLPORRHAPHY, ANTERIOR, WITH ENPLACE HYSTEROPEXY AND CYSTOSCOPY (Support the cercice with non-absorbably sutures onto pelvic structures and look in the bladder) N/A General  AND PERIURETHRAL BULKING          She requests these members of her care team be copied on today's visit information:       PCP: Lauryn Allison    Referring Provider:  Referred Self, MD  No address on file    LMP  (LMP Unknown)     Do you need any medication refills at today's visit?     Lisa Cantor LPN on 2/24/2025 at 1:43 PM

## 2025-03-03 ENCOUNTER — MYC MEDICAL ADVICE (OUTPATIENT)
Dept: UROLOGY | Facility: CLINIC | Age: 75
End: 2025-03-03
Payer: COMMERCIAL

## 2025-03-04 NOTE — TELEPHONE ENCOUNTER
Kodak August MD Rangen, Laurie, RN58 minutes ago (8:10 AM)      No it is 6 weeks of no lifting and no baths or swimming and nothing in the vagina.    Received above message from provider.    Robyn Ramirez MA on 3/4/2025 at 9:10 AM

## 2025-03-24 ENCOUNTER — VIRTUAL VISIT (OUTPATIENT)
Dept: UROLOGY | Facility: CLINIC | Age: 75
End: 2025-03-24
Payer: COMMERCIAL

## 2025-03-24 VITALS — BODY MASS INDEX: 23.32 KG/M2 | WEIGHT: 140 LBS | HEIGHT: 65 IN

## 2025-03-24 DIAGNOSIS — N39.46 MIXED INCONTINENCE: ICD-10-CM

## 2025-03-24 DIAGNOSIS — N39.41 URGE INCONTINENCE: ICD-10-CM

## 2025-03-24 DIAGNOSIS — N39.492 POSTURAL (URINARY) INCONTINENCE: ICD-10-CM

## 2025-03-24 DIAGNOSIS — R15.9 INCONTINENCE OF FECES, UNSPECIFIED FECAL INCONTINENCE TYPE: ICD-10-CM

## 2025-03-24 DIAGNOSIS — N36.42 INTRINSIC SPHINCTER DEFICIENCY: ICD-10-CM

## 2025-03-24 DIAGNOSIS — N32.81 OAB (OVERACTIVE BLADDER): ICD-10-CM

## 2025-03-24 DIAGNOSIS — Z48.89 ENCOUNTER FOR POSTOPERATIVE CARE: Primary | ICD-10-CM

## 2025-03-24 PROCEDURE — 99024 POSTOP FOLLOW-UP VISIT: CPT | Mod: 95 | Performed by: PHYSICIAN ASSISTANT

## 2025-03-24 ASSESSMENT — PAIN SCALES - GENERAL: PAINLEVEL_OUTOF10: NO PAIN (0)

## 2025-03-24 NOTE — PROGRESS NOTES
Video-Visit Details    Type of service:  Video Visit    Video Start Time: 8:57 AM    Video End Time:9:08 AM    Originating Location (pt. Location): Home    Distant Location (provider location): onsite    Platform used for Video Visit: [facility]    Urology Clinic    Name: Nicole Stanley    MRN: [id number]   YOB: 1950  Accompanied at today's visit by:self              Assessment and Plan:   74 year old female with  hx of uterovaginal prolapse, cystocele, IVAN, OAB, postural incontinence, IC. Hx complicated by MS. She had medtronic placed in 11/2023 and has been doing well on program A since 3/2024. Was advised by Dr. Song on 6/6/24 to perform ISC BID. Is s/p anterior repair, enplace hysteropexy and cysto with Dr. August on 2/11/25.    - keep follow-up appointment with Dr. August.  - continue estrogen cream.  - stop gemtesa as having feeling of incomplete emptying.  - encourage taking her time with voiding and postural changes.  - continue on program A.   - continue wafers for bowels.   - though had bulking, question if may need to resume ISC if continues to have feeling of incomplete emptying.   - After discussing the assessment and plan with patient, patient verbalized understanding and agreed to the above plan. All questions answered.     22 minutes were spent today on the date of the encounter in reviewing the EMR, direct patient care, coordination of care and documentation in addition to review of op note, review of Dr. August's note, review of Dr. Song's note.     Lucina Emery PA-C  March 24, 2025    Patient Care Team:  Lauryn Allison MD as PCP - General (Family Practice)  Jacinda Saleem MD as MD (Otolaryngology)  Maile Leonardo MD as MD (Cardiology)  Lauryn Allison MD as Assigned PCP  Giovanna Kothari PA-C as Physician Assistant (Dermatology)  Lauryn Allison MD as Referring Physician (Family Medicine)  Sheba Song MD as MD (Urology)  Maddy Sandy PA-C as Physician  "Assistant (Gastroenterology)  Sheba Song MD as Assigned Surgical Provider  Trinh Porras MD as MD (Cardiovascular Disease)  Ben Pierre MD as Surgeon (Surgery)  Trinh Porras MD as Assigned Heart and Vascular Provider  SELF, REFERRED          Chief Complaint:   Post-op follow-up          History of Present Illness:   March 24, 2025    HISTORY: Nicole Stanley is a 74 year old female is here for follow-up. We have been following her for hx of uterovaginal prolapse, cystocele, IVAN, OAB, postural incontinence, IC. Hx complicated by MS. She had medtronic placed in 11/2023 and has been doing well on program A since 3/2024. Had UDS on 6/6/24 showing some USI that is worse with prolapse reduction, not able to ferrari empty and no DO/DOI. Was advised by Dr. Song on 6/6/24 to perform ISC BID. Is s/p anterior repair, enplace hysteropexy and cysto with Dr. August on 2/11/25. She then had follow-up appointment with Dr. August on 2/24/25 and added gemesa and vaginal estrogen cream. Here today for follow-up. Denies vaginal pain, bleeding. Very pleased with results of vaginal prolapse repair surgery. Her incontinence has significantly improved since surgery. Continues on program A and currently on amplitude 2.2. States she started the gemtesa on 3/17/25 and states she felt \"hot\" after she took the medication. She is unsure if unable to empty bladder. Patient reports, Dr. August advised her to stop ISC. But because of her sx of incomplete emptying she cathed herself and had 100cc. Continues to take wafers. Her FI continues to be significantly improved since adjusting to program A and starting the wafers. Patient voices no other concerns at this time.            Past Medical History:     Past Medical History:   Diagnosis Date    Arthritis     osteoarth    Heart disease 2018    Hypertension age 49    Influenza B 4/19/2018    Spider veins     Thyroid disease age 49            Past Surgical History:     Past Surgical " History:   Procedure Laterality Date    CHOLECYSTECTOMY      COLONOSCOPY N/A 2020    Procedure: COLONOSCOPY;  Surgeon: Hugo Troncoso MD;  Location:  GI    COLPORRHAPY ANTERIOR, CYSTOSCOPY, COMBINED N/A 2025    Procedure: COLPORRHAPHY, ANTERIOR, WITH ENPLACE HYSTEROPEXY AND CYSTOSCOPY (Support the cervix with non-absorbably sutures onto pelvic structures and look in the bladder);  Surgeon: Kodak August MD;  Location: [facility] OR    CYSTOSCOPY      CYSTOSCOPY, INJECT COLLAGEN, COMBINED N/A 2025    Procedure: AND PERIURETHRAL BULKING (inject filler into the walls of the urethra);  Surgeon: Kodak August MD;  Location: [facility] OR    ENT SURGERY      turbinectomy & septoplasty    EYE SURGERY      blepharoplasty    GI SURGERY      rectal sphincter    IMPLANT STIMULATOR SACRAL NERVE STAGE ONE N/A 2023    Procedure: INSERTION, SACRAL NERVE STIMULATOR, STAGE 1;  Surgeon: Sheba Song MD;  Location: [facility] OR    IMPLANT STIMULATOR SACRAL NERVE STAGE TWO N/A 2023    Procedure: INSERTION, SACRAL NERVE STIMULATOR, STAGE 2;  Surgeon: Sheba Song MD;  Location: [facility] OR    VASCULAR SURGERY  1988    vein stripping            Social History:     Social History     Tobacco Use    Smoking status: Never     Passive exposure: Never    Smokeless tobacco: Never   Substance Use Topics    Alcohol use: Yes     Comment: 1-2 glass of wine per day            Family History:     Family History   Problem Relation Age of Onset    Hypertension Mother             Hyperlipidemia Mother     Cerebrovascular Disease Mother     Thyroid Disease Mother     Family History Negative Father             Alzheimer Disease Father     Cancer Father         sarcoma    Hypertension Father     Basal cell carcinoma Father     Hyperlipidemia Sister     Thyroid Disease Sister     Thyroid Disease Sister     Hyperlipidemia Sister     Family History Negative Maternal Grandmother         "      Anesthesia Reaction No family hx of     Thrombosis No family hx of               Allergies:   No Known Allergies         Medications:     Current Outpatient Medications   Medication Sig Dispense Refill    Acetaminophen (TYLENOL PO) Take 500 mg by mouth every 8 hours as needed for mild pain or fever      apixaban ANTICOAGULANT (ELIQUIS ANTICOAGULANT) 5 MG tablet Take 1 tablet (5 mg) by mouth 2 times daily. 180 tablet 3    atorvastatin (LIPITOR) 20 MG tablet Take 1 tablet (20 mg) by mouth at bedtime. 90 tablet 3    calcium carbonate (OSCAL 500) 1250 (500 Ca) MG TABS tablet Take 1 tablet by mouth 2 times daily      carvedilol (COREG) 6.25 MG tablet TAKE 1 TABLET BY MOUTH TWICE A DAY WITH FOOD 180 tablet 0    cycloSPORINE (RESTASIS) 0.05 % ophthalmic emulsion Place 1 drop into both eyes 2 times daily      erythromycin (ROMYCIN) 5 MG/GM ophthalmic ointment       estradiol (ESTRACE) 0.1 MG/GM vaginal cream Place 2 g vaginally twice a week. 42.5 g 11    flecainide (TAMBOCOR) 50 MG tablet Take 1 tablet (50 mg) by mouth 2 times daily. 180 tablet 3    levothyroxine (SYNTHROID/LEVOTHROID) 50 MCG tablet Take 1 tablet (50 mcg) by mouth daily. 90 tablet 3    losartan (COZAAR) 25 MG tablet Take 1 tablet (25 mg) by mouth every evening. 90 tablet 3    MAGNESIUM GLYCINATE PO Take 300 mg by mouth every evening OTC unsure of dose.      minoxidil (ROGAINE) 5 % external solution       Probiotic Product (PROBIOTIC COLON SUPPORT PO) Take 1 tablet by mouth daily      sulfamethoxazole-trimethoprim (BACTRIM) 400-80 MG tablet Take 1 tablet by mouth 2 times daily. 60 tablet 3    zinc sulfate (ZINCATE) 220 (50 Zn) MG capsule Take 220 mg by mouth daily       No current facility-administered medications for this visit.             Review of Systems:    ROS: 14 point ROS neg other than the symptoms noted above in the HPI.          Physical Exam:   Height 1.651 m (5' 5\"), weight 63.5 kg (140 lb), not currently breastfeeding.  5' 5\", " Body mass index is 23.3 kg/m ., 140 lbs 0 oz  Gen appearance: Age-appropriate appearing female in NAD.   HEENT:  EOMI, conjunctiva clear/white. Normal ROM of neck for age.   Psych:  alert , In no acute distress.  Neuro:  A&Ox3  Resp:  Normal respiratory effort; not in acute respiratory distress.          Data:    Labs:    Creatinine   Date Value Ref Range Status   02/05/2025 0.82 0.51 - 0.95 mg/dL Final   02/10/2021 0.85 0.52 - 1.04 mg/dL Final         6/6/24 UDS  POSTPROCEDURE DIAGNOSES:  *Note: Interstim is turned on throughout entire study.  -With prolapse NOT reduced:  Maximum cystometric capacity 495 mL with intact filling sensations.  Good bladder compliance without detrusor overactivity.  No stress urinary incontinence while seated, but there is a moderate volume leak upon standing up at capacity.  Maximum detrusor contraction during voiding reaches 16 cm H2O. She voids 315 mL with Qmax 13 ml/s, intermittent flow curve, increased EMG activity (suspect technical artifact), incomplete bladder emptying ( mL), and no evidence for bladder outlet obstruction (BOOI -18.2). Note that slightly elevated PVR is consistent with pre-void PVR of 200 mL.   -With prolapse REDUCED:   Maximum cystometric capacity 434 mL with intact filling sensations.   Good bladder compliance without detrusor overactivity.   No stress incontinence while seated, but she does have a large volume leak upon standing up at capacity. After the initial leak with standing, there is additional stress incontinence associated with coughing while standing with ALPP 112 cm H2O.  Maximum detrusor contraction during voiding reaches 21 cm H2O. She voids 334 mL with Qmax 9.0 ml/s, continuous but slightly prolonged flow curve, increased EMG activity (again, suspect technical artifact), improved but still incomplete bladder emptying ( mL), and no evidence for bladder outlet obstruction (BOOI 1.8).

## 2025-03-24 NOTE — NURSING NOTE
Current patient location: [address] [place] [address]    Is the patient currently in the state of [place]? YES    Visit mode: VIDEO    If the visit is dropped, the patient can be reconnected by:VIDEO VISIT: Text to cell phone:   Telephone Information:   Mobile [phone]       Will anyone else be joining the visit? NO  (If patient encounters technical issues they should call [phone]    Are changes needed to the allergy or medication list? No    Are refills needed on medications prescribed by this physician? NO    Rooming Documentation:  Questionnaire(s) completed    Reason for visit: RECHXOCHITL GARCIAF

## 2025-03-31 ENCOUNTER — TRANSFERRED RECORDS (OUTPATIENT)
Dept: HEALTH INFORMATION MANAGEMENT | Facility: CLINIC | Age: 75
End: 2025-03-31
Payer: COMMERCIAL

## 2025-04-01 DIAGNOSIS — I48.0 PAROXYSMAL ATRIAL FIBRILLATION (H): ICD-10-CM

## 2025-04-01 RX ORDER — CARVEDILOL 6.25 MG/1
TABLET ORAL
Qty: 180 TABLET | Refills: 0 | OUTPATIENT
Start: 2025-04-01

## 2025-05-05 ENCOUNTER — VIRTUAL VISIT (OUTPATIENT)
Dept: UROLOGY | Facility: CLINIC | Age: 75
End: 2025-05-05
Payer: COMMERCIAL

## 2025-05-05 DIAGNOSIS — N81.11 MIDLINE CYSTOCELE: Primary | ICD-10-CM

## 2025-05-05 DIAGNOSIS — N95.8 GENITOURINARY SYNDROME OF MENOPAUSE: ICD-10-CM

## 2025-05-05 DIAGNOSIS — R39.15 URGENCY OF URINATION: ICD-10-CM

## 2025-05-05 DIAGNOSIS — I48.0 PAROXYSMAL ATRIAL FIBRILLATION (H): ICD-10-CM

## 2025-05-05 RX ORDER — CARVEDILOL 6.25 MG/1
TABLET ORAL
Qty: 180 TABLET | Refills: 2 | Status: SHIPPED | OUTPATIENT
Start: 2025-05-05

## 2025-05-05 NOTE — PROGRESS NOTES
Reason for visit:  f/u on hysteropexy and periurethral bulking from 2/11/25    Clinical data: Nicole Stanley is a 74 year old female with pelvic organ prolapse as well as urinary frequency and urgency and stress urinary incontinence.  She also has an interstim in place that since November of 2023 and feels the stimulation in the rectum.  She is currently at level 2.  Increasing her settings helped her urgency.  She also tried Vibegron but only for two weeks bc she started to get headaches so she stopped it after 2 weeks.      Reviewed UDS from 6/6/24  Maximum cystometric capacity 495 mL with intact filling sensations.  Good bladder compliance without detrusor overactivity.  No stress urinary incontinence while seated, but there is a moderate volume leak upon standing up at capacity.  Maximum detrusor contraction during voiding reaches 16 cm H2O. She voids 315 mL with Qmax 13 ml/s, intermittent flow curve, increased EMG activity (suspect technical artifact), incomplete bladder emptying ( mL), and no evidence for bladder outlet obstruction (BOOI -18.2). Note that slightly elevated PVR is consistent with pre-void PVR of 200 mL.       Assessment & Plan   75 y/o female with pelvic organ prolapse and urgency and urge incontinence. She underwent hysteropexy with enplace and periuethral bulking and is doing well.  -continue estrogen cream  -continue with the interstim settings.  -f/u prn    Kodak August MD  [facility]

## 2025-05-05 NOTE — NURSING NOTE
Current patient location: [address] [place] [address]    Is the patient currently in the state of [place]? YES    Visit mode: VIDEO    If the visit is dropped, the patient can be reconnected by:VIDEO VISIT: Text to cell phone:   Telephone Information:   Mobile [phone]       Will anyone else be joining the visit? NO  (If patient encounters technical issues they should call [phone]    Are changes needed to the allergy or medication list? No    Are refills needed on medications prescribed by this physician? NO    Rooming Documentation:  Not applicable    Reason for visit: CRISTAL GARCIAF

## 2025-05-05 NOTE — PROGRESS NOTES
Virtual Visit Details    Type of service:  Video Visit   Video Start Time: 12:54 PM  Video End Time:1:01 PM    Originating Location (pt. Location): Home    Distant Location (provider location):  Off-site  Platform used for Video Visit: Leida

## 2025-06-16 ENCOUNTER — TRANSFERRED RECORDS (OUTPATIENT)
Dept: HEALTH INFORMATION MANAGEMENT | Facility: CLINIC | Age: 75
End: 2025-06-16
Payer: COMMERCIAL

## 2025-07-10 ENCOUNTER — TELEPHONE (OUTPATIENT)
Dept: CARDIOLOGY | Facility: CLINIC | Age: 75
End: 2025-07-10
Payer: COMMERCIAL

## 2025-07-10 NOTE — TELEPHONE ENCOUNTER
Patient Contacted for the patient to call back and schedule the following:    Appointment type: Return EP  Provider: Dr. Porras  Return date: next tarsha  Specialty phone number: [phone] Option 1  Additional appointment(s) needed: NA  Additonal Notes: Pt declined January scheduling, pt is away entire month. Requested call back when February template opens

## 2025-08-05 ENCOUNTER — TELEPHONE (OUTPATIENT)
Dept: CARDIOLOGY | Facility: CLINIC | Age: 75
End: 2025-08-05
Payer: COMMERCIAL

## (undated) DEVICE — PACK CYSTO CUSTOM ASC

## (undated) DEVICE — STIMULATOR EXTERNAL VERIFY MEDTRONIC INTERSTIM 353101

## (undated) DEVICE — SOL WATER IRRIG 500ML BOTTLE 2F7113

## (undated) DEVICE — PAD CHUX UNDERPAD 30X36" P3036C

## (undated) DEVICE — PREP POVIDONE-IODINE 7.5% SCRUB 4OZ BOTTLE MDS093945

## (undated) DEVICE — PACK MINOR CUSTOM ASC

## (undated) DEVICE — SU DERMABOND ADVANCED .7ML DNX12

## (undated) DEVICE — GLOVE BIOGEL PI MICRO INDICATOR UNDERGLOVE SZ 7.5 48975

## (undated) DEVICE — NDL COUNTER 20CT 31142493

## (undated) DEVICE — DRSG TEGADERM 4X4 3/4" 1626W

## (undated) DEVICE — GLOVE BIOGEL PI MICRO SZ 7.0 48570

## (undated) DEVICE — RETR ELASTIC STAYS LONE STAR SHARP 5MM 8/PACK 3311-8G

## (undated) DEVICE — ESU GROUND PAD ADULT W/CORD E7507

## (undated) DEVICE — SU MONOCRYL 4-0 PS-2 27" UND Y426H

## (undated) DEVICE — SYR 10ML FINGER CONTROL W/O NDL 309695

## (undated) DEVICE — DRSG TELFA 3X8" 1238

## (undated) DEVICE — SUCTION TIP YANKAUER W/O VENT K86

## (undated) DEVICE — DRSG TEGADERM 2 3/8X2 3/4" 1624W

## (undated) DEVICE — GLOVE BIOGEL PI MICRO SZ 6.5 48565

## (undated) DEVICE — GLOVE RADIATION RESISTANT SZ 7  95-394

## (undated) DEVICE — LINEN TOWEL PACK X5 5464

## (undated) DEVICE — CONNECTOR WATER VALVE PERFUSION PACK STR 020272801

## (undated) DEVICE — SU VICRYL 3-0 SH 27" UND J416H

## (undated) DEVICE — DRAPE STERI TOWEL LG 1010

## (undated) DEVICE — SUCTION MANIFOLD NEPTUNE 2 SYS 1 PORT 702-025-000

## (undated) DEVICE — SU VICRYL 2-0 SH 27" UND J417H

## (undated) DEVICE — BLADE KNIFE SURG 15 371115

## (undated) DEVICE — PREP POVIDONE IODINE SOLUTION 10% 4OZ BOTTLE 29906-004

## (undated) DEVICE — DRAPE C-ARMOR 5 SIDED 5523

## (undated) DEVICE — SUCTION MANIFOLD NEPTUNE 2 SYS 4 PORT 0702-020-000

## (undated) DEVICE — DRAPE LAP W/ARMBOARD 29410

## (undated) DEVICE — SU VICRYL+ 2-0 27 UR-6 VLT VCP602H

## (undated) DEVICE — CATH TRAY FOLEY SURESTEP 16FR W/DRAIN BAG LF A300416A

## (undated) DEVICE — LIGHT HANDLE X1 31140133

## (undated) DEVICE — PREP CHLORAPREP 26ML TINTED ORANGE  260815

## (undated) DEVICE — RETR RING LONE STAR 28.3X18.3CM W/CATH CLIPSX2 3308G

## (undated) DEVICE — DRSG TEGADERM 4X10" 1627

## (undated) DEVICE — DRAPE UNDER BUTTOCK 8483

## (undated) DEVICE — TUBING SUCTION MEDI-VAC 1/4"X20' N620A

## (undated) DEVICE — SOL NACL 0.9% IRRIG 500ML BOTTLE 2F7123

## (undated) DEVICE — COVER CAMERA IN-LIGHT DISP LT-C02

## (undated) DEVICE — ESU PENCIL SMOKE EVAC W/ROCKER SWITCH 0703-047-000

## (undated) DEVICE — NDL SPINAL 22GA 7" QUINCKE 405149

## (undated) DEVICE — SOL NACL 0.9% INJ 1000ML BAG 2B1324X

## (undated) DEVICE — DRAPE IOBAN INCISE 23X17" 6650EZ

## (undated) RX ORDER — FENTANYL CITRATE 50 UG/ML
INJECTION, SOLUTION INTRAMUSCULAR; INTRAVENOUS
Status: DISPENSED
Start: 2023-11-06

## (undated) RX ORDER — CEFAZOLIN SODIUM 1 G/3ML
INJECTION, POWDER, FOR SOLUTION INTRAMUSCULAR; INTRAVENOUS
Status: DISPENSED
Start: 2023-11-06

## (undated) RX ORDER — DEXMEDETOMIDINE HYDROCHLORIDE 4 UG/ML
INJECTION, SOLUTION INTRAVENOUS
Status: DISPENSED
Start: 2023-11-06

## (undated) RX ORDER — FENTANYL CITRATE 50 UG/ML
INJECTION, SOLUTION INTRAMUSCULAR; INTRAVENOUS
Status: DISPENSED
Start: 2023-11-20

## (undated) RX ORDER — CEFAZOLIN SODIUM 2 G/50ML
SOLUTION INTRAVENOUS
Status: DISPENSED
Start: 2023-11-20

## (undated) RX ORDER — LIDOCAINE HYDROCHLORIDE 20 MG/ML
JELLY TOPICAL
Status: DISPENSED
Start: 2023-11-06

## (undated) RX ORDER — PROPOFOL 10 MG/ML
INJECTION, EMULSION INTRAVENOUS
Status: DISPENSED
Start: 2023-11-20

## (undated) RX ORDER — SIMETHICONE 20 MG/.3ML
EMULSION ORAL
Status: DISPENSED
Start: 2018-09-19

## (undated) RX ORDER — FENTANYL CITRATE 50 UG/ML
INJECTION, SOLUTION INTRAMUSCULAR; INTRAVENOUS
Status: DISPENSED
Start: 2020-11-12

## (undated) RX ORDER — ACETAMINOPHEN 325 MG/1
TABLET ORAL
Status: DISPENSED
Start: 2025-02-11

## (undated) RX ORDER — LIDOCAINE HYDROCHLORIDE 20 MG/ML
JELLY TOPICAL
Status: DISPENSED
Start: 2025-02-11

## (undated) RX ORDER — ESTRADIOL 0.1 MG/G
CREAM VAGINAL
Status: DISPENSED
Start: 2025-02-11

## (undated) RX ORDER — ONDANSETRON 2 MG/ML
INJECTION INTRAMUSCULAR; INTRAVENOUS
Status: DISPENSED
Start: 2025-02-11

## (undated) RX ORDER — EPHEDRINE SULFATE 50 MG/ML
INJECTION, SOLUTION INTRAMUSCULAR; INTRAVENOUS; SUBCUTANEOUS
Status: DISPENSED
Start: 2025-02-11

## (undated) RX ORDER — BUPIVACAINE HYDROCHLORIDE 2.5 MG/ML
INJECTION, SOLUTION EPIDURAL; INFILTRATION; INTRACAUDAL
Status: DISPENSED
Start: 2025-02-11

## (undated) RX ORDER — ACETAMINOPHEN 325 MG/1
TABLET ORAL
Status: DISPENSED
Start: 2023-11-06

## (undated) RX ORDER — HYDROMORPHONE HYDROCHLORIDE 1 MG/ML
INJECTION, SOLUTION INTRAMUSCULAR; INTRAVENOUS; SUBCUTANEOUS
Status: DISPENSED
Start: 2025-02-11

## (undated) RX ORDER — NITROGLYCERIN 0.4 MG/1
TABLET SUBLINGUAL
Status: DISPENSED
Start: 2024-11-27

## (undated) RX ORDER — METOPROLOL TARTRATE 1 MG/ML
INJECTION, SOLUTION INTRAVENOUS
Status: DISPENSED
Start: 2024-11-27

## (undated) RX ORDER — CEFAZOLIN SODIUM 2 G/50ML
SOLUTION INTRAVENOUS
Status: DISPENSED
Start: 2025-02-11

## (undated) RX ORDER — SIMETHICONE 40MG/0.6ML
SUSPENSION, DROPS(FINAL DOSAGE FORM)(ML) ORAL
Status: DISPENSED
Start: 2020-11-12

## (undated) RX ORDER — PROPOFOL 10 MG/ML
INJECTION, EMULSION INTRAVENOUS
Status: DISPENSED
Start: 2025-02-11

## (undated) RX ORDER — FENTANYL CITRATE 50 UG/ML
INJECTION, SOLUTION INTRAMUSCULAR; INTRAVENOUS
Status: DISPENSED
Start: 2025-02-11

## (undated) RX ORDER — ACETAMINOPHEN 325 MG/1
TABLET ORAL
Status: DISPENSED
Start: 2023-11-20

## (undated) RX ORDER — SULFAMETHOXAZOLE/TRIMETHOPRIM 800-160 MG
TABLET ORAL
Status: DISPENSED
Start: 2024-06-06

## (undated) RX ORDER — GLYCOPYRROLATE 0.2 MG/ML
INJECTION INTRAMUSCULAR; INTRAVENOUS
Status: DISPENSED
Start: 2023-11-06

## (undated) RX ORDER — CEFAZOLIN SODIUM 2 G/50ML
SOLUTION INTRAVENOUS
Status: DISPENSED
Start: 2023-11-06

## (undated) RX ORDER — KETOROLAC TROMETHAMINE 30 MG/ML
INJECTION, SOLUTION INTRAMUSCULAR; INTRAVENOUS
Status: DISPENSED
Start: 2025-02-11

## (undated) RX ORDER — FENTANYL CITRATE 50 UG/ML
INJECTION, SOLUTION INTRAMUSCULAR; INTRAVENOUS
Status: DISPENSED
Start: 2018-09-19

## (undated) RX ORDER — DEXAMETHASONE SODIUM PHOSPHATE 4 MG/ML
INJECTION, SOLUTION INTRA-ARTICULAR; INTRALESIONAL; INTRAMUSCULAR; INTRAVENOUS; SOFT TISSUE
Status: DISPENSED
Start: 2025-02-11